# Patient Record
Sex: MALE | Race: OTHER | NOT HISPANIC OR LATINO | URBAN - METROPOLITAN AREA
[De-identification: names, ages, dates, MRNs, and addresses within clinical notes are randomized per-mention and may not be internally consistent; named-entity substitution may affect disease eponyms.]

---

## 2023-08-14 PROBLEM — L30.9 DERMATITIS OF EXTERNAL EAR: Status: ACTIVE | Noted: 2023-08-14

## 2023-08-14 PROBLEM — R00.0 SINUS TACHYCARDIA: Status: ACTIVE | Noted: 2023-08-14

## 2023-08-14 PROBLEM — R31.29 PERSISTENT MICROSCOPIC HEMATURIA: Status: ACTIVE | Noted: 2023-08-14

## 2023-08-14 PROBLEM — M35.3 PMR (POLYMYALGIA RHEUMATICA) (MULTI): Status: ACTIVE | Noted: 2023-08-14

## 2023-08-14 PROBLEM — N52.2 DRUG-INDUCED ERECTILE DYSFUNCTION: Status: ACTIVE | Noted: 2023-08-14

## 2023-08-14 PROBLEM — I10 BENIGN ESSENTIAL HTN: Status: ACTIVE | Noted: 2023-08-14

## 2023-08-14 PROBLEM — L82.1 SEBORRHEIC KERATOSIS: Status: ACTIVE | Noted: 2023-08-14

## 2023-08-14 PROBLEM — M79.10 MYALGIA: Status: ACTIVE | Noted: 2023-08-14

## 2023-08-14 PROBLEM — R31.9 HEMATURIA: Status: ACTIVE | Noted: 2023-08-14

## 2023-08-14 PROBLEM — I10 HYPERTENSION, ACCELERATED: Status: ACTIVE | Noted: 2023-08-14

## 2023-08-14 PROBLEM — E78.2 MIXED HYPERLIPIDEMIA: Status: ACTIVE | Noted: 2023-08-14

## 2023-08-14 PROBLEM — R33.9 URINARY RETENTION: Status: ACTIVE | Noted: 2023-08-14

## 2023-08-14 PROBLEM — M25.50 ARTHRALGIA OF MULTIPLE SITES, BILATERAL: Status: ACTIVE | Noted: 2023-08-14

## 2023-08-14 RX ORDER — LISINOPRIL AND HYDROCHLOROTHIAZIDE 12.5; 2 MG/1; MG/1
1 TABLET ORAL DAILY
COMMUNITY
Start: 2021-05-19 | End: 2023-10-26 | Stop reason: SDUPTHER

## 2023-08-14 RX ORDER — DESONIDE 0.5 MG/G
CREAM TOPICAL
COMMUNITY

## 2023-08-14 RX ORDER — SILDENAFIL 50 MG/1
TABLET, FILM COATED ORAL
COMMUNITY
Start: 2021-11-17 | End: 2024-02-14 | Stop reason: SDUPTHER

## 2023-08-14 RX ORDER — ERGOCALCIFEROL 1.25 1/1
CAPSULE ORAL
COMMUNITY
Start: 2023-02-15

## 2023-08-14 RX ORDER — HYDROCHLOROTHIAZIDE 12.5 MG/1
1 TABLET ORAL DAILY
COMMUNITY
Start: 2022-05-19 | End: 2024-01-30 | Stop reason: SDUPTHER

## 2023-08-14 RX ORDER — KETOCONAZOLE 20 MG/G
CREAM TOPICAL
COMMUNITY

## 2023-08-14 RX ORDER — FLUOCINONIDE TOPICAL SOLUTION USP, 0.05% 0.5 MG/ML
SOLUTION TOPICAL
COMMUNITY

## 2023-08-14 RX ORDER — HYDROCORTISONE 25 MG/G
CREAM TOPICAL
COMMUNITY

## 2023-08-14 RX ORDER — NEBIVOLOL 10 MG/1
1 TABLET ORAL DAILY
COMMUNITY
Start: 2021-06-02 | End: 2024-01-30 | Stop reason: SDUPTHER

## 2023-08-14 RX ORDER — KETOCONAZOLE 20 MG/ML
SHAMPOO, SUSPENSION TOPICAL
COMMUNITY
Start: 2021-02-03

## 2023-08-15 NOTE — PROGRESS NOTES
"Subjective   Patient ID: Regis Mixon is a 63 y.o. male who presents for Hypertension, Hyperlipidemia, and Follow-up. I last saw the patient on 2/15/23.     HPI   Patient has no medical complaints today.     He has a BP log with him.     He notes that he has chronic dryness in bilateral ears. Aquaphor with some relief. He admits to some itching of the ears.     Review of Systems  Except positives as noted in the CC & HPI      Constitutional: Denies fevers, chills, night sweats, fatigue, weight changes, change in appetite    Eyes: Denies blurry vision, double vision    ENT: Denies otalgia, trouble hearing, tinnitus, vertigo, nasal congestion, rhinorrhea, sore throat    Neck: Denies swelling, masses    Cardiovascular: Denies chest pain, palpitations, edema, orthopnea, syncope    Respiratory: Denies dyspnea, cough, wheezing, postural nocturnal dyspnea    Gastrointestinal: Denies abdominal pain, nausea, vomiting, diarrhea, constipation, melena, hematochezia    Genitourinary: Denies dysuria, hematuria, frequency, urgency    Musculoskeletal: Denies back pain, neck pain, arthralgias, myalgias    Integumentary: Denies skin lesions, rashes, masses    Neurological: Denies dizziness, headaches, confusion, limb weakness, paresthesias, syncope, convulsions    Psychiatric: Denies depression, anxiety, homicidal ideations, suicidal ideations, sleep disturbances    Endocrine: Denies polyphagia, polydipsia, polyuria, weakness, hair thinning, heat intolerance, cold intolerance, weight changes    Heme/Lymph: Denies easy bruising, easy bleeding, swollen glands    Objective   /82 (BP Location: Left arm, Patient Position: Sitting)   Pulse 80   Temp 36.6 °C (97.9 °F)   Resp 16   Ht 1.778 m (5' 10\")   Wt 99.2 kg (218 lb 9.6 oz)   SpO2 95%   BMI 31.37 kg/m²     Physical Exam  136/72 on recheck of BP in the right arm. 126/82 on recheck of BP in the left arm.     General Appearance - well-developed, well-nourished, 63 y.o., White " male in no acute distress.     Skin - warm, pink and dry without rash or concerning lesions.     Mental Status - alert and oriented x 3. Normal mood and affect appropriate to mood.     Ears - TMs shiny and move well with insufflation. Ear canals are clear bilaterally. Slight dryness and scaling of the external ear.     Neck - supple without lymphadenopathy. Carotid pulses are normal without bruits. Thyroid is normal in midline without nodules.     Chest - lungs are clear to auscultation without rales, rhonchi or wheezes.     Heart - regular, rate and rhythm without murmurs, rubs or gallops.    Abdomen - soft, obese, protuberant, nontender, nondistended. No masses, hepatomegaly or splenomegaly is noted. No rebound, rigidity or guarding is noted. Bowel sounds are normoactive.    Extremities - no cyanosis, clubbing or edema. Pedal pulses are 2+ normal at the dorsalis pedis and posterior pulses bilaterally.     Neurological - cranial nerves II through XII are grossly intact. Motor strength 5/5 at all fours.     Assessment/Plan   1. Benign essential HTN  Follow Up In [facility] - PCP - Established    CBC and Auto Differential    Comprehensive Metabolic Panel    CANCELED: CBC and Auto Differential    CANCELED: Comprehensive Metabolic Panel    CANCELED: Follow Up In [facility] - PCP - Established      2. Dermatitis of external ear  alclometasone (Aclovate) 0.05 % cream      3. Mixed hyperlipidemia  Follow Up In [facility] - PCP - Established    Lipid Panel    CANCELED: Lipid Panel    CANCELED: Follow Up In [facility] - PCP - Established      4. PMR (polymyalgia rheumatica) (CMS/[facility])  Follow Up In [facility] - PCP - Established    CANCELED: Follow Up In [facility] - PCP - Established      5. Hyperglycemia  Comprehensive Metabolic Panel    Hemoglobin A1C    CANCELED: Comprehensive Metabolic Panel    CANCELED: Hemoglobin A1C      6. Class 1 obesity due to  excess calories without serious comorbidity with body mass index (BMI) of 31.0 to 31.9 in adult  CBC and Auto Differential    CANCELED: CBC and Auto Differential      7. Screening PSA (prostate specific antigen)  Prostate Specific Antigen, Screen    CANCELED: Prostate Specific Antigen, Screen      8. Need for Tdap vaccination  diphth,pertus,acell,,tetanus (BoostRIX) 2.5-8-5 Lf-mcg-Lf/0.5mL injection      Patient to continue current medications (with any exceptions as noted) and diet. Follow-up in 6 month(s) otherwise as needed.      Patient is to complete fasting CBC, CMP, lipid panel, A1c, PSA labs today. Will call patient with results when available.     Patient was started on:   Alclometasone 0.05% Cream, APPLY A THIN FILM TO AFFECT AREA(S)     Rx(s) sent to pharmacy.     Patient declines HIV and Hep C screening.     Tdap injection given in the office today.       Scribe Attestation  By signing my name below, [employer] Scribe   attest that this documentation has been prepared under the direction and in the presence of Terence Nagel MD.

## 2023-08-16 ENCOUNTER — OFFICE VISIT (OUTPATIENT)
Dept: PRIMARY CARE | Facility: CLINIC | Age: 63
End: 2023-08-16
Payer: COMMERCIAL

## 2023-08-16 ENCOUNTER — LAB (OUTPATIENT)
Dept: LAB | Facility: LAB | Age: 63
End: 2023-08-16
Payer: COMMERCIAL

## 2023-08-16 VITALS
TEMPERATURE: 97.9 F | OXYGEN SATURATION: 95 % | BODY MASS INDEX: 31.3 KG/M2 | DIASTOLIC BLOOD PRESSURE: 82 MMHG | HEIGHT: 70 IN | RESPIRATION RATE: 16 BRPM | SYSTOLIC BLOOD PRESSURE: 126 MMHG | HEART RATE: 80 BPM | WEIGHT: 218.6 LBS

## 2023-08-16 DIAGNOSIS — M35.3 PMR (POLYMYALGIA RHEUMATICA) (MULTI): ICD-10-CM

## 2023-08-16 DIAGNOSIS — R73.9 HYPERGLYCEMIA: ICD-10-CM

## 2023-08-16 DIAGNOSIS — I10 BENIGN ESSENTIAL HTN: ICD-10-CM

## 2023-08-16 DIAGNOSIS — E78.2 MIXED HYPERLIPIDEMIA: ICD-10-CM

## 2023-08-16 DIAGNOSIS — Z12.5 SCREENING PSA (PROSTATE SPECIFIC ANTIGEN): ICD-10-CM

## 2023-08-16 DIAGNOSIS — E66.09 CLASS 1 OBESITY DUE TO EXCESS CALORIES WITHOUT SERIOUS COMORBIDITY WITH BODY MASS INDEX (BMI) OF 31.0 TO 31.9 IN ADULT: ICD-10-CM

## 2023-08-16 DIAGNOSIS — I10 BENIGN ESSENTIAL HTN: Primary | ICD-10-CM

## 2023-08-16 DIAGNOSIS — L30.9 DERMATITIS OF EXTERNAL EAR: ICD-10-CM

## 2023-08-16 DIAGNOSIS — Z23 NEED FOR TDAP VACCINATION: ICD-10-CM

## 2023-08-16 PROBLEM — E66.811 CLASS 1 OBESITY DUE TO EXCESS CALORIES WITHOUT SERIOUS COMORBIDITY WITH BODY MASS INDEX (BMI) OF 31.0 TO 31.9 IN ADULT: Status: ACTIVE | Noted: 2023-08-16

## 2023-08-16 LAB
ALANINE AMINOTRANSFERASE (SGPT) (U/L) IN SER/PLAS: 19 U/L (ref 10–52)
ALBUMIN (G/DL) IN SER/PLAS: 4.4 G/DL (ref 3.4–5)
ALKALINE PHOSPHATASE (U/L) IN SER/PLAS: 50 U/L (ref 33–136)
ANION GAP IN SER/PLAS: 11 MMOL/L (ref 10–20)
ASPARTATE AMINOTRANSFERASE (SGOT) (U/L) IN SER/PLAS: 20 U/L (ref 9–39)
BASOPHILS (10*3/UL) IN BLOOD BY AUTOMATED COUNT: 0.03 X10E9/L (ref 0–0.1)
BASOPHILS/100 LEUKOCYTES IN BLOOD BY AUTOMATED COUNT: 0.4 % (ref 0–2)
BILIRUBIN TOTAL (MG/DL) IN SER/PLAS: 0.6 MG/DL (ref 0–1.2)
CALCIUM (MG/DL) IN SER/PLAS: 9.8 MG/DL (ref 8.6–10.3)
CARBON DIOXIDE, TOTAL (MMOL/L) IN SER/PLAS: 30 MMOL/L (ref 21–32)
CHLORIDE (MMOL/L) IN SER/PLAS: 100 MMOL/L (ref 98–107)
CHOLESTEROL (MG/DL) IN SER/PLAS: 208 MG/DL (ref 0–199)
CHOLESTEROL IN HDL (MG/DL) IN SER/PLAS: 38.2 MG/DL
CHOLESTEROL/HDL RATIO: 5.4
CREATININE (MG/DL) IN SER/PLAS: 1.26 MG/DL (ref 0.5–1.3)
EOSINOPHILS (10*3/UL) IN BLOOD BY AUTOMATED COUNT: 0.04 X10E9/L (ref 0–0.7)
EOSINOPHILS/100 LEUKOCYTES IN BLOOD BY AUTOMATED COUNT: 0.5 % (ref 0–6)
ERYTHROCYTE DISTRIBUTION WIDTH (RATIO) BY AUTOMATED COUNT: 12.8 % (ref 11.5–14.5)
ERYTHROCYTE MEAN CORPUSCULAR HEMOGLOBIN CONCENTRATION (G/DL) BY AUTOMATED: 32.5 G/DL (ref 32–36)
ERYTHROCYTE MEAN CORPUSCULAR VOLUME (FL) BY AUTOMATED COUNT: 91 FL (ref 80–100)
ERYTHROCYTES (10*6/UL) IN BLOOD BY AUTOMATED COUNT: 5.13 X10E12/L (ref 4.5–5.9)
ESTIMATED AVERAGE GLUCOSE FOR HBA1C: 114 MG/DL
GFR MALE: 65 ML/MIN/1.73M2
GLUCOSE (MG/DL) IN SER/PLAS: 108 MG/DL (ref 74–99)
HEMATOCRIT (%) IN BLOOD BY AUTOMATED COUNT: 46.5 % (ref 41–52)
HEMOGLOBIN (G/DL) IN BLOOD: 15.1 G/DL (ref 13.5–17.5)
HEMOGLOBIN A1C/HEMOGLOBIN TOTAL IN BLOOD: 5.6 %
IMMATURE GRANULOCYTES/100 LEUKOCYTES IN BLOOD BY AUTOMATED COUNT: 0.3 % (ref 0–0.9)
LDL: 110 MG/DL (ref 0–99)
LEUKOCYTES (10*3/UL) IN BLOOD BY AUTOMATED COUNT: 7.9 X10E9/L (ref 4.4–11.3)
LYMPHOCYTES (10*3/UL) IN BLOOD BY AUTOMATED COUNT: 1.85 X10E9/L (ref 1.2–4.8)
LYMPHOCYTES/100 LEUKOCYTES IN BLOOD BY AUTOMATED COUNT: 23.3 % (ref 13–44)
MONOCYTES (10*3/UL) IN BLOOD BY AUTOMATED COUNT: 0.39 X10E9/L (ref 0.1–1)
MONOCYTES/100 LEUKOCYTES IN BLOOD BY AUTOMATED COUNT: 4.9 % (ref 2–10)
NEUTROPHILS (10*3/UL) IN BLOOD BY AUTOMATED COUNT: 5.61 X10E9/L (ref 1.2–7.7)
NEUTROPHILS/100 LEUKOCYTES IN BLOOD BY AUTOMATED COUNT: 70.6 % (ref 40–80)
NON HDL CHOLESTEROL: 170 MG/DL
PLATELETS (10*3/UL) IN BLOOD AUTOMATED COUNT: 267 X10E9/L (ref 150–450)
POTASSIUM (MMOL/L) IN SER/PLAS: 3.9 MMOL/L (ref 3.5–5.3)
PROSTATE SPECIFIC ANTIGEN,SCREEN: 0.76 NG/ML (ref 0–4)
PROTEIN TOTAL: 7.3 G/DL (ref 6.4–8.2)
SODIUM (MMOL/L) IN SER/PLAS: 137 MMOL/L (ref 136–145)
TRIGLYCERIDE (MG/DL) IN SER/PLAS: 299 MG/DL (ref 0–149)
UREA NITROGEN (MG/DL) IN SER/PLAS: 23 MG/DL (ref 6–23)
VLDL: 60 MG/DL (ref 0–40)

## 2023-08-16 PROCEDURE — 3074F SYST BP LT 130 MM HG: CPT | Performed by: FAMILY MEDICINE

## 2023-08-16 PROCEDURE — 36415 COLL VENOUS BLD VENIPUNCTURE: CPT

## 2023-08-16 PROCEDURE — 83036 HEMOGLOBIN GLYCOSYLATED A1C: CPT

## 2023-08-16 PROCEDURE — 85025 COMPLETE CBC W/AUTO DIFF WBC: CPT

## 2023-08-16 PROCEDURE — 99214 OFFICE O/P EST MOD 30 MIN: CPT | Performed by: FAMILY MEDICINE

## 2023-08-16 PROCEDURE — 3079F DIAST BP 80-89 MM HG: CPT | Performed by: FAMILY MEDICINE

## 2023-08-16 PROCEDURE — 80053 COMPREHEN METABOLIC PANEL: CPT

## 2023-08-16 PROCEDURE — 80061 LIPID PANEL: CPT

## 2023-08-16 PROCEDURE — 3008F BODY MASS INDEX DOCD: CPT | Performed by: FAMILY MEDICINE

## 2023-08-16 PROCEDURE — 84153 ASSAY OF PSA TOTAL: CPT

## 2023-08-16 PROCEDURE — 1036F TOBACCO NON-USER: CPT | Performed by: FAMILY MEDICINE

## 2023-08-16 RX ORDER — ALCLOMETASONE DIPROPIONATE 0.5 MG/G
CREAM TOPICAL 2 TIMES DAILY
Qty: 15 G | Refills: 0 | Status: SHIPPED | OUTPATIENT
Start: 2023-08-16 | End: 2023-08-17 | Stop reason: SDUPTHER

## 2023-08-16 ASSESSMENT — PATIENT HEALTH QUESTIONNAIRE - PHQ9
2. FEELING DOWN, DEPRESSED OR HOPELESS: NOT AT ALL
1. LITTLE INTEREST OR PLEASURE IN DOING THINGS: NOT AT ALL
SUM OF ALL RESPONSES TO PHQ9 QUESTIONS 1 & 2: 0

## 2023-08-16 NOTE — PATIENT INSTRUCTIONS
Patient to continue current medications (with any exceptions as noted) and diet. Follow-up in 6 month(s) otherwise as needed.      Patient is to complete fasting CBC, CMP, lipid panel, A1c, PSA labs today. Will call patient with results when available.     Patient was started on:   Alclometasone 0.05% Cream, APPLY A THIN FILM TO AFFECT AREA(S)     Rx(s) sent to pharmacy.     Patient declines HIV and Hep C screening.     Tdap injection given in the office today.

## 2023-08-17 DIAGNOSIS — L30.9 DERMATITIS OF EXTERNAL EAR: ICD-10-CM

## 2023-08-17 RX ORDER — ALCLOMETASONE DIPROPIONATE 0.5 MG/G
CREAM TOPICAL 2 TIMES DAILY
Qty: 45 G | Refills: 0 | Status: SHIPPED | OUTPATIENT
Start: 2023-08-17 | End: 2024-02-14 | Stop reason: SDUPTHER

## 2023-08-17 NOTE — TELEPHONE ENCOUNTER
Rx refill request     Name: Regis Santana   : 1960  Medication Name: Alclometasone 0.05% topical cream   Pharmacy: [facility]   Date of last appointment: 2023  Date of next appointment: 2024  Best number to reach patient: [phone]    Pharmacy states they have this in a 45gm tube due to backorder if not then is there an alternative?      Please advise  New rx pending

## 2023-08-19 NOTE — RESULT ENCOUNTER NOTE
Please call the patient regarding his abnormal result.  Blood sugar is mildly elevated at 108 but hemoglobin A1c is just within normal range at 5.6.  T.Chol 208, , HDL 38, . Follow low cholesterol, low fat diet and exercise as tolerated.  Might consider cholesterol-lowering medication.  PSA is normal at 0.76.  CBC is normal.

## 2023-08-27 DIAGNOSIS — E78.2 MIXED HYPERLIPIDEMIA: ICD-10-CM

## 2023-08-27 DIAGNOSIS — E78.2 MIXED HYPERLIPIDEMIA: Primary | ICD-10-CM

## 2023-08-27 RX ORDER — PITAVASTATIN CALCIUM 4.18 MG/1
4 TABLET, FILM COATED ORAL DAILY
Qty: 30 TABLET | Refills: 0 | Status: SHIPPED | OUTPATIENT
Start: 2023-08-27 | End: 2023-08-27 | Stop reason: SDUPTHER

## 2023-08-27 RX ORDER — PITAVASTATIN CALCIUM 4.18 MG/1
4 TABLET, FILM COATED ORAL DAILY
Qty: 90 TABLET | Refills: 3 | Status: SHIPPED | OUTPATIENT
Start: 2023-08-27 | End: 2023-09-06 | Stop reason: SDUPTHER

## 2023-08-28 ENCOUNTER — TELEPHONE (OUTPATIENT)
Dept: PRIMARY CARE | Facility: CLINIC | Age: 63
End: 2023-08-28

## 2023-08-28 NOTE — TELEPHONE ENCOUNTER
Patient called stating he talked to dr. Nagel about cholesterol meds based off his labs and he was supposed to send a script to [facility] but they havent received anything yet. Please advise

## 2023-09-01 ENCOUNTER — TELEPHONE (OUTPATIENT)
Dept: PRIMARY CARE | Facility: CLINIC | Age: 63
End: 2023-09-01
Payer: COMMERCIAL

## 2023-09-01 DIAGNOSIS — E78.2 MIXED HYPERLIPIDEMIA: Primary | ICD-10-CM

## 2023-09-01 NOTE — TELEPHONE ENCOUNTER
[facility] called stating the clarification needed is that there is only name brand for this medication.   Please advise  [facility] Clarification line : [phone]  ID : [id number]

## 2023-09-01 NOTE — TELEPHONE ENCOUNTER
Patient called in stating he received an email from [facility] regarding his prescription for pitavastatin. He stated the email is advising him the pharmacy is needing to review this prescription and is needing to speak with Dr. Nagel. Patient stated there was no other information provided in this email and is asking for Dr. Nagel to contact [facility] at [phone]  Please Advise

## 2023-09-05 NOTE — TELEPHONE ENCOUNTER
Ashley from [facility] called in regarding this message. She stated the patient is showing an allergy to rosuvastatin in their system and they are wondering if Dr. Nagel is ok with the patient taking this as it is in the same family of medications  Please Advise

## 2023-09-06 RX ORDER — PITAVASTATIN CALCIUM 4.18 MG/1
4 TABLET, FILM COATED ORAL DAILY
Qty: 14 TABLET | Refills: 0 | Status: SHIPPED | OUTPATIENT
Start: 2023-09-06 | End: 2023-09-07 | Stop reason: SDUPTHER

## 2023-09-06 NOTE — TELEPHONE ENCOUNTER
Pitavastatin was sent to Pharmacy. Should rx be for Pravastatin? He was on Pravastatin 80 mg previously. Please advise.

## 2023-09-07 RX ORDER — PITAVASTATIN CALCIUM 4.18 MG/1
TABLET, FILM COATED ORAL
Qty: 90 TABLET | Refills: 3 | Status: SHIPPED | OUTPATIENT
Start: 2023-09-07 | End: 2023-11-17 | Stop reason: SDUPTHER

## 2023-09-07 NOTE — TELEPHONE ENCOUNTER
Patient called in stating [facility] spoke with [facility] and was advised his prescription for pitavastatin calcium 4 mg was canceled. He is asking for this to be resent  Please Advise

## 2023-10-26 DIAGNOSIS — I10 BENIGN ESSENTIAL HTN: ICD-10-CM

## 2023-10-26 RX ORDER — LISINOPRIL AND HYDROCHLOROTHIAZIDE 12.5; 2 MG/1; MG/1
1 TABLET ORAL DAILY
Qty: 90 TABLET | Refills: 3 | Status: SHIPPED | OUTPATIENT
Start: 2023-10-26 | End: 2023-11-17 | Stop reason: SDUPTHER

## 2023-10-26 NOTE — TELEPHONE ENCOUNTER
Rx Refill Request     Name: Regis LOMBARDI Mixon  :  1960   Medication Name:  Lisinopril/hydrochlorothiazide tabs   [facility]   Date of last appointment:  23   Date of next appointment:  24  Best number to reach patient:  [phone]

## 2023-11-17 DIAGNOSIS — E78.2 MIXED HYPERLIPIDEMIA: ICD-10-CM

## 2023-11-17 DIAGNOSIS — I10 BENIGN ESSENTIAL HTN: Primary | ICD-10-CM

## 2023-11-17 NOTE — TELEPHONE ENCOUNTER
Patient called in stating he has changed pharmacies due to his insurance. He is asking for refills on his lisinopriL-hydrochlorothiazide 20-12.5 mg and pitavastatin calcium 4 mg to [facility] [address]

## 2023-11-20 RX ORDER — PITAVASTATIN CALCIUM 4.18 MG/1
TABLET, FILM COATED ORAL
Qty: 90 TABLET | Refills: 3 | Status: SHIPPED | OUTPATIENT
Start: 2023-11-20

## 2023-11-20 RX ORDER — LISINOPRIL AND HYDROCHLOROTHIAZIDE 12.5; 2 MG/1; MG/1
1 TABLET ORAL DAILY
Qty: 90 TABLET | Refills: 3 | Status: SHIPPED | OUTPATIENT
Start: 2023-11-20

## 2023-12-20 ENCOUNTER — TELEPHONE (OUTPATIENT)
Dept: PRIMARY CARE | Facility: CLINIC | Age: 63
End: 2023-12-20
Payer: COMMERCIAL

## 2023-12-20 NOTE — TELEPHONE ENCOUNTER
Patient called in stating he never received his prescription for pitavastatin calcium 4 mg. He is wondering if this can be resent to [id number] [address]  Please Advise

## 2023-12-20 NOTE — TELEPHONE ENCOUNTER
Patient called back stating he is needing a PA for this through his insurance. He stated he has 5 pills left and would like this started ASAP  Please Advise

## 2023-12-20 NOTE — TELEPHONE ENCOUNTER
Pa has been started. Per cover My meds no PA is needed and they canceled by request. I had pharmacy try to run it and it was still flagging for PA. I did fax an urgent PA request to [facility] since their electronic system was stating no Pa.

## 2023-12-21 NOTE — TELEPHONE ENCOUNTER
Received faxed confirmation from [facility] that a PA is not needed. I have faxed a copy of this to his pharmacy with instructions to fill medication for the patient.

## 2023-12-26 ENCOUNTER — TELEPHONE (OUTPATIENT)
Dept: PRIMARY CARE | Facility: CLINIC | Age: 63
End: 2023-12-26
Payer: COMMERCIAL

## 2023-12-26 NOTE — TELEPHONE ENCOUNTER
Mr. Mixon phoned to inform us the pharmacy is unable to fill his rx for pitavastatin.  After checking the patient's chart,  I phoned the pharmacy to inquire whether the PA is needed or not.  [facility] stated they have sent several request that had been rejected. After verifying the number, it had been determined he/ they were sending the faxes to our office phone [phone]).  I provided him with the correct fax number to our office [phone].  He claims [employer] notified them today that a PA is needed.    Please advise.  Thank you.

## 2023-12-26 NOTE — TELEPHONE ENCOUNTER
I spoke to [facility] on 12/21 and the pharmacist told me him PA request has to go through [facility]. [facility] is telling me no PA needed ( see scan). I faxed a copy of this to [facility] on 12/21/2023.  I will see if I have the information to sent to [facility]. I have yet to receive an [employer] fax from [facility].

## 2023-12-27 NOTE — TELEPHONE ENCOUNTER
I spoke with Jeromy again at [facility]. He again states I need to submit the request to [facility]. He is aware I have done that. He then asked that I try brand name and see if that works as the generic is not available and that is what they have been giving him. I have submitted this for brand Livalo today which it did seem to accept via [employer]. I have marked it urgent.  If this fails pt will have to reach out to his insurance.

## 2024-01-30 DIAGNOSIS — I10 BENIGN ESSENTIAL HTN: ICD-10-CM

## 2024-01-30 RX ORDER — HYDROCHLOROTHIAZIDE 12.5 MG/1
12.5 TABLET ORAL DAILY
Qty: 90 TABLET | Refills: 3 | Status: SHIPPED | OUTPATIENT
Start: 2024-01-30 | End: 2024-02-14 | Stop reason: SDUPTHER

## 2024-01-30 RX ORDER — NEBIVOLOL 10 MG/1
10 TABLET ORAL DAILY
Qty: 90 TABLET | Refills: 3 | Status: SHIPPED | OUTPATIENT
Start: 2024-01-30 | End: 2024-02-14 | Stop reason: SDUPTHER

## 2024-01-30 NOTE — TELEPHONE ENCOUNTER
Rx Refill Request     Name: Regis LOMBARDI Mixon  :  1960   Medication Name:  nebivolol 10mg tabs, and hydrochlorothiazide 12.5mg tabs   [facility]   Date of last appointment:  2023  Date of next appointment:  2024  Best number to reach patient:  [phone]

## 2024-02-14 ENCOUNTER — OFFICE VISIT (OUTPATIENT)
Dept: PRIMARY CARE | Facility: CLINIC | Age: 64
End: 2024-02-14
Payer: COMMERCIAL

## 2024-02-14 VITALS
HEIGHT: 70 IN | DIASTOLIC BLOOD PRESSURE: 82 MMHG | BODY MASS INDEX: 31.12 KG/M2 | OXYGEN SATURATION: 99 % | HEART RATE: 87 BPM | RESPIRATION RATE: 16 BRPM | WEIGHT: 217.4 LBS | TEMPERATURE: 97.6 F | SYSTOLIC BLOOD PRESSURE: 120 MMHG

## 2024-02-14 DIAGNOSIS — I10 BENIGN ESSENTIAL HTN: Primary | ICD-10-CM

## 2024-02-14 DIAGNOSIS — Z12.5 SCREENING PSA (PROSTATE SPECIFIC ANTIGEN): ICD-10-CM

## 2024-02-14 DIAGNOSIS — R73.9 HYPERGLYCEMIA: ICD-10-CM

## 2024-02-14 DIAGNOSIS — E78.2 MIXED HYPERLIPIDEMIA: ICD-10-CM

## 2024-02-14 DIAGNOSIS — L30.9 DERMATITIS OF EXTERNAL EAR: ICD-10-CM

## 2024-02-14 DIAGNOSIS — N52.9 ERECTILE DYSFUNCTION, UNSPECIFIED ERECTILE DYSFUNCTION TYPE: ICD-10-CM

## 2024-02-14 PROBLEM — M35.3 PMR (POLYMYALGIA RHEUMATICA) (MULTI): Status: RESOLVED | Noted: 2023-08-14 | Resolved: 2024-02-14

## 2024-02-14 PROCEDURE — 3079F DIAST BP 80-89 MM HG: CPT | Performed by: FAMILY MEDICINE

## 2024-02-14 PROCEDURE — 99214 OFFICE O/P EST MOD 30 MIN: CPT | Performed by: FAMILY MEDICINE

## 2024-02-14 PROCEDURE — 1036F TOBACCO NON-USER: CPT | Performed by: FAMILY MEDICINE

## 2024-02-14 PROCEDURE — 3074F SYST BP LT 130 MM HG: CPT | Performed by: FAMILY MEDICINE

## 2024-02-14 PROCEDURE — 3008F BODY MASS INDEX DOCD: CPT | Performed by: FAMILY MEDICINE

## 2024-02-14 RX ORDER — HYDROCHLOROTHIAZIDE 12.5 MG/1
12.5 TABLET ORAL DAILY
Qty: 90 TABLET | Refills: 3 | Status: SHIPPED | OUTPATIENT
Start: 2024-02-14

## 2024-02-14 RX ORDER — NEBIVOLOL 10 MG/1
10 TABLET ORAL DAILY
Qty: 90 TABLET | Refills: 3 | Status: SHIPPED | OUTPATIENT
Start: 2024-02-14

## 2024-02-14 RX ORDER — ALCLOMETASONE DIPROPIONATE 0.5 MG/G
CREAM TOPICAL 2 TIMES DAILY
Qty: 45 G | Refills: 0 | Status: SHIPPED | OUTPATIENT
Start: 2024-02-14

## 2024-02-14 RX ORDER — SILDENAFIL 50 MG/1
TABLET, FILM COATED ORAL
Qty: 10 TABLET | Refills: 2 | Status: SHIPPED | OUTPATIENT
Start: 2024-02-14

## 2024-02-14 ASSESSMENT — PATIENT HEALTH QUESTIONNAIRE - PHQ9
SUM OF ALL RESPONSES TO PHQ9 QUESTIONS 1 & 2: 0
1. LITTLE INTEREST OR PLEASURE IN DOING THINGS: NOT AT ALL
2. FEELING DOWN, DEPRESSED OR HOPELESS: NOT AT ALL

## 2024-02-14 ASSESSMENT — ANXIETY QUESTIONNAIRES
6. BECOMING EASILY ANNOYED OR IRRITABLE: NOT AT ALL
4. TROUBLE RELAXING: NOT AT ALL
GAD7 TOTAL SCORE: 0
7. FEELING AFRAID AS IF SOMETHING AWFUL MIGHT HAPPEN: NOT AT ALL
IF YOU CHECKED OFF ANY PROBLEMS ON THIS QUESTIONNAIRE, HOW DIFFICULT HAVE THESE PROBLEMS MADE IT FOR YOU TO DO YOUR WORK, TAKE CARE OF THINGS AT HOME, OR GET ALONG WITH OTHER PEOPLE: NOT DIFFICULT AT ALL
5. BEING SO RESTLESS THAT IT IS HARD TO SIT STILL: NOT AT ALL
2. NOT BEING ABLE TO STOP OR CONTROL WORRYING: NOT AT ALL
1. FEELING NERVOUS, ANXIOUS, OR ON EDGE: NOT AT ALL
3. WORRYING TOO MUCH ABOUT DIFFERENT THINGS: NOT AT ALL

## 2024-02-14 NOTE — PROGRESS NOTES
"Chief Complaint   Patient presents with    Follow-up       HPI: Regis Mixon is a 63 y.o. male presenting for follow-up.  I last saw the patient on 8/16/2023 .    Patient has BP log with him.  His blood pressure readings at home range around 115/70.    Patient contracted COVID 2 weeks ago. Patient is still having a lingering cough and some watery eyes.    Patient has no medical complaints today or refill requests for rooming MA.     ROS    Except positives as noted in the CC & HPI   Constitutional: Denies fevers, chills, night sweats, fatigue, weight changes, change in appetite   Eyes: Denies blurry vision, double vision   ENT: Denies otalgia, trouble hearing, tinnitus, vertigo, nasal congestion, rhinorrhea, sore throat   Neck: Denies swelling, masses   Cardiovascular: Denies chest pain, palpitations, edema, orthopnea, syncope   Respiratory: Denies dyspnea, cough, wheezing, postural nocturnal dyspnea   Gastrointestinal: Denies abdominal pain, nausea, vomiting, diarrhea, constipation, melena, hematochezia   Genitourinary: Denies dysuria, hematuria, frequency, urgency   Musculoskeletal: Denies back pain, neck pain, arthralgias, myalgias   Integumentary: Denies skin lesions, rashes, masses   Neurological: Denies dizziness, headaches, confusion, limb weakness, paresthesias, syncope, convulsions   Psychiatric: Denies depression, anxiety, homicidal ideations, suicidal ideations, sleep disturbances   Endocrine: Denies polyphagia, polydipsia, polyuria, weakness, hair thinning, heat intolerance, cold intolerance, weight changes   Heme/Lymph: Denies easy bruising, easy bleeding, swollen glands     Vitals:    02/14/24 0759   BP: 120/82   Pulse: 87   Resp: 16   Temp: 36.4 °C (97.6 °F)   SpO2: 99%   Weight: 98.6 kg (217 lb 6.4 oz)   Height: 1.778 m (5' 10\")       PHYSICAL EXAM    GENERAL APPEARANCE: well-developed, well-nourished, 63 y.o. male in no acute distress.  SKIN: warm, pink and dry without rash or concerning " lesions.  MENTAL STATUS: alert and oriented × 3. Normal mood and affect appropriate to mood.  NECK: supple without lymphadenopathy. Carotid pulses are normal without bruits. Thyroid - is normal in midline without nodules.  CHEST: lungs are clear to auscultation without rales, rhonchi or wheezes.  HEART: regular, rate and rhythm without murmurs, rubs or gallops.  ABDOMEN: soft, mildly obese, protuberant, nondistended. No masses, hepatomegaly or splenomegaly is noted.  EXTREMITIES: no cyanosis, clubbing or edema. Pedal pulses are 2+ normal at the dorsalis pedis and posterior tibial pulses bilaterally.  NEUROLOGICAL: cranial nerves II through XII are grossly intact. Motor strength 5/5 at all fours.    Below is the patient's most recent value for Albumin, ALT, AST, BUN, Calcium, Chloride, Cholesterol, CO2, Creatinine, GFR, Glucose, HDL, Hematocrit, Hemoglobin, Hemoglobin A1C, LDL, Magnesium, Phosphorus, Platelets, Potassium, PSA, Sodium, Triglycerides, and WBC.   Lab Results   Component Value Date    ALBUMIN 4.4 08/16/2023    ALT 19 08/16/2023    AST 20 08/16/2023    BUN 23 08/16/2023    CALCIUM 9.8 08/16/2023     08/16/2023    CHOL 208 (H) 08/16/2023    CO2 30 08/16/2023    CREATININE 1.26 08/16/2023    HDL 38.2 (A) 08/16/2023    HCT 46.5 08/16/2023    HGB 15.1 08/16/2023    HGBA1C 5.6 08/16/2023     08/16/2023    K 3.9 08/16/2023    PSA 0.80 05/19/2021     08/16/2023    TRIG 299 (H) 08/16/2023    WBC 7.9 08/16/2023         ASSESSMENT:  1. Benign essential HTN  Follow Up In [facility] - PCP - Established    hydroCHLOROthiazide (HYDRODiuril) 12.5 mg tablet    nebivolol (Bystolic) 10 mg tablet    CBC and Auto Differential    Comprehensive Metabolic Panel      2. Mixed hyperlipidemia  Follow Up In [facility] - PCP - Established    Lipid Panel      3. Dermatitis of external ear  alclometasone (Aclovate) 0.05 % cream      4. Erectile dysfunction, unspecified erectile dysfunction type   sildenafil (Viagra) 50 mg tablet      5. Screening PSA (prostate specific antigen)  Prostate Specific Antigen, Screen      6. Hyperglycemia  Hemoglobin A1C          PLAN:  Orders Placed This Encounter   Procedures    CBC and Auto Differential     Standing Status:   Future     Standing Expiration Date:   2/14/2025     Order Specific Question:   Release result to MyChart     Answer:   Immediate    Comprehensive Metabolic Panel     Standing Status:   Future     Standing Expiration Date:   2/14/2025     Order Specific Question:   Release result to MyChart     Answer:   Immediate    Lipid Panel     Standing Status:   Future     Standing Expiration Date:   2/14/2025     Order Specific Question:   Release result to MyChart     Answer:   Immediate    Prostate Specific Antigen, Screen     Standing Status:   Future     Standing Expiration Date:   2/14/2025     Order Specific Question:   Release result to MyChart     Answer:   Immediate    Hemoglobin A1C     Standing Status:   Future     Standing Expiration Date:   2/14/2025     Order Specific Question:   Release result to MyChart     Answer:   Immediate     I have instructed the patient to follow-up with me in 6 months or sooner if needed.  Patient will need fasting labs prior to his appointment to include a CBC, CMP, lipid panel, PSA and hemoglobin A1c.

## 2024-02-19 ENCOUNTER — TELEPHONE (OUTPATIENT)
Dept: PRIMARY CARE | Facility: CLINIC | Age: 64
End: 2024-02-19
Payer: COMMERCIAL

## 2024-02-19 NOTE — TELEPHONE ENCOUNTER
Patient  called with  medication questions a pharmacist told him that he should not be taking or mixing lisinopriL-hydrochlorothiazide 20-12.5 mg tablet  and hydroCHLOROthiazide (HYDRODiuril) 12.5 mg tablet . He would like some clarification. Please advise

## 2024-02-19 NOTE — TELEPHONE ENCOUNTER
Called and spoke with patient regarding his message about the combination of lisinopril 10-hydrochlorothiazide 12.5 mg and hydrochlorothiazide 12.5 mg once daily.  He is perfectly fine taking both medications as it is not uncommon for 25 mg of hydrochlorothiazide to be prescribed.  Patient is doing well on his current medications unfortunately, the pharmacist gave him erroneous information.

## 2024-03-29 DIAGNOSIS — E78.2 MIXED HYPERLIPIDEMIA: Primary | ICD-10-CM

## 2024-03-29 RX ORDER — PRAVASTATIN SODIUM 40 MG/1
40 TABLET ORAL NIGHTLY
Qty: 90 TABLET | Refills: 3 | Status: SHIPPED | OUTPATIENT
Start: 2024-03-29

## 2024-05-21 ENCOUNTER — TELEPHONE (OUTPATIENT)
Dept: PRIMARY CARE | Facility: CLINIC | Age: 64
End: 2024-05-21
Payer: COMMERCIAL

## 2024-07-08 ENCOUNTER — TELEPHONE (OUTPATIENT)
Dept: PRIMARY CARE | Facility: CLINIC | Age: 64
End: 2024-07-08
Payer: COMMERCIAL

## 2024-07-08 NOTE — LETTER
7/16/24    ATTN: SO Submissions    RE: Regis Mixon         1960      To Whom It May Concern:    Regis Mixon, has been under my care for his chronic medical conditions for several years. On January 10, 2022, Regis was diagnosed with polymyalgia likely triggered by Rosuvastatin use. After a change in medication regimen the patient's symptoms were resolved. The patient is currently on no treatment for polymyalgia. It is my medical opinion that his polymyalgia diagnosis is no longer active. I ask that you take my statement into consideration when reviewing the patient's case.      Please feel free to contact my office with any questions or concerns at [phone].    Sincerely,        Terence Nagel MD

## 2024-07-08 NOTE — LETTER
Terence Nagel MD  [address] [address]  [phone]      7/16/24    ATTN: SO Submissions    RE: Regis Mixon         1960      To Whom It May Concern:    Regis Mixon, has been under my care for his chronic medical conditions for several years. On January 10, 2022, Regis was diagnosed with polymyalgia likely triggered by Rosuvastatin use. After a change in medication regimen the patient's symptoms were resolved. The patient is currently on no treatment for polymyalgia. It is my medical opinion that his polymyalgia diagnosis is no longer active. I ask that you take my statement into consideration when reviewing the patient's case.    Please feel free to contact my office with any questions or concerns at [phone].    Sincerely,          Terence Nagel MD

## 2024-07-08 NOTE — TELEPHONE ENCOUNTER
"Regis called in and asked for a call back to either he or his wife. They have questions regarding life insurance requirements. He was unable to provide additional information and just stated that his wife and \"Dr. Nagel's nurse\" would know what is needed. Please advise.   "

## 2024-07-09 NOTE — TELEPHONE ENCOUNTER
Left VM to call the office back during office hours.     Dr. Nagel would like to know what the pts concerns are for his life insurance before speaking with him.

## 2024-07-11 NOTE — TELEPHONE ENCOUNTER
Regis and his wife called back - The insurance is questioning his Myalgia diagnosis. She said the insurance is asking for the following:    Statement confirming diagnosis  Severity of condition  What, if any, causes Myalgia  Treatment plan  Current status    Please advise.

## 2024-07-16 NOTE — TELEPHONE ENCOUNTER
I was able to speak with Lainey and Regis today regarding the letter. The letter was drafted and was given to Dr. Nagel for review and signature. Letter will need to be faxed to [phone].

## 2024-08-14 ENCOUNTER — APPOINTMENT (OUTPATIENT)
Dept: PRIMARY CARE | Facility: CLINIC | Age: 64
End: 2024-08-14
Payer: COMMERCIAL

## 2024-08-14 ENCOUNTER — LAB (OUTPATIENT)
Dept: LAB | Facility: LAB | Age: 64
End: 2024-08-14
Payer: COMMERCIAL

## 2024-08-14 VITALS
SYSTOLIC BLOOD PRESSURE: 120 MMHG | HEART RATE: 76 BPM | WEIGHT: 218 LBS | TEMPERATURE: 97.5 F | HEIGHT: 70 IN | OXYGEN SATURATION: 98 % | RESPIRATION RATE: 16 BRPM | BODY MASS INDEX: 31.21 KG/M2 | DIASTOLIC BLOOD PRESSURE: 90 MMHG

## 2024-08-14 DIAGNOSIS — Z12.5 SCREENING PSA (PROSTATE SPECIFIC ANTIGEN): ICD-10-CM

## 2024-08-14 DIAGNOSIS — L30.9 DERMATITIS OF EXTERNAL EAR: ICD-10-CM

## 2024-08-14 DIAGNOSIS — E78.2 MIXED HYPERLIPIDEMIA: ICD-10-CM

## 2024-08-14 DIAGNOSIS — I10 BENIGN ESSENTIAL HTN: ICD-10-CM

## 2024-08-14 DIAGNOSIS — Z23 NEED FOR TDAP VACCINATION: ICD-10-CM

## 2024-08-14 DIAGNOSIS — E66.09 CLASS 1 OBESITY DUE TO EXCESS CALORIES WITHOUT SERIOUS COMORBIDITY WITH BODY MASS INDEX (BMI) OF 31.0 TO 31.9 IN ADULT: ICD-10-CM

## 2024-08-14 DIAGNOSIS — Z00.00 ROUTINE GENERAL MEDICAL EXAMINATION AT A HEALTH CARE FACILITY: Primary | ICD-10-CM

## 2024-08-14 DIAGNOSIS — R73.9 HYPERGLYCEMIA: ICD-10-CM

## 2024-08-14 LAB
ALBUMIN SERPL BCP-MCNC: 4.4 G/DL (ref 3.4–5)
ALP SERPL-CCNC: 53 U/L (ref 33–136)
ALT SERPL W P-5'-P-CCNC: 19 U/L (ref 10–52)
ANION GAP SERPL CALC-SCNC: 12 MMOL/L (ref 10–20)
APPEARANCE UR: CLEAR
AST SERPL W P-5'-P-CCNC: 18 U/L (ref 9–39)
BASOPHILS # BLD AUTO: 0.03 X10*3/UL (ref 0–0.1)
BASOPHILS NFR BLD AUTO: 0.4 %
BILIRUB SERPL-MCNC: 0.5 MG/DL (ref 0–1.2)
BILIRUB UR STRIP.AUTO-MCNC: NEGATIVE MG/DL
BUN SERPL-MCNC: 23 MG/DL (ref 6–23)
CALCIUM SERPL-MCNC: 9.7 MG/DL (ref 8.6–10.3)
CHLORIDE SERPL-SCNC: 102 MMOL/L (ref 98–107)
CHOLEST SERPL-MCNC: 200 MG/DL (ref 0–199)
CHOLESTEROL/HDL RATIO: 4.6
CO2 SERPL-SCNC: 29 MMOL/L (ref 21–32)
COLOR UR: NORMAL
CREAT SERPL-MCNC: 1.11 MG/DL (ref 0.5–1.3)
EGFRCR SERPLBLD CKD-EPI 2021: 75 ML/MIN/1.73M*2
EOSINOPHIL # BLD AUTO: 0.1 X10*3/UL (ref 0–0.7)
EOSINOPHIL NFR BLD AUTO: 1.3 %
ERYTHROCYTE [DISTWIDTH] IN BLOOD BY AUTOMATED COUNT: 13.2 % (ref 11.5–14.5)
EST. AVERAGE GLUCOSE BLD GHB EST-MCNC: 111 MG/DL
GLUCOSE SERPL-MCNC: 94 MG/DL (ref 74–99)
GLUCOSE UR STRIP.AUTO-MCNC: NORMAL MG/DL
HBA1C MFR BLD: 5.5 %
HCT VFR BLD AUTO: 45.9 % (ref 41–52)
HDLC SERPL-MCNC: 43.3 MG/DL
HGB BLD-MCNC: 15.2 G/DL (ref 13.5–17.5)
IMM GRANULOCYTES # BLD AUTO: 0.03 X10*3/UL (ref 0–0.7)
IMM GRANULOCYTES NFR BLD AUTO: 0.4 % (ref 0–0.9)
KETONES UR STRIP.AUTO-MCNC: NEGATIVE MG/DL
LDLC SERPL CALC-MCNC: 94 MG/DL
LEUKOCYTE ESTERASE UR QL STRIP.AUTO: NEGATIVE
LYMPHOCYTES # BLD AUTO: 1.81 X10*3/UL (ref 1.2–4.8)
LYMPHOCYTES NFR BLD AUTO: 22.7 %
MCH RBC QN AUTO: 29.7 PG (ref 26–34)
MCHC RBC AUTO-ENTMCNC: 33.1 G/DL (ref 32–36)
MCV RBC AUTO: 90 FL (ref 80–100)
MONOCYTES # BLD AUTO: 0.38 X10*3/UL (ref 0.1–1)
MONOCYTES NFR BLD AUTO: 4.8 %
NEUTROPHILS # BLD AUTO: 5.61 X10*3/UL (ref 1.2–7.7)
NEUTROPHILS NFR BLD AUTO: 70.4 %
NITRITE UR QL STRIP.AUTO: NEGATIVE
NON HDL CHOLESTEROL: 157 MG/DL (ref 0–149)
NRBC BLD-RTO: 0 /100 WBCS (ref 0–0)
PH UR STRIP.AUTO: 7 [PH]
PLATELET # BLD AUTO: 247 X10*3/UL (ref 150–450)
POTASSIUM SERPL-SCNC: 4.1 MMOL/L (ref 3.5–5.3)
PROT SERPL-MCNC: 7.4 G/DL (ref 6.4–8.2)
PROT UR STRIP.AUTO-MCNC: NEGATIVE MG/DL
PSA SERPL-MCNC: 0.9 NG/ML
RBC # BLD AUTO: 5.12 X10*6/UL (ref 4.5–5.9)
RBC # UR STRIP.AUTO: NEGATIVE /UL
SODIUM SERPL-SCNC: 139 MMOL/L (ref 136–145)
SP GR UR STRIP.AUTO: 1.02
TRIGL SERPL-MCNC: 314 MG/DL (ref 0–149)
UROBILINOGEN UR STRIP.AUTO-MCNC: NORMAL MG/DL
VLDL: 63 MG/DL (ref 0–40)
WBC # BLD AUTO: 8 X10*3/UL (ref 4.4–11.3)

## 2024-08-14 PROCEDURE — 99396 PREV VISIT EST AGE 40-64: CPT | Performed by: FAMILY MEDICINE

## 2024-08-14 PROCEDURE — 84153 ASSAY OF PSA TOTAL: CPT

## 2024-08-14 PROCEDURE — 90471 IMMUNIZATION ADMIN: CPT | Performed by: FAMILY MEDICINE

## 2024-08-14 PROCEDURE — 80061 LIPID PANEL: CPT

## 2024-08-14 PROCEDURE — 3080F DIAST BP >= 90 MM HG: CPT | Performed by: FAMILY MEDICINE

## 2024-08-14 PROCEDURE — 1036F TOBACCO NON-USER: CPT | Performed by: FAMILY MEDICINE

## 2024-08-14 PROCEDURE — 80053 COMPREHEN METABOLIC PANEL: CPT

## 2024-08-14 PROCEDURE — 83036 HEMOGLOBIN GLYCOSYLATED A1C: CPT

## 2024-08-14 PROCEDURE — 81003 URINALYSIS AUTO W/O SCOPE: CPT

## 2024-08-14 PROCEDURE — 3008F BODY MASS INDEX DOCD: CPT | Performed by: FAMILY MEDICINE

## 2024-08-14 PROCEDURE — 85025 COMPLETE CBC W/AUTO DIFF WBC: CPT

## 2024-08-14 PROCEDURE — 3074F SYST BP LT 130 MM HG: CPT | Performed by: FAMILY MEDICINE

## 2024-08-14 PROCEDURE — 90715 TDAP VACCINE 7 YRS/> IM: CPT | Performed by: FAMILY MEDICINE

## 2024-08-14 ASSESSMENT — PATIENT HEALTH QUESTIONNAIRE - PHQ9
1. LITTLE INTEREST OR PLEASURE IN DOING THINGS: NOT AT ALL
2. FEELING DOWN, DEPRESSED OR HOPELESS: NOT AT ALL
SUM OF ALL RESPONSES TO PHQ9 QUESTIONS 1 AND 2: 0

## 2024-08-14 NOTE — PROGRESS NOTES
"    HPI: Regis Mixon is a 64 y.o. male presenting for follow-up.  I last saw the patient on 2/14/2024      Patient is here for a F/U  Has physical form for insurance needing filled out    Past medical, surgical, and family history reviewed.  Reviewed and documented all medications   Pt eating well, exercising as tolerated and taking medications as directed    Has no medical concerns for rooming MA.     He has been monitoring his BP at home and it has been in an excellent range.      ROS    Except positives as noted in the CC & HPI   Constitutional: Denies fevers, chills, night sweats, fatigue, weight changes, change in appetite   Eyes: Denies blurry vision, double vision   ENT: Denies otalgia, trouble hearing, tinnitus, vertigo, nasal congestion, rhinorrhea, sore throat   Neck: Denies swelling, masses   Cardiovascular: Denies chest pain, palpitations, edema, orthopnea, syncope   Respiratory: Denies dyspnea, cough, wheezing, postural nocturnal dyspnea   Gastrointestinal: Denies abdominal pain, nausea, vomiting, diarrhea, constipation, melena, hematochezia   Genitourinary: Denies dysuria, hematuria, frequency, urgency   Musculoskeletal: Denies back pain, neck pain, arthralgias, myalgias   Integumentary: Denies skin lesions, rashes, masses   Neurological: Denies dizziness, headaches, confusion, limb weakness, paresthesias, syncope, convulsions   Psychiatric: Denies depression, anxiety, homicidal ideations, suicidal ideations, sleep disturbances   Endocrine: Denies polyphagia, polydipsia, polyuria, weakness, hair thinning, heat intolerance, cold intolerance, weight changes   Heme/Lymph: Denies easy bruising, easy bleeding, swollen glands     Vitals:    08/14/24 0855   BP: 120/90   BP Location: Right arm   Pulse: 76   Resp: 16   Temp: 36.4 °C (97.5 °F)   SpO2: 98%   Weight: 98.9 kg (218 lb)   Height: 1.778 m (5' 10\")         PHYSICAL EXAM    116/76 on recheck of BP in the right arm.     GENERAL APPEARANCE: " well-developed, well-nourished, 64 y.o. male in no acute distress.    SKIN: warm, pink and dry without rash or concerning lesions.    MENTAL STATUS: alert and oriented × 3. Normal mood and affect appropriate to mood.    NECK: supple without lymphadenopathy. Carotid pulses are normal without bruits. Thyroid - is normal in midline without nodules.    CHEST: lungs are clear to auscultation without rales, rhonchi or wheezes.    HEART: regular, rate and rhythm without murmurs, rubs or gallops.    ABDOMEN: soft, mildly obese, protuberant, nondistended. No masses, hepatomegaly or splenomegaly is noted.    EXTREMITIES: no cyanosis, clubbing or edema. Pedal pulses are 2+ normal at the dorsalis pedis and posterior tibial pulses bilaterally.    NEUROLOGICAL: cranial nerves II through XII are grossly intact. Motor strength 5/5 at all fours.    Below is the patient's most recent value for Albumin, ALT, AST, BUN, Calcium, Chloride, Cholesterol, CO2, Creatinine, GFR, Glucose, HDL, Hematocrit, Hemoglobin, Hemoglobin A1C, LDL, Magnesium, Phosphorus, Platelets, Potassium, PSA, Sodium, Triglycerides, and WBC.   Lab Results   Component Value Date    ALBUMIN 4.4 08/16/2023    ALT 19 08/16/2023    AST 20 08/16/2023    BUN 23 08/16/2023    CALCIUM 9.8 08/16/2023     08/16/2023    CHOL 208 (H) 08/16/2023    CO2 30 08/16/2023    CREATININE 1.26 08/16/2023    HDL 38.2 (A) 08/16/2023    HCT 46.5 08/16/2023    HGB 15.1 08/16/2023    HGBA1C 5.6 08/16/2023     08/16/2023    K 3.9 08/16/2023    PSA 0.80 05/19/2021     08/16/2023    TRIG 299 (H) 08/16/2023    WBC 7.9 08/16/2023         ASSESSMENT:  1. Routine general medical examination at a health care facility        2. Benign essential HTN  Urinalysis with Reflex Microscopic      3. Mixed hyperlipidemia        4. Dermatitis of external ear        5. Class 1 obesity due to excess calories without serious comorbidity with body mass index (BMI) of 31.0 to 31.9 in adult        6.  Need for Tdap vaccination  Tdap vaccine, age 7 years and older        PLAN:  Orders Placed This Encounter   Procedures    Tdap vaccine, age 7 years and older    Urinalysis with Reflex Microscopic     Standing Status:   Future     Number of Occurrences:   1     Standing Expiration Date:   8/14/2025     Order Specific Question:   Release result to [facility]     Answer:   Immediate [1]     Patient to continue current medications (with any exceptions as noted) and diet. Follow-up in 6 month(s) otherwise as needed.      Will obtain CBC, CMP, Lipid profile, A1c, PSA  today. Will call patient with results when available.     Ordered Urinalysis with Reflex Microscopic. Will call patient with results when available.      Tdap vaccine(s) given in the office today.     Physical exam forms completed for wife's employer.    Patient encouraged to obtain the influenza vaccine in the fall.    Scribe Attestation  By signing my name below, Marlen KAUFMAN Scribe   attest that this documentation has been prepared under the direction and in the presence of Terence Nagel MD.

## 2024-11-21 DIAGNOSIS — I10 BENIGN ESSENTIAL HTN: Primary | ICD-10-CM

## 2024-11-22 DIAGNOSIS — I10 BENIGN ESSENTIAL HTN: ICD-10-CM

## 2024-11-23 RX ORDER — LISINOPRIL AND HYDROCHLOROTHIAZIDE 12.5; 2 MG/1; MG/1
1 TABLET ORAL DAILY
Qty: 90 TABLET | Refills: 3 | Status: SHIPPED | OUTPATIENT
Start: 2024-11-23

## 2024-11-23 RX ORDER — HYDROCHLOROTHIAZIDE 12.5 MG/1
12.5 TABLET ORAL DAILY
Qty: 90 TABLET | Refills: 3 | Status: SHIPPED | OUTPATIENT
Start: 2024-11-23

## 2024-11-25 DIAGNOSIS — E78.2 MIXED HYPERLIPIDEMIA: Primary | ICD-10-CM

## 2024-11-26 RX ORDER — PITAVASTATIN CALCIUM 4.18 MG/1
TABLET, FILM COATED ORAL
Qty: 90 TABLET | Refills: 3 | Status: SHIPPED | OUTPATIENT
Start: 2024-11-26

## 2025-01-02 ENCOUNTER — TELEPHONE (OUTPATIENT)
Dept: PRIMARY CARE | Facility: CLINIC | Age: 65
End: 2025-01-02
Payer: COMMERCIAL

## 2025-01-02 DIAGNOSIS — E78.2 MIXED HYPERLIPIDEMIA: ICD-10-CM

## 2025-01-02 DIAGNOSIS — D72.829 LEUKOCYTOSIS, UNSPECIFIED TYPE: Primary | ICD-10-CM

## 2025-01-02 DIAGNOSIS — I10 BENIGN ESSENTIAL HTN: ICD-10-CM

## 2025-01-02 NOTE — TELEPHONE ENCOUNTER
Patient called in stating he passed out and went to the [place] [facility]. He stated he was diagnosed with dry eye. Patient stated he has not been feeling well and is having a hard time focusing. He is currently scheduled for 2/6 but is needing to be seen sooner. Patient wondering if he can be fit in for a follow up to get an ophthalmology referral or if there is something Dr. Nagel can prescribe to help? He is requesting a call from Dr. Nagel to explain everything  Please Advise

## 2025-01-03 ENCOUNTER — LAB (OUTPATIENT)
Dept: LAB | Facility: LAB | Age: 65
End: 2025-01-03
Payer: COMMERCIAL

## 2025-01-03 DIAGNOSIS — E78.2 MIXED HYPERLIPIDEMIA: ICD-10-CM

## 2025-01-03 DIAGNOSIS — I10 BENIGN ESSENTIAL HTN: ICD-10-CM

## 2025-01-03 LAB
ALBUMIN SERPL BCP-MCNC: 4.8 G/DL (ref 3.4–5)
ALP SERPL-CCNC: 57 U/L (ref 33–136)
ALT SERPL W P-5'-P-CCNC: 21 U/L (ref 10–52)
ANION GAP SERPL CALC-SCNC: 12 MMOL/L (ref 10–20)
AST SERPL W P-5'-P-CCNC: 19 U/L (ref 9–39)
BASOPHILS # BLD AUTO: 0.04 X10*3/UL (ref 0–0.1)
BASOPHILS NFR BLD AUTO: 0.4 %
BILIRUB SERPL-MCNC: 0.6 MG/DL (ref 0–1.2)
BUN SERPL-MCNC: 29 MG/DL (ref 6–23)
CALCIUM SERPL-MCNC: 9.8 MG/DL (ref 8.6–10.3)
CHLORIDE SERPL-SCNC: 99 MMOL/L (ref 98–107)
CO2 SERPL-SCNC: 30 MMOL/L (ref 21–32)
CREAT SERPL-MCNC: 1.26 MG/DL (ref 0.5–1.3)
EGFRCR SERPLBLD CKD-EPI 2021: 64 ML/MIN/1.73M*2
EOSINOPHIL # BLD AUTO: 0.08 X10*3/UL (ref 0–0.7)
EOSINOPHIL NFR BLD AUTO: 0.7 %
ERYTHROCYTE [DISTWIDTH] IN BLOOD BY AUTOMATED COUNT: 13.1 % (ref 11.5–14.5)
GLUCOSE SERPL-MCNC: 109 MG/DL (ref 74–99)
HCT VFR BLD AUTO: 48.6 % (ref 41–52)
HGB BLD-MCNC: 16.3 G/DL (ref 13.5–17.5)
IMM GRANULOCYTES # BLD AUTO: 0.02 X10*3/UL (ref 0–0.7)
IMM GRANULOCYTES NFR BLD AUTO: 0.2 % (ref 0–0.9)
LYMPHOCYTES # BLD AUTO: 2.62 X10*3/UL (ref 1.2–4.8)
LYMPHOCYTES NFR BLD AUTO: 24.4 %
MCH RBC QN AUTO: 30 PG (ref 26–34)
MCHC RBC AUTO-ENTMCNC: 33.5 G/DL (ref 32–36)
MCV RBC AUTO: 89 FL (ref 80–100)
MONOCYTES # BLD AUTO: 0.65 X10*3/UL (ref 0.1–1)
MONOCYTES NFR BLD AUTO: 6.1 %
NEUTROPHILS # BLD AUTO: 7.31 X10*3/UL (ref 1.2–7.7)
NEUTROPHILS NFR BLD AUTO: 68.2 %
NRBC BLD-RTO: 0 /100 WBCS (ref 0–0)
PLATELET # BLD AUTO: 275 X10*3/UL (ref 150–450)
POTASSIUM SERPL-SCNC: 3.7 MMOL/L (ref 3.5–5.3)
PROT SERPL-MCNC: 7.9 G/DL (ref 6.4–8.2)
RBC # BLD AUTO: 5.44 X10*6/UL (ref 4.5–5.9)
SODIUM SERPL-SCNC: 137 MMOL/L (ref 136–145)
WBC # BLD AUTO: 10.7 X10*3/UL (ref 4.4–11.3)

## 2025-01-03 PROCEDURE — 85025 COMPLETE CBC W/AUTO DIFF WBC: CPT

## 2025-01-03 PROCEDURE — 80053 COMPREHEN METABOLIC PANEL: CPT

## 2025-01-03 NOTE — TELEPHONE ENCOUNTER
Patient stated Dr. Nagel spoke to him last night and advised him to come in on the 7th. Advised patient his appointment is on 2/17. Patient stated thank you and disconnected the call

## 2025-01-03 NOTE — TELEPHONE ENCOUNTER
Patient was called to be informed of appointment. VM not set up, will be added to wait list.   Appointment denied at this time as BB can not squeeze in any more appointments as he will be out of office

## 2025-01-04 DIAGNOSIS — R53.81 MALAISE: Primary | ICD-10-CM

## 2025-01-06 ENCOUNTER — TELEPHONE (OUTPATIENT)
Dept: PRIMARY CARE | Facility: CLINIC | Age: 65
End: 2025-01-06
Payer: COMMERCIAL

## 2025-01-07 LAB
NON-UH HIE APPEARANCE, U: CLEAR
NON-UH HIE BACTERIA, U: ABNORMAL
NON-UH HIE BILIRUBIN, U: NEGATIVE
NON-UH HIE BLOOD, U: ABNORMAL
NON-UH HIE COLOR, U: YELLOW
NON-UH HIE GLUCOSE QUAL, U: NEGATIVE
NON-UH HIE KETONES, U: NEGATIVE
NON-UH HIE LEUKOCYTE ESTERASE, U: NEGATIVE
NON-UH HIE MUCOUS, U: ABNORMAL
NON-UH HIE NITRITE, U: NEGATIVE
NON-UH HIE PH, U: 6 (ref 4.5–8)
NON-UH HIE PROTEIN, U: ABNORMAL
NON-UH HIE RBC/HPF, U: 4 #/HPF (ref 0–3)
NON-UH HIE SPECIFIC GRAVITY, U: 1.03 (ref 1–1.03)
NON-UH HIE SQUAMOUS EPITHELIAL CELLS, U: <1 #/HPF
NON-UH HIE U MICRO: ABNORMAL
NON-UH HIE UROBILINOGEN QUAL, U: ABNORMAL
NON-UH HIE WBC/HPF, U: <1 #/HPF (ref 0–5)

## 2025-01-07 NOTE — TELEPHONE ENCOUNTER
Patient states the off balance happens sometimes when standing up to quickly. Patient denies headache, ear pain, sinus issues. Patient to complete xray and urinalysis tomorrow.

## 2025-01-09 ENCOUNTER — APPOINTMENT (OUTPATIENT)
Dept: RADIOLOGY | Facility: HOSPITAL | Age: 65
End: 2025-01-09
Payer: COMMERCIAL

## 2025-01-09 ENCOUNTER — HOSPITAL ENCOUNTER (EMERGENCY)
Facility: HOSPITAL | Age: 65
Discharge: OTHER INSTITUTION | End: 2025-01-10
Attending: EMERGENCY MEDICINE
Payer: COMMERCIAL

## 2025-01-09 DIAGNOSIS — R42 LIGHTHEADED: ICD-10-CM

## 2025-01-09 DIAGNOSIS — G93.89 CEREBELLAR MASS: Primary | ICD-10-CM

## 2025-01-09 LAB
ALBUMIN SERPL BCP-MCNC: 4.8 G/DL (ref 3.4–5)
ALP SERPL-CCNC: 48 U/L (ref 33–136)
ALT SERPL W P-5'-P-CCNC: 27 U/L (ref 10–52)
ANION GAP SERPL CALC-SCNC: 18 MMOL/L (ref 10–20)
APPEARANCE UR: ABNORMAL
AST SERPL W P-5'-P-CCNC: 19 U/L (ref 9–39)
BASOPHILS # BLD AUTO: 0.04 X10*3/UL (ref 0–0.1)
BASOPHILS NFR BLD AUTO: 0.3 %
BILIRUB SERPL-MCNC: 0.5 MG/DL (ref 0–1.2)
BILIRUB UR STRIP.AUTO-MCNC: NEGATIVE MG/DL
BUN SERPL-MCNC: 26 MG/DL (ref 6–23)
CALCIUM SERPL-MCNC: 10 MG/DL (ref 8.6–10.3)
CHLORIDE SERPL-SCNC: 96 MMOL/L (ref 98–107)
CO2 SERPL-SCNC: 22 MMOL/L (ref 21–32)
COLOR UR: YELLOW
CREAT SERPL-MCNC: 1 MG/DL (ref 0.5–1.3)
EGFRCR SERPLBLD CKD-EPI 2021: 85 ML/MIN/1.73M*2
EOSINOPHIL # BLD AUTO: 0.03 X10*3/UL (ref 0–0.7)
EOSINOPHIL NFR BLD AUTO: 0.2 %
ERYTHROCYTE [DISTWIDTH] IN BLOOD BY AUTOMATED COUNT: 12.4 % (ref 11.5–14.5)
GLUCOSE SERPL-MCNC: 101 MG/DL (ref 74–99)
GLUCOSE UR STRIP.AUTO-MCNC: NORMAL MG/DL
HCT VFR BLD AUTO: 51.1 % (ref 41–52)
HGB BLD-MCNC: 17.6 G/DL (ref 13.5–17.5)
HOLD SPECIMEN: NORMAL
HYALINE CASTS #/AREA URNS AUTO: ABNORMAL /LPF
IMM GRANULOCYTES # BLD AUTO: 0.04 X10*3/UL (ref 0–0.7)
IMM GRANULOCYTES NFR BLD AUTO: 0.3 % (ref 0–0.9)
KETONES UR STRIP.AUTO-MCNC: ABNORMAL MG/DL
LEUKOCYTE ESTERASE UR QL STRIP.AUTO: NEGATIVE
LIPASE SERPL-CCNC: 34 U/L (ref 9–82)
LYMPHOCYTES # BLD AUTO: 1.61 X10*3/UL (ref 1.2–4.8)
LYMPHOCYTES NFR BLD AUTO: 12.6 %
MCH RBC QN AUTO: 29.4 PG (ref 26–34)
MCHC RBC AUTO-ENTMCNC: 34.4 G/DL (ref 32–36)
MCV RBC AUTO: 85 FL (ref 80–100)
MONOCYTES # BLD AUTO: 0.56 X10*3/UL (ref 0.1–1)
MONOCYTES NFR BLD AUTO: 4.4 %
MUCOUS THREADS #/AREA URNS AUTO: ABNORMAL /LPF
NEUTROPHILS # BLD AUTO: 10.49 X10*3/UL (ref 1.2–7.7)
NEUTROPHILS NFR BLD AUTO: 82.2 %
NITRITE UR QL STRIP.AUTO: NEGATIVE
NRBC BLD-RTO: 0 /100 WBCS (ref 0–0)
PH UR STRIP.AUTO: 5.5 [PH]
PLATELET # BLD AUTO: 280 X10*3/UL (ref 150–450)
POTASSIUM SERPL-SCNC: 3.4 MMOL/L (ref 3.5–5.3)
PROT SERPL-MCNC: 8.2 G/DL (ref 6.4–8.2)
PROT UR STRIP.AUTO-MCNC: ABNORMAL MG/DL
RBC # BLD AUTO: 5.99 X10*6/UL (ref 4.5–5.9)
RBC # UR STRIP.AUTO: ABNORMAL /UL
RBC #/AREA URNS AUTO: ABNORMAL /HPF
SODIUM SERPL-SCNC: 133 MMOL/L (ref 136–145)
SP GR UR STRIP.AUTO: 1.03
UROBILINOGEN UR STRIP.AUTO-MCNC: ABNORMAL MG/DL
WBC # BLD AUTO: 12.8 X10*3/UL (ref 4.4–11.3)
WBC #/AREA URNS AUTO: ABNORMAL /HPF

## 2025-01-09 PROCEDURE — 99285 EMERGENCY DEPT VISIT HI MDM: CPT | Mod: 25 | Performed by: EMERGENCY MEDICINE

## 2025-01-09 PROCEDURE — 72125 CT NECK SPINE W/O DYE: CPT | Performed by: STUDENT IN AN ORGANIZED HEALTH CARE EDUCATION/TRAINING PROGRAM

## 2025-01-09 PROCEDURE — 36415 COLL VENOUS BLD VENIPUNCTURE: CPT | Performed by: EMERGENCY MEDICINE

## 2025-01-09 PROCEDURE — 81001 URINALYSIS AUTO W/SCOPE: CPT | Performed by: EMERGENCY MEDICINE

## 2025-01-09 PROCEDURE — 72125 CT NECK SPINE W/O DYE: CPT

## 2025-01-09 PROCEDURE — 99285 EMERGENCY DEPT VISIT HI MDM: CPT | Performed by: EMERGENCY MEDICINE

## 2025-01-09 PROCEDURE — 70450 CT HEAD/BRAIN W/O DYE: CPT

## 2025-01-09 PROCEDURE — 70450 CT HEAD/BRAIN W/O DYE: CPT | Performed by: STUDENT IN AN ORGANIZED HEALTH CARE EDUCATION/TRAINING PROGRAM

## 2025-01-09 PROCEDURE — 80053 COMPREHEN METABOLIC PANEL: CPT | Performed by: EMERGENCY MEDICINE

## 2025-01-09 PROCEDURE — 85025 COMPLETE CBC W/AUTO DIFF WBC: CPT | Performed by: EMERGENCY MEDICINE

## 2025-01-09 PROCEDURE — 83690 ASSAY OF LIPASE: CPT | Performed by: EMERGENCY MEDICINE

## 2025-01-09 ASSESSMENT — PAIN SCALES - GENERAL: PAINLEVEL_OUTOF10: 0 - NO PAIN

## 2025-01-09 ASSESSMENT — COLUMBIA-SUICIDE SEVERITY RATING SCALE - C-SSRS
6. HAVE YOU EVER DONE ANYTHING, STARTED TO DO ANYTHING, OR PREPARED TO DO ANYTHING TO END YOUR LIFE?: NO
2. HAVE YOU ACTUALLY HAD ANY THOUGHTS OF KILLING YOURSELF?: NO
1. IN THE PAST MONTH, HAVE YOU WISHED YOU WERE DEAD OR WISHED YOU COULD GO TO SLEEP AND NOT WAKE UP?: NO

## 2025-01-09 ASSESSMENT — PAIN - FUNCTIONAL ASSESSMENT: PAIN_FUNCTIONAL_ASSESSMENT: 0-10

## 2025-01-10 ENCOUNTER — HOSPITAL ENCOUNTER (INPATIENT)
Facility: HOSPITAL | Age: 65
End: 2025-01-10
Attending: EMERGENCY MEDICINE | Admitting: NEUROLOGICAL SURGERY
Payer: COMMERCIAL

## 2025-01-10 ENCOUNTER — APPOINTMENT (OUTPATIENT)
Dept: RADIOLOGY | Facility: HOSPITAL | Age: 65
DRG: 025 | End: 2025-01-10
Payer: COMMERCIAL

## 2025-01-10 ENCOUNTER — CLINICAL SUPPORT (OUTPATIENT)
Dept: EMERGENCY MEDICINE | Facility: HOSPITAL | Age: 65
DRG: 025 | End: 2025-01-10
Payer: COMMERCIAL

## 2025-01-10 VITALS
DIASTOLIC BLOOD PRESSURE: 95 MMHG | TEMPERATURE: 98.2 F | RESPIRATION RATE: 15 BRPM | WEIGHT: 215 LBS | BODY MASS INDEX: 30.78 KG/M2 | OXYGEN SATURATION: 97 % | HEIGHT: 70 IN | HEART RATE: 88 BPM | SYSTOLIC BLOOD PRESSURE: 150 MMHG

## 2025-01-10 DIAGNOSIS — G93.89 CEREBELLAR MASS: Primary | ICD-10-CM

## 2025-01-10 DIAGNOSIS — Z98.890 S/P CRANIOTOMY: ICD-10-CM

## 2025-01-10 DIAGNOSIS — E87.1 HYPONATREMIA: ICD-10-CM

## 2025-01-10 DIAGNOSIS — N28.1 RENAL CYST: ICD-10-CM

## 2025-01-10 DIAGNOSIS — I10 BENIGN ESSENTIAL HTN: ICD-10-CM

## 2025-01-10 DIAGNOSIS — G89.18 ACUTE POST-OPERATIVE PAIN: ICD-10-CM

## 2025-01-10 LAB
ABO GROUP (TYPE) IN BLOOD: NORMAL
ANTIBODY SCREEN: NORMAL
APTT PPP: 43 SECONDS (ref 27–38)
ATRIAL RATE: 102 BPM
HOLD SPECIMEN: NORMAL
INR PPP: 1.3 (ref 0.9–1.1)
P AXIS: 45 DEGREES
P OFFSET: 194 MS
P ONSET: 132 MS
PR INTERVAL: 158 MS
PROTHROMBIN TIME: 14.2 SECONDS (ref 9.8–12.8)
Q ONSET: 211 MS
QRS COUNT: 17 BEATS
QRS DURATION: 96 MS
QT INTERVAL: 346 MS
QTC CALCULATION(BAZETT): 450 MS
QTC FREDERICIA: 412 MS
R AXIS: 74 DEGREES
RH FACTOR (ANTIGEN D): NORMAL
T AXIS: 42 DEGREES
T OFFSET: 384 MS
VENTRICULAR RATE: 102 BPM

## 2025-01-10 PROCEDURE — 70553 MRI BRAIN STEM W/O & W/DYE: CPT

## 2025-01-10 PROCEDURE — 2550000001 HC RX 255 CONTRASTS: Performed by: EMERGENCY MEDICINE

## 2025-01-10 PROCEDURE — 99222 1ST HOSP IP/OBS MODERATE 55: CPT | Performed by: NEUROLOGICAL SURGERY

## 2025-01-10 PROCEDURE — 96374 THER/PROPH/DIAG INJ IV PUSH: CPT

## 2025-01-10 PROCEDURE — 2500000001 HC RX 250 WO HCPCS SELF ADMINISTERED DRUGS (ALT 637 FOR MEDICARE OP)

## 2025-01-10 PROCEDURE — 99285 EMERGENCY DEPT VISIT HI MDM: CPT | Performed by: EMERGENCY MEDICINE

## 2025-01-10 PROCEDURE — 74177 CT ABD & PELVIS W/CONTRAST: CPT | Performed by: RADIOLOGY

## 2025-01-10 PROCEDURE — 71260 CT THORAX DX C+: CPT | Performed by: RADIOLOGY

## 2025-01-10 PROCEDURE — 93005 ELECTROCARDIOGRAM TRACING: CPT

## 2025-01-10 PROCEDURE — 99222 1ST HOSP IP/OBS MODERATE 55: CPT

## 2025-01-10 PROCEDURE — 74177 CT ABD & PELVIS W/CONTRAST: CPT

## 2025-01-10 PROCEDURE — 85610 PROTHROMBIN TIME: CPT

## 2025-01-10 PROCEDURE — 99285 EMERGENCY DEPT VISIT HI MDM: CPT | Mod: 25 | Performed by: EMERGENCY MEDICINE

## 2025-01-10 PROCEDURE — 36415 COLL VENOUS BLD VENIPUNCTURE: CPT

## 2025-01-10 PROCEDURE — 2500000001 HC RX 250 WO HCPCS SELF ADMINISTERED DRUGS (ALT 637 FOR MEDICARE OP): Performed by: PHYSICIAN ASSISTANT

## 2025-01-10 PROCEDURE — 2060000001 HC INTERMEDIATE ICU ROOM DAILY

## 2025-01-10 PROCEDURE — 70553 MRI BRAIN STEM W/O & W/DYE: CPT | Performed by: RADIOLOGY

## 2025-01-10 PROCEDURE — 2500000002 HC RX 250 W HCPCS SELF ADMINISTERED DRUGS (ALT 637 FOR MEDICARE OP, ALT 636 FOR OP/ED): Performed by: PHYSICIAN ASSISTANT

## 2025-01-10 PROCEDURE — A9575 INJ GADOTERATE MEGLUMI 0.1ML: HCPCS | Performed by: EMERGENCY MEDICINE

## 2025-01-10 PROCEDURE — 2500000004 HC RX 250 GENERAL PHARMACY W/ HCPCS (ALT 636 FOR OP/ED)

## 2025-01-10 PROCEDURE — 86901 BLOOD TYPING SEROLOGIC RH(D): CPT

## 2025-01-10 PROCEDURE — 2500000004 HC RX 250 GENERAL PHARMACY W/ HCPCS (ALT 636 FOR OP/ED): Performed by: PHYSICIAN ASSISTANT

## 2025-01-10 RX ORDER — ROFLUMILAST 3 MG/G
1 CREAM TOPICAL DAILY PRN
Status: ON HOLD | COMMUNITY

## 2025-01-10 RX ORDER — ACETAMINOPHEN 160 MG/5ML
650 SOLUTION ORAL EVERY 4 HOURS PRN
Status: ACTIVE | OUTPATIENT
Start: 2025-01-10

## 2025-01-10 RX ORDER — AMOXICILLIN 250 MG
2 CAPSULE ORAL 2 TIMES DAILY
Status: DISPENSED | OUTPATIENT
Start: 2025-01-10

## 2025-01-10 RX ORDER — POLYETHYLENE GLYCOL 3350 17 G/17G
17 POWDER, FOR SOLUTION ORAL DAILY
Status: DISPENSED | OUTPATIENT
Start: 2025-01-10

## 2025-01-10 RX ORDER — MIDAZOLAM HYDROCHLORIDE 1 MG/ML
2 INJECTION INTRAMUSCULAR; INTRAVENOUS AS NEEDED
Status: DISCONTINUED | OUTPATIENT
Start: 2025-01-10 | End: 2025-01-12

## 2025-01-10 RX ORDER — ONDANSETRON HYDROCHLORIDE 2 MG/ML
4 INJECTION, SOLUTION INTRAVENOUS EVERY 8 HOURS PRN
Status: DISPENSED | OUTPATIENT
Start: 2025-01-10

## 2025-01-10 RX ORDER — LISINOPRIL 20 MG/1
20 TABLET ORAL DAILY
Status: DISPENSED | OUTPATIENT
Start: 2025-01-10

## 2025-01-10 RX ORDER — ERGOCALCIFEROL 1.25 MG/1
1250 CAPSULE ORAL WEEKLY
Status: DISPENSED | OUTPATIENT
Start: 2025-01-10

## 2025-01-10 RX ORDER — ONDANSETRON 4 MG/1
4 TABLET, FILM COATED ORAL EVERY 8 HOURS PRN
Status: DISPENSED | OUTPATIENT
Start: 2025-01-10

## 2025-01-10 RX ORDER — ACETAMINOPHEN 325 MG/1
650 TABLET ORAL EVERY 4 HOURS PRN
Status: DISPENSED | OUTPATIENT
Start: 2025-01-10

## 2025-01-10 RX ORDER — HEPARIN SODIUM 5000 [USP'U]/ML
5000 INJECTION, SOLUTION INTRAVENOUS; SUBCUTANEOUS EVERY 8 HOURS
Status: DISPENSED | OUTPATIENT
Start: 2025-01-10

## 2025-01-10 RX ORDER — METOPROLOL SUCCINATE 25 MG/1
25 TABLET, EXTENDED RELEASE ORAL DAILY
Status: DISPENSED | OUTPATIENT
Start: 2025-01-10

## 2025-01-10 RX ORDER — ACETAMINOPHEN 650 MG/1
650 SUPPOSITORY RECTAL EVERY 4 HOURS PRN
Status: ACTIVE | OUTPATIENT
Start: 2025-01-10

## 2025-01-10 RX ORDER — HYDROCHLOROTHIAZIDE 25 MG/1
25 TABLET ORAL DAILY
Status: DISPENSED | OUTPATIENT
Start: 2025-01-10

## 2025-01-10 RX ORDER — POTASSIUM CHLORIDE 20 MEQ/1
40 TABLET, EXTENDED RELEASE ORAL ONCE
Status: COMPLETED | OUTPATIENT
Start: 2025-01-10 | End: 2025-01-10

## 2025-01-10 RX ORDER — MIDAZOLAM HYDROCHLORIDE 5 MG/ML
5 INJECTION, SOLUTION INTRAMUSCULAR; INTRAVENOUS AS NEEDED
Status: DISCONTINUED | OUTPATIENT
Start: 2025-01-10 | End: 2025-01-10

## 2025-01-10 RX ORDER — GADOTERATE MEGLUMINE 376.9 MG/ML
20 INJECTION INTRAVENOUS
Status: COMPLETED | OUTPATIENT
Start: 2025-01-10 | End: 2025-01-10

## 2025-01-10 RX ORDER — PRAVASTATIN SODIUM 40 MG/1
40 TABLET ORAL NIGHTLY
Status: DISPENSED | OUTPATIENT
Start: 2025-01-10

## 2025-01-10 RX ADMIN — ACETAMINOPHEN 650 MG: 325 TABLET ORAL at 16:46

## 2025-01-10 RX ADMIN — HEPARIN SODIUM 5000 UNITS: 5000 INJECTION INTRAVENOUS; SUBCUTANEOUS at 20:00

## 2025-01-10 RX ADMIN — PRAVASTATIN SODIUM 40 MG: 40 TABLET ORAL at 20:00

## 2025-01-10 RX ADMIN — METOPROLOL SUCCINATE 25 MG: 25 TABLET, EXTENDED RELEASE ORAL at 11:08

## 2025-01-10 RX ADMIN — SENNOSIDES AND DOCUSATE SODIUM 2 TABLET: 50; 8.6 TABLET ORAL at 11:10

## 2025-01-10 RX ADMIN — IOHEXOL 90 ML: 350 INJECTION, SOLUTION INTRAVENOUS at 06:10

## 2025-01-10 RX ADMIN — HYDROCHLOROTHIAZIDE 25 MG: 25 TABLET ORAL at 08:50

## 2025-01-10 RX ADMIN — MIDAZOLAM HYDROCHLORIDE 2 MG: 1 INJECTION, SOLUTION INTRAMUSCULAR; INTRAVENOUS at 06:58

## 2025-01-10 RX ADMIN — HEPARIN SODIUM 5000 UNITS: 5000 INJECTION INTRAVENOUS; SUBCUTANEOUS at 11:16

## 2025-01-10 RX ADMIN — ERGOCALCIFEROL 1250 MCG: 1.25 CAPSULE ORAL at 11:08

## 2025-01-10 RX ADMIN — LISINOPRIL 20 MG: 20 TABLET ORAL at 08:50

## 2025-01-10 RX ADMIN — SODIUM CHLORIDE 500 ML: 9 INJECTION, SOLUTION INTRAVENOUS at 14:48

## 2025-01-10 RX ADMIN — GADOTERATE MEGLUMINE 20 ML: 376.9 INJECTION INTRAVENOUS at 07:44

## 2025-01-10 RX ADMIN — POTASSIUM CHLORIDE 40 MEQ: 1500 TABLET, EXTENDED RELEASE ORAL at 14:46

## 2025-01-10 RX ADMIN — ONDANSETRON 4 MG: 2 INJECTION INTRAMUSCULAR; INTRAVENOUS at 16:54

## 2025-01-10 ASSESSMENT — PAIN DESCRIPTION - LOCATION: LOCATION: HEAD

## 2025-01-10 ASSESSMENT — ENCOUNTER SYMPTOMS
NAUSEA: 0
NUMBNESS: 0
DIAPHORESIS: 0
FEVER: 0
CHEST TIGHTNESS: 0
BRUISES/BLEEDS EASILY: 0
CONSTIPATION: 0
CHILLS: 0
COUGH: 0
DIARRHEA: 0
CONFUSION: 0
SHORTNESS OF BREATH: 0
DYSURIA: 0
ABDOMINAL PAIN: 0
MYALGIAS: 0
SLEEP DISTURBANCE: 0
ABDOMINAL DISTENTION: 0
PALPITATIONS: 0
VOMITING: 0
RHINORRHEA: 0
FATIGUE: 0

## 2025-01-10 ASSESSMENT — PAIN - FUNCTIONAL ASSESSMENT
PAIN_FUNCTIONAL_ASSESSMENT: 0-10
PAIN_FUNCTIONAL_ASSESSMENT: 0-10

## 2025-01-10 ASSESSMENT — COGNITIVE AND FUNCTIONAL STATUS - GENERAL
DRESSING REGULAR UPPER BODY CLOTHING: A LITTLE
DAILY ACTIVITIY SCORE: 18
HELP NEEDED FOR BATHING: A LITTLE
HELP NEEDED FOR BATHING: A LITTLE
DRESSING REGULAR UPPER BODY CLOTHING: A LITTLE
TOILETING: A LITTLE
EATING MEALS: A LITTLE
DRESSING REGULAR LOWER BODY CLOTHING: A LITTLE
MOBILITY SCORE: 24
PERSONAL GROOMING: A LITTLE
PERSONAL GROOMING: A LITTLE
DRESSING REGULAR LOWER BODY CLOTHING: A LITTLE
EATING MEALS: A LITTLE
DAILY ACTIVITIY SCORE: 18
MOBILITY SCORE: 24
TOILETING: A LITTLE

## 2025-01-10 ASSESSMENT — LIFESTYLE VARIABLES
TOTAL SCORE: 0
EVER FELT BAD OR GUILTY ABOUT YOUR DRINKING: NO
HAVE YOU EVER FELT YOU SHOULD CUT DOWN ON YOUR DRINKING: NO
EVER HAD A DRINK FIRST THING IN THE MORNING TO STEADY YOUR NERVES TO GET RID OF A HANGOVER: NO
HAVE PEOPLE ANNOYED YOU BY CRITICIZING YOUR DRINKING: NO

## 2025-01-10 ASSESSMENT — PAIN SCALES - GENERAL
PAINLEVEL_OUTOF10: 1
PAINLEVEL_OUTOF10: 0 - NO PAIN

## 2025-01-10 NOTE — ED PROVIDER NOTES
History of Present Illness     History provided by: Patient  Limitations to History: None  External Records Reviewed: Prior ED visit notes    HPI:  Regis Mixon is a 64 y.o. 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  Patient reports no new symptoms since transfer from [facility].  He states that he has been having the symptoms intermittently over the past weeks.  He denies falls other than a fall 2-weeks ago where he did hit his head but did not pass out.  States this was in the context of a painful stimulus after his wife was removing something from his back.  Denies weakness, numbness, tingling, difficulty speaking or changes in vision    Physical Exam   Triage vitals:  T 36.4 °C (97.5 °F)  HR 94  BP (!) 151/104  RR 18  O2 95 % None (Room air)    General: Awake, alert, in no acute distress  Eyes: Gaze conjugate.  No scleral icterus or injection  HENT: Normo-cephalic, atraumatic. No stridor  CV: Regular rate, regular rhythm. Radial pulses 2+ bilaterally  Resp: Breathing non-labored, speaking in full sentences.  Clear to auscultation bilaterally  GI: Soft, non-distended, non-tender. No rebound or guarding.  : No suprapubic tenderness  MSK/Extremities: No gross bony deformities. Moving all extremities  Skin: Warm. Appropriate color  Neuro: Ambulates without difficulty. Awake alert and oriented x 4, pupils equal round reactive to light and accommodation, EOMs are intact, normal facial sensation in all dermatomes, tongue protrusion is midline, symmetric head turn, no pronator drift, 5 out of 5 strength in the bilateral upper extremities at the major muscle groups, 5 out of 5 strength at the major muscle groups of the bilateral lower extremities, sensation intact to light touch over all 4 extremities,  no hyper hyporeflexia, no upgoing toes, no clonus  Psych: Appropriate mood and affect    Medical Decision Making & ED Course   Medical Decision Makin y.o. male who is hypertensive but otherwise hemodynamically stable presenting to the emergency department with the setting of a known intracranial mass.  Neurosurgery was contacted and they requested a life and limb MRI with fingerprinting for surgical planning.  This was ordered.  Preoperative type and screen and coags were also ordered.  Patient will be signed out to the oncoming provider pending results of the MRI and final recommendations from neurosurgery.  Where possible I reordered the patient's home medications.  ----         Social Determinants of Health which Significantly Impact Care: None identified       Chronic conditions affecting the patient's care: See HPI    The patient was discussed with the following consultants/services: Please see ED course for consult transcript        ED Course:  ED Course as of 01/10/25 0501   Fri Fidencio 10, 2025   0456 Neurosurgery recommended specific MRI brain scan, order placed.  They did specifically request life and limb threatening. [WJ]      ED Course User Index  [WJ] Nils Calderon DO         Diagnoses as of 01/10/25 0501   Cerebellar mass     Disposition   Patient was signed out to oncoming provider at 0700 pending completion of their work-up.  Please see the next provider's transition of care note for the remainder of the patient's care.     Procedures   Procedures    Patient was seen and discussed with the attending of record.    Ismael Gupta MD  Emergency Medicine     Ismael Gupta MD  Resident  01/10/25 7342

## 2025-01-10 NOTE — PROGRESS NOTES
Emergency Department Transition of Care Note       Signout   I received Regis Mixon in signout from Dr. Gupta.  Please see the ED Provider Note for all HPI, PE and MDM up to the time of signout at 700.  This is in addition to the primary record.    In brief Regis Mixon is an 64 y.o. male presenting for new left cerebellar mass with no neuro deficits.     At the time of signout we were awaiting:  MRI brain, NSGY recs    ED Course & Medical Decision Making   Medical Decision Making:  Under my care, MRI shows cerebellar lesion, mild ventriculomegaly, possible tonsillar herniation and cervical lesion.  Neurosurgery admitted patient to [facility].    ED Course:  ED Course as of 01/10/25 0906   Fri Fidencio 10, 2025   0456 Neurosurgery recommended specific MRI brain scan, order placed.  They did specifically request life and limb threatening. [WJ]   4660 I ordered a CT chest abdomen and pelvis with IV contrast to evaluate for possible other malignancy [GA]      ED Course User Index  [GA] Ismael Gupta MD  [WJ] Nils Calderon,          Diagnoses as of 01/10/25 0906   Cerebellar mass       Disposition   As a result of their workup, the patient will require admission to the hospital.  The patient was informed of his diagnosis.  The patient was given the opportunity to ask questions and I answered them. The patient agreed to be admitted to the hospital.    Procedures   Procedures    Patient seen and discussed with ED attending physician.    Maame Skaggs MD  Emergency Medicine

## 2025-01-10 NOTE — HOSPITAL COURSE
Regis Mixon is a 64 y.o. male with a past medical history of HTN, HLD, obesity and prior L cerebellar hemangioblastoma s/p resection in 2003 who presented to the [facility] ED 1/9 with dizziness x6 weeks. CTH demonstrated 5.5x5cm L cerebellar lesion. Patient admitted to neurosurgery service for further workup.     1/10 Medicine consulted for preoperative evaluation, RCRI 0.   1/12 MRI CTL spine (did not tolerated contrasted scan) with expansile C3 intramedullary lesion, multiple thoracic/lumbar bony lesions.   1/13 s/p angio 1 muscular branch from L V3 supplying tumor, did not embolize due to tortuosity. CTA H/N with L cerebellar mass with L AICA and PICA feeders. Urology consulted for noted renal cysts on CT CAP --> plan for outpatient follow up. Oncology consulted. Ophthalmology evaluated with normal eye exam, no evidence of optic or retinal hemangioblastomas.   1/14 MRI CS with anesthesia demonstrated intramedullary enhancing mass at C3-4. TTE with EF 55-60%, no PFO, no wall motion abnormalities, normal diastolic filling. Preoperative medicine consulted, RCRI 1, coreg increased to 25mg BID due to poorly controled BP.   1/16 s/p ***     PT/OT evaluated patient and recommended ***     On the day of discharge, the patient was seen and evaluated by the neurosurgery team and deemed suitable for discharge. The patient was given detailed discharge instructions and were scheduled to follow up as an outpatient.

## 2025-01-10 NOTE — PROGRESS NOTES
Pharmacy Medication History Review    Regis Mixon is a 64 y.o. male admitted for Cerebellar mass. Pharmacy reviewed the patient's [id number] medications and allergies for accuracy.    Medications ADDED:  Zoryve 0.3% cream  Medications CHANGED:  None  Medications REMOVED:   Aclovate 0.05% cream  Desonide 0.05% cream  Lidex 0.05% external solution  Hydrocortisone 2.5% cream  Ketoconazole 2% cream  Ketoconazole 2% shampoo  Pravastatin 40 mg (patient reported he is on pitavastatin)  Viagra  Vitamin D2 50,000 unit capsule     The list below reflects the updated PTA list.   Prior to Admission Medications   Prescriptions Last Dose Informant Patient Reported? Taking?   hydroCHLOROthiazide (Microzide) 12.5 mg tablet 1/9/2025 Morning Self No Yes   Sig: TAKE 1 TABLET BY MOUTH ONCE DAILY.   lisinopriL-hydrochlorothiazide 20-12.5 mg tablet 1/9/2025 Morning Self No Yes   Sig: TAKE 1 TABLET BY MOUTH EVERY DAY   nebivolol (Bystolic) 10 mg tablet 1/8/2025 Evening Self No No   Sig: Take 1 tablet (10 mg) by mouth once daily.   pitavastatin calcium (Livalo) 4 mg tablet 1/8/2025 Evening Self No No   Sig: TAKE 1 TABLET DAILY   Patient taking differently: Take 1 tablet by mouth once daily.   roflumilast (Zoryve) 0.3 % cream Past Week Self Yes Yes   Sig: Apply 1 Application topically once daily as needed (apply behind ear and lower back for rash).  No pharmacy dispense history within the last year. Patient endorsed medication dispensed more than a year ago and uses as needed       Facility-Administered Medications: None        The list below reflects the updated allergy list. Please review each documented allergy for additional clarification and justification.  Allergies  Reviewed by Steven Culver, PharmD on 1/10/2025   No Known Allergies         Patient declines M2B at discharge.     Sources:   [facility]  Pharmacy dispense history  Patient interview Good historian  Chart Review  Care Everywhere  8/14/24  primary care office  "visit    Additional Comments:  No recent dispense history per OARRS report      Steven Culver, PharmD  Transitions of Care Pharmacist  01/10/25     Secure Chat preferred   If no response call [phone] or Vocera \"Med Rec\"    "

## 2025-01-10 NOTE — CONSULTS
Inpatient consult to Neurosurgery  Consult performed by: Den Thompson MD  Consult ordered by: Ismael Gupta MD        Reason For Consult  Brain lesion    History Of Present Illness  Regis Mixon is a 64 y.o. male with h/o L cerebellar hemangioblastoma s/p resection (2003), HTN, HLD, obesity, PMR p/w dizziness x6w, CTH 5.5x5 cm L cerebellar lesion      The patient presented with dizziness that had been ongoing for the past several weeks. The patient reported experiencing lightheadedness and dizziness without any associated visual disturbances or weakness. The patient did not report having any headaches until the day of the appointment, when nausea and vomiting occurred at approximately two o'clock after eating blueberries and eggs. This was the first instance of vomiting.     Denies weakness, bowel/bladder incontinence, numbness, tingling, or any other focal neurologic deficits other than dizziness and 1 episode of vomiting.    Imaging is personally reviewed and CTH 5.5x5 cm L cerebellar lesion  Past Medical History  He has a past medical history of Other specified health status, Personal history of other infectious and parasitic diseases, and PMR (polymyalgia rheumatica) (Multi) (08/14/2023).    Surgical History  He has a past surgical history that includes Other surgical history (05/19/2021) and Other surgical history (05/19/2021).     Social History  He reports that he has never smoked. He has never been exposed to tobacco smoke. He has never used smokeless tobacco. He reports that he does not drink alcohol and does not use drugs.    Family History  Family History   Problem Relation Name Age of Onset    Lung cancer Mother      Lung cancer Father          Allergies  Patient has no known allergies.    Review of Systems   Review of systems was reviewed and otherwise negative other than what was listed in the HPI.    Physical Exam   General: No distress, alert, interactive and cooperative.   Resp: breathing  "comfortably  Cardiovascular: Radial pulses +2 and equal. No swelling, varicosities, edema, or tenderness to palpation.   Neuro:     A&Ox3     OU3R, EOMI      Face symmetric, Facial SILT      Tongue midline and symmetric     Shoulder shrugs symmetric     Hearing intact to finger rubs bilaterally        RUE D5 / B5 / T5 / HG 5/ IO 5     LUE D5 / B5 / T5 / HG 5/ IO 5     Negative rebollar     Left dysmetria       RLE HF5 / KE 5/ DF 5/ PF 5     LLE HF5 / KE 5/ DF 5/ PF 5     No clonus       SILT  Psych: appropriate  Skin: no obvious lesions        Last Recorded Vitals  Blood pressure (!) 151/104, pulse 94, temperature 36.4 °C (97.5 °F), temperature source Oral, resp. rate 18, height 1.778 m (5' 10\"), weight 97.5 kg (215 lb), SpO2 95%.    Relevant Results  Results for orders placed or performed during the hospital encounter of 01/09/25 (from the past 24 hours)   CBC with Differential   Result Value Ref Range    WBC 12.8 (H) 4.4 - 11.3 x10*3/uL    nRBC 0.0 0.0 - 0.0 /100 WBCs    RBC 5.99 (H) 4.50 - 5.90 x10*6/uL    Hemoglobin 17.6 (H) 13.5 - 17.5 g/dL    Hematocrit 51.1 41.0 - 52.0 %    MCV 85 80 - 100 fL    MCH 29.4 26.0 - 34.0 pg    MCHC 34.4 32.0 - 36.0 g/dL    RDW 12.4 11.5 - 14.5 %    Platelets 280 150 - 450 x10*3/uL    Neutrophils % 82.2 40.0 - 80.0 %    Immature Granulocytes %, Automated 0.3 0.0 - 0.9 %    Lymphocytes % 12.6 13.0 - 44.0 %    Monocytes % 4.4 2.0 - 10.0 %    Eosinophils % 0.2 0.0 - 6.0 %    Basophils % 0.3 0.0 - 2.0 %    Neutrophils Absolute 10.49 (H) 1.20 - 7.70 x10*3/uL    Immature Granulocytes Absolute, Automated 0.04 0.00 - 0.70 x10*3/uL    Lymphocytes Absolute 1.61 1.20 - 4.80 x10*3/uL    Monocytes Absolute 0.56 0.10 - 1.00 x10*3/uL    Eosinophils Absolute 0.03 0.00 - 0.70 x10*3/uL    Basophils Absolute 0.04 0.00 - 0.10 x10*3/uL   Comprehensive Metabolic Panel   Result Value Ref Range    Glucose 101 (H) 74 - 99 mg/dL    Sodium 133 (L) 136 - 145 mmol/L    Potassium 3.4 (L) 3.5 - 5.3 mmol/L    " Chloride 96 (L) 98 - 107 mmol/L    Bicarbonate 22 21 - 32 mmol/L    Anion Gap 18 10 - 20 mmol/L    Urea Nitrogen 26 (H) 6 - 23 mg/dL    Creatinine 1.00 0.50 - 1.30 mg/dL    eGFR 85 >60 mL/min/1.73m*2    Calcium 10.0 8.6 - 10.3 mg/dL    Albumin 4.8 3.4 - 5.0 g/dL    Alkaline Phosphatase 48 33 - 136 U/L    Total Protein 8.2 6.4 - 8.2 g/dL    AST 19 9 - 39 U/L    Bilirubin, Total 0.5 0.0 - 1.2 mg/dL    ALT 27 10 - 52 U/L   Lipase   Result Value Ref Range    Lipase 34 9 - 82 U/L   PST Top   Result Value Ref Range    Extra Tube Hold for add-ons.    Urinalysis with Reflex Culture and Microscopic   Result Value Ref Range    Color, Urine Yellow Light-Yellow, Yellow, Dark-Yellow    Appearance, Urine Turbid (N) Clear    Specific Gravity, Urine 1.031 1.005 - 1.035    pH, Urine 5.5 5.0, 5.5, 6.0, 6.5, 7.0, 7.5, 8.0    Protein, Urine 20 (TRACE) NEGATIVE, 10 (TRACE), 20 (TRACE) mg/dL    Glucose, Urine Normal Normal mg/dL    Blood, Urine 0.2 (2+) (A) NEGATIVE    Ketones, Urine 80 (3+) (A) NEGATIVE mg/dL    Bilirubin, Urine NEGATIVE NEGATIVE    Urobilinogen, Urine 2 (1+) (A) Normal mg/dL    Nitrite, Urine NEGATIVE NEGATIVE    Leukocyte Esterase, Urine NEGATIVE NEGATIVE   Urinalysis Microscopic   Result Value Ref Range    WBC, Urine 1-5 1-5, NONE /HPF    RBC, Urine 11-20 (A) NONE, 1-2, 3-5 /HPF    Mucus, Urine 4+ Reference range not established. /LPF    Hyaline Casts, Urine OCCASIONAL (A) NONE /LPF          Assessment/Plan     Regis Mixon is a 64 y.o. male with h/o L cerebellar hemangioblastoma s/p resection (2003), HTN, HLD, obesity, PMR p/w dizziness x6w, CTH 5.5x5 cm L cerebellar lesion    Patient is presenting with several weeks of dizziness in the setting of left cerebellar lesion.  Patient has a history of hemangioblastoma that was resected in 2003 at [facility].  In the setting of history of hemangioblastoma, recurrent hemangioblastoma is in differential diagnosis.  However, patient will need MRI to further evaluate the  lesion.    Recs  Please obtain MRI PPF  Please obtain CT chest abdomen pelvis w/wo contrast  to rule out any syndromic lesions given history of hemangiobalstoma  CBC/RFP/coag/T&S/UA/EKG/CXR  Recs pending above imaging      Den Thompson MD

## 2025-01-10 NOTE — ED PROVIDER NOTES
Emergency Department Provider Note        History of Present Illness     History provided by: Patient and Significant Other  Limitations to History: None  External Records Reviewed with Brief Summary: None    HPI:  Regis Mixon is a 64 y.o. male presenting to the [facility] emergency department with a chief complaint of increased dizziness over the past 6 weeks that is not improving after multiple visits to doctors visits.  2 weeks ago the patient experienced a vasovagal event fell backward and hit his head on the granite countertop.  He visited the hospital where he was observed in the ER for 9 hours and discharged before getting a CT scan of the head, he did not want a CT scan 2 weeks ago.  The patient has visited other doctors since then and was diagnosed with vasovagal syncope, dry eye, orthostatic hypotension, and a possible change to his medications.  The patient is not complaining of any pain currently, he did vomit earlier in the evening while sitting down in his chair watching TV.  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  The patient says that the room is spinning from his dizziness he just feels lightheaded for the past 6 weeks.    Physical Exam   Triage vitals:  T 36.8 °C (98.2 °F)  HR 94  BP (!) 147/103  RR 18  O2 98 % None (Room air)    Physical Exam  Vitals and nursing note reviewed.   Constitutional:       General: He is not in acute distress.     Appearance: Normal appearance. He is not ill-appearing, toxic-appearing or diaphoretic.   HENT:      Head: Normocephalic and atraumatic.      Right Ear: External ear normal.      Left Ear: External ear normal.      Nose: Nose normal. No congestion or rhinorrhea.   Eyes:      General: No scleral icterus.        Right eye: No discharge.         Left eye: No discharge.      Extraocular Movements:  Extraocular movements intact.      Conjunctiva/sclera: Conjunctivae normal.      Pupils: Pupils are equal, round, and reactive to light.   Cardiovascular:      Rate and Rhythm: Normal rate and regular rhythm.   Pulmonary:      Effort: Pulmonary effort is normal. No respiratory distress.      Breath sounds: Normal breath sounds.   Chest:      Chest wall: No tenderness.   Abdominal:      General: There is no distension.      Palpations: Abdomen is soft.      Tenderness: There is no abdominal tenderness. There is no right CVA tenderness, left CVA tenderness, guarding or rebound.   Musculoskeletal:         General: No swelling, tenderness, deformity or signs of injury. Normal range of motion.      Cervical back: Normal range of motion and neck supple.      Right lower leg: No edema.      Left lower leg: No edema.   Skin:     General: Skin is warm and dry.      Capillary Refill: Capillary refill takes less than 2 seconds.   Neurological:      General: No focal deficit present.      Mental Status: He is alert and oriented to person, place, and time.      GCS: GCS eye subscore is 4. GCS verbal subscore is 5. GCS motor subscore is 6.      Sensory: Sensation is intact.      Motor: Motor function is intact.      Gait: Gait abnormal.      Comments: Patient was able to accurately describe the day, month, and year when asked what the day, month, and year were.  The patient was able to ambulate with slight unsteadiness to his gait.  The patient is able to explain their symptoms and the events leading to their presentation to the emergency department.   Psychiatric:         Mood and Affect: Mood normal.         Behavior: Behavior normal.          Medical Decision Making & ED Course   Medical Decision Makin y.o. male presenting with dizziness over the past 6 weeks trying to find an answer for why he has this dizziness.    The patient has been told that he has dry eyes, orthostatic hypotension, and needs to adjust his  medications and he comes to us seeking why he is still lightheaded after the 6 weeks.    Basic labs are ordered as well as a urinalysis and lipase and CT head with CT cervical spine.  The patient did not elect to get a CT of the head 2 weeks ago when he hit his head initially on the granite countertop.  The patient does not use any blood thinners.    On my interpretation of labs, results are unimpressive for systemic inflammation or infection, acute anemia or blood loss, JESSA, hepatitis, with some electrolyte abnormalities.     The patient's urinalysis shows some red blood cells with hyaline casts and the patient says this is normal he has had red blood cells in his urine many previous visits to the emergency department and the doctors office.    CT scan does return showing a 5.4 x 5.0 x 2.9 cm mixed cystic and solid mass in the left cerebellum, with local mass effect on the adjacent structures, including effacement of the 4th ventricle and slight prominence of the supratentorial ventricular system.  The patient says that he is had his hemangioblastoma excised from the same area 23 years ago.    The patient remains neurologically intact the entire time while in the emergency department.  The on-call neurosurgeon at [facility] is contacted Dr. Fletcher, and he suggested the patient to be transported to the [facility] for further evaluation by the neurosurgery team there.  Dr. Ly at the [facility] excepted the patient to be admitted for neurosurgery evaluation and the patient will be transported in an ED to ED transport, Dr. Calderon at the [facility] emergency department accepted the patient at the [facility] ED, the patient's wife is going to drive the patient to the [facility] emergency department as soon as she leaves the [facility] emergency department with her /the patient.    Shared decision making for disposition  Patient and/or patient´s representative was counseled regarding labs, imaging, likely diagnosis.  All questions were answered. Recommendation was made for Transfer given the need for further escalation of care to inpatient management. Patient agreed and was transferred in stable condition. Accepting team was notified of any pending labs or imaging to ensure continuity of care.     ----      Differential diagnoses considered include but are not limited to: Hemangioblastoma, intraparenchymal mass of the brain     Social Determinants of Health which Significantly Impact Care: None identified     EKG Independent Interpretation: EKG not obtained    Independent Result Review and Interpretation: Relevant laboratory and radiographic results were reviewed and independently interpreted by myself.  As necessary, they are commented on in the ED Course.    Chronic conditions affecting the patient's care: As documented above in [facility]    The patient was discussed with the following consultants/services:  Dr. Fletcher, the [facility] on-call neurosurgeon, Dr. Ly, the [facility] neurosurgeon accepted the patient for neurosurgery evaluation, Dr. Calderon, the [facility] emergency department physician excepting the patient for ED to ED transfer.    Care Considerations: As documented above in [facility]    ED Course:  ED Course as of 01/10/25 0220   Fri Fidencio 10, 2025   0143 This is a 64 years old male patient presented to the emergency department with a chief complaint of feeling dizzy and lightheaded.  Stated that the symptoms started 3 weeks ago.  Denies any chest pain, shortness of breath, abdominal pain, focal weakness, or numbness.  Stated that he vomited once.  Stated that he had brain tumors in the past.    Review of system: As stated above in the HPI section.    Physical exam revealed a 64 years old male patient with does not appear to be in acute distress  Cardiopulmonary exam with normal S1, S2, no gallop, or murmur, no wheezes, rales, rhonchi or any signs of respiratory distress  Neurologic exam: Alert oriented x 4, NIH stroke scale of 0.   Negative Romberg's test.  Able to stand up and walk steadily.  Skin exam: Warm and dry  Abdominal exam: Abdomen soft, nondistended, nontender, no guarding, rigidity, rebound.    We obtained CT head and cervical spine and was concerning for a mass [ME]   0145 The physician resident spoke to neurosurgery team and please refer to his discussion   [ME]   0146 Neurosurgery team at [facility] recommended transfer by private vehicle. [ME]   0146 Patient is transferred in stable condition. [ME]   0146 Speaking with the neurosurgeon at [facility] Dr. Mayuri Ly MD it was suggested that the patient be transferred emergency department to emergency department.  I then spoke with Dr. Nils Calderon DO at the [facility] emergency department.  The patient is accepted for transfer to the [facility] emergency department, the patient was given the right to travel by private vehicle with his wife driving. [MADDI]      ED Course User Index  [MADDI] Andres Rosas DO  [ME] Paul Waterman DO         Diagnoses as of 01/10/25 0220   Lightheaded   Hemangioblastoma     Disposition   As a result of their workup, the patient will require transfer to another facility.  The patient and/or his guardian/representative is agreeable to transfer at this time.   We will continue to monitor and manage the patient in the Emergency Department until transport for transfer can be arranged.    Procedures   Procedures    Patient seen and discussed with ED attending physician.    Andres Rosas DO  Emergency Medicine     Andres Rosas DO  Resident  01/10/25 0221

## 2025-01-10 NOTE — Clinical Note
Procedure complete. Pt tolerated well with 1mg versed and 50mcg fentanyl IVP, VSS. Access via right groin. Closure via 5 fr mynx. Hemostasis achieved. 2x2 and tegaderm dressing applied. Dressing clean, dry, and intact. No hematoma. Pt to keep right leg straight for 3 hours post deployment time. VSS. Pt transferred to [facility], report given to RN per neuro template.

## 2025-01-10 NOTE — PROGRESS NOTES
Transfer From [facility] ED to [facility] ED for neurosurgery    64-year-old male who had presented to the outside hospital with 6 weeks lightheaded and an unsteady gait.  2 weeks ago, he had a presumed vasovagal syncope where he did strike his head against a countertop.  Due to persistent dizziness, patient was sent to the [facility] ED.  CT head scan today shows a 5.4 x 5.0 x 2.9 mixed cystic and solid mass in the left cerebellum with mass affect.  Patient neuro intact right now.  He is electing to go by private vehicle.  Will presents to [facility] ED for neurosurgery evaluation

## 2025-01-10 NOTE — CONSULTS
Inpatient consult to Medicine  Consult performed by: Parker Asencio MD  Consult ordered by: Ananya Singleton PA-C      Medicine Consult - Pre-Operative Medical Optimization    Currently admitted to neurosurgery for cerebellar mass. Internal Medicine consulted for pre-operative medical optimization.    HPI  Regis Mixon is a 65 yo M with PMH HTN, HLD, obesity, and left cerebellar hemangioblastoma s/p resection in 2003. He has had increasing dizziness for 6 weeks and presented to [place] ED 1/9 after an episode of vomiting and headache but main concern was the dizziness. CT head showed a 5.4 x 5.0 x 2.9 cm left cerebellar mass. He was transferred to [facility] for further neurosurgical evaluation. Patient now admitted to neurosurgery with plans for OR pending imaging.    Of note, patient had syncopal episode 2 weeks ago where he fell backward and hit his head. His wife was trying to pop an abscess on his back and he was bearing down, then had dizziness and sweatiness, no CP or palpitations, and was seen at [facility] ED with reportedly normal EKG and electrolytes. Was deemed likely vasovagal syncope and sent home.    Patient has mild headache currently and dizziness when he tries to walk but not when sitting/lying. He has trouble focusing his vision over the past few weeks. Denies fevers/chill, weakness, runny nose, cough, shortness of breath, chest pain, palpitations, n/v (other than 1 episode yesterday), abdominal pain, diarrhea, constipation, dysuria, difficulty with urination.    PMH  -see HPI    ALL  No Known Allergies     MEDs  Current Outpatient Medications   Medication Instructions    hydroCHLOROthiazide (MICROZIDE) 12.5 mg, oral, Daily    lisinopriL-hydrochlorothiazide 20-12.5 mg tablet 1 tablet, oral, Daily    nebivolol (BYSTOLIC) 10 mg, oral, Daily    pitavastatin calcium (Livalo) 4 mg tablet TAKE 1 TABLET DAILY    roflumilast (Zoryve) 0.3 % cream 1 Application, Daily PRN        PSH  -resection of left cerebellar  hemangioblastoma in     SOC  -tobacco: never smoked  -alcohol: 2 beers/week, previously heavier alcohol use 6 months ago  -substance: none      O  VT  Temperature:  [36.4 °C (97.5 °F)-36.8 °C (98.2 °F)] 36.4 °C (97.5 °F)  Heart Rate:  [] 105  Respirations:  [15-18] 18  BP: (123-151)/() 123/91     PE  General: no acute distress  Cardio: mild tachycardia to low 100s, regular rhythm, no murmurs  Pulm: non-labored, BS symmetric, no crackles/wheezing/stridor  GI: non-distended, appropriately soft, no masses, non-tender  : no CVA tenderness  MSK: no joint swelling  HEENT: grossly normocephalic  Neuro: AAOx4, CN grossly intact, extremities moving symmetrically  Psych: appropriate affect  Volume: mucus membranes dry, no LE pitting edema  Perfusion: no cyanosis, distal extremities warm      A  Hx: HTN and HLD. No history of stroke, CAD, CHF, DM, COPD, RAS    Labs:   -Mild leukocytosis at 12.8. Hemoglobin elevated at 17.6.   -Mild hyponatremia at 133, mild hypokalemia at 3.4, BUN mildly elevated at 26. Cr and LFTs wnl.  -INR mildly elevated at 1.3.    EKG 1/10/25: sinus tach, no Q waves, some nonspecific t-wave changes, QTc 450  TTE 21: LVEF 55-60%, impaired relaxation of left ventricular diastolic filling, normal RV function    Meds:   Home meds:  -lisinopril-hydrochlorothiazide 20-12.5 daily  -hydrochlorothiazide 12.5 daily  -nebivolol 10mg daily  -pitavastatin 4mg daily  -No AC or AP    Current inpatient meds:   -lisinopril 20mg  -metop succinate 25mg   -hydrochlorothiazide 25mg daily  -pravastatin 40mg      Cardiac Risk Assessment:  -emergency procedure: no  -elevated risk procedure: no (intra-thoracic, intra-peritoneal, supra-inguinal vascular)  -active ACS: low concern  -RCRI: 0 pt (3.9% 30-day risk of death, MI, or cardiac arrest)  -DASI: 50.7 pt  -METs: 8.97    STOP-BAN pt (high risk for moderate to severe RAS)      P  Patient mildly tachycardic in low 100s, BP okay. Renal function and LFTs  normal. Exam and labs suggestive of mild dehydration. Can give 500mL fluid bolus and replete K.    Patient on pitavastatin at home. Reported he possibly had myalgias from rosuvastatin a few years ago. Patient has his home meds with him. Can consider having pharmacy verify them and using them instead, or monitor for myalgias if continuing pravastatin that we have on formulary.    EKG with sinus tach, no Q waves, some nonspecific t-wave changes. TTE from 2021 with normal LVEF. Patient has HTN and HLD with no other major medical co-morbidities.   RCRI 0  DASI 50.7  METs 8.97  STOP-BANG 4    Patient okay for surgery without further testing. Continue statin and BB, hold lisinopril and hydrochlorothiazide on day of surgery.     -dispo: pt to remain admitted to neurosurgery  [facility]      Note not final until attested by attending physician - Dr. Oleary.      Parker Asencio MD  PGY-1

## 2025-01-10 NOTE — DISCHARGE INSTRUCTIONS
Please report to the [facility] emergency department as soon as you can.  They should have all of your information when you arrive and tell them who you are and why you are there.

## 2025-01-10 NOTE — H&P
History Of Present Illness  Regis Mixon is a 64 y.o. male with h/o L cerebellar hemangioblastoma s/p resection (2003), HTN, HLD, obesity, PMR p/w dizziness x6w, CTH 5.5x5 cm L cerebellar lesion     The patient presented with dizziness that had been ongoing for the past several weeks. The patient reported experiencing lightheadedness and dizziness without any associated visual disturbances or weakness. The patient did not report having any headaches until the day of the appointment, when nausea and vomiting occurred at approximately two o'clock after eating blueberries and eggs. This was the first instance of vomiting.      Denies weakness, bowel/bladder incontinence, numbness, tingling, or any other focal neurologic deficits other than dizziness and 1 episode of vomiting.     Imaging with CTH 5.5x5 cm L cerebellar lesion     Past Medical History  Past Medical History:   Diagnosis Date    Other specified health status     No pertinent past medical history    Personal history of other infectious and parasitic diseases     History of genital warts    PMR (polymyalgia rheumatica) (Multi) 08/14/2023     Surgical History  Past Surgical History:   Procedure Laterality Date    OTHER SURGICAL HISTORY  05/19/2021    Brain surgery    OTHER SURGICAL HISTORY  05/19/2021    Colonoscopy     Social History  He reports that he has never smoked. He has never been exposed to tobacco smoke. He has never used smokeless tobacco. He reports that he does not drink alcohol and does not use drugs.    Family History  Family History   Problem Relation Name Age of Onset    Lung cancer Mother      Lung cancer Father       Allergies  Patient has no known allergies.    Review of Systems   Constitutional:  Negative for chills, diaphoresis, fatigue and fever.   HENT:  Negative for congestion and rhinorrhea.    Respiratory:  Negative for cough, chest tightness and shortness of breath.    Cardiovascular:  Negative for chest pain, palpitations and  "leg swelling.   Gastrointestinal:  Negative for abdominal distention, abdominal pain, constipation, diarrhea, nausea and vomiting.   Genitourinary:  Negative for dysuria.   Musculoskeletal:  Negative for myalgias.   Skin:  Negative for rash.   Neurological:  Negative for numbness.   Hematological:  Does not bruise/bleed easily.   Psychiatric/Behavioral:  Negative for confusion and sleep disturbance.      Physical Exam  General: No distress, alert, interactive and cooperative.   Resp: breathing comfortably  Cardiovascular: Radial pulses +2 and equal. No swelling, varicosities, edema, or tenderness to palpation.   Neuro:     A&Ox3     OU3R, EOMI      Face symmetric, Facial SILT      Tongue midline and symmetric     Shoulder shrugs symmetric     Hearing intact to finger rubs bilaterally        RUE D5 / B5 / T5 / HG 5/ IO 5     LUE D5 / B5 / T5 / HG 5/ IO 5     Negative rebollar     Left dysmetria        RLE HF5 / KE 5/ DF 5/ PF 5     LLE HF5 / KE 5/ DF 5/ PF 5     No clonus        SILT  Psych: appropriate  Skin: no obvious lesions     Last Recorded Vitals  Blood pressure (!) 123/91, pulse (!) 105, temperature 36.4 °C (97.5 °F), temperature source Oral, resp. rate 18, height 1.778 m (5' 10\"), weight 97.5 kg (215 lb), SpO2 94%.    Assessment/Plan   Assessment & Plan  Cerebellar mass    Regis Mixon is a 64 y.o. male with h/o L cerebellar hemangioblastoma s/p resection (2003), HTN, HLD, obesity, PMR p/w dizziness x6w, CTH 5.5x5 cm L cerebellar lesion     Patient is presenting with several weeks of dizziness in the setting of left cerebellar lesion.  Patient has a history of hemangioblastoma that was resected in 2003 at [facility].  In the setting of history of hemangioblastoma, recurrent hemangioblastoma is in differential diagnosis.  However, patient will need MRI to further evaluate the lesion.     Recs  Admit to [facility] under neurosurgery   OR planning for mass resection   Please obtain MRI PPF  Medicine consult for preoperative " evaluation   CBC/RFP/coag/T&S/UA/EKG/CXR       Ananya Singleton PA-C

## 2025-01-10 NOTE — ED TRIAGE NOTES
MIRACLE BRINK is a 64y old M who presents to [facility] ED from [facility] for NeuroSurgery Consultation.     Background/HPI   64-year-old male who had presented to the outside hospital with 6 weeks lightheaded and an unsteady gait.  2 weeks ago, he had a presumed vasovagal syncope where he did strike his head against a countertop.  Due to persistent dizziness, patient was sent to the ED.  CT head scan today shows a 5.4 x 5.0 x 2.9 mixed cystic and solid mass in the left cerebellum with mass affect.  Patient neuro intact right now. NKDA

## 2025-01-11 PROCEDURE — 2500000001 HC RX 250 WO HCPCS SELF ADMINISTERED DRUGS (ALT 637 FOR MEDICARE OP): Performed by: PHYSICIAN ASSISTANT

## 2025-01-11 PROCEDURE — 2500000004 HC RX 250 GENERAL PHARMACY W/ HCPCS (ALT 636 FOR OP/ED): Performed by: PHYSICIAN ASSISTANT

## 2025-01-11 PROCEDURE — 2060000001 HC INTERMEDIATE ICU ROOM DAILY

## 2025-01-11 RX ORDER — LORAZEPAM 1 MG/1
1 TABLET ORAL ONCE
Status: ACTIVE | OUTPATIENT
Start: 2025-01-11

## 2025-01-11 RX ORDER — ACETAMINOPHEN 500 MG
5 TABLET ORAL NIGHTLY PRN
Status: DISPENSED | OUTPATIENT
Start: 2025-01-11

## 2025-01-11 RX ADMIN — METOPROLOL SUCCINATE 25 MG: 25 TABLET, EXTENDED RELEASE ORAL at 08:16

## 2025-01-11 RX ADMIN — HEPARIN SODIUM 5000 UNITS: 5000 INJECTION INTRAVENOUS; SUBCUTANEOUS at 03:00

## 2025-01-11 RX ADMIN — LISINOPRIL 20 MG: 20 TABLET ORAL at 08:16

## 2025-01-11 RX ADMIN — PRAVASTATIN SODIUM 40 MG: 40 TABLET ORAL at 20:38

## 2025-01-11 RX ADMIN — HYDROCHLOROTHIAZIDE 25 MG: 25 TABLET ORAL at 08:15

## 2025-01-11 RX ADMIN — ONDANSETRON HYDROCHLORIDE 4 MG: 4 TABLET, FILM COATED ORAL at 08:16

## 2025-01-11 RX ADMIN — SENNOSIDES AND DOCUSATE SODIUM 2 TABLET: 50; 8.6 TABLET ORAL at 20:38

## 2025-01-11 RX ADMIN — HEPARIN SODIUM 5000 UNITS: 5000 INJECTION INTRAVENOUS; SUBCUTANEOUS at 18:15

## 2025-01-11 RX ADMIN — HEPARIN SODIUM 5000 UNITS: 5000 INJECTION INTRAVENOUS; SUBCUTANEOUS at 10:06

## 2025-01-11 RX ADMIN — ONDANSETRON HYDROCHLORIDE 4 MG: 4 TABLET, FILM COATED ORAL at 16:20

## 2025-01-11 ASSESSMENT — COGNITIVE AND FUNCTIONAL STATUS - GENERAL
MOBILITY SCORE: 22
CLIMB 3 TO 5 STEPS WITH RAILING: A LITTLE
WALKING IN HOSPITAL ROOM: A LITTLE

## 2025-01-11 ASSESSMENT — PAIN SCALES - GENERAL
PAINLEVEL_OUTOF10: 0 - NO PAIN

## 2025-01-11 ASSESSMENT — PAIN - FUNCTIONAL ASSESSMENT
PAIN_FUNCTIONAL_ASSESSMENT: 0-10

## 2025-01-11 NOTE — CARE PLAN
The patient's goals for the shift include  getting rest and completing MRI.    The clinical goals for the shift include Patient will remain neurologically stable throughout shift.      Problem: Fall/Injury  Goal: Not fall by end of shift  Outcome: Progressing  Goal: Be free from injury by end of the shift  Outcome: Progressing  Goal: Verbalize understanding of personal risk factors for fall in the hospital  Outcome: Progressing  Goal: Verbalize understanding of risk factor reduction measures to prevent injury from fall in the home  Outcome: Progressing  Goal: Use assistive devices by end of the shift  Outcome: Progressing  Goal: Pace activities to prevent fatigue by end of the shift  Outcome: Progressing

## 2025-01-11 NOTE — PROGRESS NOTES
"Regis Mixon is a 64 y.o. male on day 1 of admission presenting with Cerebellar mass.    Subjective   No acute events    Objective     Physical Exam  A&Ox3  Face symmetric  RUE 5/5  LUE 5/5  RLE 5/5  LLE 5/5  Sensation intact to light touch throughout all extremities    Last Recorded Vitals  Blood pressure (!) 127/93, pulse 89, temperature 36 °C (96.8 °F), resp. rate 18, height 1.778 m (5' 10\"), weight 97.5 kg (215 lb), SpO2 95%.  Intake/Output last 3 Shifts:  I/O last 3 completed shifts:  In: 50 (0.5 mL/kg) [P.O.:50]  Out: 50 (0.5 mL/kg) [Emesis/NG output:50]  Weight: 97.5 kg     Relevant Results      Assessment/Plan   Assessment & Plan  Cerebellar mass      Regis Mixon is a 64 y.o. male with h/o L cerebellar of hemangioblastoma s/p resection (2003), HTN, HLD, obesity, PMR p/w dizziness x6w, CTH 5.5x5 cm L cerebellar lesion, MRI 5.3 x 2.6cm  L cerebellar cystic mass, C3-4 dorsal cord lesion, 4th effacement, CT CAP multiple b/l pulmonary nodules, multiple b/l renal cysts        Recs  [facility]  OR Tues 1/14 for L crani for tumor resection  angio Mon 1/13   medicine recs- RCRI 0  SCDs, SQH    Salima Jennings MD      "

## 2025-01-12 ENCOUNTER — APPOINTMENT (OUTPATIENT)
Dept: RADIOLOGY | Facility: HOSPITAL | Age: 65
DRG: 025 | End: 2025-01-12
Payer: COMMERCIAL

## 2025-01-12 VITALS
SYSTOLIC BLOOD PRESSURE: 109 MMHG | WEIGHT: 215 LBS | OXYGEN SATURATION: 95 % | BODY MASS INDEX: 30.78 KG/M2 | DIASTOLIC BLOOD PRESSURE: 81 MMHG | RESPIRATION RATE: 14 BRPM | HEART RATE: 83 BPM | HEIGHT: 70 IN | TEMPERATURE: 97.5 F

## 2025-01-12 LAB
ALBUMIN SERPL BCP-MCNC: 4.3 G/DL (ref 3.4–5)
ANION GAP SERPL CALC-SCNC: 15 MMOL/L (ref 10–20)
APTT PPP: 39 SECONDS (ref 27–38)
BASOPHILS # BLD AUTO: 0.04 X10*3/UL (ref 0–0.1)
BASOPHILS NFR BLD AUTO: 0.4 %
BUN SERPL-MCNC: 34 MG/DL (ref 6–23)
CALCIUM SERPL-MCNC: 10.1 MG/DL (ref 8.6–10.6)
CHLORIDE SERPL-SCNC: 96 MMOL/L (ref 98–107)
CO2 SERPL-SCNC: 24 MMOL/L (ref 21–32)
CREAT SERPL-MCNC: 1.21 MG/DL (ref 0.5–1.3)
EGFRCR SERPLBLD CKD-EPI 2021: 68 ML/MIN/1.73M*2
EOSINOPHIL # BLD AUTO: 0.09 X10*3/UL (ref 0–0.7)
EOSINOPHIL NFR BLD AUTO: 0.8 %
ERYTHROCYTE [DISTWIDTH] IN BLOOD BY AUTOMATED COUNT: 12.4 % (ref 11.5–14.5)
GLUCOSE SERPL-MCNC: 133 MG/DL (ref 74–99)
HCT VFR BLD AUTO: 48.7 % (ref 41–52)
HGB BLD-MCNC: 16.6 G/DL (ref 13.5–17.5)
IMM GRANULOCYTES # BLD AUTO: 0.03 X10*3/UL (ref 0–0.7)
IMM GRANULOCYTES NFR BLD AUTO: 0.3 % (ref 0–0.9)
INR PPP: 1.2 (ref 0.9–1.1)
LYMPHOCYTES # BLD AUTO: 2.29 X10*3/UL (ref 1.2–4.8)
LYMPHOCYTES NFR BLD AUTO: 21 %
MCH RBC QN AUTO: 29.4 PG (ref 26–34)
MCHC RBC AUTO-ENTMCNC: 34.1 G/DL (ref 32–36)
MCV RBC AUTO: 86 FL (ref 80–100)
MONOCYTES # BLD AUTO: 0.63 X10*3/UL (ref 0.1–1)
MONOCYTES NFR BLD AUTO: 5.8 %
NEUTROPHILS # BLD AUTO: 7.8 X10*3/UL (ref 1.2–7.7)
NEUTROPHILS NFR BLD AUTO: 71.7 %
NRBC BLD-RTO: 0 /100 WBCS (ref 0–0)
PHOSPHATE SERPL-MCNC: 4.1 MG/DL (ref 2.5–4.9)
PLATELET # BLD AUTO: 304 X10*3/UL (ref 150–450)
POTASSIUM SERPL-SCNC: 3.3 MMOL/L (ref 3.5–5.3)
PROTHROMBIN TIME: 13.6 SECONDS (ref 9.8–12.8)
RBC # BLD AUTO: 5.64 X10*6/UL (ref 4.5–5.9)
SODIUM SERPL-SCNC: 132 MMOL/L (ref 136–145)
WBC # BLD AUTO: 10.9 X10*3/UL (ref 4.4–11.3)

## 2025-01-12 PROCEDURE — 2500000001 HC RX 250 WO HCPCS SELF ADMINISTERED DRUGS (ALT 637 FOR MEDICARE OP): Performed by: PHYSICIAN ASSISTANT

## 2025-01-12 PROCEDURE — 1100000001 HC PRIVATE ROOM DAILY

## 2025-01-12 PROCEDURE — 2500000001 HC RX 250 WO HCPCS SELF ADMINISTERED DRUGS (ALT 637 FOR MEDICARE OP)

## 2025-01-12 PROCEDURE — 72148 MRI LUMBAR SPINE W/O DYE: CPT | Performed by: RADIOLOGY

## 2025-01-12 PROCEDURE — 72141 MRI NECK SPINE W/O DYE: CPT | Performed by: RADIOLOGY

## 2025-01-12 PROCEDURE — 85610 PROTHROMBIN TIME: CPT | Performed by: STUDENT IN AN ORGANIZED HEALTH CARE EDUCATION/TRAINING PROGRAM

## 2025-01-12 PROCEDURE — 36415 COLL VENOUS BLD VENIPUNCTURE: CPT | Performed by: STUDENT IN AN ORGANIZED HEALTH CARE EDUCATION/TRAINING PROGRAM

## 2025-01-12 PROCEDURE — 2500000004 HC RX 250 GENERAL PHARMACY W/ HCPCS (ALT 636 FOR OP/ED)

## 2025-01-12 PROCEDURE — 80069 RENAL FUNCTION PANEL: CPT | Performed by: STUDENT IN AN ORGANIZED HEALTH CARE EDUCATION/TRAINING PROGRAM

## 2025-01-12 PROCEDURE — 2500000002 HC RX 250 W HCPCS SELF ADMINISTERED DRUGS (ALT 637 FOR MEDICARE OP, ALT 636 FOR OP/ED)

## 2025-01-12 PROCEDURE — 72146 MRI CHEST SPINE W/O DYE: CPT | Mod: 52

## 2025-01-12 PROCEDURE — 72146 MRI CHEST SPINE W/O DYE: CPT | Performed by: RADIOLOGY

## 2025-01-12 PROCEDURE — 72148 MRI LUMBAR SPINE W/O DYE: CPT | Mod: 52

## 2025-01-12 PROCEDURE — 2500000004 HC RX 250 GENERAL PHARMACY W/ HCPCS (ALT 636 FOR OP/ED): Performed by: PHYSICIAN ASSISTANT

## 2025-01-12 PROCEDURE — 2060000001 HC INTERMEDIATE ICU ROOM DAILY

## 2025-01-12 PROCEDURE — 72141 MRI NECK SPINE W/O DYE: CPT

## 2025-01-12 PROCEDURE — 85025 COMPLETE CBC W/AUTO DIFF WBC: CPT | Performed by: STUDENT IN AN ORGANIZED HEALTH CARE EDUCATION/TRAINING PROGRAM

## 2025-01-12 RX ORDER — LORAZEPAM 2 MG/ML
2 INJECTION INTRAMUSCULAR ONCE
Status: COMPLETED | OUTPATIENT
Start: 2025-01-12 | End: 2025-01-12

## 2025-01-12 RX ORDER — POTASSIUM CHLORIDE 20 MEQ/1
20 TABLET, EXTENDED RELEASE ORAL ONCE
Status: COMPLETED | OUTPATIENT
Start: 2025-01-12 | End: 2025-01-12

## 2025-01-12 RX ORDER — LORAZEPAM 2 MG/ML
1 INJECTION INTRAMUSCULAR ONCE
Status: COMPLETED | OUTPATIENT
Start: 2025-01-12 | End: 2025-01-12

## 2025-01-12 RX ADMIN — LISINOPRIL 20 MG: 20 TABLET ORAL at 08:43

## 2025-01-12 RX ADMIN — LORAZEPAM 2 MG: 2 INJECTION INTRAMUSCULAR; INTRAVENOUS at 01:08

## 2025-01-12 RX ADMIN — METOPROLOL SUCCINATE 25 MG: 25 TABLET, EXTENDED RELEASE ORAL at 08:43

## 2025-01-12 RX ADMIN — Medication 5 MG: at 00:38

## 2025-01-12 RX ADMIN — HEPARIN SODIUM 5000 UNITS: 5000 INJECTION INTRAVENOUS; SUBCUTANEOUS at 10:59

## 2025-01-12 RX ADMIN — ONDANSETRON HYDROCHLORIDE 4 MG: 4 TABLET, FILM COATED ORAL at 00:38

## 2025-01-12 RX ADMIN — HEPARIN SODIUM 5000 UNITS: 5000 INJECTION INTRAVENOUS; SUBCUTANEOUS at 18:18

## 2025-01-12 RX ADMIN — PRAVASTATIN SODIUM 40 MG: 40 TABLET ORAL at 20:05

## 2025-01-12 RX ADMIN — SENNOSIDES AND DOCUSATE SODIUM 2 TABLET: 50; 8.6 TABLET ORAL at 08:43

## 2025-01-12 RX ADMIN — POTASSIUM CHLORIDE 20 MEQ: 1500 TABLET, EXTENDED RELEASE ORAL at 20:51

## 2025-01-12 RX ADMIN — LORAZEPAM 1 MG: 2 INJECTION INTRAMUSCULAR; INTRAVENOUS at 02:15

## 2025-01-12 RX ADMIN — HYDROCHLOROTHIAZIDE 25 MG: 25 TABLET ORAL at 08:43

## 2025-01-12 ASSESSMENT — COGNITIVE AND FUNCTIONAL STATUS - GENERAL
CLIMB 3 TO 5 STEPS WITH RAILING: A LITTLE
MOBILITY SCORE: 22
WALKING IN HOSPITAL ROOM: A LITTLE
DAILY ACTIVITIY SCORE: 24

## 2025-01-12 ASSESSMENT — PAIN - FUNCTIONAL ASSESSMENT
PAIN_FUNCTIONAL_ASSESSMENT: 0-10
PAIN_FUNCTIONAL_ASSESSMENT: 0-10

## 2025-01-12 ASSESSMENT — PAIN SCALES - GENERAL
PAINLEVEL_OUTOF10: 0 - NO PAIN
PAINLEVEL_OUTOF10: 0 - NO PAIN

## 2025-01-12 NOTE — PROGRESS NOTES
"Regis Mixon is a 64 y.o. male on day 2 of admission presenting with Cerebellar mass.    Subjective   NAEON       Objective     Physical Exam  A&Ox3  Face symmetric  RUE 5/5  LUE 5/5  RLE 5/5  LLE 5/5  Sensation intact to light touch throughout all extremities  Left dysmetria    Last Recorded Vitals  Blood pressure 100/70, pulse 81, temperature 35.9 °C (96.6 °F), temperature source Temporal, resp. rate 13, height 1.778 m (5' 10\"), weight 97.5 kg (215 lb), SpO2 94%.  Intake/Output last 3 Shifts:  I/O last 3 completed shifts:  In: 170 (1.7 mL/kg) [P.O.:170]  Out: 50 (0.5 mL/kg) [Emesis/NG output:50]  Weight: 97.5 kg     Relevant Results                              Assessment/Plan   Assessment & Plan  Cerebellar mass    Regis Mixon is a 64 y.o. male with h/o L cerebellar of hemangioblastoma s/p resection (2003), HTN, HLD, obesity, PMR p/w dizziness x6w, CTH 5.5x5 cm L cerebellar lesion, MRI 5.3 x 2.6cm  L cerebellar cystic mass, C3-4 dorsal cord lesion, 4th effacement, CT CAP multiple b/l pulmonary nodules, multiple b/l renal cysts        Recs  [facility]  OR [place] 1/14 for L crani for tumor resection  angio Mon 1/13   medicine recs- RCRI 0  SCDs, SQH  PTOT           Giselle Benavides MD      "

## 2025-01-12 NOTE — CARE PLAN
The patient's goals for the shift include increase appetite     The clinical goals for the shift include Pt's neuro exam will remain stable overnight    Over the shift, the patient did not make progress toward the following goals. Barriers to progression include decrease in appetite. Recommendations to address these barriers include encourage po intake.

## 2025-01-12 NOTE — SIGNIFICANT EVENT
Rapid Response Nurse Note: RADAR alert: 6    Pager time: [phone]  Arrival time: 426  Event end time: 428  Location:  4029  [] Triage by phone or secure messaging    Rapid response initiated by:  [] Rapid response RN [] Family [] Nursing Supervisor [] Physician   [x] RADAR auto page [] Sepsis auto-page [] RN [] RT   [] NP/PA [] Other:     Primary reason for call:   [] BAT [] New CPAP/BiPAP [] Bleeding [] Change in mental status   [] Chest pain [] Code blue [] FiO2 >/= 50% [] HR </= 40 bpm   [] HR >/= 130 bpm [] Hyperglycemia [] Hypoglycemia [x] RADAR    [] RR </= 8 bpm [] RR >/= 30 bpm [] SBP </= 90 mmHg [] SpO2 < 90%   [] Seizure [] Sepsis [] Shortness of breath  [] Staff concern: see comments     Initial VS and/or RADAR VS: T 35.9 °C; HR 90; RR 14; /79; SPO2 93% on supplemental oxygen.    Interventions:  [x] None [] ABG/VBG [] Assist w/ICU transfer [] BAT paged    [] Bag mask [] Blood [] Cardioversion [] Code Blue   [] Code blue for intubation [] Code status changed [] Chest x-ray [] EKG   [] IV fluid/bolus [] KUB x-ray [] Labs/cultures [] Medication   [] Nebulizer treatment [] NIPPV (CPAP/BiPAP) [] Oxygen [] Oral airway   [] Peripheral IV [] Palliative care consult [] CT/MRI [] Sepsis protocol    [] Suctioned [] Other:     Outcome:  [] Coded and  [] Code blue for intubation [] Coded and transferred to ICU []  on division   [x] Remained on division (no change) [] Remained on division + additional monitoring [] Remained in ED [] Transferred to ED   [] Transferred to ICU [] Transferred to inpatient status [] Transferred for interventions (procedure) [] Transferred to ICU stepdown    [] Transferred to surgery [] Transferred to telemetry [] Sepsis protocol [] STEMI protocol   [] Stroke protocol [x] Bedside nurse instructed to page rapid response for any concerns or acute change in condition/VS     Additional Comments: Reviewed above RADAR VS with bedside RN.  VS within patient's current trends.   SPO2 verified: 96%.  Staff to page rapid response for any concerns or acute change in condition/VS.

## 2025-01-12 NOTE — SIGNIFICANT EVENT
Department of Pediatrics  General Pediatrics  Attending History and Physical        CHIEF COMPLAINT:    Chief Complaint   Patient presents with    Abdominal Pain        Reason for Admission:  pyelonephritis    History Obtained From:  patient, mother, chart    HISTORY OF PRESENT ILLNESS:              The patient is a 14 y.o. female with significant past medical history of two prior UTI's who presented with back pain and fever. She was initially taken to [facility] ED on 1/30 and was diagnosed with a UTI and started on Keflex. Her pain worsened and fevers became higher (initially low grade 100-101, then became 102-103) so she returned to [facility] where she a CT scan showed R sided pyelonephritis. She was admitted to the hospital and given two doses of ceftriaxone before being transitioned to oral bactrim and discharged. At home she continued to have back pain, particularly with urinating, and continued fevers. She also reports juni nausea, runny nose, and a mild cough. Of note, siblings have influenza. Given persistence/worsening of her symptoms, she was brought to [facility]. ER course: She was afebrile with normal vitals, but given history and physical exam findings of back pain, labs were performed and included a UA showing large leuk esterase, a CT scan was performed which confirmed R sided pyelonephritis. CBC and electrolytes were unremarkable. CXR was clear. Flu was negative. She was given 1 g of Rocephin and admitted for further management. Review of Systems:  CONSTITUTIONAL:  General malaize  EYES:  negative  HEENT:  Mild congestion/rhinorrhea  RESPIRATORY:  Mild cough  CARDIOVASCULAR:  negative  GASTROINTESTINAL:  negative  GENITOURINARY:  see HPI  INTEGUMENT/BREAST:  negative  HEMATOLOGIC/LYMPHATIC:  negative  ALLERGIC/IMMUNOLOGIC:  negative  ENDOCRINE:  negative  MUSCULOSKELETAL:  negative  NEUROLOGICAL:  negative  BEHAVIOR/PSYCH:  negative      Past Medical History:    History reviewed.  No pertinent Rapid Response Nurse Note: QUINTEN score of 6    Pager time:   Arrival time:   Event end time:   Location: LT 4029  [x] Triage by secure messaging    Rapid response initiated by:  [] Rapid response RN [] Family [] Nursing Supervisor [] Physician   [x] RADAR auto page [] Sepsis auto-page [] RN [] RT   [] NP/PA [] Other:     Primary reason for call:   [] BAT [] New CPAP/BiPAP [] Bleeding [] Change in mental status   [] Chest pain [] Code blue [] FiO2 >/= 50% [] HR </= 40 bpm   [] HR >/= 130 bpm [] Hyperglycemia [] Hypoglycemia [x] RADAR    [] RR </= 8 bpm [] RR >/= 30 bpm [] SBP </= 90 mmHg [] SpO2 < 90%   [] Seizure [] Sepsis [] Shortness of breath  [] Staff concern: see comments     Interventions:  [x] None [] ABG/VBG [] Assist w/ICU transfer [] BAT paged    [] Bag mask [] Blood [] Cardioversion [] Code Blue   [] Code blue for intubation [] Code status changed [] Chest x-ray [] EKG   [] IV fluid/bolus [] KUB x-ray [] Labs/cultures [] Medication   [] Nebulizer treatment [] NIPPV (CPAP/BiPAP) [] Oxygen [] Oral airway   [] Peripheral IV [] Palliative care consult [] CT/MRI [] Sepsis protocol    [] Suctioned [] Other:     Outcome:  [] Coded and  [] Code blue for intubation [] Coded and transferred to ICU []  on division   [x] Remained on division (no change) [] Remained on division + additional monitoring [] Remained in ED [] Transferred to ED   [] Transferred to ICU [] Transferred to inpatient status [] Transferred for interventions (procedure) [] Transferred to ICU stepdown    [] Transferred to surgery [] Transferred to telemetry [] Sepsis protocol [] STEMI protocol   [] Stroke protocol       Additional Comments:      Notified nurse of QUINTEN andre received: 35.2 95 10 106/74 96.  RR now 13.     No concerns per nurse at this time.  Encouraged to call a rapid response as needed if patient status changes.     past medical history. Past Surgical History:    History reviewed. No pertinent surgical history. Medications Prior to Admission:   Medications Prior to Admission: sulfamethoxazole-trimethoprim (BACTRIM DS;SEPTRA DS) 800-160 MG per tablet, Take 1 tablet by mouth 2 times daily  ibuprofen (ADVIL;MOTRIN) 200 MG tablet, Take 400 mg by mouth every 6 hours as needed for Pain    Allergies:  Codeine    Diet:  general    Family History:       Problem Relation Age of Onset    Diabetes Mother     Diabetes Maternal Grandmother     Diabetes Maternal Grandfather        Social History:   Current Caregiver is mom. Development: Age appropriate. Physical Exam:    Vitals:    Temp: 100.3 °F (37.9 °C) I Temp  Av.3 °F (37.4 °C)  Min: 97.8 °F (36.6 °C)  Max: 100.3 °F (37.9 °C) I Heart Rate: 90 I Pulse  Av.3  Min: 50  Max: 91 I BP: 804/01 I Systolic (54VZZ), YBW:026 , Min:92 , ORF:518   ; Diastolic (59LRM), WYQ:47, Min:52, Max:69   I Resp: 16 I Resp  Av.4  Min: 15  Max: 22 I SpO2: 100 % I SpO2  Av %  Min: 98 %  Max: 100 % I   I Height: 5' 1\" (154.9 cm) I   I No head circumference on file for this encounter. I      29 %ile (Z= -0.54) based on CDC (Girls, 2-20 Years) weight-for-age data using vitals from 2020.  11 %ile (Z= -1.21) based on CDC (Girls, 2-20 Years) Stature-for-age data based on Stature recorded on 2020. No head circumference on file for this encounter. 53 %ile (Z= 0.08) based on CDC (Girls, 2-20 Years) BMI-for-age based on BMI available as of 2020.     GENERAL:  alert, active and cooperative  HEENT:  sclera clear, extra ocular muscles intact, mucus membranes moist, neck supple and mild congestion noted  RESPIRATORY:  no increased work of breathing, breath sounds clear to auscultation bilaterally, no crackles or wheezing and good air exchange  CARDIOVASCULAR:  regular rate and rhythm, normal S1, S2, no murmur noted and capillary Refill less than 2 seconds  ABDOMEN:  soft, non-distended, non-tender, no rebound tenderness or guarding, no masses palpated and no hepatosplenomegaly  MUSCULOSKELETAL:  moving all extremities well and symmetrically and costovertebral angle tenderness on right  NEUROLOGIC:  normal tone and strength and sensation intact  SKIN:  no rashes    DATA:  Admission on 02/04/2020   Component Date Value Ref Range Status    WBC 02/04/2020 8.1  4.8 - 10.8 thou/mm3 Final    RBC 02/04/2020 4.41  4.20 - 5.40 mill/mm3 Final    Hemoglobin 02/04/2020 12.8  12.0 - 16.0 gm/dl Final    Hematocrit 02/04/2020 39.2  37.0 - 47.0 % Final    MCV 02/04/2020 88.9  81.0 - 99.0 fL Final    MCH 02/04/2020 29.0  26.0 - 33.0 pg Final    MCHC 02/04/2020 32.7  32.2 - 35.5 gm/dl Final    RDW-CV 02/04/2020 12.4  11.5 - 14.5 % Final    RDW-SD 02/04/2020 40.3  35.0 - 45.0 fL Final    Platelets 14/10/3232 375  130 - 400 thou/mm3 Final    MPV 02/04/2020 9.2* 9.4 - 12.4 fL Final    Seg Neutrophils 02/04/2020 77.5  % Final    Lymphocytes 02/04/2020 11.8  % Final    Monocytes 02/04/2020 8.0  % Final    Eosinophils 02/04/2020 1.9  % Final    Basophils 02/04/2020 0.6  % Final    Immature Granulocytes 02/04/2020 0.2  % Final    Segs Absolute 02/04/2020 6.3  1.8 - 7.7 thou/mm3 Final    Lymphocytes Absolute 02/04/2020 1.0  1.0 - 4.8 thou/mm3 Final    Monocytes Absolute 02/04/2020 0.6  0.4 - 1.3 thou/mm3 Final    Eosinophils Absolute 02/04/2020 0.2  0.0 - 0.4 thou/mm3 Final    Basophils Absolute 02/04/2020 0.0  0.0 - 0.1 thou/mm3 Final    Immature Grans (Abs) 02/04/2020 0.02  0.00 - 0.07 thou/mm3 Final    nRBC 02/04/2020 0  /100 wbc Final    Performed at [facility], [address]    Magnesium 02/04/2020 2.2  1.6 - 2.4 mg/dL Final    Performed at [facility], [address]    Preg, Serum 02/04/2020 NEGATIVE  NEGATIVE Final    Performed at [facility], [address]    Glucose 02/04/2020 117* 70 - 108 test should be     considered presumptive positive until/unless confirmed     by another method. (Additional request)  Quantitative values from a reference laboratory are     available upon additional request.  These results are for medical use only.   Performed at [facility], [address]      Flu A Antigen 02/04/2020 NEGATIVE  NEGATIVE Final    Flu B Antigen 02/04/2020 NEGATIVE  NEGATIVE Final    Performed at [facility], [address]    Glucose, Ur 02/04/2020 NEGATIVE  NEGATIVE mg/dl Final    Bilirubin Urine 02/04/2020 NEGATIVE  NEGATIVE Final    Ketones, Urine 02/04/2020 NEGATIVE  NEGATIVE Final    Specific Gravity, Urine 02/04/2020 1.006  1.002 - 1.03 Final    Blood, Urine 02/04/2020 NEGATIVE  NEGATIVE Final    pH, UA 02/04/2020 6.0  5.0 - 9.0 Final    Protein, UA 02/04/2020 NEGATIVE  NEGATIVE Final    Urobilinogen, Urine 02/04/2020 0.2  0.0 - 1.0 eu/dl Final    Nitrite, Urine 02/04/2020 NEGATIVE  NEGATIVE Final    Leukocyte Esterase, Urine 02/04/2020 LARGE* NEGATIVE Final    Color, UA 02/04/2020 YELLOW  STRAW-YELL Final    Character, Urine 02/04/2020 CLEAR  CLEAR-SL C Final    RBC, UA 02/04/2020 0-2  0-2/hpf /hpf Final    WBC, UA 02/04/2020 25-50  0-4/hpf /hpf Final    Epi Cells 02/04/2020 3-5  3-5/hpf /hpf Final    Bacteria, UA 02/04/2020 FEW  FEW/NONE S /hpf Final    Casts UA 02/04/2020 NONE SEEN  NONE SEEN /lpf Final    Crystals 02/04/2020 NONE SEEN  NONE SEEN Final    Renal Epithelial, Urine 02/04/2020 NONE SEEN  NONE SEEN Final    Yeast, UA 02/04/2020 NONE SEEN  NONE SEEN Final    CASTS 2 02/04/2020 NONE SEEN  NONE SEEN /lpf Final    MISCELLANEOUS 2 02/04/2020 NONE SEEN   Final    Performed at [facility], [address]    Urine Culture Reflex 02/04/2020 No growth-preliminary    Preliminary    Anion Gap 02/04/2020 15.0  8.0 - 16.0 meq/L Final    Comment: ANION GAP = Sodium -(Chloride + CO2)  Performed at [address] [place] [address], [address]      Osmolality Calc 02/04/2020 272.4* 275.0 - 300 mOsmol/kg Final    Performed at [address], [address]       I personally reviewed the patient's CXR, which shows no focal findings. Assessment and Plan:    Patient's primary care physician is REYNA Deleon - CNP     Active Problems:    Acute pyelonephritis  - Give another 1 g of Rocephin this morning for total first dose of 2 g (more appropriate dose based on weight), continue 2 g Rocephin thereafter   - Given readmission, would not discharge until afebrile >24 h and clinically improving.  Due to rising resistance to Bactrim, would prefer a 3rd gen cephalosporin when time to transition to PO antibiotics  - Continue IVF, can stop when taking excellent PO      Adria Toure MD, PhD  02/05/20   2:49 PM

## 2025-01-12 NOTE — CARE PLAN
The patient's goals for the shift include      The clinical goals for the shift include Pt's neuro exam will remain stable overnight    Problem: Fall/Injury  Goal: Not fall by end of shift  Outcome: Progressing     Problem: Fall/Injury  Goal: Be free from injury by end of the shift  Outcome: Progressing     Problem: Fall/Injury  Goal: Verbalize understanding of personal risk factors for fall in the hospital  Outcome: Progressing     Problem: Pain - Adult  Goal: Verbalizes/displays adequate comfort level or baseline comfort level  Outcome: Progressing     Problem: Safety - Adult  Goal: Free from fall injury  Outcome: Progressing     Problem: Discharge Planning  Goal: Discharge to home or other facility with appropriate resources  Outcome: Progressing     Problem: Chronic Conditions and Co-morbidities  Goal: Patient's chronic conditions and co-morbidity symptoms are monitored and maintained or improved  Outcome: Progressing

## 2025-01-13 ENCOUNTER — ANESTHESIA (OUTPATIENT)
Dept: RADIOLOGY | Facility: HOSPITAL | Age: 65
DRG: 025 | End: 2025-01-13
Payer: COMMERCIAL

## 2025-01-13 ENCOUNTER — ANESTHESIA EVENT (OUTPATIENT)
Dept: RADIOLOGY | Facility: HOSPITAL | Age: 65
DRG: 025 | End: 2025-01-13
Payer: COMMERCIAL

## 2025-01-13 ENCOUNTER — APPOINTMENT (OUTPATIENT)
Dept: RADIOLOGY | Facility: HOSPITAL | Age: 65
DRG: 025 | End: 2025-01-13
Payer: COMMERCIAL

## 2025-01-13 ENCOUNTER — ANESTHESIA EVENT (OUTPATIENT)
Dept: OPERATING ROOM | Facility: HOSPITAL | Age: 65
End: 2025-01-13
Payer: COMMERCIAL

## 2025-01-13 DIAGNOSIS — D43.1: Primary | ICD-10-CM

## 2025-01-13 LAB
ABO GROUP (TYPE) IN BLOOD: NORMAL
ALBUMIN SERPL BCP-MCNC: 4.3 G/DL (ref 3.4–5)
ANION GAP SERPL CALC-SCNC: 15 MMOL/L (ref 10–20)
ANTIBODY SCREEN: NORMAL
BUN SERPL-MCNC: 24 MG/DL (ref 6–23)
CALCIUM SERPL-MCNC: 9.9 MG/DL (ref 8.6–10.6)
CHLORIDE SERPL-SCNC: 95 MMOL/L (ref 98–107)
CO2 SERPL-SCNC: 27 MMOL/L (ref 21–32)
CREAT SERPL-MCNC: 1.16 MG/DL (ref 0.5–1.3)
EGFRCR SERPLBLD CKD-EPI 2021: 71 ML/MIN/1.73M*2
ERYTHROCYTE [DISTWIDTH] IN BLOOD BY AUTOMATED COUNT: 12.5 % (ref 11.5–14.5)
GLUCOSE SERPL-MCNC: 93 MG/DL (ref 74–99)
HCT VFR BLD AUTO: 48.3 % (ref 41–52)
HGB BLD-MCNC: 16.7 G/DL (ref 13.5–17.5)
MCH RBC QN AUTO: 29.6 PG (ref 26–34)
MCHC RBC AUTO-ENTMCNC: 34.6 G/DL (ref 32–36)
MCV RBC AUTO: 86 FL (ref 80–100)
NRBC BLD-RTO: 0 /100 WBCS (ref 0–0)
PHOSPHATE SERPL-MCNC: 3.3 MG/DL (ref 2.5–4.9)
PLATELET # BLD AUTO: 283 X10*3/UL (ref 150–450)
POTASSIUM SERPL-SCNC: 3.3 MMOL/L (ref 3.5–5.3)
RBC # BLD AUTO: 5.64 X10*6/UL (ref 4.5–5.9)
RH FACTOR (ANTIGEN D): NORMAL
SODIUM SERPL-SCNC: 134 MMOL/L (ref 136–145)
WBC # BLD AUTO: 10 X10*3/UL (ref 4.4–11.3)

## 2025-01-13 PROCEDURE — 7100000009 HC PHASE TWO TIME - INITIAL BASE CHARGE: Performed by: NEUROLOGICAL SURGERY

## 2025-01-13 PROCEDURE — 2500000001 HC RX 250 WO HCPCS SELF ADMINISTERED DRUGS (ALT 637 FOR MEDICARE OP): Performed by: PHYSICIAN ASSISTANT

## 2025-01-13 PROCEDURE — 99152 MOD SED SAME PHYS/QHP 5/>YRS: CPT | Performed by: NEUROLOGICAL SURGERY

## 2025-01-13 PROCEDURE — 36226 PLACE CATH VERTEBRAL ART: CPT | Performed by: NEUROLOGICAL SURGERY

## 2025-01-13 PROCEDURE — 99223 1ST HOSP IP/OBS HIGH 75: CPT | Performed by: PSYCHIATRY & NEUROLOGY

## 2025-01-13 PROCEDURE — 86901 BLOOD TYPING SEROLOGIC RH(D): CPT | Performed by: PHYSICIAN ASSISTANT

## 2025-01-13 PROCEDURE — 2550000001 HC RX 255 CONTRASTS: Performed by: NEUROLOGICAL SURGERY

## 2025-01-13 PROCEDURE — 2780000003 HC OR 278 NO HCPCS: Performed by: NEUROLOGICAL SURGERY

## 2025-01-13 PROCEDURE — 86923 COMPATIBILITY TEST ELECTRIC: CPT

## 2025-01-13 PROCEDURE — 70498 CT ANGIOGRAPHY NECK: CPT

## 2025-01-13 PROCEDURE — 70498 CT ANGIOGRAPHY NECK: CPT | Performed by: RADIOLOGY

## 2025-01-13 PROCEDURE — 36415 COLL VENOUS BLD VENIPUNCTURE: CPT | Performed by: PHYSICIAN ASSISTANT

## 2025-01-13 PROCEDURE — 2500000004 HC RX 250 GENERAL PHARMACY W/ HCPCS (ALT 636 FOR OP/ED): Performed by: PHYSICIAN ASSISTANT

## 2025-01-13 PROCEDURE — 99153 MOD SED SAME PHYS/QHP EA: CPT | Performed by: NEUROLOGICAL SURGERY

## 2025-01-13 PROCEDURE — 85027 COMPLETE CBC AUTOMATED: CPT | Performed by: PHYSICIAN ASSISTANT

## 2025-01-13 PROCEDURE — 80069 RENAL FUNCTION PANEL: CPT | Performed by: PHYSICIAN ASSISTANT

## 2025-01-13 PROCEDURE — 2500000004 HC RX 250 GENERAL PHARMACY W/ HCPCS (ALT 636 FOR OP/ED): Performed by: NEUROLOGICAL SURGERY

## 2025-01-13 PROCEDURE — 7100000010 HC PHASE TWO TIME - EACH INCREMENTAL 1 MINUTE: Performed by: NEUROLOGICAL SURGERY

## 2025-01-13 PROCEDURE — 2060000001 HC INTERMEDIATE ICU ROOM DAILY

## 2025-01-13 PROCEDURE — 2720000007 HC OR 272 NO HCPCS: Performed by: NEUROLOGICAL SURGERY

## 2025-01-13 PROCEDURE — 2500000004 HC RX 250 GENERAL PHARMACY W/ HCPCS (ALT 636 FOR OP/ED)

## 2025-01-13 PROCEDURE — C1769 GUIDE WIRE: HCPCS | Performed by: NEUROLOGICAL SURGERY

## 2025-01-13 PROCEDURE — C1760 CLOSURE DEV, VASC: HCPCS | Performed by: NEUROLOGICAL SURGERY

## 2025-01-13 PROCEDURE — 2500000001 HC RX 250 WO HCPCS SELF ADMINISTERED DRUGS (ALT 637 FOR MEDICARE OP): Performed by: STUDENT IN AN ORGANIZED HEALTH CARE EDUCATION/TRAINING PROGRAM

## 2025-01-13 PROCEDURE — 70496 CT ANGIOGRAPHY HEAD: CPT | Performed by: RADIOLOGY

## 2025-01-13 PROCEDURE — 2500000004 HC RX 250 GENERAL PHARMACY W/ HCPCS (ALT 636 FOR OP/ED): Performed by: STUDENT IN AN ORGANIZED HEALTH CARE EDUCATION/TRAINING PROGRAM

## 2025-01-13 PROCEDURE — 75710 ARTERY X-RAYS ARM/LEG: CPT | Mod: TC,RT | Performed by: NEUROLOGICAL SURGERY

## 2025-01-13 RX ORDER — POTASSIUM CHLORIDE 1.5 G/1.58G
20 POWDER, FOR SOLUTION ORAL ONCE
Status: COMPLETED | OUTPATIENT
Start: 2025-01-13 | End: 2025-01-13

## 2025-01-13 RX ORDER — MIDAZOLAM HYDROCHLORIDE 1 MG/ML
INJECTION INTRAMUSCULAR; INTRAVENOUS
Status: COMPLETED | OUTPATIENT
Start: 2025-01-13 | End: 2025-01-13

## 2025-01-13 RX ORDER — FENTANYL CITRATE 50 UG/ML
INJECTION, SOLUTION INTRAMUSCULAR; INTRAVENOUS
Status: COMPLETED | OUTPATIENT
Start: 2025-01-13 | End: 2025-01-13

## 2025-01-13 RX ORDER — SODIUM CHLORIDE 9 MG/ML
75 INJECTION, SOLUTION INTRAVENOUS CONTINUOUS
Status: DISCONTINUED | OUTPATIENT
Start: 2025-01-14 | End: 2025-01-13

## 2025-01-13 RX ORDER — POTASSIUM CHLORIDE 14.9 MG/ML
20 INJECTION INTRAVENOUS
Status: DISCONTINUED | OUTPATIENT
Start: 2025-01-13 | End: 2025-01-14

## 2025-01-13 RX ORDER — SODIUM CHLORIDE 9 MG/ML
75 INJECTION, SOLUTION INTRAVENOUS CONTINUOUS
Status: DISCONTINUED | OUTPATIENT
Start: 2025-01-13 | End: 2025-01-15

## 2025-01-13 RX ADMIN — LISINOPRIL 20 MG: 20 TABLET ORAL at 08:01

## 2025-01-13 RX ADMIN — HYDROCHLOROTHIAZIDE 25 MG: 25 TABLET ORAL at 08:01

## 2025-01-13 RX ADMIN — SODIUM CHLORIDE 1000 ML: 9 INJECTION, SOLUTION INTRAVENOUS at 23:54

## 2025-01-13 RX ADMIN — SODIUM CHLORIDE 75 ML/HR: 9 INJECTION, SOLUTION INTRAVENOUS at 14:15

## 2025-01-13 RX ADMIN — ONDANSETRON 4 MG: 2 INJECTION INTRAMUSCULAR; INTRAVENOUS at 08:06

## 2025-01-13 RX ADMIN — POTASSIUM CHLORIDE 20 MEQ: 1.5 POWDER, FOR SOLUTION ORAL at 23:52

## 2025-01-13 RX ADMIN — MIDAZOLAM HYDROCHLORIDE 1 MG: 1 INJECTION, SOLUTION INTRAMUSCULAR; INTRAVENOUS at 10:08

## 2025-01-13 RX ADMIN — IOHEXOL 80 ML: 350 INJECTION, SOLUTION INTRAVENOUS at 17:57

## 2025-01-13 RX ADMIN — HEPARIN SODIUM 5000 UNITS: 5000 INJECTION INTRAVENOUS; SUBCUTANEOUS at 17:15

## 2025-01-13 RX ADMIN — IOHEXOL 100 ML: 350 INJECTION, SOLUTION INTRAVENOUS at 10:54

## 2025-01-13 RX ADMIN — PRAVASTATIN SODIUM 40 MG: 40 TABLET ORAL at 22:23

## 2025-01-13 RX ADMIN — ONDANSETRON HYDROCHLORIDE 4 MG: 4 TABLET, FILM COATED ORAL at 16:42

## 2025-01-13 RX ADMIN — FENTANYL CITRATE 50 MCG: 50 INJECTION, SOLUTION INTRAMUSCULAR; INTRAVENOUS at 10:08

## 2025-01-13 RX ADMIN — HEPARIN SODIUM 5000 UNITS: 5000 INJECTION INTRAVENOUS; SUBCUTANEOUS at 02:00

## 2025-01-13 RX ADMIN — POTASSIUM CHLORIDE 20 MEQ: 14.9 INJECTION, SOLUTION INTRAVENOUS at 22:24

## 2025-01-13 RX ADMIN — METOPROLOL SUCCINATE 25 MG: 25 TABLET, EXTENDED RELEASE ORAL at 08:01

## 2025-01-13 ASSESSMENT — PAIN SCALES - GENERAL

## 2025-01-13 ASSESSMENT — PAIN - FUNCTIONAL ASSESSMENT

## 2025-01-13 ASSESSMENT — ACTIVITIES OF DAILY LIVING (ADL): LACK_OF_TRANSPORTATION: NO

## 2025-01-13 NOTE — PRE-PROCEDURE NOTE
Pre-Procedure H&P     Provider Assessment:  Diagnosis/Reason for Procedure: Hemangioblastoma   Procedure: Diagnostic Cerebral Angiogram  Medications Reviewed:   yes   Prophylatic Antibiotics Needed:   no    Neuro status: A&Ox3, moving all extremities full strength   Mouth Opening OK: yes   Neck Flexibility OK: yes   Sedation Plan: moderate sedation   COVID-19 Risk Consent:  Surgeon has reviewed key risks related to the risk of domenico COVID-19 and if they contract COVID-19 what the risks are.

## 2025-01-13 NOTE — CONSULTS
History Of Present Illness  Regis Mixon is a 64-year-old male with a history of left cerebellar hemangioblastoma status post-resection in 2003, presenting with progressive dizziness over the past six weeks. The dizziness is described as lightheadedness without associated visual disturbances, weakness, numbness, or focal neurological deficits. On the day of presentation, the patient experienced his first episode of nausea and vomiting after eating, with no recurrent episodes. He denies headache, bowel or bladder incontinence, or any other systemic symptoms.    Recent imaging studies revealed a 5.3 x 2.6 cm mixed cystic and hypervascular solid mass in the left cerebellar hemisphere, associated with effacement of the 4th ventricle, mild ventriculomegaly, and tonsillar herniation (1.1 cm below the foramen magnum). Additional findings include a suspected C3-4 dorsal cord lesion and multiple renal cysts.    Plan discussed with the patient and his wife.      Past Medical History  He has a past medical history of Other specified health status, Personal history of other infectious and parasitic diseases, and PMR (polymyalgia rheumatica) (Multi) (08/14/2023).    Surgical History  He has a past surgical history that includes Other surgical history (05/19/2021) and Other surgical history (05/19/2021).     Social History  He reports that he has never smoked. He has never been exposed to tobacco smoke. He has never used smokeless tobacco. He reports that he does not drink alcohol and does not use drugs.    Family History  Family History   Problem Relation Name Age of Onset    Lung cancer Mother      Lung cancer Father          Allergies  Patient has no known allergies.    Review of Systems  As above     Physical Exam  General: No distress, alert, interactive and cooperative.   Resp: breathing comfortably  Cardiovascular: Radial pulses +2 and equal. No swelling, varicosities, edema, or tenderness to palpation.   Neuro:A&Ox3    "  Last Recorded Vitals  Blood pressure 124/88, pulse 85, temperature 36.9 °C (98.4 °F), resp. rate 16, height 1.778 m (5' 10\"), weight 97.5 kg (215 lb), SpO2 97%.    Relevant Results  Results for orders placed or performed during the hospital encounter of 01/10/25 (from the past 24 hours)   Coagulation Screen   Result Value Ref Range    Protime 13.6 (H) 9.8 - 12.8 seconds    INR 1.2 (H) 0.9 - 1.1    aPTT 39 (H) 27 - 38 seconds   Renal function panel   Result Value Ref Range    Glucose 133 (H) 74 - 99 mg/dL    Sodium 132 (L) 136 - 145 mmol/L    Potassium 3.3 (L) 3.5 - 5.3 mmol/L    Chloride 96 (L) 98 - 107 mmol/L    Bicarbonate 24 21 - 32 mmol/L    Anion Gap 15 10 - 20 mmol/L    Urea Nitrogen 34 (H) 6 - 23 mg/dL    Creatinine 1.21 0.50 - 1.30 mg/dL    eGFR 68 >60 mL/min/1.73m*2    Calcium 10.1 8.6 - 10.6 mg/dL    Phosphorus 4.1 2.5 - 4.9 mg/dL    Albumin 4.3 3.4 - 5.0 g/dL   CBC and Auto Differential   Result Value Ref Range    WBC 10.9 4.4 - 11.3 x10*3/uL    nRBC 0.0 0.0 - 0.0 /100 WBCs    RBC 5.64 4.50 - 5.90 x10*6/uL    Hemoglobin 16.6 13.5 - 17.5 g/dL    Hematocrit 48.7 41.0 - 52.0 %    MCV 86 80 - 100 fL    MCH 29.4 26.0 - 34.0 pg    MCHC 34.1 32.0 - 36.0 g/dL    RDW 12.4 11.5 - 14.5 %    Platelets 304 150 - 450 x10*3/uL    Neutrophils % 71.7 40.0 - 80.0 %    Immature Granulocytes %, Automated 0.3 0.0 - 0.9 %    Lymphocytes % 21.0 13.0 - 44.0 %    Monocytes % 5.8 2.0 - 10.0 %    Eosinophils % 0.8 0.0 - 6.0 %    Basophils % 0.4 0.0 - 2.0 %    Neutrophils Absolute 7.80 (H) 1.20 - 7.70 x10*3/uL    Immature Granulocytes Absolute, Automated 0.03 0.00 - 0.70 x10*3/uL    Lymphocytes Absolute 2.29 1.20 - 4.80 x10*3/uL    Monocytes Absolute 0.63 0.10 - 1.00 x10*3/uL    Eosinophils Absolute 0.09 0.00 - 0.70 x10*3/uL    Basophils Absolute 0.04 0.00 - 0.10 x10*3/uL      Scheduled medications  ergocalciferol, 1,250 mcg, oral, Weekly  heparin (porcine), 5,000 Units, subcutaneous, q8h  hydroCHLOROthiazide, 25 mg, oral, " Daily  lisinopril, 20 mg, oral, Daily  LORazepam, 1 mg, oral, Once  metoprolol succinate XL, 25 mg, oral, Daily  polyethylene glycol, 17 g, oral, Daily  pravastatin, 40 mg, oral, Nightly  sennosides-docusate sodium, 2 tablet, oral, BID      Continuous medications  sodium chloride 0.9%, 75 mL/hr      PRN medications  PRN medications: acetaminophen **OR** acetaminophen **OR** acetaminophen, melatonin, ondansetron **OR** ondansetron       Oncology History    No history exists.         Assessment/Plan     A 64-year-old male with a history of left cerebellar hemangioblastoma status post-resection (2003), presenting with six weeks of progressive dizziness. Imaging identified a 5.3 x 2.6 cm mixed cystic and hypervascular solid mass in the left cerebellar hemisphere with associated mass effect, effacement of the 4th ventricle, and mild ventriculomegaly. Additional findings include a suspected C3-4 dorsal cord lesion, nonspecific intraosseous hemangiomas in the thoracic and lumbar vertebrae, and multiple renal cysts.    Staging and Workup Timeline  2003: Left cerebellar hemangioblastoma resected.  01/09/2025: CT head identified a 5.5 x 5 cm left cerebellar lesion.  01/10/2025: MRI brain with and without contrast:  Mixed cystic and hypervascular solid mass (5.3 x 2.6 cm) in the left cerebellar hemisphere.  Mass effect with effacement of the 4th ventricle and mild ventriculomegaly.  Inferior cerebellar tonsillar herniation (1.1 cm below the foramen magnum).  Tortuous vascular supply from the left vertebral artery.  01/12/2025: MRI spine (non-contrast, limited study):  Expansile T2/STIR hyperintense lesion at C3-4.  No definitive osseous metastatic disease; atypical hemangioma in T11.  01/13/2025: Diagnostic cerebral angiogram confirmed tumor vascularity from the left vertebral artery muscular branch.    Assessment and plan:    # Cerebellar Mass  -History of left cerebellar hemangioblastoma (2003, resected) presenting with a  recurrent left cerebellar mass (5.3 x 2.6 cm) on imaging, likely representing recurrent hemangioblastoma versus other differentials, causing mass effect and C3-4 dorsal cord lesion. Planned for surgical resection     Recommendations:     Diagnosis and Staging:  -Confirm pathology with planned surgical resection (left craniotomy for tumor resection, scheduled 01/14/2025).  -Post-resection pathology to determine recurrence or a secondary tumor.  Workup:  -MRI cervical spine with contrast for further evaluation of the C3-4 lesion.  -Consider genetic evaluation if von Hippel-Lindau (VHL) disease suspected due to hemangioblastoma recurrence.  -Repeat contrast-enhanced brain MRI 6 weeks postoperatively for surveillance.  Surgical and Perioperative Management:  -Proceed with left craniotomy for tumor resection on 01/14/2025.  -Dexamethasone for cerebral edema and mass effect (current dose: 4 mg Q6H).  -Neurological monitoring and PT/OT postoperatively.    Thank you for this consult. Patient seen and discussed with attending physician, Dr. Barboza who agrees with the above.      Trina Cohn  Hematology-Oncology Fellow, PGY4  Hematology Consult Pager: [phone]  Oncology Consult Pager: [phone]

## 2025-01-13 NOTE — PROGRESS NOTES
"Regis Mixon is a 64 y.o. male on day 3 of admission presenting with Cerebellar mass.    Subjective   No acute events overnight, ready for angio       Objective     Physical Exam  A&Ox3  Face symmetric  RUE 5/5  LUE 5/5  RLE 5/5  LLE 5/5  Sensation intact to light touch throughout all extremities  Left dysmetria    Last Recorded Vitals  Blood pressure 117/84, pulse 75, temperature 37.2 °C (99 °F), temperature source Temporal, resp. rate 16, height 1.778 m (5' 10\"), weight 97.5 kg (215 lb), SpO2 95%.  Intake/Output last 3 Shifts:  I/O last 3 completed shifts:  In: 720 (7.4 mL/kg) [P.O.:720]  Out: - (0 mL/kg)   Weight: 97.5 kg       Assessment/Plan   Assessment & Plan  Cerebellar mass    Regis Mixon is a 64 y.o. male with h/o L cerebellar of hemangioblastoma s/p resection (2003), HTN, HLD, obesity, PMR p/w dizziness x6w, CTH 5.5x5 cm L cerebellar lesion, MRI 5.3 x 2.6cm  L cerebellar cystic mass, C3-4 dorsal cord lesion, 4th effacement, CT CAP multiple b/l pulmonary nodules, multiple b/l renal cysts, 1/12 MRI CTL spine w/o did not tolerate XIAO, expansile C3 intramedullary lesion, multiple thoracic/lumbar vertebral body lesions        Recs  MILAGRO-poss downgrade to floor  OR Tues 1/14 for L crani for tumor resection  angio today Mon 1/13   medicine recs- RCRI 0  SCDs, SQH  PTOT           Francesco Franco MD      "

## 2025-01-13 NOTE — PROCEDURES
Pre-Procedure Verification and Time Out:  Procedure Location: procedure area  HUDDLE - Pre-procedure Verification:  completed  TIME OUT - Final Verification:  completed immediately prior to procedure start  DEBRIEF: completed    Complications:  None; patient tolerated the procedure well.     Disposition: rPCU  Condition: stable  Specimens Collected: No specimens collected    General Information:   Anesthesia/ sedation: Non-Anesthesia  Indication(s)/Pre - Procedure Diagnoses: Cerebral Hemangioblastoma   Post-Procedure Diagnosis: Cerebral Hemangioblastoma  Procedure Name: Diagnostic Cerebral Angiogram  Procedure performed by: Vini Patricio   Assistant(s): Bk Alexandra   Estimated Blood Loss (mL): 10  Specimen: no  Informed Consent: consent obtained and in chart    Access: 6 Fr Sheath in R CFA  Closure: Mynx  Vessels Injected: L VA, R CFA   Moderate Sedation Time: 30 min   Findings: cerebellar hemangioblastoma supplied to Left vertebral artery muscular branch, Full report to follow in PACS dictation

## 2025-01-13 NOTE — PROGRESS NOTES
Transitional Care Coordination Progress Note:  Plan per Medical/Surgical team: Presented with cerebellar mass. Angio today, to OR Tuesday for L crani tumor resection.   Discharge Disposition: Pending PT/OT rec's  Potential Barriers: none  ADOD: 1/16  This nurse attempted to meet pt to discuss discharge planning needs, pt off of unit for angiogram. Will reattempt.     Becky Murillo, RN, BSN  Transitional Care Coordinator  Office: [phone]  Secure chat via Haiku

## 2025-01-13 NOTE — PROGRESS NOTES
01/13/25 1552   Discharge Planning   Living Arrangements Spouse/significant other   Support Systems Spouse/significant other   Assistance Needed None   Type of Residence Private residence   Who is requesting discharge planning? Provider   Home or Post Acute Services None   Expected Discharge Disposition Home   Does the patient need discharge transport arranged? No   RoundTrip coordination needed? No   Financial Resource Strain   How hard is it for you to pay for the very basics like food, housing, medical care, and heating? Not hard   Housing Stability   In the last 12 months, was there a time when you were not able to pay the mortgage or rent on time? N   At any time in the past 12 months, were you homeless or living in a shelter (including now)? N   Transportation Needs   In the past 12 months, has lack of transportation kept you from medical appointments or from getting medications? no   In the past 12 months, has lack of transportation kept you from meetings, work, or from getting things needed for daily living? No     Met with pt, and his family and introduced myself as Care Coordinator with Care Transitions Team for Discharge Planning. Pt stated he  feels safe at home. Pt works part time at [employer], drives himself to work and  to [facility].  Pt's address, phone number and contact information was verified. Pt denies any social or financial concerns at this time.  Discharge disposition: home  Home Healthcare: none  DME: none  PCP: Terence Nagel

## 2025-01-13 NOTE — SIGNIFICANT EVENT
Urology Plan of care note:    Per chart review, Mr. Mixon is 64M with a history of L cerebellar mass admitted for dizziness and altered mental status and is scheduled for a L craniotomy for tumor resection on 1/14. Urology team called to review a CT abd/pel obtained on 1/10 and found to have multiple bilateral simple renal cysts with a 2.5 x 2.2 cm Bosniak 2F cyst of the left kidney. No evidence of hydronephrosis. Cr 1.21 and voiding spontaneously.    Recommendations:  - No acute surgical intervention is indicated this admission  - Outpatient urology follow-up (ordered) to obtain repeat renal imaging in 6 months to re-assess Bosniak 2F cyst  - Please re-engage urology if needed    David Stark MD

## 2025-01-14 ENCOUNTER — APPOINTMENT (OUTPATIENT)
Dept: RADIOLOGY | Facility: HOSPITAL | Age: 65
DRG: 025 | End: 2025-01-14
Payer: COMMERCIAL

## 2025-01-14 ENCOUNTER — ANESTHESIA (OUTPATIENT)
Dept: OPERATING ROOM | Facility: HOSPITAL | Age: 65
End: 2025-01-14
Payer: COMMERCIAL

## 2025-01-14 ENCOUNTER — APPOINTMENT (OUTPATIENT)
Dept: CARDIOLOGY | Facility: HOSPITAL | Age: 65
DRG: 025 | End: 2025-01-14
Payer: COMMERCIAL

## 2025-01-14 LAB
AORTIC VALVE MEAN GRADIENT: 3 MMHG
AORTIC VALVE PEAK VELOCITY: 1.25 M/S
AV PEAK GRADIENT: 6 MMHG
AVA (PEAK VEL): 2.96 CM2
AVA (VTI): 3.22 CM2
EJECTION FRACTION APICAL 4 CHAMBER: 55.4
EJECTION FRACTION: 58 %
LEFT ATRIUM VOLUME AREA LENGTH INDEX BSA: 29.1 ML/M2
LEFT VENTRICLE INTERNAL DIMENSION DIASTOLE: 4.7 CM (ref 3.5–6)
LEFT VENTRICULAR OUTFLOW TRACT DIAMETER: 2.1 CM
MITRAL VALVE E/A RATIO: 0.64
RIGHT VENTRICLE FREE WALL PEAK S': 17 CM/S
TRICUSPID ANNULAR PLANE SYSTOLIC EXCURSION: 2 CM

## 2025-01-14 PROCEDURE — 2060000001 HC INTERMEDIATE ICU ROOM DAILY

## 2025-01-14 PROCEDURE — 93306 TTE W/DOPPLER COMPLETE: CPT | Performed by: INTERNAL MEDICINE

## 2025-01-14 PROCEDURE — 2500000001 HC RX 250 WO HCPCS SELF ADMINISTERED DRUGS (ALT 637 FOR MEDICARE OP): Performed by: PHYSICIAN ASSISTANT

## 2025-01-14 PROCEDURE — 2550000001 HC RX 255 CONTRASTS: Performed by: NEUROLOGICAL SURGERY

## 2025-01-14 PROCEDURE — C8929 TTE W OR WO FOL WCON,DOPPLER: HCPCS

## 2025-01-14 PROCEDURE — 72156 MRI NECK SPINE W/O & W/DYE: CPT

## 2025-01-14 PROCEDURE — 2500000001 HC RX 250 WO HCPCS SELF ADMINISTERED DRUGS (ALT 637 FOR MEDICARE OP): Performed by: STUDENT IN AN ORGANIZED HEALTH CARE EDUCATION/TRAINING PROGRAM

## 2025-01-14 PROCEDURE — A9575 INJ GADOTERATE MEGLUMI 0.1ML: HCPCS | Performed by: NEUROLOGICAL SURGERY

## 2025-01-14 PROCEDURE — 99223 1ST HOSP IP/OBS HIGH 75: CPT

## 2025-01-14 PROCEDURE — 72156 MRI NECK SPINE W/O & W/DYE: CPT | Performed by: RADIOLOGY

## 2025-01-14 PROCEDURE — 2500000004 HC RX 250 GENERAL PHARMACY W/ HCPCS (ALT 636 FOR OP/ED)

## 2025-01-14 PROCEDURE — 2500000004 HC RX 250 GENERAL PHARMACY W/ HCPCS (ALT 636 FOR OP/ED): Performed by: PHYSICIAN ASSISTANT

## 2025-01-14 RX ORDER — LORAZEPAM 1 MG/1
1 TABLET ORAL ONCE
Status: COMPLETED | OUTPATIENT
Start: 2025-01-14 | End: 2025-01-14

## 2025-01-14 RX ORDER — CARVEDILOL 12.5 MG/1
12.5 TABLET ORAL 2 TIMES DAILY
Status: DISCONTINUED | OUTPATIENT
Start: 2025-01-14 | End: 2025-01-15

## 2025-01-14 RX ORDER — GADOTERATE MEGLUMINE 376.9 MG/ML
20 INJECTION INTRAVENOUS
Status: COMPLETED | OUTPATIENT
Start: 2025-01-14 | End: 2025-01-14

## 2025-01-14 RX ADMIN — ACETAMINOPHEN 650 MG: 325 TABLET ORAL at 06:16

## 2025-01-14 RX ADMIN — GADOTERATE MEGLUMINE 19 ML: 376.9 INJECTION INTRAVENOUS at 13:44

## 2025-01-14 RX ADMIN — HEPARIN SODIUM 5000 UNITS: 5000 INJECTION INTRAVENOUS; SUBCUTANEOUS at 18:55

## 2025-01-14 RX ADMIN — SENNOSIDES AND DOCUSATE SODIUM 2 TABLET: 50; 8.6 TABLET ORAL at 08:47

## 2025-01-14 RX ADMIN — HYDROCHLOROTHIAZIDE 25 MG: 25 TABLET ORAL at 08:49

## 2025-01-14 RX ADMIN — HEPARIN SODIUM 5000 UNITS: 5000 INJECTION INTRAVENOUS; SUBCUTANEOUS at 10:36

## 2025-01-14 RX ADMIN — CARVEDILOL 12.5 MG: 12.5 TABLET, FILM COATED ORAL at 20:30

## 2025-01-14 RX ADMIN — SENNOSIDES AND DOCUSATE SODIUM 2 TABLET: 50; 8.6 TABLET ORAL at 20:30

## 2025-01-14 RX ADMIN — PRAVASTATIN SODIUM 40 MG: 40 TABLET ORAL at 20:30

## 2025-01-14 RX ADMIN — PERFLUTREN 2 ML OF DILUTION: 6.52 INJECTION, SUSPENSION INTRAVENOUS at 15:20

## 2025-01-14 RX ADMIN — METOPROLOL SUCCINATE 25 MG: 25 TABLET, EXTENDED RELEASE ORAL at 08:47

## 2025-01-14 RX ADMIN — LORAZEPAM 1 MG: 1 TABLET ORAL at 12:39

## 2025-01-14 RX ADMIN — LISINOPRIL 20 MG: 20 TABLET ORAL at 08:47

## 2025-01-14 RX ADMIN — HEPARIN SODIUM 5000 UNITS: 5000 INJECTION INTRAVENOUS; SUBCUTANEOUS at 02:26

## 2025-01-14 ASSESSMENT — COGNITIVE AND FUNCTIONAL STATUS - GENERAL
MOBILITY SCORE: 22
WALKING IN HOSPITAL ROOM: A LITTLE
CLIMB 3 TO 5 STEPS WITH RAILING: A LITTLE
DAILY ACTIVITIY SCORE: 24

## 2025-01-14 ASSESSMENT — PAIN SCALES - GENERAL
PAINLEVEL_OUTOF10: 0 - NO PAIN
PAINLEVEL_OUTOF10: 0 - NO PAIN

## 2025-01-14 NOTE — PROGRESS NOTES
"Regis Mixon is a 64 y.o. male on day 4 of admission presenting with Cerebellar mass.    Subjective   No acute events overnight       Objective     Physical Exam  A&Ox3  Face symmetric  RUE 5/5  LUE 5/5  RLE 5/5  LLE 5/5  Sensation intact to light touch throughout all extremities  Groin site soft  Left dysmetria    Last Recorded Vitals  Blood pressure (!) 139/100, pulse 78, temperature 36.9 °C (98.4 °F), resp. rate 14, height 1.778 m (5' 10\"), weight 97.5 kg (215 lb), SpO2 95%.  Intake/Output last 3 Shifts:  I/O last 3 completed shifts:  In: 1620 (16.6 mL/kg) [I.V.:600 (6.2 mL/kg); IV Piggyback:1020]  Out: - (0 mL/kg)   Weight: 97.5 kg       Assessment/Plan   Assessment & Plan  Cerebellar mass    Regis Mixon is a 64 y.o. male with h/o L cerebellar of hemangioblastoma s/p resection (2003), HTN, HLD, obesity, PMR p/w dizziness x6w, CTH 5.5x5 cm L cerebellar lesion, MRI 5.3 x 2.6cm  L cerebellar cystic mass, C3-4 dorsal cord lesion, 4th effacement, CT CAP multiple b/l pulmonary nodules, multiple b/l renal cysts, 1/12 MRI CTL spine w/o did not tolerate XIAO, expansile C3 intramedullary lesion, multiple thoracic/lumbar vertebral body lesions, 1/13 angio 1 muscular branch from L V3 supplying tumor, did not embolize d/t tortuosity, CTA H/N L cerebellar mass w/ L AICA and PICA feeders        Recs  MILAGRO  MRI CS  TTE  Onc recs  [facility] consult  medicine recs- RCRI 0  SCDs, SQH  PTOT           Francesco Franco MD      "

## 2025-01-14 NOTE — CARE PLAN
The clinical goals for the shift include patient will maintain neuro exam through the shift    Patients neuro exam unchanged through the night.  Has denied pain.  Awaiting further testing today.      Problem: Fall/Injury  Goal: Be free from injury by end of the shift  Outcome: Progressing

## 2025-01-14 NOTE — CONSULTS
Reason For Consult  OR clearance     History Of Present Illness  Regis Mixon is a 64 y.o. male with h/o L cerebellar hemangioblastoma s/p resection (2003) w/ IVC filter also placed at that time, HTN, HLD, obesity. He has had increasing dizziness for 6 weeks and presented to [facility] ED 1/9 after an episode of vomiting and headache but main concern was the dizziness. CT head showed a 5.4 x 5.0 x 2.9 cm left cerebellar mass. He was transferred to [facility] for further neurosurgical evaluation. Further imaging studies including angio and MRI brain, C/T/L spine c/w L cerebellar hemangioblastoma w/ associated with effacement of the 4th ventricle, mild ventriculomegaly, and tonsillar herniation (1.1 cm below the foramen magnum). Additional findings include a suspected C3-4 dorsal cord lesion and multiple renal and pulmonary cysts.     Today, patient endorsing worsening lightheadedness visual disturbances over the last 6 weeks, however have significantly started to worsen within the last 3 weeks. Denies similar symptoms between prior hemangioblastoma resection and present.  Overall he has difficulty describing sensation he has been feeling, but states symptoms are similar to what he experienced in 2003 with an initial diagnosis of cerebellar hemangioblastoma.  Associated with mild headaches around forehead and back of head. He denies presyncopal feelings however had a vasovagal episode around 3 weeks ago.  Episode provoked by bearing down when wife was popping abscess on his back.  He was worked up at outside hospital with a normal EKG and BMP.  His symptoms worsen when going from seated/laying to standing.  Describes visual disturbances as having trouble focusing but denies tunnel vision or double vision.  Denies recent falls or trouble with gait.  Prior to hospitalization had no trouble with completing ADLs or working.  Works as a  at car [employer].  Reports that no one in his family has had similar  issues.    Denies history of MI or heart failure.  No recent chest pain, palpitations, or shortness of breath.  No decreasing activity level.  Was diagnosed with hypertension 3 years ago. Had an echo done at that time which was largely within normal limits - some degree of RV and RA dilation.  BP throughout admission has been elevated with diastolics into the 110s.  However patient takes blood pressure at home and reports it is usually within a normal range.  He contributes his hypertension to stress.     Past Medical History  He has a past medical history of Other specified health status, Personal history of other infectious and parasitic diseases, and PMR (polymyalgia rheumatica) (Multi) (08/14/2023).    Surgical History  -resection of left cerebellar hemangioblastoma in 2003      Social History  -tobacco: never smoked  -alcohol: 2 beers/week  -substance: none    Family History  Family History   Problem Relation Name Age of Onset    Lung cancer Mother      Lung cancer Father       Medications Prior to Admission   Medication Sig Dispense Refill Last Dose/Taking    hydroCHLOROthiazide (Microzide) 12.5 mg tablet TAKE 1 TABLET BY MOUTH ONCE DAILY. 90 tablet 3 1/9/2025 Morning    lisinopriL-hydrochlorothiazide 20-12.5 mg tablet TAKE 1 TABLET BY MOUTH EVERY DAY 90 tablet 3 1/9/2025 Morning    roflumilast (Zoryve) 0.3 % cream Apply 1 Application topically once daily as needed (apply behind ear and lower back for rash).   Past Week    nebivolol (Bystolic) 10 mg tablet Take 1 tablet (10 mg) by mouth once daily. 90 tablet 3 1/8/2025 Evening    pitavastatin calcium (Livalo) 4 mg tablet TAKE 1 TABLET DAILY (Patient taking differently: Take 1 tablet by mouth once daily.) 90 tablet 3 1/8/2025 Evening       Scheduled medications  ergocalciferol, 1,250 mcg, oral, Weekly  heparin (porcine), 5,000 Units, subcutaneous, q8h  hydroCHLOROthiazide, 25 mg, oral, Daily  lisinopril, 20 mg, oral, Daily  LORazepam, 1 mg, oral,  "Once  metoprolol succinate XL, 25 mg, oral, Daily  perflutren lipid microspheres, 0.5-10 mL of dilution, intravenous, Once in imaging  perflutren protein A microsphere, 0.5 mL, intravenous, Once in imaging  polyethylene glycol, 17 g, oral, Daily  pravastatin, 40 mg, oral, Nightly  sennosides-docusate sodium, 2 tablet, oral, BID  sulfur hexafluoride microsphr, 2 mL, intravenous, Once in imaging      Continuous medications  sodium chloride 0.9%, 75 mL/hr, Last Rate: 75 mL/hr (01/13/25 1415)      PRN medications  PRN medications: acetaminophen **OR** acetaminophen **OR** acetaminophen, melatonin, ondansetron **OR** ondansetron    Allergies  Patient has no known allergies.    Review of Systems  13 point ROS negative except as mentioned in HPI     Physical Exam  General: Awake, alert, no acute distress  Neuro: A&Ox4. No focal deficits. Pupils equal and round. Ssensation to light touch intact and equal in UE.   HENT     Head: atraumatic, normocephalic. Mucus membranes moist.     Eyes: Pupils equal, round, reactive. EOM intact.    Neck: full ROM  CV: RRR, no murmurs, rubs, gallops.   Resp: No increased work of breathing. CTAB  GI: abd soft, non-distended, non-tender to palpation. Bowel sounds present   Ext: WWP. No edema in LE BL. 5/5 strength in UE BL.        Last Recorded Vitals  Blood pressure (!) 139/100, pulse 79, temperature 36.9 °C (98.4 °F), resp. rate 21, height 1.778 m (5' 10\"), weight 97.5 kg (215 lb), SpO2 95%.    Relevant Results    Results for orders placed or performed during the hospital encounter of 01/10/25 (from the past 24 hours)   CBC   Result Value Ref Range    WBC 10.0 4.4 - 11.3 x10*3/uL    nRBC 0.0 0.0 - 0.0 /100 WBCs    RBC 5.64 4.50 - 5.90 x10*6/uL    Hemoglobin 16.7 13.5 - 17.5 g/dL    Hematocrit 48.3 41.0 - 52.0 %    MCV 86 80 - 100 fL    MCH 29.6 26.0 - 34.0 pg    MCHC 34.6 32.0 - 36.0 g/dL    RDW 12.5 11.5 - 14.5 %    Platelets 283 150 - 450 x10*3/uL   Renal Function Panel   Result Value Ref " Range    Glucose 93 74 - 99 mg/dL    Sodium 134 (L) 136 - 145 mmol/L    Potassium 3.3 (L) 3.5 - 5.3 mmol/L    Chloride 95 (L) 98 - 107 mmol/L    Bicarbonate 27 21 - 32 mmol/L    Anion Gap 15 10 - 20 mmol/L    Urea Nitrogen 24 (H) 6 - 23 mg/dL    Creatinine 1.16 0.50 - 1.30 mg/dL    eGFR 71 >60 mL/min/1.73m*2    Calcium 9.9 8.6 - 10.6 mg/dL    Phosphorus 3.3 2.5 - 4.9 mg/dL    Albumin 4.3 3.4 - 5.0 g/dL   Type and screen   Result Value Ref Range    ABO TYPE A     Rh TYPE POS     ANTIBODY SCREEN NEG       Cardiac   Encounter Date: 01/10/25   ECG 12 Lead   Result Value    Ventricular Rate 102    Atrial Rate 102    IL Interval 158    QRS Duration 96    QT Interval 346    QTC Calculation(Bazett) 450    P Axis 45    R Axis 74    T Axis 42    QRS Count 17    Q Onset 211    P Onset 132    P Offset 194    T Offset 384    QTC Fredericia 412    Narrative    Sinus tachycardia  ST & T wave abnormality, consider anterior ischemia  Abnormal ECG  No previous ECGs available  See ED provider note for full interpretation and clinical correlation  Confirmed by Raisa Mccoy ([id number]) on 1/10/2025 3:38:03 PM     TRANSTHORACIC ECHOCARDIOGRAM REPORT 6/22/2021  - ordered for essential HTN  Left Ventricle: The left ventricular systolic function is normal, with an estimated ejection fraction of 55-60%. There are no regional wall motion abnormalities. The left ventricular cavity size is normal. Spectral Doppler shows an impaired relaxation pattern of left ventricular diastolic filling.   CONCLUSIONS:  1. The left ventricular systolic function is normal with a 55-60% estimated ejection fraction.  2. Spectral Doppler shows an impaired relaxation pattern of left ventricular diastolic filling.  3. Mitral stenosis is not seen.  4. Aortic valve stenosis is not present.  5. The main pulmonary artery is normal in size, and position, with normal bifurcation into the left and right pulmonary arteries.    Imaging   Chest PA and lateral  1/7/2025 2:38  PM CST   Findings:   There is mild linear atelectasis versus fibrosis involving the lingula. Otherwise, there is no definite acute infiltrate by plain film. The costophrenic angles are not blunted. There is no pneumothorax. The heart size and mediastinal width are within gross limits of normal.  No prior chest radiograph is available for comparison.     CT HEAD WO IV CONTRAST; CT CERVICAL SPINE WO IV CONTRAST;  1/9/2025  11:15 pm  IMPRESSION:  CT HEAD:  1. Imaging findings concerning for a 5.4 x 5.0 x 2.9 cm mixed cystic  and solid mass in the left cerebellum, with local mass effect on the  adjacent structures, including effacement of the 4th ventricle and  slight prominence of the supratentorial ventricular system. MRI of  the brain with and without contrast and neurosurgical consultation  are recommended.  2. No evidence of hemorrhage or CT apparent transcortical infarct.  3. Patchy attenuation changes in the periventricular and subcortical  white matter of bilateral cerebral hemispheres are nonspecific, but  favored to represent sequela of microvascular disease.      CT C-SPINE:  1. No evidence of acute osseous abnormality in the cervical spine.  2. Spondylotic changes of the cervical spine as detailed above.    CT CAP W IV CONTRAST; 1/10/2025 6:19 am   INDICATION:   Signs/Symptoms: New intracranial mass, eval for malignancy   IMPRESSION:  1. Indeterminate multiple bilateral pulmonary nodules measuring up to  0.7 cm in the right lower lobe. Consider follow up non contrast chest  CT at 3-6 months, then consider CT chest at 18-24 months (Mat Nguyenhobenjamin et al., Guidelines for management of incidental pulmonary  nodules detected on CT images: From the Fleischner Society 2017,  Radiology. 2017 Jul;284 (1):228-243.)  2. Multiple bilateral simple renal cysts with a Bosniak 2F cyst of  the left kidney as described above.  3. Additional chronic findings as described above.    MR BRAIN W AND WO IV CONTRAST  1/10/2025  Mixed cystic and hypervascular solid mass in the left cerebellar  hemisphere with the cystic component measuring up to 5.3 cm and solid  component measuring 2.6 cm. Differential includes hemangioblastoma,  pilocytic astrocytoma, or ganglia glioma.    Questionable additional lesion within the dorsal aspect of the cord  at C3-4 only included in the field of view on single  image.  Inferior cerebellar tonsillar herniation as well as effacement of the  superior cerebellar, prepontine, and quadrigeminal cisterns.    MR CERVICAL SPINE WO IV CONTRAST; MR THORACIC SPINE WO IV CONTRAST;  MR LUMBAR SPINE WO IV CONTRAST;  1/12/2025 2:44 am  2. Expansile T2/stir hyperintense signal within the cervical spinal  cord. Underlying lesion not excluded. Evaluation with  contrast-enhanced MRI is recommended.  3. No evidence of osseous metastatic disease within the limitations  of exam. Findings suggestive of atypical hemangioma within T11  vertebral body.  4. Mild multilevel degenerative changes of the spine most pronounced  at C5-6 and L4-5.     CT ANGIO HEAD AND NECK W AND WO IV CONTRAST; 1/13/2025 5:56 pm   IMPRESSION:  1. Left cerebellar mass, better characterized on MRI of the brain  from 01/10/2025, with suspected vascular supply from the left  anterior inferior cerebellar and left posteroinferior cerebellar  arteries as detailed above. There is no evidence of new mass effect  with overall similar degree of effacement of the 4th ventricle and  mild supratentorial ventriculomegaly.  2. No evidence of significant stenosis or large branch vessel cutoffs  of the intracranial vessels.  3. No evidence of significant stenosis of the cervical vessels.  4. Heterogeneous nodular focus of increased density in the left half  of the spinal canal at the C3-4 level measures 6.3 x 6.3 mm greatest  axial dimensions and 7.7 mm cc. This may represent another  hemangioblastoma.        Assessment/Plan   A 64-year-old male with a  history of HTN, HLD, left cerebellar hemangioblastoma status post-resection (2003), presenting with six weeks of progressive dizziness. W/u  identified a 5.3 x 2.6 cm hemangioblastoma in left cerebellar hemisphere with associated mass effect, effacement of the 4th ventricle, mild ventriculomegaly and tonsillar herniation. Additional findings include a suspected C3-4 hemangioblastoma and, nonspecific intraosseous hemangiomas in the thoracic and lumbar vertebrae, and multiple renal cysts. 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     EKG acquired this admission s/f mild sinus tachycardia with T wave inversions in V1-V4, and poor R wave progression from V1 to V6. No prior EKGs to compare to, but low suspicion for ischemia given normal LVEF on echo. Pt hypertensive throughout admission w/ DBPs into the 110s. Maintained on nebivolol, lisinopril, and hydrochlorothiazide at home. Here was started on metoprolol 25mg. Transitioned to Carvedilol 12.5mg BID yesterday. Today, will increase Carvedilol to 25mg BID. Likely that some degree of increased intracranial pressure is contributing to hypertension.    This patient has cardiovascular risk factors of hypertension and hyperlipidemia. No signs or symptoms c/f active ACS, decompensated heart failure, malignant arrhythmias. Patient has no risk modifiers. Patient is at elevated but acceptable clinical risk with RCRI 1 given high risk surgery equating to 6.0% of 30-day risk of major cardiac events. Patient has acceptable baseline functional capacity with DASI 50.7/METS 8.97. Given these findings, no significant further cardiovascular testing needs to be ordered at this time.     Patient seen and discussed with Dr. Gil.     Kelly Dukes MD  Anesthesiology, PGY-2

## 2025-01-14 NOTE — CONSULTS
History of Present Illness:  This is a 64 year old male with h/o L cerebellar of hemangioblastoma s/p resection (2003) and past ocular history of RONNIE OU who is presenting with dizziness. Since admission he has been found to have a 5.5x5 cm L cerebellar lesion on CTH, MRI with 5.3 x 2.6cm  L cerebellar cystic mass, C3-4 dorsal cord lesion, 4th effacement, CT CAP multiple b/l pulmonary nodules, multiple b/l renal cysts, 1/12 MRI CTL spine  demonstrating expansile C3 intramedullary lesion, & multiple thoracic/lumbar vertebral body lesions. Ophthalmology has been consulted to rule out retinal hemangioblastoma with concerns for Von Hippel Lindau syndrome.    Of note, patient has had no prior eye surgeries but has had some recent eye exams. Besides, dry eyes, these exams were unremarkable per patient. He states he had a dilated fundus examination three days ago and it was normal per his provider.    ROS: Patient denies eye pain, redness, discharge, decreased vision, double vision, blind spots, flashes, or new floaters. All other systems have been reviewed and are negative.    PMHx: please refer to admission HPI  Medications: please refer to medication reconciliation  Allergies: please refer to patient allergy list  Past Ocular History: as per above HPI  Family History: reviewed and noncontributory to chief ophthalmic complaint  Orientation: Alert and oriented x3, appropriate mood and behavior    Examination:     Base Eye Exam       Visual Acuity (Snellen - Linear)         Right Left    Near sc 20/20-1 20/20-2              Tonometry (Tonopen, 8:12 PM)         Right Left    Pressure 13 13              Pupils         Dark Light Shape React APD    Right 4 2 Round Brisk None    Left 4 2 Round Brisk None              Visual Fields         Left Right     Full Full              Extraocular Movement         Right Left     Full Full              Dilation       Both eyes: 1% Mydriacyl & 2.5% Glenn  @ 8:12 PM                  Slit Lamp  and Fundus Exam       External Exam         Right Left    External Normal Normal              Slit Lamp Exam         Right Left    Lids/Lashes Normal Normal    Conjunctiva/Sclera White and quiet White and quiet    Cornea Clear Clear    Anterior Chamber Deep and quiet Deep and quiet    Iris Round and reactive Small nevus inferior    Lens 1+ Nuclear sclerosis, Trace Cortical cataract 1+ Nuclear sclerosis, Trace Cortical cataract    Anterior Vitreous Normal Normal              Fundus Exam         Right Left    Disc Pink and sharp Pink and sharp    C/D Ratio 0.5 0.5    Macula Normal Normal    Vessels Normal Normal    Periphery Normal Normal                  Assessment and Plan:  #Normal Eye Exam  - No evidence of retinal hemangioblastomas or optic nerve hemangioblastomas on exam  - No disc edema on today's exam, remainder of exam also unremarkable  - Please note that the absence of these findings does not necessarily rule out VHL as these findings may present later in life and may be variably expressed in different individuals  - If warranted, further workup should be performed to evaluate for this condition such as genetic testing  - Rest of workup per primary team  - Patient to follow up as needed with eye care provider of choice    Viri Soliz MD    Ophthalmology Adult Pager - [phone]  Ophthalmology Pediatrics Pager - [phone]    For adult follow-up appointments, call: [phone]  For pediatric follow-up appointments, call: [phone]    NOTE: This note is not finalized until attending reviews and signs.

## 2025-01-15 ENCOUNTER — TUMOR BOARD CONFERENCE (OUTPATIENT)
Dept: HEMATOLOGY/ONCOLOGY | Facility: HOSPITAL | Age: 65
End: 2025-01-15
Payer: COMMERCIAL

## 2025-01-15 LAB
ABO GROUP (TYPE) IN BLOOD: NORMAL
ALBUMIN SERPL BCP-MCNC: 4.2 G/DL (ref 3.4–5)
ANION GAP SERPL CALC-SCNC: 16 MMOL/L (ref 10–20)
ANTIBODY SCREEN: NORMAL
APTT PPP: 39 SECONDS (ref 27–38)
BUN SERPL-MCNC: 17 MG/DL (ref 6–23)
CALCIUM SERPL-MCNC: 9.8 MG/DL (ref 8.6–10.6)
CHLORIDE SERPL-SCNC: 97 MMOL/L (ref 98–107)
CO2 SERPL-SCNC: 27 MMOL/L (ref 21–32)
CREAT SERPL-MCNC: 1 MG/DL (ref 0.5–1.3)
EGFRCR SERPLBLD CKD-EPI 2021: 85 ML/MIN/1.73M*2
ERYTHROCYTE [DISTWIDTH] IN BLOOD BY AUTOMATED COUNT: 12.3 % (ref 11.5–14.5)
GLUCOSE SERPL-MCNC: 91 MG/DL (ref 74–99)
HCT VFR BLD AUTO: 45.3 % (ref 41–52)
HGB BLD-MCNC: 15.5 G/DL (ref 13.5–17.5)
INR PPP: 1.3 (ref 0.9–1.1)
MAGNESIUM SERPL-MCNC: 1.98 MG/DL (ref 1.6–2.4)
MCH RBC QN AUTO: 29.1 PG (ref 26–34)
MCHC RBC AUTO-ENTMCNC: 34.2 G/DL (ref 32–36)
MCV RBC AUTO: 85 FL (ref 80–100)
NRBC BLD-RTO: 0 /100 WBCS (ref 0–0)
PHOSPHATE SERPL-MCNC: 3.2 MG/DL (ref 2.5–4.9)
PLATELET # BLD AUTO: 246 X10*3/UL (ref 150–450)
POTASSIUM SERPL-SCNC: 3.7 MMOL/L (ref 3.5–5.3)
PROTHROMBIN TIME: 14.1 SECONDS (ref 9.8–12.8)
RBC # BLD AUTO: 5.32 X10*6/UL (ref 4.5–5.9)
RH FACTOR (ANTIGEN D): NORMAL
SODIUM SERPL-SCNC: 136 MMOL/L (ref 136–145)
WBC # BLD AUTO: 8.2 X10*3/UL (ref 4.4–11.3)

## 2025-01-15 PROCEDURE — 2500000001 HC RX 250 WO HCPCS SELF ADMINISTERED DRUGS (ALT 637 FOR MEDICARE OP): Performed by: STUDENT IN AN ORGANIZED HEALTH CARE EDUCATION/TRAINING PROGRAM

## 2025-01-15 PROCEDURE — 2500000001 HC RX 250 WO HCPCS SELF ADMINISTERED DRUGS (ALT 637 FOR MEDICARE OP): Performed by: PHYSICIAN ASSISTANT

## 2025-01-15 PROCEDURE — 85027 COMPLETE CBC AUTOMATED: CPT | Performed by: NURSE PRACTITIONER

## 2025-01-15 PROCEDURE — 83835 ASSAY OF METANEPHRINES: CPT | Performed by: ANESTHESIOLOGY

## 2025-01-15 PROCEDURE — 36415 COLL VENOUS BLD VENIPUNCTURE: CPT

## 2025-01-15 PROCEDURE — 2500000001 HC RX 250 WO HCPCS SELF ADMINISTERED DRUGS (ALT 637 FOR MEDICARE OP)

## 2025-01-15 PROCEDURE — 2500000004 HC RX 250 GENERAL PHARMACY W/ HCPCS (ALT 636 FOR OP/ED)

## 2025-01-15 PROCEDURE — 83735 ASSAY OF MAGNESIUM: CPT | Performed by: NURSE PRACTITIONER

## 2025-01-15 PROCEDURE — 2500000004 HC RX 250 GENERAL PHARMACY W/ HCPCS (ALT 636 FOR OP/ED): Performed by: PHYSICIAN ASSISTANT

## 2025-01-15 PROCEDURE — 85610 PROTHROMBIN TIME: CPT | Performed by: STUDENT IN AN ORGANIZED HEALTH CARE EDUCATION/TRAINING PROGRAM

## 2025-01-15 PROCEDURE — 2060000001 HC INTERMEDIATE ICU ROOM DAILY

## 2025-01-15 PROCEDURE — 36415 COLL VENOUS BLD VENIPUNCTURE: CPT | Performed by: PHYSICIAN ASSISTANT

## 2025-01-15 PROCEDURE — 99232 SBSQ HOSP IP/OBS MODERATE 35: CPT | Performed by: NEUROLOGICAL SURGERY

## 2025-01-15 PROCEDURE — 82565 ASSAY OF CREATININE: CPT | Performed by: NURSE PRACTITIONER

## 2025-01-15 PROCEDURE — 2500000001 HC RX 250 WO HCPCS SELF ADMINISTERED DRUGS (ALT 637 FOR MEDICARE OP): Performed by: ANESTHESIOLOGY

## 2025-01-15 PROCEDURE — 86901 BLOOD TYPING SEROLOGIC RH(D): CPT

## 2025-01-15 RX ORDER — CARVEDILOL 25 MG/1
25 TABLET ORAL 2 TIMES DAILY
Status: DISCONTINUED | OUTPATIENT
Start: 2025-01-15 | End: 2025-01-22

## 2025-01-15 RX ORDER — CARVEDILOL 12.5 MG/1
12.5 TABLET ORAL ONCE
Status: COMPLETED | OUTPATIENT
Start: 2025-01-15 | End: 2025-01-15

## 2025-01-15 RX ORDER — PANTOPRAZOLE SODIUM 40 MG/10ML
40 INJECTION, POWDER, LYOPHILIZED, FOR SOLUTION INTRAVENOUS
Status: DISCONTINUED | OUTPATIENT
Start: 2025-01-16 | End: 2025-01-23

## 2025-01-15 RX ORDER — SODIUM CHLORIDE 9 MG/ML
75 INJECTION, SOLUTION INTRAVENOUS CONTINUOUS
Status: DISCONTINUED | OUTPATIENT
Start: 2025-01-16 | End: 2025-01-16

## 2025-01-15 RX ORDER — ESOMEPRAZOLE MAGNESIUM 40 MG/1
40 GRANULE, DELAYED RELEASE ORAL
Status: DISCONTINUED | OUTPATIENT
Start: 2025-01-16 | End: 2025-01-23

## 2025-01-15 RX ORDER — PANTOPRAZOLE SODIUM 40 MG/1
40 TABLET, DELAYED RELEASE ORAL
Status: DISCONTINUED | OUTPATIENT
Start: 2025-01-16 | End: 2025-01-27 | Stop reason: HOSPADM

## 2025-01-15 RX ADMIN — DEXAMETHASONE SODIUM PHOSPHATE 10 MG: 4 INJECTION, SOLUTION INTRA-ARTICULAR; INTRALESIONAL; INTRAMUSCULAR; INTRAVENOUS; SOFT TISSUE at 21:29

## 2025-01-15 RX ADMIN — SODIUM CHLORIDE 75 ML/HR: 9 INJECTION, SOLUTION INTRAVENOUS at 23:58

## 2025-01-15 RX ADMIN — POLYETHYLENE GLYCOL 3350 17 G: 17 POWDER, FOR SOLUTION ORAL at 09:37

## 2025-01-15 RX ADMIN — CARVEDILOL 25 MG: 25 TABLET, FILM COATED ORAL at 21:26

## 2025-01-15 RX ADMIN — LISINOPRIL 20 MG: 20 TABLET ORAL at 09:37

## 2025-01-15 RX ADMIN — PRAVASTATIN SODIUM 40 MG: 40 TABLET ORAL at 21:26

## 2025-01-15 RX ADMIN — CARVEDILOL 12.5 MG: 12.5 TABLET, FILM COATED ORAL at 15:51

## 2025-01-15 RX ADMIN — HEPARIN SODIUM 5000 UNITS: 5000 INJECTION INTRAVENOUS; SUBCUTANEOUS at 02:30

## 2025-01-15 RX ADMIN — SENNOSIDES AND DOCUSATE SODIUM 2 TABLET: 50; 8.6 TABLET ORAL at 09:37

## 2025-01-15 RX ADMIN — ONDANSETRON 4 MG: 2 INJECTION INTRAMUSCULAR; INTRAVENOUS at 10:25

## 2025-01-15 RX ADMIN — ACETAMINOPHEN 650 MG: 325 TABLET ORAL at 09:51

## 2025-01-15 RX ADMIN — HYDROCHLOROTHIAZIDE 25 MG: 25 TABLET ORAL at 09:37

## 2025-01-15 RX ADMIN — HEPARIN SODIUM 5000 UNITS: 5000 INJECTION INTRAVENOUS; SUBCUTANEOUS at 09:37

## 2025-01-15 RX ADMIN — CARVEDILOL 12.5 MG: 12.5 TABLET, FILM COATED ORAL at 09:37

## 2025-01-15 RX ADMIN — ONDANSETRON 4 MG: 2 INJECTION INTRAMUSCULAR; INTRAVENOUS at 20:16

## 2025-01-15 RX ADMIN — HEPARIN SODIUM 5000 UNITS: 5000 INJECTION INTRAVENOUS; SUBCUTANEOUS at 19:07

## 2025-01-15 ASSESSMENT — PAIN SCALES - GENERAL
PAINLEVEL_OUTOF10: 0 - NO PAIN

## 2025-01-15 ASSESSMENT — PAIN - FUNCTIONAL ASSESSMENT
PAIN_FUNCTIONAL_ASSESSMENT: 0-10
PAIN_FUNCTIONAL_ASSESSMENT: 0-10

## 2025-01-15 NOTE — PROGRESS NOTES
Transitional Care Coordination Progress Note:  Plan per Medical/Surgical team: Pt to OR Thursday 1/16 for redo L retrosig for tumor resection.   Discharge Disposition: From home with wife, dispo pending PT/OT assessment following surgery.   Potential Barriers: none  ADOD: 1/19    Becky Murillo RN, BSN  Transitional Care Coordinator  Office: [phone]  Secure chat via Haiku

## 2025-01-15 NOTE — CARE PLAN
The patient's goals for the shift include      The clinical goals for the shift include Pt. will remain safe during shift.    Over the shift, the patient met these goals.

## 2025-01-15 NOTE — TUMOR BOARD NOTE
CNS Tumor Board Recommendations       Patient was presented by Dr. Geetha Ghosh at our CNS Tumor Board on 01/15/2025 which included representatives from Radiation oncology, Surgical oncology, Neuro-oncology, Pathology, Radiology, Research, Neurosurgery, Social Work (Neurosurgery).     Current patient presents with history of the following treatment history: PMH L cerebellar of hemangioblastoma s/p resection (2003), HTN, HLD, obesity, p/w dizziness, headaches, gait instability x 6 weeks. CTH with 5.5 x 5cm L cerebellar lesion. MRI with L cerebellar cystic mass. C3-4 dorsal cord lesion with 4th effacement consistent for hemangioma, multiple small foci that are concerning for neoplasm. CT C/A/P with multiple bilateral pulmonary nodules and bilateral renal cyst. DSA 01/13/24 with muscular branch from L V3 supplying tumor; embolization not recommended.  CTA H/N with L cerebellar mass with L AICA and PICA feeders. No previous VHL workup. No ocular lesions on exam.     The CNS Tumor Board tumor board considered available treatment options and made the following recommendations: VHL workup through neuro-oncology. MR spinal imaging with contrast. Surgical resection of L cerebellar lesion.     Clinical Trial Status: N/A      National site-specific guidelines were discussed with respect to the case.

## 2025-01-15 NOTE — PROGRESS NOTES
"Regis Mixon is a 64 y.o. male on day 5 of admission presenting with Cerebellar mass.    Subjective   NAEON       Objective     Physical Exam  A&Ox3  Face symmetric  RUE 5/5  LUE 5/5  RLE 5/5  LLE 5/5  Sensation intact to light touch throughout all extremities  Left dysmetria    Last Recorded Vitals  Blood pressure (!) 144/98, pulse 78, temperature 36.6 °C (97.9 °F), temperature source Temporal, resp. rate 12, height 1.778 m (5' 10\"), weight 97.5 kg (215 lb), SpO2 99%.  Intake/Output last 3 Shifts:  I/O last 3 completed shifts:  In: 1620 (16.6 mL/kg) [I.V.:600 (6.2 mL/kg); IV Piggyback:1020]  Out: - (0 mL/kg)   Weight: 97.5 kg     Relevant Results                              Assessment/Plan   Assessment & Plan  Cerebellar mass    Regis Mixon is a 64 y.o. male with h/o L cerebellar of hemangioblastoma s/p resection (2003), HTN, HLD, obesity, PMR p/w dizziness x6w, CTH 5.5x5 cm L cerebellar lesion, MRI 5.3 x 2.6cm  L cerebellar cystic mass, C3-4 dorsal cord lesion, 4th effacement, CT CAP multiple b/l pulmonary nodules, multiple b/l renal cysts, 1/12 MRI CTL spine w/o did not tolerate XIAO, expansile C3 intramedullary lesion, multiple thoracic/lumbar vertebral body lesions, 1/13 angio 1 muscular branch from L V3 supplying tumor, did not embolize d/t tortuosity, CTA H/N L cerebellar mass w/ L AICA and PICA feeders     1/14 MRI C-spine w/ intramedullary enhancing mass at C3-C4, TTE EF 55-60% without wall motion abnormalities=, no PFO, normal diastolic filling     Recs  MILAGRO  OR likely Thursday 1/16 for redo L retrosig for tumor resection  Onc recs  Appreciate Dr. Gil recs  SCDs, [facility]  PTOT           Giselle Benavides MD      "

## 2025-01-16 ENCOUNTER — ANESTHESIA EVENT (OUTPATIENT)
Dept: OPERATING ROOM | Facility: HOSPITAL | Age: 65
End: 2025-01-16
Payer: COMMERCIAL

## 2025-01-16 ENCOUNTER — APPOINTMENT (OUTPATIENT)
Dept: RADIOLOGY | Facility: HOSPITAL | Age: 65
DRG: 025 | End: 2025-01-16
Payer: COMMERCIAL

## 2025-01-16 ENCOUNTER — ANESTHESIA (OUTPATIENT)
Dept: OPERATING ROOM | Facility: HOSPITAL | Age: 65
End: 2025-01-16
Payer: COMMERCIAL

## 2025-01-16 ENCOUNTER — HOSPITAL ENCOUNTER (INPATIENT)
Dept: NEUROLOGY | Facility: HOSPITAL | Age: 65
Discharge: HOME | DRG: 025 | End: 2025-01-16
Payer: COMMERCIAL

## 2025-01-16 PROBLEM — M19.90 OSTEOARTHRITIS: Status: ACTIVE | Noted: 2025-01-16

## 2025-01-16 PROBLEM — G89.29 CHRONIC NECK PAIN: Status: ACTIVE | Noted: 2025-01-16

## 2025-01-16 PROBLEM — Z85.89 HISTORY OF MUSCULOSKELETAL CANCER: Status: ACTIVE | Noted: 2025-01-16

## 2025-01-16 PROBLEM — M54.50 CHRONIC LOW BACK PAIN: Status: ACTIVE | Noted: 2025-01-16

## 2025-01-16 PROBLEM — M54.2 CHRONIC NECK PAIN: Status: ACTIVE | Noted: 2025-01-16

## 2025-01-16 PROBLEM — G89.29 CHRONIC LOW BACK PAIN: Status: ACTIVE | Noted: 2025-01-16

## 2025-01-16 LAB
ANION GAP BLDA CALCULATED.4IONS-SCNC: 12 MMO/L (ref 10–25)
ANION GAP BLDA CALCULATED.4IONS-SCNC: 14 MMO/L (ref 10–25)
ANION GAP BLDA CALCULATED.4IONS-SCNC: 15 MMO/L (ref 10–25)
ANION GAP BLDA CALCULATED.4IONS-SCNC: 9 MMO/L (ref 10–25)
BASE EXCESS BLDA CALC-SCNC: -1.7 MMOL/L (ref -2–3)
BASE EXCESS BLDA CALC-SCNC: -2.1 MMOL/L (ref -2–3)
BASE EXCESS BLDA CALC-SCNC: -2.4 MMOL/L (ref -2–3)
BASE EXCESS BLDA CALC-SCNC: -2.7 MMOL/L (ref -2–3)
BODY TEMPERATURE: 37 DEGREES CELSIUS
CA-I BLDA-SCNC: 1.12 MMOL/L (ref 1.1–1.33)
CA-I BLDA-SCNC: 1.16 MMOL/L (ref 1.1–1.33)
CA-I BLDA-SCNC: 1.17 MMOL/L (ref 1.1–1.33)
CA-I BLDA-SCNC: 1.17 MMOL/L (ref 1.1–1.33)
CHLORIDE BLDA-SCNC: 96 MMOL/L (ref 98–107)
CHLORIDE BLDA-SCNC: 96 MMOL/L (ref 98–107)
CHLORIDE BLDA-SCNC: 97 MMOL/L (ref 98–107)
CHLORIDE BLDA-SCNC: 98 MMOL/L (ref 98–107)
GLUCOSE BLDA-MCNC: 149 MG/DL (ref 74–99)
GLUCOSE BLDA-MCNC: 157 MG/DL (ref 74–99)
GLUCOSE BLDA-MCNC: 158 MG/DL (ref 74–99)
GLUCOSE BLDA-MCNC: 174 MG/DL (ref 74–99)
HCO3 BLDA-SCNC: 21.2 MMOL/L (ref 22–26)
HCO3 BLDA-SCNC: 21.9 MMOL/L (ref 22–26)
HCO3 BLDA-SCNC: 23 MMOL/L (ref 22–26)
HCO3 BLDA-SCNC: 23.2 MMOL/L (ref 22–26)
HCT VFR BLD EST: 44 % (ref 41–52)
HCT VFR BLD EST: 45 % (ref 41–52)
HCT VFR BLD EST: 45 % (ref 41–52)
HCT VFR BLD EST: 46 % (ref 41–52)
HGB BLDA-MCNC: 14.8 G/DL (ref 13.5–17.5)
HGB BLDA-MCNC: 15.1 G/DL (ref 13.5–17.5)
HGB BLDA-MCNC: 15.1 G/DL (ref 13.5–17.5)
HGB BLDA-MCNC: 15.4 G/DL (ref 13.5–17.5)
INHALED O2 CONCENTRATION: 50 %
INHALED O2 CONCENTRATION: 80 %
LACTATE BLDA-SCNC: 1.7 MMOL/L (ref 0.4–2)
LACTATE BLDA-SCNC: 1.8 MMOL/L (ref 0.4–2)
LACTATE BLDA-SCNC: 1.8 MMOL/L (ref 0.4–2)
LACTATE BLDA-SCNC: 2.1 MMOL/L (ref 0.4–2)
OXYHGB MFR BLDA: 97.2 % (ref 94–98)
OXYHGB MFR BLDA: 97.3 % (ref 94–98)
OXYHGB MFR BLDA: 97.3 % (ref 94–98)
OXYHGB MFR BLDA: 97.6 % (ref 94–98)
PCO2 BLDA: 32 MM HG (ref 38–42)
PCO2 BLDA: 37 MM HG (ref 38–42)
PCO2 BLDA: 38 MM HG (ref 38–42)
PCO2 BLDA: 42 MM HG (ref 38–42)
PH BLDA: 7.35 PH (ref 7.38–7.42)
PH BLDA: 7.38 PH (ref 7.38–7.42)
PH BLDA: 7.39 PH (ref 7.38–7.42)
PH BLDA: 7.43 PH (ref 7.38–7.42)
PO2 BLDA: 127 MM HG (ref 85–95)
PO2 BLDA: 135 MM HG (ref 85–95)
PO2 BLDA: 157 MM HG (ref 85–95)
PO2 BLDA: 170 MM HG (ref 85–95)
POTASSIUM BLDA-SCNC: 3.8 MMOL/L (ref 3.5–5.3)
POTASSIUM BLDA-SCNC: 3.9 MMOL/L (ref 3.5–5.3)
POTASSIUM BLDA-SCNC: 4 MMOL/L (ref 3.5–5.3)
POTASSIUM BLDA-SCNC: 4.4 MMOL/L (ref 3.5–5.3)
SAO2 % BLDA: 100 % (ref 94–100)
SODIUM BLDA-SCNC: 126 MMOL/L (ref 136–145)
SODIUM BLDA-SCNC: 127 MMOL/L (ref 136–145)
SODIUM BLDA-SCNC: 128 MMOL/L (ref 136–145)
SODIUM BLDA-SCNC: 129 MMOL/L (ref 136–145)

## 2025-01-16 PROCEDURE — 2500000004 HC RX 250 GENERAL PHARMACY W/ HCPCS (ALT 636 FOR OP/ED): Performed by: STUDENT IN AN ORGANIZED HEALTH CARE EDUCATION/TRAINING PROGRAM

## 2025-01-16 PROCEDURE — 61107 TDH PNXR IMPLT VENTR CATH: CPT | Performed by: NEUROLOGICAL SURGERY

## 2025-01-16 PROCEDURE — 70450 CT HEAD/BRAIN W/O DYE: CPT

## 2025-01-16 PROCEDURE — 2500000004 HC RX 250 GENERAL PHARMACY W/ HCPCS (ALT 636 FOR OP/ED): Performed by: ANESTHESIOLOGY

## 2025-01-16 PROCEDURE — 95938 SOMATOSENSORY TESTING: CPT

## 2025-01-16 PROCEDURE — 88307 TISSUE EXAM BY PATHOLOGIST: CPT | Mod: TC,SUR | Performed by: NEUROLOGICAL SURGERY

## 2025-01-16 PROCEDURE — 84132 ASSAY OF SERUM POTASSIUM: CPT

## 2025-01-16 PROCEDURE — 2500000004 HC RX 250 GENERAL PHARMACY W/ HCPCS (ALT 636 FOR OP/ED)

## 2025-01-16 PROCEDURE — 99291 CRITICAL CARE FIRST HOUR: CPT | Performed by: REGISTERED NURSE

## 2025-01-16 PROCEDURE — 88307 TISSUE EXAM BY PATHOLOGIST: CPT | Performed by: PATHOLOGY

## 2025-01-16 PROCEDURE — 3600000005 HC OR TIME - INITIAL BASE CHARGE - PROCEDURE LEVEL FIVE: Performed by: NEUROLOGICAL SURGERY

## 2025-01-16 PROCEDURE — 69990 MICROSURGERY ADD-ON: CPT | Performed by: NEUROLOGICAL SURGERY

## 2025-01-16 PROCEDURE — C9248 INJ, CLEVIDIPINE BUTYRATE: HCPCS

## 2025-01-16 PROCEDURE — 00BC0ZZ EXCISION OF CEREBELLUM, OPEN APPROACH: ICD-10-PCS | Performed by: NEUROLOGICAL SURGERY

## 2025-01-16 PROCEDURE — 2500000005 HC RX 250 GENERAL PHARMACY W/O HCPCS: Performed by: NEUROLOGICAL SURGERY

## 2025-01-16 PROCEDURE — C1713 ANCHOR/SCREW BN/BN,TIS/BN: HCPCS | Performed by: NEUROLOGICAL SURGERY

## 2025-01-16 PROCEDURE — 2500000004 HC RX 250 GENERAL PHARMACY W/ HCPCS (ALT 636 FOR OP/ED): Performed by: PHYSICIAN ASSISTANT

## 2025-01-16 PROCEDURE — 88341 IMHCHEM/IMCYTCHM EA ADD ANTB: CPT | Performed by: PATHOLOGY

## 2025-01-16 PROCEDURE — 95955 EEG DURING SURGERY: CPT | Performed by: STUDENT IN AN ORGANIZED HEALTH CARE EDUCATION/TRAINING PROGRAM

## 2025-01-16 PROCEDURE — 3600000018 HC OR TIME - INITIAL BASE CHARGE - PROCEDURE LEVEL SIX: Performed by: NEUROLOGICAL SURGERY

## 2025-01-16 PROCEDURE — 3700000001 HC GENERAL ANESTHESIA TIME - INITIAL BASE CHARGE: Performed by: NEUROLOGICAL SURGERY

## 2025-01-16 PROCEDURE — 2780000003 HC OR 278 NO HCPCS: Performed by: NEUROLOGICAL SURGERY

## 2025-01-16 PROCEDURE — 2500000004 HC RX 250 GENERAL PHARMACY W/ HCPCS (ALT 636 FOR OP/ED): Performed by: NEUROLOGICAL SURGERY

## 2025-01-16 PROCEDURE — 3700000002 HC GENERAL ANESTHESIA TIME - EACH INCREMENTAL 1 MINUTE: Performed by: NEUROLOGICAL SURGERY

## 2025-01-16 PROCEDURE — 2020000001 HC ICU ROOM DAILY

## 2025-01-16 PROCEDURE — 84132 ASSAY OF SERUM POTASSIUM: CPT | Performed by: NURSE ANESTHETIST, CERTIFIED REGISTERED

## 2025-01-16 PROCEDURE — C1889 IMPLANT/INSERT DEVICE, NOC: HCPCS | Performed by: NEUROLOGICAL SURGERY

## 2025-01-16 PROCEDURE — 95941 IONM REMOTE/>1 PT OR PER HR: CPT | Performed by: STUDENT IN AN ORGANIZED HEALTH CARE EDUCATION/TRAINING PROGRAM

## 2025-01-16 PROCEDURE — 009630Z DRAINAGE OF CEREBRAL VENTRICLE WITH DRAINAGE DEVICE, PERCUTANEOUS APPROACH: ICD-10-PCS | Performed by: NEUROLOGICAL SURGERY

## 2025-01-16 PROCEDURE — 2500000001 HC RX 250 WO HCPCS SELF ADMINISTERED DRUGS (ALT 637 FOR MEDICARE OP): Performed by: STUDENT IN AN ORGANIZED HEALTH CARE EDUCATION/TRAINING PROGRAM

## 2025-01-16 PROCEDURE — 8E09XBZ COMPUTER ASSISTED PROCEDURE OF HEAD AND NECK REGION: ICD-10-PCS | Performed by: NEUROLOGICAL SURGERY

## 2025-01-16 PROCEDURE — 2720000007 HC OR 272 NO HCPCS: Performed by: NEUROLOGICAL SURGERY

## 2025-01-16 PROCEDURE — 61781 SCAN PROC CRANIAL INTRA: CPT | Performed by: NEUROLOGICAL SURGERY

## 2025-01-16 PROCEDURE — C1729 CATH, DRAINAGE: HCPCS | Performed by: NEUROLOGICAL SURGERY

## 2025-01-16 PROCEDURE — 3600000010 HC OR TIME - EACH INCREMENTAL 1 MINUTE - PROCEDURE LEVEL FIVE: Performed by: NEUROLOGICAL SURGERY

## 2025-01-16 PROCEDURE — 71045 X-RAY EXAM CHEST 1 VIEW: CPT

## 2025-01-16 PROCEDURE — 95938 SOMATOSENSORY TESTING: CPT | Performed by: STUDENT IN AN ORGANIZED HEALTH CARE EDUCATION/TRAINING PROGRAM

## 2025-01-16 PROCEDURE — 88342 IMHCHEM/IMCYTCHM 1ST ANTB: CPT | Performed by: PATHOLOGY

## 2025-01-16 PROCEDURE — 70450 CT HEAD/BRAIN W/O DYE: CPT | Performed by: RADIOLOGY

## 2025-01-16 PROCEDURE — 61520 REMOVAL OF BRAIN LESION: CPT | Performed by: NEUROLOGICAL SURGERY

## 2025-01-16 DEVICE — DURA-GUARD DURAL REPAIR PATCH IS PREPARED FROM BOVINE PERICARDIUM WHICH IS CROSS-LINKED WITH GLUTARALDEHYDE. THE PERICARDIUM IS PROCURED FROM CATTLE ORIGINATING IN THE UNITED STATES. DURA-GUARD DURAL REPAIR PATCH IS CHEMICALLY STERILIZED USING ETHANOL AND PROPYLENE OXIDE. DURA-GUARD DURAL REPAIR PATCH HAS BEEN TREATED WITH 1 MOLAR SODIUM HYDROXIDE FOR A MINIMUM OF 60 MINUTES AT 20 - 25 C.  DURA-GUARD DURAL REPAIR PATCH IS PACKAGED IN A CONTAINER FILLED WITH STERILE, NON-PYROGENIC WATER CONTAINING PROPYLENE OXIDE. THE CONTENTS OF THE UNOPENED, UNDAMAGED CONTAINER ARE STERILE.
Type: IMPLANTABLE DEVICE | Site: SCALP | Status: FUNCTIONAL
Brand: DURA-GUARD DURAL REPAIR PATCH

## 2025-01-16 DEVICE — MEDPOR TITAN BARRIER (MTB)
Type: IMPLANTABLE DEVICE | Site: SCALP | Status: FUNCTIONAL
Brand: MEDPOR TITAN

## 2025-01-16 DEVICE — IMPLANTABLE DEVICE: Type: IMPLANTABLE DEVICE | Site: CRANIAL | Status: FUNCTIONAL

## 2025-01-16 DEVICE — VENTRICLEAR II VENTRICULAR DRAINAGE CATHETER
Type: IMPLANTABLE DEVICE | Site: CRANIAL | Status: FUNCTIONAL
Brand: VENTRICLEAR

## 2025-01-16 DEVICE — IMPLANTABLE DEVICE: Type: IMPLANTABLE DEVICE | Site: BRAIN | Status: FUNCTIONAL

## 2025-01-16 RX ORDER — SODIUM CHLORIDE, SODIUM LACTATE, POTASSIUM CHLORIDE, CALCIUM CHLORIDE 600; 310; 30; 20 MG/100ML; MG/100ML; MG/100ML; MG/100ML
INJECTION, SOLUTION INTRAVENOUS CONTINUOUS PRN
Status: DISCONTINUED | OUTPATIENT
Start: 2025-01-16 | End: 2025-01-16

## 2025-01-16 RX ORDER — FENTANYL CITRATE 50 UG/ML
INJECTION, SOLUTION INTRAMUSCULAR; INTRAVENOUS AS NEEDED
Status: DISCONTINUED | OUTPATIENT
Start: 2025-01-16 | End: 2025-01-16

## 2025-01-16 RX ORDER — BACITRACIN 500 [USP'U]/G
OINTMENT TOPICAL AS NEEDED
Status: DISCONTINUED | OUTPATIENT
Start: 2025-01-16 | End: 2025-01-16 | Stop reason: HOSPADM

## 2025-01-16 RX ORDER — ETOMIDATE 2 MG/ML
INJECTION INTRAVENOUS AS NEEDED
Status: DISCONTINUED | OUTPATIENT
Start: 2025-01-16 | End: 2025-01-16

## 2025-01-16 RX ORDER — PROPOFOL 10 MG/ML
INJECTION, EMULSION INTRAVENOUS AS NEEDED
Status: DISCONTINUED | OUTPATIENT
Start: 2025-01-16 | End: 2025-01-16

## 2025-01-16 RX ORDER — PHENYLEPHRINE HCL IN 0.9% NACL 0.4MG/10ML
SYRINGE (ML) INTRAVENOUS AS NEEDED
Status: DISCONTINUED | OUTPATIENT
Start: 2025-01-16 | End: 2025-01-16

## 2025-01-16 RX ORDER — CEFTRIAXONE 2 G/1
INJECTION, POWDER, FOR SOLUTION INTRAMUSCULAR; INTRAVENOUS AS NEEDED
Status: DISCONTINUED | OUTPATIENT
Start: 2025-01-16 | End: 2025-01-16

## 2025-01-16 RX ORDER — LISINOPRIL 20 MG/1
20 TABLET ORAL DAILY
Status: DISCONTINUED | OUTPATIENT
Start: 2025-01-17 | End: 2025-01-27 | Stop reason: HOSPADM

## 2025-01-16 RX ORDER — HYDROCHLOROTHIAZIDE 25 MG/1
25 TABLET ORAL DAILY
Status: DISCONTINUED | OUTPATIENT
Start: 2025-01-17 | End: 2025-01-27 | Stop reason: HOSPADM

## 2025-01-16 RX ORDER — VANCOMYCIN HYDROCHLORIDE 1 G/20ML
INJECTION, POWDER, LYOPHILIZED, FOR SOLUTION INTRAVENOUS AS NEEDED
Status: DISCONTINUED | OUTPATIENT
Start: 2025-01-16 | End: 2025-01-16

## 2025-01-16 RX ORDER — MIDAZOLAM HYDROCHLORIDE 1 MG/ML
INJECTION INTRAMUSCULAR; INTRAVENOUS AS NEEDED
Status: DISCONTINUED | OUTPATIENT
Start: 2025-01-16 | End: 2025-01-16

## 2025-01-16 RX ORDER — FUROSEMIDE 10 MG/ML
INJECTION INTRAMUSCULAR; INTRAVENOUS AS NEEDED
Status: DISCONTINUED | OUTPATIENT
Start: 2025-01-16 | End: 2025-01-16

## 2025-01-16 RX ORDER — ROCURONIUM BROMIDE 10 MG/ML
INJECTION, SOLUTION INTRAVENOUS AS NEEDED
Status: DISCONTINUED | OUTPATIENT
Start: 2025-01-16 | End: 2025-01-16

## 2025-01-16 RX ORDER — LIDOCAINE HYDROCHLORIDE AND EPINEPHRINE 5; 5 MG/ML; UG/ML
INJECTION, SOLUTION INFILTRATION; PERINEURAL AS NEEDED
Status: DISCONTINUED | OUTPATIENT
Start: 2025-01-16 | End: 2025-01-16 | Stop reason: HOSPADM

## 2025-01-16 RX ORDER — MANNITOL 20 G/100ML
INJECTION, SOLUTION INTRAVENOUS AS NEEDED
Status: DISCONTINUED | OUTPATIENT
Start: 2025-01-16 | End: 2025-01-16

## 2025-01-16 RX ORDER — NICARDIPINE HYDROCHLORIDE 0.2 MG/ML
2.5-15 INJECTION INTRAVENOUS CONTINUOUS
Status: DISCONTINUED | OUTPATIENT
Start: 2025-01-16 | End: 2025-01-17

## 2025-01-16 RX ORDER — ESMOLOL HYDROCHLORIDE 10 MG/ML
INJECTION INTRAVENOUS AS NEEDED
Status: DISCONTINUED | OUTPATIENT
Start: 2025-01-16 | End: 2025-01-16

## 2025-01-16 RX ORDER — LABETALOL HYDROCHLORIDE 5 MG/ML
INJECTION, SOLUTION INTRAVENOUS AS NEEDED
Status: DISCONTINUED | OUTPATIENT
Start: 2025-01-16 | End: 2025-01-16

## 2025-01-16 RX ORDER — POLYMYXIN B 500000 [USP'U]/1
INJECTION, POWDER, LYOPHILIZED, FOR SOLUTION INTRAMUSCULAR; INTRATHECAL; INTRAVENOUS; OPHTHALMIC AS NEEDED
Status: DISCONTINUED | OUTPATIENT
Start: 2025-01-16 | End: 2025-01-16 | Stop reason: HOSPADM

## 2025-01-16 RX ORDER — LIDOCAINE HYDROCHLORIDE 20 MG/ML
INJECTION, SOLUTION INFILTRATION; PERINEURAL AS NEEDED
Status: DISCONTINUED | OUTPATIENT
Start: 2025-01-16 | End: 2025-01-16

## 2025-01-16 RX ORDER — SUCCINYLCHOLINE CHLORIDE 20 MG/ML
INJECTION INTRAMUSCULAR; INTRAVENOUS AS NEEDED
Status: DISCONTINUED | OUTPATIENT
Start: 2025-01-16 | End: 2025-01-16

## 2025-01-16 RX ORDER — DEXMEDETOMIDINE HYDROCHLORIDE 4 UG/ML
INJECTION, SOLUTION INTRAVENOUS CONTINUOUS PRN
Status: DISCONTINUED | OUTPATIENT
Start: 2025-01-16 | End: 2025-01-16

## 2025-01-16 RX ORDER — REMIFENTANIL HYDROCHLORIDE 1 MG/ML
INJECTION, POWDER, LYOPHILIZED, FOR SOLUTION INTRAVENOUS CONTINUOUS PRN
Status: DISCONTINUED | OUTPATIENT
Start: 2025-01-16 | End: 2025-01-16

## 2025-01-16 RX ORDER — ONDANSETRON HYDROCHLORIDE 2 MG/ML
INJECTION, SOLUTION INTRAVENOUS AS NEEDED
Status: DISCONTINUED | OUTPATIENT
Start: 2025-01-16 | End: 2025-01-16

## 2025-01-16 RX ORDER — PHENYLEPHRINE 10 MG/250 ML(40 MCG/ML)IN 0.9 % SOD.CHLORIDE INTRAVENOUS
CONTINUOUS PRN
Status: DISCONTINUED | OUTPATIENT
Start: 2025-01-16 | End: 2025-01-16

## 2025-01-16 RX ORDER — SODIUM CHLORIDE 0.9 G/100ML
INJECTION, SOLUTION IRRIGATION AS NEEDED
Status: DISCONTINUED | OUTPATIENT
Start: 2025-01-16 | End: 2025-01-16 | Stop reason: HOSPADM

## 2025-01-16 RX ORDER — GLYCOPYRROLATE 0.2 MG/ML
INJECTION INTRAMUSCULAR; INTRAVENOUS AS NEEDED
Status: DISCONTINUED | OUTPATIENT
Start: 2025-01-16 | End: 2025-01-16

## 2025-01-16 RX ADMIN — NICARDIPINE HYDROCHLORIDE 2.5 MG/HR: 0.2 INJECTION, SOLUTION INTRAVENOUS at 20:30

## 2025-01-16 RX ADMIN — ESMOLOL HYDROCHLORIDE 50 MG: 100 INJECTION, SOLUTION INTRAVENOUS at 10:36

## 2025-01-16 RX ADMIN — SODIUM CHLORIDE, POTASSIUM CHLORIDE, SODIUM LACTATE AND CALCIUM CHLORIDE: 600; 310; 30; 20 INJECTION, SOLUTION INTRAVENOUS at 07:31

## 2025-01-16 RX ADMIN — DEXAMETHASONE SODIUM PHOSPHATE 10 MG: 4 INJECTION INTRA-ARTICULAR; INTRALESIONAL; INTRAMUSCULAR; INTRAVENOUS; SOFT TISSUE at 09:00

## 2025-01-16 RX ADMIN — Medication 40 MCG: at 18:17

## 2025-01-16 RX ADMIN — PROPOFOL 75 MCG/KG/MIN: 10 INJECTION, EMULSION INTRAVENOUS at 09:19

## 2025-01-16 RX ADMIN — PHENYLEPHRINE-NACL IV SOLUTION 10 MG/250ML-0.9% 0.4 MCG/KG/MIN: 10-0.9/25 SOLUTION at 09:56

## 2025-01-16 RX ADMIN — PROPOFOL 30 MG: 10 INJECTION, EMULSION INTRAVENOUS at 19:45

## 2025-01-16 RX ADMIN — LABETALOL HYDROCHLORIDE 20 MG: 5 INJECTION INTRAVENOUS at 19:51

## 2025-01-16 RX ADMIN — ONDANSETRON 4 MG: 2 INJECTION INTRAMUSCULAR; INTRAVENOUS at 18:53

## 2025-01-16 RX ADMIN — PROPOFOL 50 MCG/KG/MIN: 10 INJECTION, EMULSION INTRAVENOUS at 09:38

## 2025-01-16 RX ADMIN — ROCURONIUM BROMIDE 50 MG: 10 INJECTION INTRAVENOUS at 10:25

## 2025-01-16 RX ADMIN — GLYCOPYRROLATE 0.2 MG: 0.2 INJECTION, SOLUTION INTRAMUSCULAR; INTRAVENOUS at 10:00

## 2025-01-16 RX ADMIN — REMIFENTANIL HYDROCHLORIDE 10 MCG: 1 INJECTION, POWDER, LYOPHILIZED, FOR SOLUTION INTRAVENOUS at 19:49

## 2025-01-16 RX ADMIN — FENTANYL CITRATE 50 MCG: 50 INJECTION, SOLUTION INTRAMUSCULAR; INTRAVENOUS at 08:06

## 2025-01-16 RX ADMIN — NICARDIPINE HYDROCHLORIDE 10 MG/HR: 0.2 INJECTION, SOLUTION INTRAVENOUS at 23:34

## 2025-01-16 RX ADMIN — FENTANYL CITRATE 50 MCG: 50 INJECTION, SOLUTION INTRAMUSCULAR; INTRAVENOUS at 19:47

## 2025-01-16 RX ADMIN — Medication 120 MCG: at 11:34

## 2025-01-16 RX ADMIN — SUCCINYLCHOLINE CHLORIDE 120 MG: 20 INJECTION, SOLUTION INTRAMUSCULAR; INTRAVENOUS at 08:06

## 2025-01-16 RX ADMIN — PROPOFOL 50 MG: 10 INJECTION, EMULSION INTRAVENOUS at 08:07

## 2025-01-16 RX ADMIN — PROPOFOL 20 MG: 10 INJECTION, EMULSION INTRAVENOUS at 16:22

## 2025-01-16 RX ADMIN — Medication 40 MCG: at 15:57

## 2025-01-16 RX ADMIN — SODIUM CHLORIDE, SODIUM LACTATE, POTASSIUM CHLORIDE, CALCIUM CHLORIDE: 600; 310; 30; 20 INJECTION, SOLUTION INTRAVENOUS at 09:00

## 2025-01-16 RX ADMIN — ESMOLOL HYDROCHLORIDE 50 MG: 100 INJECTION, SOLUTION INTRAVENOUS at 10:37

## 2025-01-16 RX ADMIN — Medication 40 MCG: at 17:21

## 2025-01-16 RX ADMIN — ROCURONIUM BROMIDE 20 MG: 10 INJECTION INTRAVENOUS at 14:05

## 2025-01-16 RX ADMIN — Medication 5 MG: at 23:51

## 2025-01-16 RX ADMIN — SUGAMMADEX 200 MG: 100 INJECTION, SOLUTION INTRAVENOUS at 19:55

## 2025-01-16 RX ADMIN — DEXAMETHASONE SODIUM PHOSPHATE 4 MG: 4 INJECTION, SOLUTION INTRA-ARTICULAR; INTRALESIONAL; INTRAMUSCULAR; INTRAVENOUS; SOFT TISSUE at 02:26

## 2025-01-16 RX ADMIN — LABETALOL HYDROCHLORIDE 15 MG: 5 INJECTION INTRAVENOUS at 19:41

## 2025-01-16 RX ADMIN — MIDAZOLAM HYDROCHLORIDE 1 MG: 1 INJECTION, SOLUTION INTRAMUSCULAR; INTRAVENOUS at 07:40

## 2025-01-16 RX ADMIN — CLEVIPIDINE 1 MG/HR: 0.5 EMULSION INTRAVENOUS at 20:10

## 2025-01-16 RX ADMIN — REMIFENTANIL HYDROCHLORIDE 10 MCG: 1 INJECTION, POWDER, LYOPHILIZED, FOR SOLUTION INTRAVENOUS at 16:25

## 2025-01-16 RX ADMIN — VANCOMYCIN HYDROCHLORIDE 1.5 G: 1025 INJECTION, POWDER, FOR SOLUTION INTRAVENOUS; ORAL at 08:00

## 2025-01-16 RX ADMIN — LIDOCAINE HYDROCHLORIDE 100 MG: 20 INJECTION, SOLUTION INFILTRATION; PERINEURAL at 08:06

## 2025-01-16 RX ADMIN — REMIFENTANIL HYDROCHLORIDE 0.06 MCG/KG/MIN: 1 INJECTION, POWDER, LYOPHILIZED, FOR SOLUTION INTRAVENOUS at 09:28

## 2025-01-16 RX ADMIN — ONDANSETRON 4 MG: 2 INJECTION INTRAMUSCULAR; INTRAVENOUS at 07:30

## 2025-01-16 RX ADMIN — ROCURONIUM BROMIDE 20 MG: 10 INJECTION INTRAVENOUS at 16:29

## 2025-01-16 RX ADMIN — SENNOSIDES AND DOCUSATE SODIUM 2 TABLET: 50; 8.6 TABLET ORAL at 23:55

## 2025-01-16 RX ADMIN — ROCURONIUM BROMIDE 10 MG: 10 INJECTION INTRAVENOUS at 15:52

## 2025-01-16 RX ADMIN — Medication 120 MCG: at 12:26

## 2025-01-16 RX ADMIN — CARVEDILOL 25 MG: 25 TABLET, FILM COATED ORAL at 23:55

## 2025-01-16 RX ADMIN — LABETALOL HYDROCHLORIDE 5 MG: 5 INJECTION INTRAVENOUS at 19:37

## 2025-01-16 RX ADMIN — ACETAMINOPHEN 650 MG: 325 TABLET ORAL at 23:52

## 2025-01-16 RX ADMIN — PROPOFOL 30 MG: 10 INJECTION, EMULSION INTRAVENOUS at 19:52

## 2025-01-16 RX ADMIN — DEXAMETHASONE SODIUM PHOSPHATE 4 MG: 4 INJECTION INTRA-ARTICULAR; INTRALESIONAL; INTRAMUSCULAR; INTRAVENOUS; SOFT TISSUE at 14:54

## 2025-01-16 RX ADMIN — Medication 40 MCG: at 17:14

## 2025-01-16 RX ADMIN — LABETALOL HYDROCHLORIDE 5 MG: 5 INJECTION INTRAVENOUS at 19:35

## 2025-01-16 RX ADMIN — Medication 120 MCG: at 11:45

## 2025-01-16 RX ADMIN — FENTANYL CITRATE 50 MCG: 50 INJECTION, SOLUTION INTRAMUSCULAR; INTRAVENOUS at 11:31

## 2025-01-16 RX ADMIN — CEFTRIAXONE SODIUM 2 G: 2 INJECTION, POWDER, FOR SOLUTION INTRAMUSCULAR; INTRAVENOUS at 11:15

## 2025-01-16 RX ADMIN — REMIFENTANIL HYDROCHLORIDE 0.08 MCG/KG/MIN: 1 INJECTION, POWDER, LYOPHILIZED, FOR SOLUTION INTRAVENOUS at 09:24

## 2025-01-16 RX ADMIN — PROPOFOL 150 MG: 10 INJECTION, EMULSION INTRAVENOUS at 08:06

## 2025-01-16 RX ADMIN — Medication 40 MCG: at 17:25

## 2025-01-16 RX ADMIN — MANNITOL 25 G: 20 INJECTION, SOLUTION INTRAVENOUS at 11:20

## 2025-01-16 RX ADMIN — ESMOLOL HYDROCHLORIDE 30 MG: 100 INJECTION, SOLUTION INTRAVENOUS at 19:40

## 2025-01-16 RX ADMIN — Medication 40 MCG: at 16:42

## 2025-01-16 RX ADMIN — Medication 80 MCG: at 09:49

## 2025-01-16 RX ADMIN — ESMOLOL HYDROCHLORIDE 40 MG: 100 INJECTION, SOLUTION INTRAVENOUS at 19:51

## 2025-01-16 RX ADMIN — Medication 40 MCG: at 16:35

## 2025-01-16 RX ADMIN — PROPOFOL 20 MG: 10 INJECTION, EMULSION INTRAVENOUS at 10:35

## 2025-01-16 RX ADMIN — ESMOLOL HYDROCHLORIDE 30 MG: 100 INJECTION, SOLUTION INTRAVENOUS at 19:45

## 2025-01-16 RX ADMIN — Medication 120 MCG: at 11:56

## 2025-01-16 RX ADMIN — DEXMEDETOMIDINE HYDROCHLORIDE 0.2 MCG/KG/HR: 4 INJECTION, SOLUTION INTRAVENOUS at 09:19

## 2025-01-16 RX ADMIN — FUROSEMIDE 10 MG: 10 INJECTION, SOLUTION INTRAVENOUS at 11:15

## 2025-01-16 RX ADMIN — Medication 40 MCG: at 09:50

## 2025-01-16 RX ADMIN — SODIUM CHLORIDE, POTASSIUM CHLORIDE, SODIUM LACTATE AND CALCIUM CHLORIDE: 600; 310; 30; 20 INJECTION, SOLUTION INTRAVENOUS at 16:00

## 2025-01-16 RX ADMIN — ROCURONIUM BROMIDE 30 MG: 10 INJECTION INTRAVENOUS at 14:54

## 2025-01-16 RX ADMIN — FENTANYL CITRATE 50 MCG: 50 INJECTION, SOLUTION INTRAMUSCULAR; INTRAVENOUS at 07:47

## 2025-01-16 RX ADMIN — Medication 120 MCG: at 11:59

## 2025-01-16 RX ADMIN — Medication 120 MCG: at 09:56

## 2025-01-16 RX ADMIN — GLYCOPYRROLATE 0.2 MG: 0.2 INJECTION, SOLUTION INTRAMUSCULAR; INTRAVENOUS at 09:49

## 2025-01-16 RX ADMIN — PROPOFOL 20 MG: 10 INJECTION, EMULSION INTRAVENOUS at 15:30

## 2025-01-16 RX ADMIN — LABETALOL HYDROCHLORIDE 5 MG: 5 INJECTION INTRAVENOUS at 19:47

## 2025-01-16 RX ADMIN — MIDAZOLAM HYDROCHLORIDE 2 MG: 1 INJECTION, SOLUTION INTRAMUSCULAR; INTRAVENOUS at 09:45

## 2025-01-16 RX ADMIN — ROCURONIUM BROMIDE 20 MG: 10 INJECTION INTRAVENOUS at 17:29

## 2025-01-16 RX ADMIN — MIDAZOLAM HYDROCHLORIDE 1 MG: 1 INJECTION, SOLUTION INTRAMUSCULAR; INTRAVENOUS at 07:31

## 2025-01-16 SDOH — HEALTH STABILITY: MENTAL HEALTH: CURRENT SMOKER: 0

## 2025-01-16 ASSESSMENT — PAIN SCALES - GENERAL
PAINLEVEL_OUTOF10: 0 - NO PAIN
PAIN_LEVEL: 0
PAINLEVEL_OUTOF10: 0 - NO PAIN

## 2025-01-16 ASSESSMENT — COLUMBIA-SUICIDE SEVERITY RATING SCALE - C-SSRS
6. HAVE YOU EVER DONE ANYTHING, STARTED TO DO ANYTHING, OR PREPARED TO DO ANYTHING TO END YOUR LIFE?: NO
1. IN THE PAST MONTH, HAVE YOU WISHED YOU WERE DEAD OR WISHED YOU COULD GO TO SLEEP AND NOT WAKE UP?: NO
2. HAVE YOU ACTUALLY HAD ANY THOUGHTS OF KILLING YOURSELF?: NO

## 2025-01-16 ASSESSMENT — PAIN - FUNCTIONAL ASSESSMENT
PAIN_FUNCTIONAL_ASSESSMENT: 0-10

## 2025-01-16 NOTE — SIGNIFICANT EVENT
Rapid Response Nurse Note: RADAR alert: 6    Pager time: 235  Arrival time: 240  Event end time: 245  Location:  402  [] Triage by phone or secure messaging    Rapid response initiated by:  [] Rapid response RN [] Family [] Nursing Supervisor [] Physician   [x] RADAR auto page [] Sepsis auto-page [] RN [] RT   [] NP/PA [] Other:     Primary reason for call:   [] BAT [] New CPAP/BiPAP [] Bleeding [] Change in mental status   [] Chest pain [] Code blue [] FiO2 >/= 50% [] HR </= 40 bpm   [] HR >/= 130 bpm [] Hyperglycemia [] Hypoglycemia [x] RADAR    [] RR </= 8 bpm [] RR >/= 30 bpm [] SBP </= 90 mmHg [] SpO2 < 90%   [] Seizure [] Sepsis [] Shortness of breath  [] Staff concern: see comments     Initial VS and/or RADAR VS: T n/a °C; HR 77; RR 15; /71; SPO2 90% on room air.    Interventions:  [x] None [] ABG/VBG [] Assist w/ICU transfer [] BAT paged    [] Bag mask [] Blood [] Cardioversion [] Code Blue   [] Code blue for intubation [] Code status changed [] Chest x-ray [] EKG   [] IV fluid/bolus [] KUB x-ray [] Labs/cultures [] Medication   [] Nebulizer treatment [] NIPPV (CPAP/BiPAP) [] Oxygen [] Oral airway   [] Peripheral IV [] Palliative care consult [] CT/MRI [] Sepsis protocol    [] Suctioned [] Other:     Outcome:  [] Coded and  [] Code blue for intubation [] Coded and transferred to ICU []  on division   [x] Remained on division (no change) [] Remained on division + additional monitoring [] Remained in ED [] Transferred to ED   [] Transferred to ICU [] Transferred to inpatient status [] Transferred for interventions (procedure) [] Transferred to ICU stepdown    [] Transferred to surgery [] Transferred to telemetry [] Sepsis protocol [] STEMI protocol   [] Stroke protocol [x] Bedside nurse instructed to page rapid response for any concerns or acute change in condition/VS     Additional Comments: Reviewed above RADAR VS with bedside RN.  Oxygen placed at 2 L/m NC per RN.  SPO2 95%.   Patient without complaints.  Staff to page rapid response for any concerns or acute change in condition/VS.

## 2025-01-16 NOTE — ANESTHESIA PREPROCEDURE EVALUATION
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      Musculoskeletal   (+) Chronic low back pain   (+) Chronic neck pain   (+) Osteoarthritis       Clinical information reviewed:   Tobacco  Allergies  Meds   Med Hx  Surg Hx   Fam Hx  Soc Hx        NPO Detail:  NPO/Void Status  Date of Last Liquid: 01/15/25  Time of Last Liquid: 1600  Date of Last Solid: 01/12/25         Physical Exam    Airway  TM distance: >3 FB  Neck ROM: limited     Cardiovascular   Rhythm: regular  Rate: normal     Dental - normal exam     Pulmonary   Breath sounds clear to auscultation     Abdominal   Abdomen: soft             Anesthesia Plan    History of general anesthesia?: yes  History of complications of general anesthesia?: no    ASA 3     general     The patient is not a current smoker.  Patient did not smoke on day of procedure.    intravenous induction   Postoperative administration of opioids is intended.  Trial extubation is planned.  Anesthetic plan and risks discussed with patient.  Use of blood products discussed with patient who consented to blood products.    Plan discussed with attending.

## 2025-01-16 NOTE — ANESTHESIA PROCEDURE NOTES
Arterial Line:    Date/Time: 1/16/2025 8:35 AM    Staffing  Performed: resident   Authorized by: Imtiaz Boss MD    Performed by: Foster Vincent MD    An arterial line was placed. Procedure performed using surface landmarks.in the OR for the following indication(s): continuous blood pressure monitoring and blood sampling needed.    A 20 gauge (size), 1 and 3/4 inch (length), Angiocath (type) catheter was placed into the Left radial artery, secured by Tegaderm,   Seldinger technique used.  Events:  patient tolerated procedure well with no complications.

## 2025-01-16 NOTE — ANESTHESIA PROCEDURE NOTES
Central Venous Line:    Date/Time: 1/16/2025 8:45 AM    A central venous line was placed in the OR for the following indication(s): central venous access.  Staffing  Performed: resident   Authorized by: Imtiaz Boss MD    Performed by: Foster Vincent MD    Sterility preparation included the following: provider hand hygiene performed prior to central venous catheter insertion, all 5 sterile barriers used (gloves, gown, cap, mask, large sterile drape) during central venous catheter insertion, antiseptic used during central venous catheter insertion and skin prep agent completely dried prior to procedure.  The patient was placed in Trendelenburg position.    Right subclavian vein was prepped.    The site was prepped with Chlorhexidine.  Catheter size: 8 Fr.   Catheter length (cm): 16 cm.  Number of Lumens: double lumen      During the procedure, the following specific steps were taken: target vein identified, needle advanced into vein and blood aspirated and guidewire advanced into vein.  Seldinger technique used.  Procedure performed using ultrasound guidance and surface landmarks.  Sterile gel and probe cover used in ultrasound-guided central venous catheter insertion.    Intravenous verification was obtained by ultrasound, venous blood return, x-ray and manometry.      Post insertion care included: all ports aspirated, all ports flushed easily, guidewire removed intact, Biopatch applied, line sutured in place and dressing applied.    During the procedure the patient experienced: patient tolerated procedure well with no complications.

## 2025-01-16 NOTE — PROGRESS NOTES
"Regis Mixon is a 64 y.o. male on day 6 of admission presenting with Cerebellar mass.    Subjective   NAEON       Objective     Physical Exam  A&Ox3  Face symmetric  RUE 5/5  LUE 5/5  RLE 5/5  LLE 5/5  Sensation intact to light touch throughout all extremities  Left dysmetria    Last Recorded Vitals  Blood pressure 101/74, pulse 78, temperature 36.3 °C (97.3 °F), temperature source Temporal, resp. rate 14, height 1.778 m (5' 10\"), weight 97.5 kg (215 lb), SpO2 95%.  Intake/Output last 3 Shifts:  I/O last 3 completed shifts:  In: - (0 mL/kg)   Out: 400 (4.1 mL/kg) [Urine:400 (0.1 mL/kg/hr)]  Weight: 97.5 kg         Assessment/Plan   Assessment & Plan  Cerebellar mass    Regis Mixon is a 64 y.o. male with h/o L cerebellar of hemangioblastoma s/p resection (2003), HTN, HLD, obesity, PMR p/w dizziness x6w, CTH 5.5x5 cm L cerebellar lesion, MRI 5.3 x 2.6cm  L cerebellar cystic mass, C3-4 dorsal cord lesion, 4th effacement, CT CAP multiple b/l pulmonary nodules, multiple b/l renal cysts, 1/12 MRI CTL spine w/o did not tolerate XIAO, expansile C3 intramedullary lesion, multiple thoracic/lumbar vertebral body lesions, 1/13 angio 1 muscular branch from L V3 supplying tumor, did not embolize d/t tortuosity, CTA H/N L cerebellar mass w/ L AICA and PICA feeders     1/14 MRI C-spine w/ intramedullary enhancing mass at C3-C4, TTE EF 55-60% without wall motion abnormalities=, no PFO, normal diastolic filling     Recs  MILAGRO  OR today for redo L retrosig for tumor resection  Onc recs  Appreciate Dr. Gil recs  SCDs, [facility] held  PTOT           Francesco Franco MD      "

## 2025-01-16 NOTE — ANESTHESIA PROCEDURE NOTES
Airway  Date/Time: 1/16/2025 8:07 AM  Urgency: elective    Airway not difficult    Staffing  Performed: resident   Authorized by: Imtiaz Boss MD    Performed by: Foster Vincent MD  Patient location during procedure: OR    Indications and Patient Condition  Indications for airway management: anesthesia  Spontaneous Ventilation: absent  Sedation level: deep  Preoxygenated: yes  Patient position: MILS by NSGY fellow.  MILS maintained throughout  Mask difficulty assessment: 0 - not attempted  Planned trial extubation    Final Airway Details  Final airway type: endotracheal airway      Successful airway: ETT and reinforced tube  Cuffed: yes   Successful intubation technique: video laryngoscopy  Facilitating devices/methods: intubating stylet  Endotracheal tube insertion site: oral  Blade: [place]  Blade size: #4  ETT size (mm): 7.0  Cormack-Lehane Classification: grade I - full view of glottis  Placement verified by: chest auscultation and capnometry   Cuff volume (mL): 5  Measured from: lips  ETT to lips (cm): 22  Number of attempts at approach: 1    Additional Comments  ETT cuff inflated and deflated upon repositioning from supine to sloppy lateral position.

## 2025-01-16 NOTE — CARE PLAN
The patient's goals for the shift include      The clinical goals for the shift include Pt. will remain safe throughout shift.    Over the shift, the patient met these goals.

## 2025-01-16 NOTE — ANESTHESIA PROCEDURE NOTES
Central Venous Line:    Date/Time: 1/16/2025 12:24 PM    A central venous line was placed in the OR for the following indication(s): central venous access and CVP monitoring.  Staffing  Performed: resident   Authorized by: Imtiaz Boss MD    Performed by: Foster Vincent MD    Sterility preparation included the following: provider hand hygiene performed prior to central venous catheter insertion, all 5 sterile barriers used (gloves, gown, cap, mask, large sterile drape) during central venous catheter insertion, antiseptic used during central venous catheter insertion and skin prep agent completely dried prior to procedure.  The patient was placed in Trendelenburg position.       The site was prepped with Chlorhexidine.  Catheter size: 8 Fr.   Catheter length (cm): 16 cm.  Number of Lumens: double lumen      During the procedure, the following specific steps were taken: target vein identified, needle advanced into vein and blood aspirated and guidewire advanced into vein.  Seldinger technique used.  Procedure performed using ultrasound guidance.  Sterile gel and probe cover used in ultrasound-guided central venous catheter insertion.    Intravenous verification was obtained by ultrasound, venous blood return and manometry.      Post insertion care included: all ports aspirated, all ports flushed easily, guidewire removed intact, Biopatch applied, line sutured in place and dressing applied.    During the procedure the patient experienced: patient tolerated procedure well with no complications.

## 2025-01-17 ENCOUNTER — APPOINTMENT (OUTPATIENT)
Dept: RADIOLOGY | Facility: HOSPITAL | Age: 65
DRG: 025 | End: 2025-01-17
Payer: COMMERCIAL

## 2025-01-17 LAB
ALBUMIN SERPL BCP-MCNC: 3.5 G/DL (ref 3.4–5)
ALBUMIN SERPL BCP-MCNC: 3.8 G/DL (ref 3.4–5)
ANION GAP SERPL CALC-SCNC: 12 MMOL/L (ref 10–20)
ANION GAP SERPL CALC-SCNC: 15 MMOL/L (ref 10–20)
APTT PPP: 30 SECONDS (ref 27–38)
BLOOD EXPIRATION DATE: NORMAL
BUN SERPL-MCNC: 24 MG/DL (ref 6–23)
BUN SERPL-MCNC: 26 MG/DL (ref 6–23)
CA-I BLD-SCNC: 1.1 MMOL/L (ref 1.1–1.33)
CALCIUM SERPL-MCNC: 9 MG/DL (ref 8.6–10.6)
CALCIUM SERPL-MCNC: 9.4 MG/DL (ref 8.6–10.6)
CFT FORM KAOLIN IND BLD RES TEG: 1.2 MIN (ref 0.8–2.1)
CHLORIDE SERPL-SCNC: 96 MMOL/L (ref 98–107)
CHLORIDE SERPL-SCNC: 97 MMOL/L (ref 98–107)
CLOT ANGLE.KAOLIN INDUCED BLD RES TEG: 74 DEG (ref 63–78)
CLOT INIT KAO IND P HEP NEUT BLD RES TEG: 5.2 MIN (ref 4.6–9.1)
CLOT INIT KAO IND P HEP NEUT BLD RES TEG: 5.5 MIN (ref 4.3–8.3)
CO2 SERPL-SCNC: 25 MMOL/L (ref 21–32)
CO2 SERPL-SCNC: 27 MMOL/L (ref 21–32)
CREAT SERPL-MCNC: 0.86 MG/DL (ref 0.5–1.3)
CREAT SERPL-MCNC: 0.98 MG/DL (ref 0.5–1.3)
DISPENSE STATUS: NORMAL
EGFRCR SERPLBLD CKD-EPI 2021: 87 ML/MIN/1.73M*2
EGFRCR SERPLBLD CKD-EPI 2021: >90 ML/MIN/1.73M*2
ERYTHROCYTE [DISTWIDTH] IN BLOOD BY AUTOMATED COUNT: 12 % (ref 11.5–14.5)
ERYTHROCYTE [DISTWIDTH] IN BLOOD BY AUTOMATED COUNT: 12.1 % (ref 11.5–14.5)
FIBRINOGEN BLD CALC-MCNC: 442 MG/DL (ref 278–581)
GLUCOSE SERPL-MCNC: 144 MG/DL (ref 74–99)
GLUCOSE SERPL-MCNC: 159 MG/DL (ref 74–99)
HCT VFR BLD AUTO: 43.5 % (ref 41–52)
HCT VFR BLD AUTO: 44.5 % (ref 41–52)
HGB BLD-MCNC: 15.1 G/DL (ref 13.5–17.5)
HGB BLD-MCNC: 16.1 G/DL (ref 13.5–17.5)
INR PPP: 1.2 (ref 0.9–1.1)
MA KAOLIN BLD RES TEG: 64 MM (ref 52–69)
MA KAOLIN+TF BLD RES TEG: 67 MM (ref 52–70)
MA TF IND+IIB-IIIA INH BLD RES TEG: 24 MM (ref 15–32)
MAGNESIUM SERPL-MCNC: 1.83 MG/DL (ref 1.6–2.4)
MAGNESIUM SERPL-MCNC: 2.57 MG/DL (ref 1.6–2.4)
MCH RBC QN AUTO: 29 PG (ref 26–34)
MCH RBC QN AUTO: 29 PG (ref 26–34)
MCHC RBC AUTO-ENTMCNC: 34.7 G/DL (ref 32–36)
MCHC RBC AUTO-ENTMCNC: 36.2 G/DL (ref 32–36)
MCV RBC AUTO: 80 FL (ref 80–100)
MCV RBC AUTO: 84 FL (ref 80–100)
NRBC BLD-RTO: 0 /100 WBCS (ref 0–0)
NRBC BLD-RTO: 0 /100 WBCS (ref 0–0)
PHOSPHATE SERPL-MCNC: 2.3 MG/DL (ref 2.5–4.9)
PHOSPHATE SERPL-MCNC: 3 MG/DL (ref 2.5–4.9)
PLATELET # BLD AUTO: 274 X10*3/UL (ref 150–450)
PLATELET # BLD AUTO: 278 X10*3/UL (ref 150–450)
POTASSIUM SERPL-SCNC: 3.6 MMOL/L (ref 3.5–5.3)
POTASSIUM SERPL-SCNC: 4 MMOL/L (ref 3.5–5.3)
PRODUCT BLOOD TYPE: 6200
PRODUCT CODE: NORMAL
PROTHROMBIN TIME: 13.2 SECONDS (ref 9.8–12.8)
RBC # BLD AUTO: 5.2 X10*6/UL (ref 4.5–5.9)
RBC # BLD AUTO: 5.55 X10*6/UL (ref 4.5–5.9)
SODIUM SERPL-SCNC: 131 MMOL/L (ref 136–145)
SODIUM SERPL-SCNC: 132 MMOL/L (ref 136–145)
SODIUM SERPL-SCNC: 132 MMOL/L (ref 136–145)
SODIUM SERPL-SCNC: 133 MMOL/L (ref 136–145)
UNIT ABO: NORMAL
UNIT NUMBER: NORMAL
UNIT RH: NORMAL
UNIT VOLUME: 350
WBC # BLD AUTO: 19.3 X10*3/UL (ref 4.4–11.3)
WBC # BLD AUTO: 22.1 X10*3/UL (ref 4.4–11.3)
XM INTEP: NORMAL

## 2025-01-17 PROCEDURE — 2500000001 HC RX 250 WO HCPCS SELF ADMINISTERED DRUGS (ALT 637 FOR MEDICARE OP): Performed by: STUDENT IN AN ORGANIZED HEALTH CARE EDUCATION/TRAINING PROGRAM

## 2025-01-17 PROCEDURE — 70450 CT HEAD/BRAIN W/O DYE: CPT | Performed by: RADIOLOGY

## 2025-01-17 PROCEDURE — 97530 THERAPEUTIC ACTIVITIES: CPT | Mod: GP

## 2025-01-17 PROCEDURE — 85027 COMPLETE CBC AUTOMATED: CPT

## 2025-01-17 PROCEDURE — 2500000004 HC RX 250 GENERAL PHARMACY W/ HCPCS (ALT 636 FOR OP/ED): Performed by: STUDENT IN AN ORGANIZED HEALTH CARE EDUCATION/TRAINING PROGRAM

## 2025-01-17 PROCEDURE — 97530 THERAPEUTIC ACTIVITIES: CPT | Mod: GO

## 2025-01-17 PROCEDURE — 2500000001 HC RX 250 WO HCPCS SELF ADMINISTERED DRUGS (ALT 637 FOR MEDICARE OP): Performed by: NEUROLOGICAL SURGERY

## 2025-01-17 PROCEDURE — 85610 PROTHROMBIN TIME: CPT | Performed by: STUDENT IN AN ORGANIZED HEALTH CARE EDUCATION/TRAINING PROGRAM

## 2025-01-17 PROCEDURE — 2500000004 HC RX 250 GENERAL PHARMACY W/ HCPCS (ALT 636 FOR OP/ED)

## 2025-01-17 PROCEDURE — 37799 UNLISTED PX VASCULAR SURGERY: CPT | Performed by: REGISTERED NURSE

## 2025-01-17 PROCEDURE — 97162 PT EVAL MOD COMPLEX 30 MIN: CPT | Mod: GP

## 2025-01-17 PROCEDURE — 82330 ASSAY OF CALCIUM: CPT | Performed by: REGISTERED NURSE

## 2025-01-17 PROCEDURE — 85027 COMPLETE CBC AUTOMATED: CPT | Performed by: NURSE PRACTITIONER

## 2025-01-17 PROCEDURE — 80069 RENAL FUNCTION PANEL: CPT | Performed by: STUDENT IN AN ORGANIZED HEALTH CARE EDUCATION/TRAINING PROGRAM

## 2025-01-17 PROCEDURE — 83735 ASSAY OF MAGNESIUM: CPT

## 2025-01-17 PROCEDURE — 84520 ASSAY OF UREA NITROGEN: CPT

## 2025-01-17 PROCEDURE — 97166 OT EVAL MOD COMPLEX 45 MIN: CPT | Mod: GO

## 2025-01-17 PROCEDURE — 2580000001 HC RX 258 IV SOLUTIONS

## 2025-01-17 PROCEDURE — 37799 UNLISTED PX VASCULAR SURGERY: CPT

## 2025-01-17 PROCEDURE — 2500000004 HC RX 250 GENERAL PHARMACY W/ HCPCS (ALT 636 FOR OP/ED): Performed by: REGISTERED NURSE

## 2025-01-17 PROCEDURE — 70450 CT HEAD/BRAIN W/O DYE: CPT

## 2025-01-17 PROCEDURE — 2500000002 HC RX 250 W HCPCS SELF ADMINISTERED DRUGS (ALT 637 FOR MEDICARE OP, ALT 636 FOR OP/ED): Performed by: REGISTERED NURSE

## 2025-01-17 PROCEDURE — 2020000001 HC ICU ROOM DAILY

## 2025-01-17 PROCEDURE — 83735 ASSAY OF MAGNESIUM: CPT | Performed by: STUDENT IN AN ORGANIZED HEALTH CARE EDUCATION/TRAINING PROGRAM

## 2025-01-17 PROCEDURE — 84295 ASSAY OF SERUM SODIUM: CPT

## 2025-01-17 PROCEDURE — 85384 FIBRINOGEN ACTIVITY: CPT

## 2025-01-17 RX ORDER — POTASSIUM CHLORIDE 20 MEQ/1
20 TABLET, EXTENDED RELEASE ORAL EVERY 6 HOURS PRN
Status: DISCONTINUED | OUTPATIENT
Start: 2025-01-17 | End: 2025-01-22

## 2025-01-17 RX ORDER — 3% SODIUM CHLORIDE 3 G/100ML
25 INJECTION, SOLUTION INTRAVENOUS CONTINUOUS
Status: DISCONTINUED | OUTPATIENT
Start: 2025-01-17 | End: 2025-01-19

## 2025-01-17 RX ORDER — NICARDIPINE HYDROCHLORIDE 0.2 MG/ML
2.5-15 INJECTION INTRAVENOUS CONTINUOUS
Status: DISCONTINUED | OUTPATIENT
Start: 2025-01-17 | End: 2025-01-20

## 2025-01-17 RX ORDER — LABETALOL HYDROCHLORIDE 5 MG/ML
10 INJECTION, SOLUTION INTRAVENOUS EVERY 10 MIN PRN
Status: DISCONTINUED | OUTPATIENT
Start: 2025-01-17 | End: 2025-01-21

## 2025-01-17 RX ORDER — CALCIUM GLUCONATE 20 MG/ML
1 INJECTION, SOLUTION INTRAVENOUS EVERY 6 HOURS PRN
Status: DISCONTINUED | OUTPATIENT
Start: 2025-01-17 | End: 2025-01-21

## 2025-01-17 RX ORDER — LABETALOL HYDROCHLORIDE 5 MG/ML
10 INJECTION, SOLUTION INTRAVENOUS EVERY 10 MIN PRN
Status: DISCONTINUED | OUTPATIENT
Start: 2025-01-17 | End: 2025-01-17

## 2025-01-17 RX ORDER — HYDRALAZINE HYDROCHLORIDE 20 MG/ML
20 INJECTION INTRAMUSCULAR; INTRAVENOUS EVERY 30 MIN PRN
Status: DISCONTINUED | OUTPATIENT
Start: 2025-01-17 | End: 2025-01-21

## 2025-01-17 RX ORDER — POTASSIUM CHLORIDE 1.5 G/1.58G
40 POWDER, FOR SOLUTION ORAL EVERY 6 HOURS PRN
Status: DISCONTINUED | OUTPATIENT
Start: 2025-01-17 | End: 2025-01-22

## 2025-01-17 RX ORDER — LEVETIRACETAM 500 MG/1
500 TABLET ORAL 2 TIMES DAILY
Status: DISCONTINUED | OUTPATIENT
Start: 2025-01-17 | End: 2025-01-27 | Stop reason: HOSPADM

## 2025-01-17 RX ORDER — POTASSIUM CHLORIDE 20 MEQ/1
40 TABLET, EXTENDED RELEASE ORAL EVERY 6 HOURS PRN
Status: DISCONTINUED | OUTPATIENT
Start: 2025-01-17 | End: 2025-01-22

## 2025-01-17 RX ORDER — HYDRALAZINE HYDROCHLORIDE 20 MG/ML
20 INJECTION INTRAMUSCULAR; INTRAVENOUS EVERY 30 MIN PRN
Status: DISCONTINUED | OUTPATIENT
Start: 2025-01-17 | End: 2025-01-17

## 2025-01-17 RX ORDER — POTASSIUM CHLORIDE 1.5 G/1.58G
20 POWDER, FOR SOLUTION ORAL EVERY 6 HOURS PRN
Status: DISCONTINUED | OUTPATIENT
Start: 2025-01-17 | End: 2025-01-22

## 2025-01-17 RX ORDER — HEPARIN SODIUM 5000 [USP'U]/ML
5000 INJECTION, SOLUTION INTRAVENOUS; SUBCUTANEOUS EVERY 12 HOURS
Status: DISCONTINUED | OUTPATIENT
Start: 2025-01-17 | End: 2025-01-18

## 2025-01-17 RX ORDER — CALCIUM GLUCONATE 20 MG/ML
2 INJECTION, SOLUTION INTRAVENOUS EVERY 6 HOURS PRN
Status: DISCONTINUED | OUTPATIENT
Start: 2025-01-17 | End: 2025-01-21

## 2025-01-17 RX ORDER — MAGNESIUM SULFATE HEPTAHYDRATE 40 MG/ML
2 INJECTION, SOLUTION INTRAVENOUS EVERY 6 HOURS PRN
Status: DISCONTINUED | OUTPATIENT
Start: 2025-01-17 | End: 2025-01-21

## 2025-01-17 RX ORDER — MAGNESIUM SULFATE HEPTAHYDRATE 40 MG/ML
4 INJECTION, SOLUTION INTRAVENOUS EVERY 6 HOURS PRN
Status: DISCONTINUED | OUTPATIENT
Start: 2025-01-17 | End: 2025-01-21

## 2025-01-17 RX ADMIN — CARVEDILOL 25 MG: 25 TABLET, FILM COATED ORAL at 08:55

## 2025-01-17 RX ADMIN — DEXAMETHASONE SODIUM PHOSPHATE 4 MG: 4 INJECTION, SOLUTION INTRA-ARTICULAR; INTRALESIONAL; INTRAMUSCULAR; INTRAVENOUS; SOFT TISSUE at 05:28

## 2025-01-17 RX ADMIN — DEXAMETHASONE SODIUM PHOSPHATE 4 MG: 4 INJECTION, SOLUTION INTRA-ARTICULAR; INTRALESIONAL; INTRAMUSCULAR; INTRAVENOUS; SOFT TISSUE at 00:45

## 2025-01-17 RX ADMIN — HEPARIN SODIUM 5000 UNITS: 5000 INJECTION, SOLUTION INTRAVENOUS; SUBCUTANEOUS at 12:56

## 2025-01-17 RX ADMIN — SODIUM CHLORIDE 50 ML/HR: 3 INJECTION, SOLUTION INTRAVENOUS at 17:38

## 2025-01-17 RX ADMIN — HEPARIN SODIUM 5000 UNITS: 5000 INJECTION, SOLUTION INTRAVENOUS; SUBCUTANEOUS at 21:19

## 2025-01-17 RX ADMIN — POLYETHYLENE GLYCOL 3350 17 G: 17 POWDER, FOR SOLUTION ORAL at 08:55

## 2025-01-17 RX ADMIN — HYDROCHLOROTHIAZIDE 25 MG: 25 TABLET ORAL at 08:55

## 2025-01-17 RX ADMIN — LEVETIRACETAM 500 MG: 500 TABLET, FILM COATED ORAL at 21:19

## 2025-01-17 RX ADMIN — SENNOSIDES AND DOCUSATE SODIUM 2 TABLET: 50; 8.6 TABLET ORAL at 21:19

## 2025-01-17 RX ADMIN — CARVEDILOL 25 MG: 25 TABLET, FILM COATED ORAL at 21:19

## 2025-01-17 RX ADMIN — LISINOPRIL 20 MG: 20 TABLET ORAL at 08:55

## 2025-01-17 RX ADMIN — CALCIUM GLUCONATE 1 G: 20 INJECTION, SOLUTION INTRAVENOUS at 05:28

## 2025-01-17 RX ADMIN — LABETALOL HYDROCHLORIDE 10 MG: 5 INJECTION, SOLUTION INTRAVENOUS at 06:17

## 2025-01-17 RX ADMIN — DEXAMETHASONE SODIUM PHOSPHATE 4 MG: 4 INJECTION, SOLUTION INTRA-ARTICULAR; INTRALESIONAL; INTRAMUSCULAR; INTRAVENOUS; SOFT TISSUE at 12:56

## 2025-01-17 RX ADMIN — ERGOCALCIFEROL 1250 MCG: 1.25 CAPSULE ORAL at 08:55

## 2025-01-17 RX ADMIN — Medication 5 MG: at 21:19

## 2025-01-17 RX ADMIN — NICARDIPINE HYDROCHLORIDE 12.5 MG/HR: 0.2 INJECTION, SOLUTION INTRAVENOUS at 03:17

## 2025-01-17 RX ADMIN — LABETALOL HYDROCHLORIDE 10 MG: 5 INJECTION, SOLUTION INTRAVENOUS at 08:12

## 2025-01-17 RX ADMIN — MAGNESIUM SULFATE IN WATER 2 G: 40 INJECTION, SOLUTION INTRAVENOUS at 05:28

## 2025-01-17 RX ADMIN — POTASSIUM CHLORIDE 40 MEQ: 1500 TABLET, EXTENDED RELEASE ORAL at 05:27

## 2025-01-17 RX ADMIN — SENNOSIDES AND DOCUSATE SODIUM 2 TABLET: 50; 8.6 TABLET ORAL at 08:55

## 2025-01-17 RX ADMIN — PRAVASTATIN SODIUM 40 MG: 40 TABLET ORAL at 21:19

## 2025-01-17 RX ADMIN — DEXAMETHASONE SODIUM PHOSPHATE 4 MG: 4 INJECTION, SOLUTION INTRA-ARTICULAR; INTRALESIONAL; INTRAMUSCULAR; INTRAVENOUS; SOFT TISSUE at 23:51

## 2025-01-17 RX ADMIN — PANTOPRAZOLE SODIUM 40 MG: 40 TABLET, DELAYED RELEASE ORAL at 06:17

## 2025-01-17 RX ADMIN — DEXAMETHASONE SODIUM PHOSPHATE 4 MG: 4 INJECTION, SOLUTION INTRA-ARTICULAR; INTRALESIONAL; INTRAMUSCULAR; INTRAVENOUS; SOFT TISSUE at 17:38

## 2025-01-17 ASSESSMENT — PAIN SCALES - GENERAL
PAINLEVEL_OUTOF10: 0 - NO PAIN

## 2025-01-17 ASSESSMENT — COGNITIVE AND FUNCTIONAL STATUS - GENERAL
STANDING UP FROM CHAIR USING ARMS: A LOT
PERSONAL GROOMING: A LITTLE
DRESSING REGULAR UPPER BODY CLOTHING: A LITTLE
TURNING FROM BACK TO SIDE WHILE IN FLAT BAD: A LOT
DAILY ACTIVITIY SCORE: 19
TOILETING: A LITTLE
MOBILITY SCORE: 11
MOVING FROM LYING ON BACK TO SITTING ON SIDE OF FLAT BED WITH BEDRAILS: A LOT
WALKING IN HOSPITAL ROOM: A LOT
CLIMB 3 TO 5 STEPS WITH RAILING: TOTAL
MOVING TO AND FROM BED TO CHAIR: A LOT
HELP NEEDED FOR BATHING: A LITTLE
DRESSING REGULAR LOWER BODY CLOTHING: A LITTLE

## 2025-01-17 ASSESSMENT — PAIN - FUNCTIONAL ASSESSMENT
PAIN_FUNCTIONAL_ASSESSMENT: 0-10

## 2025-01-17 ASSESSMENT — ACTIVITIES OF DAILY LIVING (ADL)
BATHING_ASSISTANCE: MINIMAL
ADLS_ADDRESSED: NO
ADL_ASSISTANCE: INDEPENDENT
ADL_ASSISTANCE: INDEPENDENT

## 2025-01-17 NOTE — CARE PLAN
Problem: Fall/Injury  Goal: Not fall by end of shift  Outcome: Progressing  Goal: Be free from injury by end of the shift  Outcome: Progressing  Goal: Verbalize understanding of personal risk factors for fall in the hospital  Outcome: Progressing  Goal: Verbalize understanding of risk factor reduction measures to prevent injury from fall in the home  Outcome: Progressing  Goal: Use assistive devices by end of the shift  Outcome: Progressing  Goal: Pace activities to prevent fatigue by end of the shift  Outcome: Progressing     Problem: Discharge Planning  Goal: Discharge to home or other facility with appropriate resources  Outcome: Progressing     Problem: Chronic Conditions and Co-morbidities  Goal: Patient's chronic conditions and co-morbidity symptoms are monitored and maintained or improved  Outcome: Progressing     Problem: Skin  Goal: Decreased wound size/increased tissue granulation at next dressing change  Outcome: Progressing  Goal: Participates in plan/prevention/treatment measures  1/17/2025 1219 by Yandy Santana RN  Outcome: Progressing  1/17/2025 1218 by Yandy Santana RN  Flowsheets (Taken 1/17/2025 1218)  Participates in plan/prevention/treatment measures: Elevate heels  Goal: Prevent/manage excess moisture  Outcome: Progressing  Goal: Prevent/minimize sheer/friction injuries  1/17/2025 1219 by Yandy Santana RN  Outcome: Progressing  1/17/2025 1219 by Yandy Santaan RN  Flowsheets (Taken 1/17/2025 1219)  Prevent/minimize sheer/friction injuries:   Use pull sheet   HOB 30 degrees or less  Goal: Promote/optimize nutrition  Outcome: Progressing  Goal: Promote skin healing  Outcome: Progressing   The patient's goals for the shift include      The clinical goals for the shift include pt to remain neurologically stable throughout shift.

## 2025-01-17 NOTE — PROGRESS NOTES
Physical Therapy    Physical Therapy Evaluation & Treatment    Patient Name: Regis Mixon  MRN: [id number]  Today's Date: 1/17/2025   Room: 19/19  Time Calculation  Start Time: 1157  Stop Time: 1245  Time Calculation (min): 48 min    Assessment/Plan   PT Assessment  PT Assessment Results: Decreased endurance, Impaired balance, Decreased mobility, Decreased coordination, Pain, Impaired vision  Rehab Prognosis: Excellent  Barriers to Discharge Home: Physical needs  Physical Needs: High falls risk due to function or environment  Evaluation/Treatment Tolerance: Patient tolerated treatment well  Strengths: Attitude of self, Support and attitude of living partners  End of Session Communication: Bedside nurse  Assessment Comment: Pt to benefit from ongoing PT services to address the above limitations and to prepare pt for timely return to prior level of function.  End of Session Patient Position: Up in chair, Alarm on  IP OR SWING BED PT PLAN  Inpatient or Swing Bed: Inpatient  PT Plan  Treatment/Interventions: Bed mobility, Transfer training, Gait training, Stair training, Balance training, Neuromuscular re-education, Strengthening, Endurance training, Therapeutic exercise, Therapeutic activity, Home exercise program, Postural re-education  PT Plan: Ongoing PT  PT Frequency: 6 times per week  PT Discharge Recommendations: High intensity level of continued care  Equipment Recommended upon Discharge: Wheeled walker  PT Recommended Transfer Status: Assist x2  PT - OK to Discharge: Yes (When medically ready)      Subjective   General Visit Information:  Reason for Referral: Pt p/w dizziness. Found to have L cerebellar cystic mass, C3-4 dorsal cord lesion, 4th effacement. 1/12 MRI CTL spine w/o did not tolerate XIAO, expansile C3 intramedullary lesion, multiple thoracic/lumbar vertebral body lesions. 1/13 angio 1 muscular branch from L V3 supplying tumor, did not embolize d/t tortuosity, CTA H/N L cerebellar mass w/ L AICA and  PICA feeders. 1/14 MRI C-spine w/ intramedullary enhancing mass at C3-C4. 1/16 s/p L redo retrosig crani for tumor resection, with intra-op EVD, CTH POC with EVD tract hemorrhage/IVH.  Past Medical History Relevant to Rehab: L cerebellar of hemangioblastoma s/p resection (2003), HTN, HLD, obesity, PMR  Co-Treatment:  (Rehab aide assisted with session)  Prior to Session Communication: Bedside nurse  Patient Position Received: Bed, 3 rail up, Alarm off, not on at start of session  Family/Caregiver Present: Yes  Caregiver Feedback: Pt's spouse present and supportive  General Comment: Pt reports experiencing dizziness with standing/position changes prior to surgery. Mild dizziness with sup to sit, but not with sit to stand. Extra time taken between position changes due to this.     Home Living:  Home Living  Type of Home: House  Lives With: Spouse (Spouse works, but reports family supervision will be available 24/7.)  Home Adaptive Equipment: None  Home Layout: One level  Home Access: Stairs to enter without rails  Entrance Stairs-Number of Steps: 1  Bathroom Shower/Tub: Walk-in shower, Tub/shower unit  Bathroom Equipment: Built-in shower seat    Prior Level of Function:  Prior Function Per Pt/Caregiver Report  Level of [place]: Independent with ADLs and functional transfers, Independent with homemaking with ambulation  ADL Assistance: Independent  Homemaking Assistance: Independent  Ambulatory Assistance: Independent  Vocational: Part time employment ()  Leisure: Golf, gambling, bowling  Hand Dominance: Left  Prior Function Comments: Drives, community ambulator    Precautions:  Precautions  Hearing/Visual Limitations: Hypermetric saccades, smooth pursuit with reduced saccadic gain in horizontal plane.  Medical Precautions: Fall precautions, External Ventricular Device (EVD)  Precautions Comment: SBP , EVD clamped for mobility.    Vital Signs:  Vital Signs (Past 2hrs)        Date/Time Vitals  Session Patient Position Pulse Resp SpO2 BP MAP (mmHg)    01/17/25 1157 Pre PT  Lying  101  14  96 %  120/84  96     01/17/25 1200 --  --  102  16  99 %  123/92  --     01/17/25 1220 During PT  Sitting  112  17  97 %  128/72  88     01/17/25 1245 Post PT  Sitting  101  17  97 %  104/83  91                     Objective   Lines/Tubes/Drains:  CVC 01/16/25 Double lumen Right Subclavian (Active)   Number of days: 1       Arterial Line 01/16/25 Left Radial (Active)   Number of days: 1       Urethral Catheter Double-lumen 16 Fr. (Active)   Number of days: 1       Intracranial Pressure/Ventriculostomy Right Temporal region (Active)   Number of days: 1       Oxygen: 3 L/min via NC     Pain:  Pain Assessment  Pain Assessment:  (Pt reported unrated pain in head throughout session.)    Cognition:  Cognition  Overall Cognitive Status: Within Functional Limits  Orientation Level: Oriented X4  Insight: Within function limits  Impulsive: Within functional limits  Processing Speed: Within funtional limits      Extremity/Trunk Assessments:  Strength:       RUE   RUE : Within Functional Limits    LUE   LUE: Within Functional Limits    RLE   RLE : Exceptions to WFL  Strength RLE  R Hip Flexion: 5/5  R Knee Flexion: 5/5  R Knee Extension: 5/5  R Ankle Dorsiflexion: 5/5  R Ankle Plantar Flexion: 5/5    LLE   LLE : Exceptions to WFL  Strength LLE  L Hip Flexion: 5/5  L Knee Flexion: 5/5  L Knee Extension: 5/5  L Ankle Dorsiflexion: 5/5  L Ankle Plantar Flexion: 5/5    General Assessments:         Activity Tolerance  Endurance: Endurance does not limit participation in activity  Early Mobility/Exercise Safety Screen: Proceed with mobilization - No exclusion criteria met  Sensation  Light Touch: No apparent deficits  Coordination  Movements are Fluid and Coordinated: No  Lower Body Coordination: BLE ataxia with stepping at bedside  Trunk Coordination: Trunk ataxia appreciated in standing/sitting dynamic activitess.  Finger to Nose:  Impaired (BUE L > R hypometria, bradykinesia)  Heel to [place]: Impaired (Bradykinesia BLE, mild dysmetria LLE)  Static Sitting Balance  Static Sitting-Balance Support: Bilateral upper extremity supported, Feet supported  Static Sitting-Level of Assistance: Moderate assistance (x1; Progressing to MIN A x1 with cues and assist.)  Static Sitting-Comment/Number of Minutes: 15 min  Dynamic Sitting Balance  Dynamic Sitting-Balance Support: Bilateral upper extremity supported, Feet supported  Dynamic Sitting-Level of Assistance: Moderate assistance (x1)  Dynamic Sitting-Balance: Forward lean  Static Standing Balance  Static Standing-Balance Support: Bilateral upper extremity supported  Static Standing-Level of Assistance: Moderate assistance (x1)  Dynamic Standing Balance  Dynamic Standing-Balance Support: Bilateral upper extremity supported  Dynamic Standing-Level of Assistance: Moderate assistance (x1)  Dynamic Standing-Balance: Turning    Functional Assessments:  Bed Mobility  Bed Mobility: Yes  Bed Mobility 1  Bed Mobility 1: Supine to sitting  Level of Assistance 1: Moderate assistance (x1)  Bed Mobility Comments 1: HOB elevated, cues for proper hand placement and controlling speed.    Transfers  Transfer: Yes  Transfer 1  Technique 1: Sit to stand, Stand to sit  Transfer Device 1: Walker, Gait belt  Transfer Level of Assistance 1: Moderate assistance (x1)  Trials/Comments 1: Retropulsive on standing. Cues for glute activation/trunk muscle activation to shift weight anteriorly.    Ambulation/Gait Training  Ambulation/Gait Training Performed: Yes  Ambulation/Gait Training 1  Surface 1: Level tile  Device 1: Rolling walker  Gait Support Devices: Gait belt  Assistance 1: Moderate assistance (x1)  Quality of Gait 1: Wide base of support, Inconsistent stride length, Ataxic (Retropulsive. Cues for decreased B step length and walker mgmt. Tactile cues to promote fwd shifting of center of mass to prevent posterior loss of  balance.)  Comments/Distance (ft) 1: 3 steps laterally at bedside    Stairs  Stairs: No         Treatment:  Skilled PT treatment was provided beyond the scope of evaluation, as follows:    Therapeutic Activity  Therapeutic Activity Performed: Yes  Therapeutic Activity 1: Progressive chair position in bed with skilled vitals monitoring to promote increased tolerance of upright position and to decrease risk of sudden onset dizziness with transition to sitting.      Outcome Measures:  [facility] Basic Mobility  Turning from your back to your side while in a flat bed without using bedrails: A lot  Moving from lying on your back to sitting on the side of a flat bed without using bedrails: A lot  Moving to and from bed to chair (including a wheelchair): A lot  Standing up from a chair using your arms (e.g. wheelchair or bedside chair): A lot  To walk in hospital room: A lot  Climbing 3-5 steps with railing: Total  Basic Mobility - Total Score: 11                   FSS-ICU  Ambulation: Walks <50 feet with any assistance x1 or walks any distance with assistance x2 people  Rolling: Moderate assistance (performs 50 - 74% of task)  Sitting: Moderate assistance (performs 50 - 74% of task)  Transfer Sit-to-Stand: Moderate assistance (performs 50 - 74% of task)  Transfer Supine-to-Sit: Moderate assistance (performs 50 - 74% of task)  Total Score: 13    ICU Mobility Screen  Early Mobility/Exercise Safety Screen: Proceed with mobilization - No exclusion criteria met  ICU Mobility Scale: Transferring bed to chair                        Encounter Problems       Encounter Problems (Active)       PT Problem       Patient will perform bed mobility with </= close sup to reduce risk of developing decubitus ulcers.        Start:  01/17/25    Expected End:  01/31/25            Patient will perform sit to stand and stand to sit transfers with </= close sup and LRD to increase functional strength.        Start:  01/17/25    Expected End:  01/31/25             Patient will ambulate at least 150  ft. with </= close sup and LRD to improve tolerance of community distances.          Start:  01/17/25    Expected End:  01/31/25            Patient will perform the 5-Time Sit to Stand test in <12 sec to indicate decreased falls risk. (Cut-Off Scores: Balance/Vestibular: >14.2 sec if over 60 y.o. Chronic Stroke: >12 sec. MCID: >2.3 sec balance/vestibular.)        Start:  01/17/25    Expected End:  01/31/25            Patient will ascend and descend 1 platform step with LRD and </= close sup to facilitate safe navigation of stairs in the home.          Start:  01/17/25    Expected End:  01/31/25               Pain - Adult              Education Documentation  Precautions, taught by Kelly Tubbs PT at 1/17/2025  1:25 PM.  Learner: Significant Other, Patient  Readiness: Acceptance  Method: Explanation  Response: Verbalizes Understanding  Comment: PT expectations, mobility prognosis, tracking progress to appreciate gains while in acute care/rehabilitation, importance of upright position and out of bed mobility.    Body Mechanics, taught by Kelly Tubbs PT at 1/17/2025  1:25 PM.  Learner: Significant Other, Patient  Readiness: Acceptance  Method: Explanation  Response: Verbalizes Understanding  Comment: PT expectations, mobility prognosis, tracking progress to appreciate gains while in acute care/rehabilitation, importance of upright position and out of bed mobility.    Mobility Training, taught by Kelly Tubbs PT at 1/17/2025  1:25 PM.  Learner: Significant Other, Patient  Readiness: Acceptance  Method: Explanation  Response: Verbalizes Understanding  Comment: PT expectations, mobility prognosis, tracking progress to appreciate gains while in acute care/rehabilitation, importance of upright position and out of bed mobility.    Education Comments  No comments found.            01/17/25 at 1:27 PM   Kelly Tubbs PT   Rehab Office: [phone]

## 2025-01-17 NOTE — ANESTHESIA POSTPROCEDURE EVALUATION
Patient: Regis Mixon    Procedure Summary       Date: 01/16/25 Room / Location: [facility] OR 26 / Virtual [facility] [place] OR    Anesthesia Start: 0733 Anesthesia Stop: 2036    Procedure: CRANIOTOMY, RETROSIGMOID APPROACH, WITH TISSUE EXCISION (Left: Head) Diagnosis:       Cerebellar mass      (Cerebellar mass [G93.89])    Surgeons: Geetha Ghosh MD Responsible Provider: Henrik Santacruz MD    Anesthesia Type: general ASA Status: 3            Anesthesia Type: general    Vitals Value Taken Time   /65 01/16/25 2036   Temp 36 01/16/25 2036   Pulse 86 01/16/25 2035   Resp 15 01/16/25 2035   SpO2 96 % 01/16/25 2035   Vitals shown include unfiled device data.    Anesthesia Post Evaluation    Patient location during evaluation: ICU  Patient participation: complete - patient participated  Level of consciousness: awake  Pain score: 0  Pain management: adequate  Multimodal analgesia pain management approach  Airway patency: patent  Two or more strategies used to mitigate risk of obstructive sleep apnea  Cardiovascular status: acceptable, hemodynamically stable and blood pressure returned to baseline  Respiratory status: acceptable, airway suctioned and face mask  Hydration status: acceptable  Postoperative Nausea and Vomiting: none        No notable events documented.

## 2025-01-17 NOTE — PROGRESS NOTES
[facility]  NEUROSCIENCE INTENSIVE CARE UNIT  DAILY PROGRESS NOTE       Patient Name: Regis Mixon   MRN: [id number]     Admit Date: 1/10/2025     : 1960 AGE: 64 y.o. GENDER: male        Subjective    Regis Mixon is a 63yo M with PMH L cerebellar hemangioblastoma s/p resection (), HTN, HLD, obesity, PMR. 1/10 p/w dizziness x6 weeks. CTH 5/5x5cm L cerebellar lesion, MRI 5.3 x2.6cm L cerebellar cystic mass, C3-4 dosal cord lesion, 4th ventricular effacement. CT CAP multiple b/l pulmonary nodules, multiple b/l renal cysts.  MRI CTL spine w/o did not tolerate XIAO, expansile C3 intramedullary lesion, multiple thoracic/lumbar vertebral body lesions,  angio 1 muscular branch from L V3 supplying tumor, did not embolize d/t tortuosity, CTA H/N L cerebellar mass w/ L AICA and PICA feeders.     Interval Events:    MRI C-spine w/ intramedullary enhancing mass at C3-C4, TTE EF 55-60% without wall motion abnormalities=, no PFO, normal diastolic filling    s/p redo left retrosigmoid craniotomy for tumor resection, admitted to [facility] post-op for close blood pressure management.        Objective   VITALS (24H):  Temp:  [36.2 °C (97.2 °F)-37.2 °C (99 °F)] 36.2 °C (97.2 °F)  Heart Rate:  [77-89] 89  Resp:  [14-18] 16  BP: (101-158)/() 158/98  INTAKE/OUTPUT:  Intake/Output Summary (Last 24 hours) at 20259  Last data filed at 2025  Gross per 24 hour   Intake 3315.75 ml   Output 2635 ml   Net 680.75 ml     VENT SETTINGS:        PHYSICAL EXAM:  NEURO:  - Awake, AOx4, follows commands  - EOS, PERRL 3mm-->2mm bilaterally, EOMI, VFF  - R side +FC, 5/5   - LUE +FC, 4+/5  - LLE +FC, 5/5   - SILT   - Crani site c/d/I   - EVD clamped   CV:  - RRR on telemetry, NSR  RESP:  - No signs of resp distress  - On RA  :  - Rodriguez draining clear yellow urine to gravity   GI:  - Abdomen NT/ND, soft  SKIN:  - Intact    MEDICATIONS:  Scheduled: PRN: Continuous:   carvedilol, 25 mg, oral,  BID  dexAMETHasone, 4 mg, intravenous, q6h  ergocalciferol, 1,250 mcg, oral, Weekly  pantoprazole, 40 mg, oral, Daily before breakfast   Or  esomeprazole, 40 mg, nasoduodenal tube, Daily before breakfast   Or  pantoprazole, 40 mg, intravenous, Daily before breakfast  [Held by provider] heparin (porcine), 5,000 Units, subcutaneous, q8h  [START ON 1/17/2025] hydroCHLOROthiazide, 25 mg, oral, Daily  [START ON 1/17/2025] lisinopril, 20 mg, oral, Daily  polyethylene glycol, 17 g, oral, Daily  pravastatin, 40 mg, oral, Nightly  sennosides-docusate sodium, 2 tablet, oral, BID     PRN medications: acetaminophen **OR** acetaminophen **OR** acetaminophen, melatonin, ondansetron **OR** ondansetron niCARdipine, 2.5-15 mg/hr         LAB RESULTS:  Results from last 72 hours   Lab Units 01/15/25  0848   GLUCOSE mg/dL 91   SODIUM mmol/L 136   POTASSIUM mmol/L 3.7   CHLORIDE mmol/L 97*   CO2 mmol/L 27   ANION GAP mmol/L 16   BUN mg/dL 17   CREATININE mg/dL 1.00   EGFR mL/min/1.73m*2 85   CALCIUM mg/dL 9.8   PHOSPHORUS mg/dL 3.2   ALBUMIN g/dL 4.2   MAGNESIUM mg/dL 1.98      Results from last 72 hours   Lab Units 01/15/25  0848   WBC AUTO x10*3/uL 8.2   NRBC AUTO /100 WBCs 0.0   RBC AUTO x10*6/uL 5.32   HEMOGLOBIN g/dL 15.5   HEMATOCRIT % 45.3   MCV fL 85   MCH pg 29.1   MCHC g/dL 34.2   RDW % 12.3   PLATELETS AUTO x10*3/uL 246      Results from last 72 hours   Lab Units 01/15/25  0848   WBC AUTO x10*3/uL 8.2   NRBC AUTO /100 WBCs 0.0   RBC AUTO x10*6/uL 5.32   HEMOGLOBIN g/dL 15.5   HEMATOCRIT % 45.3   MCV fL 85   MCH pg 29.1   MCHC g/dL 34.2   RDW % 12.3   PLATELETS AUTO x10*3/uL 246      Results from last 72 hours   Lab Units 01/15/25  0848   PROTIME seconds 14.1*   INR  1.3*   APTT seconds 39*        IMAGING RESULTS:  XR chest 1 view    Result Date: 1/16/2025  These images are not reportable by radiology and will not be interpreted by  Radiologists.    Transthoracic Echo (TTE) Complete    Result Date: 1/14/2025   Alexx  [facility], [place], [address] [place] [address]                Tel [phone] and Fax [phone] TRANSTHORACIC ECHOCARDIOGRAM REPORT  Patient Name:       MIRACLE BRINK       Reading Physician:    [id number] Piter Patino MD Study Date:         1/14/2025           Ordering Provider:    [id number] NISSA DEE MRN/PID:            [id number]            Fellow:               [id number] Tc Chow MD Accession#:         [id number]        Nurse: Date of Birth/Age:  1960 / 64      Sonographer:          Mei lacy                                     PAUL Gender assigned at                     Additional Staff: Birth: Height:             177.80 cm           Admit Date:           1/10/2025 Weight:             97.52 kg            Admission Status:     Inpatient -                                                               Routine BSA / BMI:          2.15 m2 / 30.85     Encounter#:           [id number]                     kg/m2 Blood Pressure:     158/104 mmHg        Department Location:  [facility]                                                               Non Invasive Study Type:    TRANSTHORACIC ECHO (TTE) COMPLETE Diagnosis/ICD: Essential (primary) hypertension-I10 Indication:    HTN, impared diastolic relaxation CPT Code:      Echo Complete w Full Doppler-02921 Patient History: Pertinent History: Cerebellar mass, hx Cerebellar hemangioblastoma s/p resection                    (2003), HTN, HLD, Obesity, PMR p/w dizziness x6wks. Study Detail: The following Echo studies were performed: 2D, M-Mode, Doppler and               color flow. Definity used as a contrast agent for endocardial               border definition and agitated saline used as a contrast agent for               intraseptal flow  evaluation.  PHYSICIAN INTERPRETATION: Left Ventricle: Left ventricular ejection fraction is normal, by visual estimate at 55-60%. There are no regional left ventricular wall motion abnormalities. The left ventricular cavity size is normal. There is normal septal and normal posterior left ventricular wall thickness. Spectral Doppler shows a normal pattern of left ventricular diastolic filling. Left Atrium: The left atrium is normal in size. There is no evidence of a patent foramen ovale. A bubble study using agitated saline was performed. Bubble study is negative. Right Ventricle: The right ventricle is normal in size. There is normal right ventricular global systolic function. Right Atrium: The right atrium is normal in size. Aortic Valve: The aortic valve is trileaflet. There is minimal aortic valve cusp calcification. The aortic valve dimensionless index is 0.93. There is trace aortic valve regurgitation. The peak instantaneous gradient of the aortic valve is 6 mmHg. The mean gradient of the aortic valve is 3 mmHg. Mitral Valve: The mitral valve is normal in structure. There is no evidence of mitral valve regurgitation. Tricuspid Valve: The tricuspid valve is structurally normal. There is trace tricuspid regurgitation. The right ventricular systolic pressure is unable to be estimated. Pulmonic Valve: The pulmonic valve is not well visualized. The pulmonic valve regurgitation was not well visualized. Pericardium: Trivial pericardial effusion. Aorta: The aortic root is normal. There is no dilatation of the ascending aorta. The aortic root is at the upper limits of normal size. In comparison to the previous echocardiogram(s): There are no prior studies on this patient for comparison purposes.  CONCLUSIONS:  1. Poorly visualized anatomical structures due to suboptimal image quality.  2. Left ventricular ejection fraction is normal, by visual estimate at 55-60%.  3. There is normal right ventricular global systolic  function.  4. There is no evidence of a patent foramen ovale. QUANTITATIVE DATA SUMMARY:  2D MEASUREMENTS:          Normal Ranges: Ao Root d:       3.50 cm  (2.0-3.7cm) LAs:             2.90 cm  (2.7-4.0cm) IVSd:            0.80 cm  (0.6-1.1cm) LVPWd:           0.80 cm  (0.6-1.1cm) LVIDd:           4.70 cm  (3.9-5.9cm) LVIDs:           3.70 cm LV Mass Index:   57 g/m2 LVEDV Index:     43 ml/m2 LV % FS          21.3 %  LA VOLUME:                    Normal Ranges: LA Vol A4C:        68.7 ml    (22+/-6mL/m2) LA Vol A2C:        55.7 ml LA Vol BP:         62.6 ml LA Vol Index A4C:  31.9ml/m2 LA Vol Index A2C:  25.9 ml/m2 LA Vol Index BP:   29.1 ml/m2 LA Area A4C:       21.4 cm2 LA Area A2C:       19.5 cm2 LA Major Axis A4C: 5.7 cm LA Major Axis A2C: 5.8 cm LA Volume Index:   29.1 ml/m2  AORTA MEASUREMENTS:         Normal Ranges: Asc Ao, d:          3.30 cm (2.1-3.4cm)  LV SYSTOLIC FUNCTION BY 2D PLANIMETRY (MOD):                      Normal Ranges: EF-A4C View:    55 % (>=55%) EF-A2C View:    55 % EF-Biplane:     53 % EF-Visual:      58 % LV EF Reported: 58 %  LV DIASTOLIC FUNCTION:             Normal Ranges: MV Peak E:             0.48 m/s    (0.7-1.2 m/s) MV Peak A:             0.74 m/s    (0.42-0.7 m/s) E/A Ratio:             0.64        (1.0-2.2) MV e'                  0.095 m/s   (>8.0) MV lateral e'          0.10 m/s MV medial e'           0.09 m/s MV A Dur:              85.00 msec E/e' Ratio:            5.00        (<8.0) a'                     0.12 m/s MV DT:                 235 msec    (150-240 msec) PulmV Sys Hitesh:         55.40 cm/s PulmV Hudson Hitesh:        34.10 cm/s PulmV S/D Hitesh:         1.60 PulmV A Revs Hitesh:      39.00 cm/s PulmV A Revs Dur:      121.00 msec  MITRAL VALVE:          Normal Ranges: MV DT:        235 msec (150-240msec)  AORTIC VALVE:                     Normal Ranges: AoV Vmax:                1.25 m/s (<=1.7m/s) AoV Peak P.2 mmHg (<20mmHg) AoV Mean PG:             3.0 mmHg  (1.7-11.5mmHg) LVOT Max Hitesh:            1.07 m/s (<=1.1m/s) AoV VTI:                 19.70 cm (18-25cm) LVOT VTI:                18.30 cm LVOT Diameter:           2.10 cm  (1.8-2.4cm) AoV Area, VTI:           3.22 cm2 (2.5-5.5cm2) AoV Area,Vmax:           2.96 cm2 (2.5-4.5cm2) AoV Dimensionless Index: 0.93  RIGHT VENTRICLE: TAPSE: 20.3 mm RV s'  0.17 m/s  PULMONIC VALVE:          Normal Ranges: PV Accel Time:  98 msec  (>120ms) PV Max Hitesh:     1.3 m/s  (0.6-0.9m/s) PV Max P.0 mmHg  Pulmonary Veins: PulmV A Revs Dur: 121.00 msec PulmV A Revs Hitesh: 39.00 cm/s PulmV Hudson Hitesh:   34.10 cm/s PulmV S/D Hitesh:    1.60 PulmV Sys Hitesh:    55.40 cm/s  [id number] Piter Patino MD Electronically signed on 2025 at 6:18:03 PM  ** Final **     MR cervical spine w and wo IV contrast    Result Date: 2025  Interpreted By:  Av Thomson, STUDY: MR CERVICAL SPINE W AND WO IV CONTRAST;  2025 2:04 pm   INDICATION: Signs/Symptoms:assess cervical lesion for OR planning.   COMPARISON: MRI cervical spine 2025.   ACCESSION NUMBER(S): [id number]   ORDERING CLINICIAN: NISSA DEE   TECHNIQUE: Sagittal T1, T2, STIR, axial T1 and axial T2 weighted images were acquired through the cervical spine.  19 cc Dotarem administered intravenously. Subsequently, sagittal and axial postcontrast T1 weighted fat saturated imaging was performed.   FINDINGS: Mild retrolisthesis C5 on C6. Moderate loss of disc height C5-6 with mild Modic type 1 endplate degenerative changes at that level. No compression deformity or destructive osseous process.   Again demonstrated is slightly ill-defined spinal cord T2 signal increase extending from the lower C2 level to the C6 superior endplate level. This is mildly expansile with isocenter at approximately the C4 level. A T2 signal abnormalities predominantly centrally within the cord. Additionally, there is an enhancing nodule along the left lateral spinal cord centered at the upper C4 level. This  measures 6.6 x 6.2 mm greatest axial dimensions, 9.8 mm cc. There is multifocal nodular and curvilinear discontinuous enhancement along the margin of the spinal cord, predominantly posterior cord, that extends along essentially the entirety of the cervical spine into the visualized upper thoracic spine.   C1-C2: There is cerebellar tonsillar herniation from mass effect of the posterior cranial fossa that causes moderate central canal stenosis and crowding of the spinal cord anteriorly at this level.   C2-C3: Mild disc uncinate and facet degenerative changes. There is no significant central canal or neural foraminal stenosis.   C3-C4: Shallow dorsal central disc protrusion. Mild uncinate spurring. Mild facet arthrosis. Mild central canal stenosis. Mild bilateral foraminal stenosis.   C4-C5: Shallow broad-based dorsal disc protrusion. Minimal uncinate spurring. Mild-to-moderate right and mild left facet arthrosis. Mild central canal stenosis. Mild bilateral foraminal stenosis.   C5-C6: Broad-based dorsal disc protrusion eccentric to the right with right-greater-than-left uncinate spurring. Mild-to-moderate facet arthrosis. Mild central canal stenosis. Moderately severe right lateral recess stenosis and right foraminal stenosis. Mild-to-moderate left foraminal stenosis.   C6-C7: Shallow broad-based dorsal disc protrusion. Mild left-greater-than-right uncinate spurring. Mild facet arthrosis. No significant central canal stenosis. Mild-to-moderate bilateral foraminal stenosis.   C7-T1: Mild disc uncinate degenerative changes. Mild-to-moderate facet arthrosis. No significant central canal stenosis. Mild bilateral foraminal stenosis.       1. Enhancing nodule left side of the spinal cord of the upper C4 level with associated vaguely defined T2 cord signal extending from the lower C2 level to the C6 superior endplate level which is mildly expansile. Suspect this represents another hemangioblastoma. 2. Multifocal nodular and  curvilinear discontinuous enhancement along the margin of the spinal cord, predominantly posterior cord, that extends along essentially the entirety of the cervical spine into the visualized upper thoracic spine. Suspect these represent additional tiny hemangioblastomas. 3. Correlate for history of von Hippel-Lindau syndrome. 4. Multilevel degenerative cervical spondylosis as described above.   MACRO: None   Signed by: Av Thomson 1/14/2025 3:20 PM Dictation workstation:   [id number]    CT angio head and neck w and wo IV contrast    Result Date: 1/13/2025  Interpreted By:  Av Thomson,  and Fredrick Meng STUDY: CT ANGIO HEAD AND NECK W AND WO IV CONTRAST;  1/13/2025 5:56 pm   INDICATION: Signs/Symptoms:hemangioblastoma, pre-op planning.   COMPARISON: CT head 01/09/2025 MRI of the brain 01/10/2025.   ACCESSION NUMBER(S): [id number]   ORDERING CLINICIAN: NISSA DEE   TECHNIQUE: Unenhanced CT images of the head were obtained. Subsequently, 80 ML of Omnipaque 350 was administered intravenously and axial images of the head and neck were acquired.  Coronal, sagittal, and 3-D reconstructions were provided for review.   FINDINGS: CT BRAIN:   Parenchyma: Redemonstration of area of geographically decreased attenuation of the left cerebellar hemisphere, with mixed densities compatible with cystic and hypervascular solid components better characterized on MR of the brain from 01/10/2025, measuring similar in size and 6.0 x 4.6 cm (series 204, image 69) compatible with suspected hemangioblastoma (series 204, image 68). On arterial phase imaging,, there are multiple vascular collateral vessels supplying the mass from the intracranial portion of the left anterior inferior and posteroinferior cerebellar arteries. There is again similar degree of mild mass effect on the adjacent 4th ventricle/effacement. There is overall similar mild degree of prominent supratentorial ventriculomegaly.   Otherwise, the grey-white  differentiation is intact without transcortical infarct. No evidence of new extra-axial fluid collection. Few scattered areas of patchy periventricular white matter hyperintensities favored to represent sequela of chronic small vessel ischemic disease.   Paranasal sinuses and mastoid air cells are clear.     CTA HEAD FINDINGS:   Anterior circulation: The bilateral intracranial internal carotid arteries, bilateral carotid terminals, bilateral proximal anterior and middle cerebral arteries are normal.   Posterior circulation: Bilateral intracranial vertebral arteries, vertebrobasilar junction, basilar artery and proximal posterior cerebral arteries are normal. Collateral vasculature as detailed above supplying the left cerebellar mass, with collaterals along the expected course of the posteroinferior and left anterior inferior cerebellar arteries.   CTA NECK FINDINGS:   Conventional three-vessel aortic arch anatomy is noted.   Right carotid vessels: The common carotid artery is normal. The carotid bifurcation is normal. The internal carotid artery in the neck is normal. There is no evidence of significant flow-limiting stenosis or dissection.   Left carotid vessels: The common carotid artery is normal. The carotid bifurcation is normal. The internal carotid artery in the neck is normal. There is no evidence of significant flow-limiting stenosis or dissection.   Vertebral vessels:  The visualized segments of the cervical vertebral arteries are normal in caliber.   Spine: Heterogeneous nodular focus of increased density in the left half of the spinal canal at the C3-4 level measures 6.3 x 6.3 mm greatest axial dimensions and 7.7 mm cc.       1. Left cerebellar mass, better characterized on MRI of the brain from 01/10/2025, with suspected vascular supply from the left anterior inferior cerebellar and left posteroinferior cerebellar arteries as detailed above. There is no evidence of new mass effect with overall similar  degree of effacement of the 4th ventricle and mild supratentorial ventriculomegaly. 2. No evidence of significant stenosis or large branch vessel cutoffs of the intracranial vessels. 3. No evidence of significant stenosis of the cervical vessels. 4. Heterogeneous nodular focus of increased density in the left half of the spinal canal at the C3-4 level measures 6.3 x 6.3 mm greatest axial dimensions and 7.7 mm cc. This may represent another hemangioblastoma.   I personally reviewed the images/study and I agree with the findings as stated by resident Taqueria Alcala. This study was interpreted at [place], [place].   MACRO: None   Signed by: Av Thomson 1/13/2025 7:45 PM Dictation workstation:   [id number]    IR intervention NEURO embolization    Result Date: 1/13/2025  Interpreted By:  George Alexandra, STUDY: IR INTERVENTION NEURO EMBOLIZATION;  1/13/2025 10:50 am   INDICATION: Signs/Symptoms:brain tumor for embolization.   COMPARISON: None.   ACCESSION NUMBER(S): [id number]   ORDERING CLINICIAN: NATALI THOMAS   TECHNIQUE: Risks including groin site injury, groin hematoma, pseudoaneurysm, retroperitoneal hematoma, vessel dissection, stroke, paralysis, contrast induced nephropathy, and death were explained to the patient. After discussion of risks, benefits, and alternatives, the patient agreed to proceed with cerebral angiogram for possible tumor embolization and informed consent was obtained.   The patient was monitored throughout for EKG, blood pressure, and pulse oximetry.   Moderate sedation services (supervision of administration, induction, and maintenance) were provided by the physician performing the procedure with intravenous fentanyl and versed for 30 minutes. The physician was assisted by an independent trained observer in the continuous monitoring of patient level of consciousness and physiologic status. There were no apparent sedation complications.    Total fluoroscopy time was 5.8 minutes. Approximately 50 ML Optiray 320 contrast was employed.   Using Seldinger technique and a right common femoral approach a 6 [place] sheath was placed. Next a MCS catheter was used for selective injections of the following arteries: Left vertebral artery, right common femoral artery   No safe target for embolization was identified, therefore embolization was not attempted.   The catheter and then the sheath were removed. Hemostasis was obtained with hand compression following placement of Mynx device. The patient was taken to a hospital bed for routine post procedure observation and care. There were no apparent complications.   FINDINGS: LEFT VERTEBRAL ARTERY INJECTION: DSA runs of the head were obtained in PA, lateral, and magnified oblique views. The distal left vertebral artery is within normal limits. Tumor blush is seen being fed from a muscular branch originating from the distal cervical segment of the left vertebral artery. Opacification of the left posterior inferior cerebellar artery and the vertebrobasilar junction is seen without evidence of aneurysm, vascular malformation, or stenosis. The basilar artery is widely patent and unremarkable. Bilateral anterior inferior cerebellar, superior cerebellar, and posterior cerebral arteries fill within normal limits. No aneurysm, early draining vein, or stenosis is seen.   RIGHT COMMON FEMORAL ARTERY INJECTION: DSA run over the right hip was obtained in UMANZOR view. The right common femoral artery, femoral bifurcation, and distal vasculature opacify well appear unremarkable. There is no evidence of pseudoaneurysm, stenosis, dissection, or other type of vascular injury.       1. Tumor blush is seen and is fed by a muscular branch originating from the distal cervical segment of the left vertebral artery. No tumor arterial feeders for the tumor is seen from the left posteroinferior cerebellar artery.   I was present for and/or  performed the critical portions of the procedure and immediately available throughout the entire procedure. I personally reviewed the image(s)/study and interpretation. I agree with the findings as stated. Performed and dictated at [facility].   MACRO: None     Dictation workstation:   [id number]    MR cervical spine wo IV contrast    Result Date: 1/12/2025  Interpreted By:  Lilliam Maynard and Stephens Katherine STUDY: MR CERVICAL SPINE WO IV CONTRAST; MR THORACIC SPINE WO IV CONTRAST; MR LUMBAR SPINE WO IV CONTRAST;  1/12/2025 2:44 am   INDICATION: Signs/Symptoms:metastatic work up, known cerebellar mass, L dysmetria.     COMPARISON: CT cervical spine 01/09/2025 CT chest abdomen pelvis 01/10/2025   ACCESSION NUMBER(S): [id number]; [id number]; [id number]   ORDERING CLINICIAN: NISSA DEE   TECHNIQUE: Sagittal T1, T2, STIR, axial T1 and axial T2 weighted images were acquired through the cervical spine. Sagittal T1, T2, and STIR weighted images were acquired through the thoracic and lumbar spine. Exam terminated at patient's request.   FINDINGS: Images are degraded by motion artifact.   CERVICAL SPINE:   Alignment: Straightening of the expected cervical lordosis likely secondary to patient positioning or muscle spasm.   Vertebrae/Intervertebral Discs: The vertebral bodies demonstrate expected height. The marrow signal is within normal limits. Mild disc desiccation and loss of intervertebral disc height most pronounced at C5-6.   Cord: There is expansile T2 hyperintense signal spanning C2 through C6 no discrete underlying mass identified in the limits of the noncontrast enhanced exam.   C1-C2: The cervicomedullary junction appears unremarkable.   C2-C3: No significant spinal canal or neural foraminal stenosis.   C3-C4: Posterior disc osteophyte complex indenting the ventral thecal sac without significant spinal canal or neural foraminal stenosis.   C4-C5: Posterior  disc osteophyte complex indenting the ventral thecal sac without significant spinal canal or neural foraminal stenosis.   C5-C6: Asymmetric right posterior disc osteophyte complex indenting the ventral thecal sac and causing moderate right neural foraminal stenosis. No significant spinal canal stenosis.   C6-C7: Posterior disc osteophyte complex without significant spinal canal or neural foraminal stenosis.   C7-T1: No significant spinal canal or neural foraminal stenosis.   The prevertebral and posterior paraspinous soft tissues are within normal limits. Cystic lesion noted in the left posterior fossa better evaluated on prior MRI brain. There is herniation of the left cerebellar tonsil     THORACIC SPINE: Only sagittal imaging performed.   Alignment: Within normal limits.   Vertebrae/Intervertebral Discs: The vertebral body heights are intact.  Diffuse increased T2/stir and patchy decreased T1 signal within the T11 vertebral body likely reflects an atypical hemangioma. Additional T1/T2 hyperintense lesions in the T6 vertebral body measuring 1.6 x 1.7 cm and T12 vertebral body measuring 2.0 x 1.7 cm. Another small T1 and T2 hyperintense lesion within T6 vertebral body. These likely reflect nonspecific intraosseous hemangiomas. The disc spaces are preserved.   Cord: Normal in signal.   Degenerative Change: Disc bulge at T7-8 indenting the ventral thecal sac without significant spinal canal or neural foraminal stenosis.   Paraspinous Soft Tissues: Within normal limits.     LUMBAR SPINE: Only sagittal images were acquired. Motion limited exam.   There are 5 lumbar type vertebral bodies. The last rib-bearing vertebra is labeled T12.   Alignment: The vertebral alignment is maintained.   Vertebrae/Intervertebral Discs: The vertebral bodies demonstrate expected height. T1/T2 hyperintense lesions in the L2 vertebral body measuring 1.7 x 1.6 cm on the left and 1.6 x 1.5 cm on the right. Lesion in the L3 vertebral body  measuring 1.6 x 1.4 cm. These are suggestive of nonspecific intraosseous hemangiomas. There is desiccation and mild degeneration of several intervertebral discs most significantly at L4-L5.   Conus: The conus terminates at L1.   T12-L1: No significant spinal canal or neural foraminal stenosis.   L1-2: No significant spinal canal or neural foraminal stenosis.   L2-3: No significant spinal canal or neural foraminal stenosis.   L3-4: Right facet arthrosis without significant spinal canal or neural foraminal stenosis.   L4-5: Disc bulge with superimposed left paracentral disc protrusion narrowing the left subarticular recess and causing mild-to-moderate left neural foraminal stenosis. No significant spinal canal stenosis.   L5-S1: No significant spinal canal or neural foraminal stenosis.   The prevertebral and posterior paraspinous soft tissues are unremarkable.       1. Limited, incomplete exam. 2. Expansile T2/stir hyperintense signal within the cervical spinal cord. Underlying lesion not excluded. Evaluation with contrast-enhanced MRI is recommended. 3. No evidence of osseous metastatic disease within the limitations of exam. Findings suggestive of atypical hemangioma within T11 vertebral body. 4. Mild multilevel degenerative changes of the spine most pronounced at C5-6 and L4-5. 5. Partially visualized cystic posterior fossa lesion better evaluated on MRI brain dated 01/10/2025.   I personally reviewed the images/study and I agree with Ivory Moon DO's (radiology resident) findings as stated. This study was interpreted at [place], [place].   MACRO: None   Signed by: Lilliam Maynard 1/12/2025 8:12 AM Dictation workstation:   [id number]    MR lumbar spine wo IV contrast    Result Date: 1/12/2025  Interpreted By:  Lilliam Maynard and Stephens Katherine STUDY: MR CERVICAL SPINE WO IV CONTRAST; MR THORACIC SPINE WO IV CONTRAST; MR LUMBAR SPINE WO IV CONTRAST;   1/12/2025 2:44 am   INDICATION: Signs/Symptoms:metastatic work up, known cerebellar mass, L dysmetria.     COMPARISON: CT cervical spine 01/09/2025 CT chest abdomen pelvis 01/10/2025   ACCESSION NUMBER(S): [id number]; [id number]; [id number]   ORDERING CLINICIAN: NISSA DEE   TECHNIQUE: Sagittal T1, T2, STIR, axial T1 and axial T2 weighted images were acquired through the cervical spine. Sagittal T1, T2, and STIR weighted images were acquired through the thoracic and lumbar spine. Exam terminated at patient's request.   FINDINGS: Images are degraded by motion artifact.   CERVICAL SPINE:   Alignment: Straightening of the expected cervical lordosis likely secondary to patient positioning or muscle spasm.   Vertebrae/Intervertebral Discs: The vertebral bodies demonstrate expected height. The marrow signal is within normal limits. Mild disc desiccation and loss of intervertebral disc height most pronounced at C5-6.   Cord: There is expansile T2 hyperintense signal spanning C2 through C6 no discrete underlying mass identified in the limits of the noncontrast enhanced exam.   C1-C2: The cervicomedullary junction appears unremarkable.   C2-C3: No significant spinal canal or neural foraminal stenosis.   C3-C4: Posterior disc osteophyte complex indenting the ventral thecal sac without significant spinal canal or neural foraminal stenosis.   C4-C5: Posterior disc osteophyte complex indenting the ventral thecal sac without significant spinal canal or neural foraminal stenosis.   C5-C6: Asymmetric right posterior disc osteophyte complex indenting the ventral thecal sac and causing moderate right neural foraminal stenosis. No significant spinal canal stenosis.   C6-C7: Posterior disc osteophyte complex without significant spinal canal or neural foraminal stenosis.   C7-T1: No significant spinal canal or neural foraminal stenosis.   The prevertebral and posterior paraspinous soft tissues are within normal limits. Cystic  lesion noted in the left posterior fossa better evaluated on prior MRI brain. There is herniation of the left cerebellar tonsil     THORACIC SPINE: Only sagittal imaging performed.   Alignment: Within normal limits.   Vertebrae/Intervertebral Discs: The vertebral body heights are intact.  Diffuse increased T2/stir and patchy decreased T1 signal within the T11 vertebral body likely reflects an atypical hemangioma. Additional T1/T2 hyperintense lesions in the T6 vertebral body measuring 1.6 x 1.7 cm and T12 vertebral body measuring 2.0 x 1.7 cm. Another small T1 and T2 hyperintense lesion within T6 vertebral body. These likely reflect nonspecific intraosseous hemangiomas. The disc spaces are preserved.   Cord: Normal in signal.   Degenerative Change: Disc bulge at T7-8 indenting the ventral thecal sac without significant spinal canal or neural foraminal stenosis.   Paraspinous Soft Tissues: Within normal limits.     LUMBAR SPINE: Only sagittal images were acquired. Motion limited exam.   There are 5 lumbar type vertebral bodies. The last rib-bearing vertebra is labeled T12.   Alignment: The vertebral alignment is maintained.   Vertebrae/Intervertebral Discs: The vertebral bodies demonstrate expected height. T1/T2 hyperintense lesions in the L2 vertebral body measuring 1.7 x 1.6 cm on the left and 1.6 x 1.5 cm on the right. Lesion in the L3 vertebral body measuring 1.6 x 1.4 cm. These are suggestive of nonspecific intraosseous hemangiomas. There is desiccation and mild degeneration of several intervertebral discs most significantly at L4-L5.   Conus: The conus terminates at L1.   T12-L1: No significant spinal canal or neural foraminal stenosis.   L1-2: No significant spinal canal or neural foraminal stenosis.   L2-3: No significant spinal canal or neural foraminal stenosis.   L3-4: Right facet arthrosis without significant spinal canal or neural foraminal stenosis.   L4-5: Disc bulge with superimposed left paracentral  disc protrusion narrowing the left subarticular recess and causing mild-to-moderate left neural foraminal stenosis. No significant spinal canal stenosis.   L5-S1: No significant spinal canal or neural foraminal stenosis.   The prevertebral and posterior paraspinous soft tissues are unremarkable.       1. Limited, incomplete exam. 2. Expansile T2/stir hyperintense signal within the cervical spinal cord. Underlying lesion not excluded. Evaluation with contrast-enhanced MRI is recommended. 3. No evidence of osseous metastatic disease within the limitations of exam. Findings suggestive of atypical hemangioma within T11 vertebral body. 4. Mild multilevel degenerative changes of the spine most pronounced at C5-6 and L4-5. 5. Partially visualized cystic posterior fossa lesion better evaluated on MRI brain dated 01/10/2025.   I personally reviewed the images/study and I agree with Ivory Moon DO's (radiology resident) findings as stated. This study was interpreted at [place], [place].   MACRO: None   Signed by: Lilliam Maynard 1/12/2025 8:12 AM Dictation workstation:   [id number]    MR thoracic spine wo IV contrast    Result Date: 1/12/2025  Interpreted By:  Lilliam Maynard and Stephens Katherine STUDY: MR CERVICAL SPINE WO IV CONTRAST; MR THORACIC SPINE WO IV CONTRAST; MR LUMBAR SPINE WO IV CONTRAST;  1/12/2025 2:44 am   INDICATION: Signs/Symptoms:metastatic work up, known cerebellar mass, L dysmetria.     COMPARISON: CT cervical spine 01/09/2025 CT chest abdomen pelvis 01/10/2025   ACCESSION NUMBER(S): [id number]; [id number]; [id number]   ORDERING CLINICIAN: NISSA DEE   TECHNIQUE: Sagittal T1, T2, STIR, axial T1 and axial T2 weighted images were acquired through the cervical spine. Sagittal T1, T2, and STIR weighted images were acquired through the thoracic and lumbar spine. Exam terminated at patient's request.   FINDINGS: Images are degraded by motion  artifact.   CERVICAL SPINE:   Alignment: Straightening of the expected cervical lordosis likely secondary to patient positioning or muscle spasm.   Vertebrae/Intervertebral Discs: The vertebral bodies demonstrate expected height. The marrow signal is within normal limits. Mild disc desiccation and loss of intervertebral disc height most pronounced at C5-6.   Cord: There is expansile T2 hyperintense signal spanning C2 through C6 no discrete underlying mass identified in the limits of the noncontrast enhanced exam.   C1-C2: The cervicomedullary junction appears unremarkable.   C2-C3: No significant spinal canal or neural foraminal stenosis.   C3-C4: Posterior disc osteophyte complex indenting the ventral thecal sac without significant spinal canal or neural foraminal stenosis.   C4-C5: Posterior disc osteophyte complex indenting the ventral thecal sac without significant spinal canal or neural foraminal stenosis.   C5-C6: Asymmetric right posterior disc osteophyte complex indenting the ventral thecal sac and causing moderate right neural foraminal stenosis. No significant spinal canal stenosis.   C6-C7: Posterior disc osteophyte complex without significant spinal canal or neural foraminal stenosis.   C7-T1: No significant spinal canal or neural foraminal stenosis.   The prevertebral and posterior paraspinous soft tissues are within normal limits. Cystic lesion noted in the left posterior fossa better evaluated on prior MRI brain. There is herniation of the left cerebellar tonsil     THORACIC SPINE: Only sagittal imaging performed.   Alignment: Within normal limits.   Vertebrae/Intervertebral Discs: The vertebral body heights are intact.  Diffuse increased T2/stir and patchy decreased T1 signal within the T11 vertebral body likely reflects an atypical hemangioma. Additional T1/T2 hyperintense lesions in the T6 vertebral body measuring 1.6 x 1.7 cm and T12 vertebral body measuring 2.0 x 1.7 cm. Another small T1 and T2  hyperintense lesion within T6 vertebral body. These likely reflect nonspecific intraosseous hemangiomas. The disc spaces are preserved.   Cord: Normal in signal.   Degenerative Change: Disc bulge at T7-8 indenting the ventral thecal sac without significant spinal canal or neural foraminal stenosis.   Paraspinous Soft Tissues: Within normal limits.     LUMBAR SPINE: Only sagittal images were acquired. Motion limited exam.   There are 5 lumbar type vertebral bodies. The last rib-bearing vertebra is labeled T12.   Alignment: The vertebral alignment is maintained.   Vertebrae/Intervertebral Discs: The vertebral bodies demonstrate expected height. T1/T2 hyperintense lesions in the L2 vertebral body measuring 1.7 x 1.6 cm on the left and 1.6 x 1.5 cm on the right. Lesion in the L3 vertebral body measuring 1.6 x 1.4 cm. These are suggestive of nonspecific intraosseous hemangiomas. There is desiccation and mild degeneration of several intervertebral discs most significantly at L4-L5.   Conus: The conus terminates at L1.   T12-L1: No significant spinal canal or neural foraminal stenosis.   L1-2: No significant spinal canal or neural foraminal stenosis.   L2-3: No significant spinal canal or neural foraminal stenosis.   L3-4: Right facet arthrosis without significant spinal canal or neural foraminal stenosis.   L4-5: Disc bulge with superimposed left paracentral disc protrusion narrowing the left subarticular recess and causing mild-to-moderate left neural foraminal stenosis. No significant spinal canal stenosis.   L5-S1: No significant spinal canal or neural foraminal stenosis.   The prevertebral and posterior paraspinous soft tissues are unremarkable.       1. Limited, incomplete exam. 2. Expansile T2/stir hyperintense signal within the cervical spinal cord. Underlying lesion not excluded. Evaluation with contrast-enhanced MRI is recommended. 3. No evidence of osseous metastatic disease within the limitations of exam.  Findings suggestive of atypical hemangioma within T11 vertebral body. 4. Mild multilevel degenerative changes of the spine most pronounced at C5-6 and L4-5. 5. Partially visualized cystic posterior fossa lesion better evaluated on MRI brain dated 01/10/2025.   I personally reviewed the images/study and I agree with Ivory Moon DO's (radiology resident) findings as stated. This study was interpreted at [place], [place].   MACRO: None   Signed by: Lilliam Maynard 1/12/2025 8:12 AM Dictation workstation:   [id number]    ECG 12 Lead    Result Date: 1/10/2025  Sinus tachycardia ST & T wave abnormality, consider anterior ischemia Abnormal ECG No previous ECGs available See ED provider note for full interpretation and clinical correlation Confirmed by Raisa Mccoy ([id number]) on 1/10/2025 3:38:03 PM    MR brain w and wo IV contrast    Result Date: 1/10/2025  Interpreted By:  Sean Rivas, STUDY: MR BRAIN W AND WO IV CONTRAST   INDICATION: Signs/Symptoms:Dizziness, outside CT showed a cystic mass in left cerebellum measuring 5.4 x 5.0 x 2.9 mass   COMPARISON: Head CT 01/09/2025.   ACCESSION NUMBER(S): [id number]   ORDERING CLINICIAN: RAYMUNDO TEMPLE   TECHNIQUE: Multi-planar multi-sequential MR imaging of the brain was performed before and after the intravenous administration of contrast. 20 ml of Dotarem was administered (the balance of single use vial(s) has/have been discarded).   FINDINGS: Cystic lesion within the anterior left cerebellar hemisphere measuring 2.9 x 5.3 x 2.7 cm (series 9, image 168; series 1040, image 38). Along the inferolateral aspect of the lesion, there is a heterogeneously enhancing solid component measuring approximately 2.6 x 1.3 x 2.0 cm (series 9, image 179; series 1040, image 33). Along these posterosuperior aspect of the lesion, additional rind of solid enhancement measuring 0.9 x 3.1 x 0.7 cm (series 9, image 167; series 1040, image 32).  There is tortuous vascularity supplying the solid component of the lesion seen within the posterior left cerebellar hemisphere (series 9, image 177). There also enhancing septation through the cystic cavity. Mild degree of hemosiderin along the posterior aspect of the cystic component (series 4, image 30).   The tumor abuts the left sigmoid sinus, which remains patent. Edema throughout the left cerebellar hemisphere and cerebellar vermis resulting in near-complete effacement of the 4th ventricle, anterior displacement of the chucky with partial effacement of the prepontine cistern, as well as left-greater-than-right cerebellar tonsil herniation measuring up to 1.1 cm below level of foramen magnum (series 1046, image 45). There is effacement of the superior cerebellar cistern and quadrigeminal plate cistern. Mass effect on the left inferior, middle, and superior cerebellar peduncles.   No acute infarct or intracranial hemorrhage.   Mild ventriculomegaly. Confluent FLAIR hyperintense signal within the periventricular white matter noted. Additional deep subcortical white matter FLAIR hyperintense lesions noted.   No extra-axial fluid collections. The skull base flow voids are present.   The visualized intraorbital contents are normal. The imaged portions of the paranasal sinuses are clear. Small left mastoid effusion. The visualized osseous structures, soft tissues and partially visualized parotid glands appear normal.   Questionable lesion versus artifact only seen on  images at the dorsal aspect of the cord at C3-4 (series 6, image 1 of 1).       Mixed cystic and hypervascular solid mass in the left cerebellar hemisphere with the cystic component measuring up to 5.3 cm and solid component measuring 2.6 cm. Differential includes hemangioblastoma, pilocytic astrocytoma, or ganglia glioma.   Questionable additional lesion within the dorsal aspect of the cord at C3-4 only included in the field of view on single   image. Consider cervical spine MRI with and without contrast for further evaluation.   Left cerebellar lesion results in effacement of the 4th ventricle with mild ventriculomegaly. Confluent periventricular FLAIR hyperintensity may represent a combination of transependymal edema and microangiopathic change.   Inferior cerebellar tonsillar herniation as well as effacement of the superior cerebellar, prepontine, and quadrigeminal cisterns.   No acute infarct. Moderate burden deep and subcortical white matter microangiopathic disease also noted.   Signed by: Sean Rivas 1/10/2025 8:50 AM Dictation workstation:   [id number]    CT chest abdomen pelvis w IV contrast    Result Date: 1/10/2025  Interpreted By:  Vargas Farley and Ohs Zachary STUDY: CT CHEST ABDOMEN PELVIS W IV CONTRAST;  1/10/2025 6:19 am   INDICATION: Signs/Symptoms:New intracranial mass, eval for malignancy.     COMPARISON: None.   ACCESSION NUMBER(S): [id number]   ORDERING CLINICIAN: RAYMUNDO TEMPLE   TECHNIQUE: CT of the chest, abdomen, and pelvis was performed.  Contiguous axial images were obtained at 3 mm slice thickness through the chest, abdomen and pelvis. Coronal and sagittal reconstructions at 3 mm slice thickness were performed. 90 ML of Omnipaque 350 was administered intravenously without immediate complication.   FINDINGS: CHEST:   LUNG/PLEURA/LARGE AIRWAYS: Minimal nonobstructive mucus in the upper trachea. Otherwise the trachea and central bronchi are patent without endobronchial lesions. Round noncalcified pulmonary nodule measures up to 0.5 cm in the left lower lobe (series 204, image 181). There is an additional 0.4 cm pulmonary nodule of the right upper lobe (series 204, image 131) and a 0.7 cm pulmonary nodule of the right lower lobe (image 204). Scattered calcified granulomas. No lung masses or consolidations are present. There is no pleural effusion or pneumothorax. Bibasilar atelectasis is noted.   VESSELS: Aorta and pulmonary  arteries are normal caliber.  Mild atherosclerotic changes are noted of the aorta and branching vessels. Minimal coronary artery calcifications are present.   HEART: The heart is normal in size.  There is no pericardial effusion.   MEDIASTINUM AND PEDRITO: No mediastinal, hilar or axillary lymphadenopathy is present.  The esophagus is normal.   CHEST WALL AND LOWER NECK: The soft tissues of the chest wall demonstrate no gross abnormality. The visualized thyroid gland appears within normal limits.   ABDOMEN:   LIVER: The liver is normal in size and attenuation. There are several hypoattenuating lesions measuring up to 1.7 cm in the left hepatic lobe (series 201, image 76) likely representing hepatic cysts/hemangiomas.   BILE DUCTS: The intrahepatic and extrahepatic ducts are not dilated.   GALLBLADDER: The gallbladder is nondistended and without evidence of radiopaque stones.   PANCREAS: The pancreas appears unremarkable without evidence of ductal dilatation or masses.   SPLEEN: The spleen is normal in size without focal lesions.   ADRENAL GLANDS: Bilateral adrenal glands appear normal.   KIDNEYS AND URETERS: Multiple bilateral presumed simple renal cysts. Exophytic left midpole lesion measures 2.5 x 2.2 cm and demonstrates intermediate density with layering calcific focus measuring up to 0.6 cm (series 201, image 112). Otherwise, no hydroureteronephrosis or nephroureterolithiasis.   PELVIS:   BLADDER: The urinary bladder appears normal without abnormal wall thickening.   REPRODUCTIVE ORGANS: The prostate is not enlarged.   BOWEL: The stomach is unremarkable.  The small and large bowel are normal in caliber and demonstrate no wall thickening. Diverticulosis without evidence of diverticulitis. The appendix appears normal.     VESSELS: The aorta and IVC appear normal. IVC filter at the L2-3 level.   PERITONEUM/RETROPERITONEUM/LYMPH NODES: No ascites or free air, no fluid collection.  No abdominopelvic lymphadenopathy is  present.   BONE AND SOFT TISSUE: No suspicious osseous lesions are identified. Degenerative discogenic disease is noted in the lower thoracic and lumbar spine. There are multiple heterogeneous lesions involving the T12, L2, and L3 vertebral bodies as best seen on series 206, image 71, image 70, and image 69 which likely represent hemangiomas. The abdominal wall soft tissues appear normal.       1. Indeterminate multiple bilateral pulmonary nodules measuring up to 0.7 cm in the right lower lobe. Consider follow up non contrast chest CT at 3-6 months, then consider CT chest at 18-24 months (Mat Marie et al., Guidelines for management of incidental pulmonary nodules detected on CT images: From the Fleischner Society 2017, Radiology. 2017 Jul;284 (1):228-243.) 2. Multiple bilateral simple renal cysts with a Bosniak 2F cyst of the left kidney as described above. 3. Additional chronic findings as described above.   I personally reviewed the images/study and I agree with the findings as stated by Dr. Phu Ugalde. This study was interpreted at [place], [place].   MACRO: None   Signed by: Vargas Farley 1/10/2025 8:03 AM Dictation workstation:   [id number]    CT head wo IV contrast    Result Date: 1/9/2025  Interpreted By:  Malena Stover, STUDY: CT HEAD WO IV CONTRAST; CT CERVICAL SPINE WO IV CONTRAST;  1/9/2025 11:15 pm   INDICATION: Signs/Symptoms:Dizziness; Signs/Symptoms:Head injury.     COMPARISON: None.   ACCESSION NUMBER(S): [id number]; [id number]   ORDERING CLINICIAN: DILLON SOLANO   TECHNIQUE: Noncontrast axial CT scan of head was performed, with coronal and sagittal reformats provided. The images were reviewed in bone, brain, blood and soft tissue windows.   Axial CT images of the cervical spine are obtained. Axial, coronal and sagittal reconstructions are provided for review.   FINDINGS: CT HEAD:   No hyperdense intracranial hemorrhage is present.  There is no mass effect or midline shift identified.   A geographic area of decreased attenuation measuring 5.4 x 5.0 x 2.9 cm in size (series 205, image 10) is present in the left cerebellum, highly suspicious for a mixed cystic and solid mass. There is mass effect on the adjacent structures, including effacement of the 4th ventricle.   There is mild prominence of the supratentorial ventricular system, possibly representing early hydrocephalus. No extra-axial fluid collections are identified. There is effacement of the basal cisterns in the posterior cranial fossa.   Gray-white differentiation is intact, without evidence of CT apparent transcortical infarct. Patchy attenuation changes are present in the periventricular and subcortical white matter of bilateral cerebral hemispheres, likely representing sequela of microvascular disease.   Scalp soft tissues do not demonstrate any acute abnormality. No acute calvarial abnormality is present.   Mastoid air cells and middle ear cavities are clear. Visualized paranasal sinuses are unremarkable in appearance.   CT C-SPINE:   There is straightening of normal lordotic curvature of the cervical spine, without significant spondylolisthesis.   Cervical vertebral body heights are preserved without compression fractures. Posterior elements of the cervical spine do not demonstrate any evidence of acute trauma.   Craniocervical junction is intact. Facet joints are preserved without evidence of traumatic subluxation or perching.   Moderate intervertebral disc height loss is present at C5-C6 and C6-C7.   No high-grade spinal canal stenosis is identified, although mild spinal canal narrowing is suspected at C5-C6 and C6-C7 due to disc osteophyte complexes with some encroachment of the ventral cervical spinal cord.   Moderate neural foraminal stenosis is present at C5-C6 and C6-C7 the right with mild stenosis present in the left due to hypertrophic facet changes.   Prevertebral and  paraspinal soft tissues do not demonstrate any acute abnormality.       CT HEAD: 1. Imaging findings concerning for a 5.4 x 5.0 x 2.9 cm mixed cystic and solid mass in the left cerebellum, with local mass effect on the adjacent structures, including effacement of the 4th ventricle and slight prominence of the supratentorial ventricular system. MRI of the brain with and without contrast and neurosurgical consultation are recommended. 2. No evidence of hemorrhage or CT apparent transcortical infarct. 3. Patchy attenuation changes in the periventricular and subcortical white matter of bilateral cerebral hemispheres are nonspecific, but favored to represent sequela of microvascular disease.   CT C-SPINE: 1. No evidence of acute osseous abnormality in the cervical spine. 2. Spondylotic changes of the cervical spine as detailed above.   MACRO: None   Signed by: Malena Stover 1/9/2025 11:27 PM Dictation workstation:   [id number]    CT cervical spine wo IV contrast    Result Date: 1/9/2025  Interpreted By:  Malena Stover, STUDY: CT HEAD WO IV CONTRAST; CT CERVICAL SPINE WO IV CONTRAST;  1/9/2025 11:15 pm   INDICATION: Signs/Symptoms:Dizziness; Signs/Symptoms:Head injury.     COMPARISON: None.   ACCESSION NUMBER(S): [id number]; [id number]   ORDERING CLINICIAN: DILLON SOLANO   TECHNIQUE: Noncontrast axial CT scan of head was performed, with coronal and sagittal reformats provided. The images were reviewed in bone, brain, blood and soft tissue windows.   Axial CT images of the cervical spine are obtained. Axial, coronal and sagittal reconstructions are provided for review.   FINDINGS: CT HEAD:   No hyperdense intracranial hemorrhage is present. There is no mass effect or midline shift identified.   A geographic area of decreased attenuation measuring 5.4 x 5.0 x 2.9 cm in size (series 205, image 10) is present in the left cerebellum, highly suspicious for a mixed cystic and solid mass. There is mass  effect on the adjacent structures, including effacement of the 4th ventricle.   There is mild prominence of the supratentorial ventricular system, possibly representing early hydrocephalus. No extra-axial fluid collections are identified. There is effacement of the basal cisterns in the posterior cranial fossa.   Gray-white differentiation is intact, without evidence of CT apparent transcortical infarct. Patchy attenuation changes are present in the periventricular and subcortical white matter of bilateral cerebral hemispheres, likely representing sequela of microvascular disease.   Scalp soft tissues do not demonstrate any acute abnormality. No acute calvarial abnormality is present.   Mastoid air cells and middle ear cavities are clear. Visualized paranasal sinuses are unremarkable in appearance.   CT C-SPINE:   There is straightening of normal lordotic curvature of the cervical spine, without significant spondylolisthesis.   Cervical vertebral body heights are preserved without compression fractures. Posterior elements of the cervical spine do not demonstrate any evidence of acute trauma.   Craniocervical junction is intact. Facet joints are preserved without evidence of traumatic subluxation or perching.   Moderate intervertebral disc height loss is present at C5-C6 and C6-C7.   No high-grade spinal canal stenosis is identified, although mild spinal canal narrowing is suspected at C5-C6 and C6-C7 due to disc osteophyte complexes with some encroachment of the ventral cervical spinal cord.   Moderate neural foraminal stenosis is present at C5-C6 and C6-C7 the right with mild stenosis present in the left due to hypertrophic facet changes.   Prevertebral and paraspinal soft tissues do not demonstrate any acute abnormality.       CT HEAD: 1. Imaging findings concerning for a 5.4 x 5.0 x 2.9 cm mixed cystic and solid mass in the left cerebellum, with local mass effect on the adjacent structures, including effacement  of the 4th ventricle and slight prominence of the supratentorial ventricular system. MRI of the brain with and without contrast and neurosurgical consultation are recommended. 2. No evidence of hemorrhage or CT apparent transcortical infarct. 3. Patchy attenuation changes in the periventricular and subcortical white matter of bilateral cerebral hemispheres are nonspecific, but favored to represent sequela of microvascular disease.   CT C-SPINE: 1. No evidence of acute osseous abnormality in the cervical spine. 2. Spondylotic changes of the cervical spine as detailed above.   MACRO: None   Signed by: Malena Stover 1/9/2025 11:27 PM Dictation workstation:   [id number]    XR chest 2 views    Result Date: 1/7/2025  Chest PA and lateral  1/7/2025 2:38 PM CST Prior Study: None History:  OTHER REASON Tech notes: WHAT SYMPTOMS ARE YOU EXPERIENCING?; DIZZINESS/MALAISE Findings: There is mild linear atelectasis versus fibrosis involving the lingula. Otherwise, there is no definite acute infiltrate by plain film. The costophrenic angles are not blunted. There is no pneumothorax. The heart size and mediastinal width are within gross limits of normal.  No prior chest radiograph is available for comparison. Impression: Mild linear atelectasis versus fibrosis involving the lingula. Otherwise, no definite acute pulmonary findings by plain film. Electronically signed by:  Pelon Roach MD  01/07/2025 03:57 PM EST  Workstation: [id number] Technologist:  RONEL Dictated By:   PELON ROACH MD Signed By:     PELON ROACH MD Signed Out:    01/07/25 15:57:59            Assessment/Plan    64 y.o. male with PMH L cerebellar hemangioblastoma s/p resection (2023), HTN, HLD, obesity, PMR. 1/10 p/w dizziness x6 weeks. CTH 5/5x5cm L cerebellar lesion, MRI 5.3 x2.6cm L cerebellar cystic mass, C3-4 dosal cord lesion, 4th ventricular effacement. CT CAP multiple b/l pulmonary nodules, multiple b/l renal cysts. 1/12 MRI CTL spine w/o did not  tolerate XIAO, expansile C3 intramedullary lesion, multiple thoracic/lumbar vertebral body lesions, 1/13 angio 1 muscular branch from L V3 supplying tumor, did not embolize d/t tortuosity, CTA H/N L cerebellar mass w/ L AICA and PICA feeders.     Interval Events:   1/14 MRI C-spine w/ intramedullary enhancing mass at C3-C4, TTE EF 55-60% without wall motion abnormalities=, no PFO, normal diastolic filling   1/16 s/p redo left retrosigmoid craniotomy for tumor resection, admitted to [facility] post-op for close blood pressure management.      NEURO:  #L cerebellar mass s/p redo L retrosigmoid craniotomy for tumor resection   #L cerebellar hemangioblastoma s/p resection (2023)  Assessment:  - Neurologically: see above   Plan:  - NSU  - NSGY primary   - Neuro Checks: Q1H  - HOB>30 degrees   - SBP    - Dex 4q6  - STAT CTH   - Pain: acetaminophen PRN, oxycodone PRN, and hydromorphone PRN  - Nausea: ondansetron  - PT/OT/SLP    CARDIOVASCULAR:  #HTN, HLD  Assessment:  - SR on tele  - ECHO 1/14/25: EF 55-60%, no evidence of PFO    Plan:  - Continue to monitor on telemetry  - SBP Goal:     --> PRN: Labetalol, Hydralazine, and Nicardipine   - c/w home lisinopril/hydrochlorothiazide 20mg-25mg qd  - c/w coreg 25mg BID  - pravastatin 40mg qHS    RESPIRATORY:  Assessment:  - On RA  Plan:  - Continuous pulse oximetry   - O2 PRN to maintain SpO2 > 94%, wean as tolerated  - Incentive spirometry while awake    RENAL/:  Assessment:  Results from last 72 hours   Lab Units 01/15/25  0848   BUN mg/dL 17   CREATININE mg/dL 1.00   Plan:  - Monitor with daily RFP  - Remove catheter by POD#1    FEN/GI:  Assessment:  - Last BM Date: 01/12/25  Plan:  - Monitor and replace electrolytes per protocol  - Diet: NPO until passes bedside swallow  - Bowel Regimen: Docusate-Senna BID and Miralax QD    ENDOCRINE:  Assessment:  Results from last 7 days   Lab Units 01/15/25  0848 01/13/25 1927 01/12/25  1704   GLUCOSE mg/dL 91 93 133*   SODIUM  mmol/L 136 134* 132*   Plan:  - Accuchecks & ISS Q4H   - Hypoglycemia protocol     HEMATOLOGY:  Assessment:  Results from last 7 days   Lab Units 01/15/25  0848 25  1704   HEMOGLOBIN g/dL 15.5 16.7 16.6   HEMATOCRIT % 45.3 48.3 48.7   PLATELETS AUTO x10*3/uL 246 283 304   Plan:  - Continue to monitor with daily CBC and Coag panel    INFECTIOUS DISEASE:  Assessment:  Results from last 7 days   Lab Units 01/15/25  0848 25  1704   WBC AUTO x10*3/uL 8.2 10.0 10.9    - Temp (24hrs), Av.6 °C (97.8 °F), Min:36.2 °C (97.2 °F), Max:37.2 °C (99 °F)  Plan:  - Continue to monitor for s/sx of infection  - Pan culture for temperature > 38.4 C    MUSCULOSKELETAL:  - No acute issues    SKIN:  - No acute issues  - Turns and skin care per NSU protocol    ACCESS:  - PIVs   - R subclavian CVC line (--)  - L radial arterial line (--)    PROPHYLAXIS:  - DVT Ppx: SCDs and hold chem ppx pending [facility]  - GI Ppx: Pantoprazole    RESTRAINTS:  Not indicated/Patient does not meet criteria for restraints    SARAH Roth-CNP  Neuroscience Intensive Care       Total critical care time of 60 minutes, with > 50% of time spent in direct contact with patient/family for education, counseling and coordination of care.

## 2025-01-17 NOTE — PROGRESS NOTES
Occupational Therapy    Evaluation and Treatment    Patient Name: Regis Mixon  MRN: [id number]  Today's Date: 1/17/2025  Room: 19/19  Time Calculation  Start Time: 1301  Stop Time: 1330  Time Calculation (min): 29 min    Assessment  IP OT Assessment  Prognosis: Good  Barriers to Discharge Home: Physical needs  Physical Needs: Ambulating household distances limited by function/safety, Intermittent mobility assistance needed, Intermittent ADL assistance needed, High falls risk due to function or environment  Evaluation/Treatment Tolerance: Patient tolerated treatment well  Medical Staff Made Aware: Yes  End of Session Communication: Bedside nurse  End of Session Patient Position: Bed, 3 rail up, Alarm off, not on at start of session  Plan:  Inpatient Plan  Treatment Interventions: ADL retraining, Functional transfer training, Cognitive reorientation, Patient/family training, Equipment evaluation/education, Neuromuscular reeducation, Fine motor coordination activities, Compensatory technique education  OT Frequency: 4 times per week  OT Discharge Recommendations: High intensity level of continued care  Equipment Recommended upon Discharge: Wheeled walker  OT Recommended Transfer Status: Minimal assist, Assist of 1  OT - OK to Discharge: Yes  OT Assessment  OT Assessment Results: Decreased ADL status, Decreased safe judgment during ADL, Decreased cognition, Decreased fine motor control, Decreased functional mobility, Decreased gross motor control, Decreased IADLs  Prognosis: Good  Barriers to Discharge: None  Evaluation/Treatment Tolerance: Patient tolerated treatment well  Medical Staff Made Aware: Yes  Strengths: Attitude of self, Coping skills, Support and attitude of living partners    Subjective   Current Problem:  1. Cerebellar mass  Case Request Operating Room: CRANIOTOMY, RETROSIGMOID APPROACH, WITH TISSUE EXCISION    Case Request Operating Room: CRANIOTOMY, RETROSIGMOID APPROACH, WITH TISSUE EXCISION     Surgical Pathology Exam    Surgical Pathology Exam    CANCELED: Case Request Operating Room: Left craniotomy for tumor resection with neuromonitoring    CANCELED: Case Request Operating Room: Left craniotomy for tumor resection with neuromonitoring      2. Renal cyst  Referral to Urology      3. Benign essential HTN  Transthoracic Echo (TTE) Complete    Transthoracic Echo (TTE) Complete        General:  Reason for Referral: Pt p/w dizziness. Found to have L cerebellar cystic mass, C3-4 dorsal cord lesion, 4th effacement. 1/12 MRI CTL spine w/o did not tolerate XIAO, expansile C3 intramedullary lesion, multiple thoracic/lumbar vertebral body lesions. 1/13 angio 1 muscular branch from L V3 supplying tumor, did not embolize d/t tortuosity, CTA H/N L cerebellar mass w/ L AICA and PICA feeders. 1/14 MRI C-spine w/ intramedullary enhancing mass at C3-C4. 1/16 s/p L redo retrosig crani for tumor resection, with intra-op EVD, CTH POC with EVD tract hemorrhage/IVH.  Past Medical History Relevant to Rehab: L cerebellar of hemangioblastoma s/p resection (2003), HTN, HLD, obesity, PMR  Prior to Session Communication: Bedside nurse  Patient Position Received: Up in chair, Alarm on  Family/Caregiver Present: Yes  Caregiver Feedback: Pt's wife present and supportive during therapy session  General Comment: Pt pleasant and agreeable to therapy. Completed transfer and side steps with Min A at FWW.   Precautions:  Hearing/Visual Limitations: able to track across all planes, vision WFL and hearing WFL.  Medical Precautions: Fall precautions, External Ventricular Device (EVD)  Precautions Comment: SBP , EVD clamped for mobility, SpO2 > 94%  Vital Signs:    Vital Signs     Vitals Session Pre OT During OT Post OT   Heart Rate 99 90s 95   Resp  16  16   SpO2 97 >97 98   /83 114/91 121/92   MAP 91 100 99   ICP            Pain:  Pain Assessment  Pain Assessment: 0-10  0-10 (Numeric) Pain Score: 0 - No  pain  Lines/Tubes/Drains:  A-line, EVD, tele 3L O2 via NC, double lumen.      Objective   Cognition:  Overall Cognitive Status: Within Functional Limits  Orientation Level: Oriented X4  Attention: Within Functional Limits  Problem Solving: Exceptions to WFL  Complex Functional Tasks: Impaired  Simple Functional Tasks: Impaired  Safety/Judgement: Exceptions to WFL  Unable to Self-Monitor and Self-Correct Consistently: Minimal  Insight: Within function limits  Impulsive: Within functional limits  Planning: Reduced planning skills  Processing Speed: Within funtional limits  Cognition Test Scores  Cognition Tests: Cognition Test Performed  SBT Test Score: Administered SBT this date with pt scoring 7/28, demonstrating questionable cognition (Normal =0-4). Pt made one errot with timeof day, stating months in reverse order, and delayed recall.        Home Living:  Type of Home: House  Lives With: Spouse (Spouse works, but reports family supervision will be available 24/7.)  Home Adaptive Equipment: None  Home Layout: One level  Home Access: Stairs to enter without rails  Entrance Stairs-Number of Steps: 1  Bathroom Shower/Tub: Walk-in shower, Tub/shower unit  Bathroom Toilet: Standard  Bathroom Equipment: Built-in shower seat   Prior Function:  Level of [place]: Independent with ADLs and functional transfers, Independent with homemaking with ambulation  ADL Assistance: Independent  Homemaking Assistance: Independent  Ambulatory Assistance: Independent  Vocational: Part time employment ()  Leisure: Golf, gambling, bowling  Hand Dominance: Left  Prior Function Comments: Drives, community ambulator  IADL History:     ADL:  Eating Assistance: Independent  Grooming Assistance: Stand by  Bathing Assistance: Minimal  UE Dressing Assistance:  (CGA)  LE Dressing Assistance: Minimal  Toileting Assistance with Device: Minimal  Activity Tolerance:  Endurance: Endurance does not limit participation in  activity  Early Mobility/Exercise Safety Screen: Proceed with mobilization - No exclusion criteria met  Balance:  Dynamic Sitting Balance  Dynamic Sitting-Level of Assistance: Contact guard  Dynamic Standing Balance  Dynamic Standing-Level of Assistance: Minimum assistance  Static Sitting Balance  Static Sitting-Level of Assistance: Contact guard, Close supervision  Static Standing Balance  Static Standing-Level of Assistance: Minimum assistance  Bed Mobility/Transfers: Bed Mobility/Transfers: Bed Mobility  Bed Mobility: Yes  Bed Mobility 1  Bed Mobility 1: Sitting to supine  Level of Assistance 1: Moderate assistance (x 1 assist)  Bed Mobility Comments 1: HOB flat, cues for sequencing steps for transitional technique and UE/LE placement   and Transfers  Transfer: Yes  Transfer 1  Technique 1: Sit to stand, Stand to sit  Transfer Device 1: Walker, Gait belt  Transfer Level of Assistance 1: Minimum assistance (x 1 assist)  Trials/Comments 1: Cues for proper hand placement and sequencing  Transfers 2  Transfer From 2: Chair with arms to  Transfer to 2: Bed  Technique 2:  (side steps)  Transfer Device 2: Walker, Gait belt  Transfer Level of Assistance 2: Minimum assistance, Moderate assistance  Trials/Comments 2: cues for walker management and sequencing  IADL's:      Vision: Vision - Basic Assessment  Current Vision: Wears glasses all the time   and Vision - Complex Assessment  Tracking: [facility]  Sensation:  Light Touch: No apparent deficits  Strength:     Perception:  Inattention/Neglect: Appears intact  Coordination:  Movements are Fluid and Coordinated: No  Upper Body Coordination: B UE ataxia and dysmetria L> R  Lower Body Coordination: B LE ataxia with stepping  Finger to Nose: Impaired, Dysmetria (L > R)  Rapid Alternating Movements: Dysdiadokinesia  Alternating Toe Taps: Bradykinesia  Heel to Shin: Impaired (Bradykinesia B LE, L LE > impairment than R LE)   Hand Function:  Hand Function  Gross Grasp:  Functional  Coordination: Functional  Extremities:   RUE   RUE : Exceptions to WFL  RUE AROM (degrees)  RUE AROM Comment: WFL  RUE Strength  R Shoulder Flexion: 4/5  R Shoulder Extension: 5/5  R Elbow Flexion: 5/5  R Elbow Extension: 5/5  R Forearm Pronation: 5/5  R Forearm Supination: 5/5  R Wrist Flexion: 5/5  R Wrist Extension: 5/5, LUE   LUE: Exceptions to WFL  LUE AROM (degrees)  LUE AROM Comment: WFL  LUE Strength  L Shoulder Flexion: 4/5  L Shoulder Extension: 5/5  L Elbow Flexion: 5/5  L Elbow Extension: 5/5  L Forearm Pronation: 5/5  L Forearm Supination: 5/5  L Wrist Flexion: 5/5  L Wrist Extension: 5/5, RLE   RLE : Within Functional Limits (strength 5/5), and LLE   LLE : Within Functional Limits (strength 5/5)      Outcome Measures: [facility] Daily Activity  Putting on and taking off regular lower body clothing: A little  Bathing (including washing, rinsing, drying): A little  Putting on and taking off regular upper body clothing: A little  Toileting, which includes using toilet, bedpan or urinal: A little  Taking care of personal grooming such as brushing teeth: A little  Eating Meals: None  Daily Activity - Total Score: 19        ICU Mobility Screen  Early Mobility/Exercise Safety Screen: Proceed with mobilization - No exclusion criteria met  ICU Mobility Scale: Marching on spot (at bedside),          Education Documentation  Body Mechanics, taught by Suma Sanchez OT at 1/17/2025  5:04 PM.  Learner: Patient  Readiness: Acceptance  Method: Explanation  Response: Verbalizes Understanding  Comment: OT POC    Precautions, taught by Suma Sanchez OT at 1/17/2025  5:04 PM.  Learner: Patient  Readiness: Acceptance  Method: Explanation  Response: Verbalizes Understanding  Comment: OT POC    ADL Training, taught by Suma Sanchez OT at 1/17/2025  5:04 PM.  Learner: Patient  Readiness: Acceptance  Method: Explanation  Response: Verbalizes Understanding  Comment: OT POC    Education Comments  No  comments found.        Goals:   Encounter Problems       Encounter Problems (Active)       ADLs       Patient will perform UB and LB bathing with Mod I using AE and adaptive technique as needed.       Start:  01/17/25    Expected End:  01/31/25            Patient with complete upper body dressing with IND.       Start:  01/17/25    Expected End:  01/31/25            Patient with complete lower body dressing with Mod I using AE and adaptive tech as needed.       Start:  01/17/25    Expected End:  01/31/25            Patient will complete daily grooming tasks in standing with Mod I using LRAD.       Start:  01/17/25    Expected End:  01/31/25            Patient will complete toileting including hygiene clothing management/hygiene with Mod I.       Start:  01/17/25    Expected End:  01/31/25               BALANCE       Patient will tolerate static/dynamic standing tasks during functional tasks > 10 min with Mod I using LRAD without LOB.        Start:  01/17/25    Expected End:  01/31/25               COGNITION/SAFETY       Patient will score WFL on standardized cognitive assessment within reasonable time frame       Start:  01/17/25    Expected End:  01/31/25               COORDINATION       Pt will improve FMC/GMC to WNL to complete ADLs independently using bilateral integration without increase in time.         Start:  01/17/25    Expected End:  01/31/25               MOBILITY       Pt will perform functional mobility household distances with Mod I using LRAD without LOB and demonstrating good safety awareness.        Start:  01/17/25    Expected End:  01/31/25               TRANSFERS       Pt will perform bed mobility in simulated home environment with Mod I.         Start:  01/17/25    Expected End:  01/31/25            Pt will perform functional transfers on various surfaces with Mod I using LRAD with good safety awareness.        Start:  01/17/25    Expected End:  01/31/25                   Treatment Completed on  Evaluation    Activities of Daily Living:                LE Dressing  LE Dressing: Yes  Sock Level of Assistance: Contact guard, Minimal verbal cues  LE Dressing Where Assessed: Chair  LE Dressing Comments: pt required cues for doffing/donning socks this date but demonstrated decreased probloem solving, trying to don new socks over old socks. Required prompting and cueing to correct and able to correct error with cues appropriately. Provided cues for figure four position for LB dressing.         Therapy/Activity:     Therapeutic Activity  Therapeutic Activity Performed: Yes  Therapeutic Activity 1: Pt completed side steps and forward/backward steps with Min-Mod A x 1 at FWW with cuesw needed for walker management, sequencing,and safety.  Therapeutic Activity 2: Pt sat EOB with CGA-SBA with cues for safety and sequencing.  Pt required close monitoring of vital signs throughout session for hemodynamic stability.       01/17/25 at 5:05 PM   Suma Sanchez OT   Rehab Office: [phone]

## 2025-01-17 NOTE — PROGRESS NOTES
[facility]  NEUROSCIENCE INTENSIVE CARE UNIT  DAILY PROGRESS NOTE       Patient Name: Regis Mixon   MRN: [id number]     Admit Date: 1/10/2025     : 1960 AGE: 64 y.o. GENDER: male        Subjective    Regis Mixon is a 65yo M with PMH L cerebellar hemangioblastoma s/p resection (), HTN, HLD, obesity, PMR. 1/10 p/w dizziness x6 weeks. CTH 5/5x5cm L cerebellar lesion, MRI 5.3 x2.6cm L cerebellar cystic mass, C3-4 dosal cord lesion, 4th ventricular effacement. CT CAP multiple b/l pulmonary nodules, multiple b/l renal cysts.  MRI CTL spine w/o did not tolerate XIAO, expansile C3 intramedullary lesion, multiple thoracic/lumbar vertebral body lesions,  angio 1 muscular branch from L V3 supplying tumor, did not embolize d/t tortuosity, CTA H/N L cerebellar mass w/ L AICA and PICA feeders.  MRI C-spine w/ intramedullary enhancing mass at C3-C4, TTE EF 55-60% without wall motion abnormalities=, no PFO, normal diastolic filling,  s/p L redo retrosig crani for tumor resection, with intra-op EVD, CTH POC with EVD tract hemorrhage/IVH    Significant Events:  - Post-op CTH with track hemorrhage/IVH       Objective   VITALS (24H):  Temp:  [36 °C (96.8 °F)-36.6 °C (97.9 °F)] 36.6 °C (97.9 °F)  Heart Rate:  [] 89  Resp:  [11-20] 16  BP: (104-135)/(68-89) 135/68  Arterial Line BP 1: (110-164)/(51-93) 116/56  INTAKE/OUTPUT:  Intake/Output Summary (Last 24 hours) at 2025 0815  Last data filed at 2025 0700  Gross per 24 hour   Intake 3572.84 ml   Output 3255 ml   Net 317.84 ml     VENT SETTINGS:        PHYSICAL EXAM:  NEURO:  - Awake, AOx4, follows commands  - EOS, PERRL 3mm-->2mm bilaterally, EOMI, VFF  - R side +FC, 5/5   - LUE +FC, 4+/5  - LLE +FC, 5/5   - Crani site slight drainage   - EVD clamped   CV:  - RRR on telemetry, NSR  RESP:  - No signs of resp distress  - On RA  -Clear to auscultation b/l  :  - Rodriguez draining clear yellow urine to gravity   GI:  - Abdomen NT,  slight distension, soft  SKIN:  - Intact    MEDICATIONS:  Scheduled: PRN: Continuous:   carvedilol, 25 mg, oral, BID  dexAMETHasone, 4 mg, intravenous, q6h  ergocalciferol, 1,250 mcg, oral, Weekly  pantoprazole, 40 mg, oral, Daily before breakfast   Or  esomeprazole, 40 mg, nasoduodenal tube, Daily before breakfast   Or  pantoprazole, 40 mg, intravenous, Daily before breakfast  [Held by provider] heparin (porcine), 5,000 Units, subcutaneous, q8h  hydroCHLOROthiazide, 25 mg, oral, Daily  lisinopril, 20 mg, oral, Daily  polyethylene glycol, 17 g, oral, Daily  pravastatin, 40 mg, oral, Nightly  sennosides-docusate sodium, 2 tablet, oral, BID     PRN medications: acetaminophen **OR** acetaminophen **OR** acetaminophen, calcium gluconate, calcium gluconate, hydrALAZINE, labetaloL, magnesium sulfate, magnesium sulfate, melatonin, ondansetron **OR** ondansetron, potassium chloride CR **OR** potassium chloride, potassium chloride CR **OR** potassium chloride niCARdipine, 2.5-15 mg/hr, Last Rate: Stopped (01/17/25 0617)         LAB RESULTS:  Results from last 72 hours   Lab Units 01/17/25 0018 01/15/25  0848   GLUCOSE mg/dL 159* 91   SODIUM mmol/L 132* 136   POTASSIUM mmol/L 3.6 3.7   CHLORIDE mmol/L 96* 97*   CO2 mmol/L 25 27   ANION GAP mmol/L 15 16   BUN mg/dL 26* 17   CREATININE mg/dL 0.86 1.00   EGFR mL/min/1.73m*2 >90 85   CALCIUM mg/dL 9.4 9.8   PHOSPHORUS mg/dL 2.3* 3.2   ALBUMIN g/dL 3.8 4.2   MAGNESIUM mg/dL 1.83 1.98   POCT CALCIUM IONIZED (MMOL/L) IN BLOOD mmol/L 1.10  --       Results from last 72 hours   Lab Units 01/17/25  0018 01/15/25  0848   WBC AUTO x10*3/uL 22.1* 8.2   NRBC AUTO /100 WBCs 0.0 0.0   RBC AUTO x10*6/uL 5.55 5.32   HEMOGLOBIN g/dL 16.1 15.5   HEMATOCRIT % 44.5 45.3   MCV fL 80 85   MCH pg 29.0 29.1   MCHC g/dL 36.2* 34.2   RDW % 12.0 12.3   PLATELETS AUTO x10*3/uL 274 246         Results from last 72 hours   Lab Units 01/17/25  0018 01/15/25  0848   PROTIME seconds 13.2* 14.1*   INR  1.2*  1.3*   APTT seconds 30 39*        IMAGING RESULTS:  MR brain w and wo IV contrast   Final Result   Mixed cystic and hypervascular solid mass in the left cerebellar   hemisphere with the cystic component measuring up to 5.3 cm and solid   component measuring 2.6 cm. Differential includes hemangioblastoma,   pilocytic astrocytoma, or ganglia glioma.        Questionable additional lesion within the dorsal aspect of the cord   at C3-4 only included in the field of view on single  image.   Consider cervical spine MRI with and without contrast for further   evaluation.        Left cerebellar lesion results in effacement of the 4th ventricle   with mild ventriculomegaly. Confluent periventricular FLAIR   hyperintensity may represent a combination of transependymal edema   and microangiopathic change.        Inferior cerebellar tonsillar herniation as well as effacement of the   superior cerebellar, prepontine, and quadrigeminal cisterns.        No acute infarct. Moderate burden deep and subcortical white matter   microangiopathic disease also noted.        Signed by: Sean Rivas 1/10/2025 8:50 AM   Dictation workstation:   [id number]      CT chest abdomen pelvis w IV contrast   Final Result   1. Indeterminate multiple bilateral pulmonary nodules measuring up to   0.7 cm in the right lower lobe. Consider follow up non contrast chest   CT at 3-6 months, then consider CT chest at 18-24 months ([place]   Mac[place]on et al., Guidelines for management of incidental pulmonary   nodules detected on CT images: From the Fleischner Society 2017,   Radiology. 2017 Jul;284 (1):228-243.)   2. Multiple bilateral simple renal cysts with a Bosniak 2F cyst of   the left kidney as described above.   3. Additional chronic findings as described above.        I personally reviewed the images/study and I agree with the findings   as stated by Dr. Phu Ugalde. This study was interpreted at   [facility],  [place], [place].        MACRO:   None        Signed by: Vargas Farley 1/10/2025 8:03 AM   Dictation workstation:   [id number]             Assessment/Plan    64 y.o. male with PMH L cerebellar hemangioblastoma s/p resection (2023), HTN, HLD, obesity, PMR. 1/10 p/w dizziness x6 weeks. CTH 5/5x5cm L cerebellar lesion, MRI 5.3 x2.6cm L cerebellar cystic mass, C3-4 dosal cord lesion, 4th ventricular effacement. CT CAP multiple b/l pulmonary nodules, multiple b/l renal cysts. 1/12 MRI CTL spine w/o did not tolerate XIAO, expansile C3 intramedullary lesion, multiple thoracic/lumbar vertebral body lesions, 1/13 angio 1 muscular branch from L V3 supplying tumor, did not embolize d/t tortuosity, CTA H/N L cerebellar mass w/ L AICA and PICA feeders.     Interval Events:   1/14 MRI C-spine w/ intramedullary enhancing mass at C3-C4, TTE EF 55-60% without wall motion abnormalities=, no PFO, normal diastolic filling   1/16 s/p redo left retrosigmoid craniotomy for tumor resection, admitted to NSU post-op for close blood pressure management.    NEURO:  #L cerebellar mass s/p redo L retrosigmoid craniotomy for tumor resection with intra-op EVD  #L cerebellar hemangioblastoma s/p resection (2023)  #Concern for VHL  Assessment:  - Neurologically: see above   Plan:  - NSU  - NSGY primary   - Neuro Checks: Q1H  - HOB>30 degrees   - SBP    - Dex 4q6  - RF EVD clamped transduce ICP   - Repeat CTH 1/18  - Pain: acetaminophen PRN  - Nausea: ondansetron  - PT/OT  - Genetics eval for VHL as OP     CARDIOVASCULAR:  #HTN, HLD  Assessment:  - SR on tele  - ECHO 1/14/25: EF 55-60%, no evidence of PFO    Plan:  - Continue to monitor on telemetry  - SBP Goal:     --> PRN: Labetalol, Hydralazine, and Nicardipine   - c/w home lisinopril/hydrochlorothiazide 20mg-25mg qd  - c/w coreg 25mg BID  - pravastatin 40mg qHS     RESPIRATORY:  Assessment:  - On RA  Plan:  - Continuous pulse oximetry   - O2 PRN to maintain SpO2 > 94%, wean as  tolerated  - Incentive spirometry while awake     RENAL/:  Assessment:  Results from last 72 hours   Lab Units 25  0018 01/15/25  0848   BUN mg/dL 26* 17   CREATININE mg/dL 0.86 1.00     Plan:  - Monitor with daily RFP  - electrolyte replacement PRN      FEN/GI:  #renal cyst  Assessment:  - Last BM Date: 25, passing flatus  Plan:  - Monitor and replace electrolytes per protocol  - Diet: Adult diet  - Bowel Regimen: Docusate-Senna BID and Miralax every day  - Urology recs- OP follow up for renal cyst      ENDOCRINE:  Assessment:  Results from last 7 days   Lab Units 25  0018 01/15/25  0848 25  1927 25  1704   GLUCOSE mg/dL 159* 91 93 133*   SODIUM mmol/L 132* 136 134* 132*      Plan:  - Accuchecks & ISS Q4H   - Hypoglycemia protocol      HEMATOLOGY:  Assessment:  Results from last 7 days   Lab Units 258 01/15/25  0848 25  1927   HEMOGLOBIN g/dL 16.1 15.5 16.7   HEMATOCRIT % 44.5 45.3 48.3   PLATELETS AUTO x10*3/uL 274 246 283   Plan:  - Continue to monitor with daily CBC and Coag panel     INFECTIOUS DISEASE:  #Leukocytosis likely reactive  Assessment:  Results from last 7 days   Lab Units 25  0018 01/15/25  0848 25  1927   WBC AUTO x10*3/uL 22.1* 8.2 10.0    - Temp (24hrs), Av.3 °C (97.3 °F), Min:36 °C (96.8 °F), Max:36.6 °C (97.9 °F)     Plan:  - Continue to monitor for s/sx of infection  - Pan culture for temperature > 38.4 C     MUSCULOSKELETAL:  - No acute issues     SKIN:  - No acute issues  - Turns and skin care per NSU protocol     ACCESS:  - PIVs   - R subclavian CVC line (--)  - L radial arterial line (--)     PROPHYLAXIS:  - DVT Ppx: SCDs, SQH 5q12   - GI Ppx: Pantoprazole     RESTRAINTS:  Not indicated/Patient does not meet criteria for restraints    Claudia Ambriz MD  Neuroscience Intensive Care       Plan of care to be discussed with neurocritical care attending    The patient is critically ill with brain tumor with brain  compression  Neurologically unchanged  EVD management per neurosurgery: Drain clamped  Cont steroids per neurosurgery    I have seen and examined the patient.  I have reviewed the patient's laboratory, radiographic, and clinical data.  I have spent 30 minutes providing critical care for the patient.      BRINA Ace M.D.

## 2025-01-17 NOTE — PROGRESS NOTES
"Regis Mixon is a 64 y.o. male on day 7 of admission presenting with Cerebellar mass.    Subjective   NAEON. Tolerated the procedure well. Denies any new symptoms.       Objective     Physical Exam  A&Ox3  Face symmetric  RUE 5/5  LUE 5/5  RLE 5/5  LLE 5/5  Sensation intact to light touch throughout all extremities  Incision c/d/I    Last Recorded Vitals  Blood pressure 115/83, pulse 98, temperature 36.2 °C (97.2 °F), temperature source Temporal, resp. rate 19, height 1.778 m (5' 10\"), weight 106 kg (233 lb 11 oz), SpO2 96%.  Intake/Output last 3 Shifts:  I/O last 3 completed shifts:  In: 2528.8 (25.9 mL/kg) [P.O.:120; I.V.:2408.8 (24.7 mL/kg)]  Out: 2635 (27 mL/kg) [Urine:2585 (0.7 mL/kg/hr); Blood:50]  Weight: 97.5 kg     Relevant Results               This patient has a central line   Reason for the central line remaining today? Parenteral medication    This patient has a urinary catheter   Reason for the urinary catheter remaining today? Urine catheter unnecessary, will be removed today               Assessment/Plan   Assessment & Plan  Cerebellar mass    Osteoarthritis    Chronic low back pain    Chronic neck pain    Regis Mixon is a 64 y.o. male with h/o L cerebellar of hemangioblastoma s/p resection (2003), HTN, HLD, obesity, PMR p/w dizziness x6w, CTH 5.5x5 cm L cerebellar lesion, MRI 5.3 x 2.6cm  L cerebellar cystic mass, C3-4 dorsal cord lesion, 4th effacement, CT CAP multiple b/l pulmonary nodules, multiple b/l renal cysts, 1/12 MRI CTL spine w/o did not tolerate XIAO, expansile C3 intramedullary lesion, multiple thoracic/lumbar vertebral body lesions, 1/13 angio 1 muscular branch from L V3 supplying tumor, did not embolize d/t tortuosity, CTA H/N L cerebellar mass w/ L AICA and PICA feeders     1/14 MRI C-spine w/ intramedullary enhancing mass at C3-C4, TTE EF 55-60% without wall motion abnormalities=, no PFO, normal diastolic filling  1/16 s/p L redo retrosig crani for tumor resection, with intra-op " EVD, CTH POC with EVD tract hemorrhage/IVH    Recs  NSU  RF EVD clamped transduce ICP  Repeat CTH   SBP ; will further relax depending on   Dr. Gil recs  Neuro onc recs  Genetics eval for VHL as OP  Urology recs- OP follow up for renal cyst  PTOT  SCDs, SQH 5q12           Giselle Benavides MD

## 2025-01-18 ENCOUNTER — APPOINTMENT (OUTPATIENT)
Dept: RADIOLOGY | Facility: HOSPITAL | Age: 65
DRG: 025 | End: 2025-01-18
Payer: COMMERCIAL

## 2025-01-18 LAB
ALBUMIN SERPL BCP-MCNC: 3.5 G/DL (ref 3.4–5)
ANION GAP SERPL CALC-SCNC: 11 MMOL/L (ref 10–20)
BUN SERPL-MCNC: 25 MG/DL (ref 6–23)
CALCIUM SERPL-MCNC: 8.4 MG/DL (ref 8.6–10.6)
CHLORIDE SERPL-SCNC: 103 MMOL/L (ref 98–107)
CO2 SERPL-SCNC: 25 MMOL/L (ref 21–32)
CREAT SERPL-MCNC: 0.8 MG/DL (ref 0.5–1.3)
EGFRCR SERPLBLD CKD-EPI 2021: >90 ML/MIN/1.73M*2
ERYTHROCYTE [DISTWIDTH] IN BLOOD BY AUTOMATED COUNT: 12.6 % (ref 11.5–14.5)
GLUCOSE SERPL-MCNC: 137 MG/DL (ref 74–99)
HCT VFR BLD AUTO: 42 % (ref 41–52)
HGB BLD-MCNC: 14.3 G/DL (ref 13.5–17.5)
MAGNESIUM SERPL-MCNC: 2.27 MG/DL (ref 1.6–2.4)
MCH RBC QN AUTO: 29.3 PG (ref 26–34)
MCHC RBC AUTO-ENTMCNC: 34 G/DL (ref 32–36)
MCV RBC AUTO: 86 FL (ref 80–100)
NRBC BLD-RTO: 0 /100 WBCS (ref 0–0)
PHOSPHATE SERPL-MCNC: 2.5 MG/DL (ref 2.5–4.9)
PLATELET # BLD AUTO: 253 X10*3/UL (ref 150–450)
POTASSIUM SERPL-SCNC: 3.9 MMOL/L (ref 3.5–5.3)
RBC # BLD AUTO: 4.88 X10*6/UL (ref 4.5–5.9)
SODIUM SERPL-SCNC: 135 MMOL/L (ref 136–145)
SODIUM SERPL-SCNC: 136 MMOL/L (ref 136–145)
SODIUM SERPL-SCNC: 137 MMOL/L (ref 136–145)
SODIUM SERPL-SCNC: 139 MMOL/L (ref 136–145)
SODIUM SERPL-SCNC: 140 MMOL/L (ref 136–145)
WBC # BLD AUTO: 16.4 X10*3/UL (ref 4.4–11.3)

## 2025-01-18 PROCEDURE — 2500000001 HC RX 250 WO HCPCS SELF ADMINISTERED DRUGS (ALT 637 FOR MEDICARE OP): Performed by: STUDENT IN AN ORGANIZED HEALTH CARE EDUCATION/TRAINING PROGRAM

## 2025-01-18 PROCEDURE — 80069 RENAL FUNCTION PANEL: CPT

## 2025-01-18 PROCEDURE — 84295 ASSAY OF SERUM SODIUM: CPT

## 2025-01-18 PROCEDURE — 2500000001 HC RX 250 WO HCPCS SELF ADMINISTERED DRUGS (ALT 637 FOR MEDICARE OP)

## 2025-01-18 PROCEDURE — 2500000004 HC RX 250 GENERAL PHARMACY W/ HCPCS (ALT 636 FOR OP/ED): Performed by: REGISTERED NURSE

## 2025-01-18 PROCEDURE — 74018 RADEX ABDOMEN 1 VIEW: CPT

## 2025-01-18 PROCEDURE — 2500000004 HC RX 250 GENERAL PHARMACY W/ HCPCS (ALT 636 FOR OP/ED)

## 2025-01-18 PROCEDURE — 37799 UNLISTED PX VASCULAR SURGERY: CPT

## 2025-01-18 PROCEDURE — 83735 ASSAY OF MAGNESIUM: CPT

## 2025-01-18 PROCEDURE — 70450 CT HEAD/BRAIN W/O DYE: CPT | Performed by: RADIOLOGY

## 2025-01-18 PROCEDURE — 2500000004 HC RX 250 GENERAL PHARMACY W/ HCPCS (ALT 636 FOR OP/ED): Performed by: STUDENT IN AN ORGANIZED HEALTH CARE EDUCATION/TRAINING PROGRAM

## 2025-01-18 PROCEDURE — 2500000001 HC RX 250 WO HCPCS SELF ADMINISTERED DRUGS (ALT 637 FOR MEDICARE OP): Performed by: NEUROLOGICAL SURGERY

## 2025-01-18 PROCEDURE — 74018 RADEX ABDOMEN 1 VIEW: CPT | Performed by: RADIOLOGY

## 2025-01-18 PROCEDURE — 2020000001 HC ICU ROOM DAILY

## 2025-01-18 PROCEDURE — 70450 CT HEAD/BRAIN W/O DYE: CPT

## 2025-01-18 PROCEDURE — 99291 CRITICAL CARE FIRST HOUR: CPT | Performed by: NEUROLOGICAL SURGERY

## 2025-01-18 PROCEDURE — 2580000001 HC RX 258 IV SOLUTIONS

## 2025-01-18 PROCEDURE — 85027 COMPLETE CBC AUTOMATED: CPT

## 2025-01-18 PROCEDURE — 2500000002 HC RX 250 W HCPCS SELF ADMINISTERED DRUGS (ALT 637 FOR MEDICARE OP, ALT 636 FOR OP/ED): Performed by: REGISTERED NURSE

## 2025-01-18 RX ORDER — POLYETHYLENE GLYCOL 3350 17 G/17G
17 POWDER, FOR SOLUTION ORAL 2 TIMES DAILY
Status: DISCONTINUED | OUTPATIENT
Start: 2025-01-18 | End: 2025-01-27 | Stop reason: HOSPADM

## 2025-01-18 RX ORDER — CYCLOBENZAPRINE HCL 10 MG
5 TABLET ORAL 3 TIMES DAILY PRN
Status: DISCONTINUED | OUTPATIENT
Start: 2025-01-18 | End: 2025-01-27 | Stop reason: HOSPADM

## 2025-01-18 RX ORDER — BISACODYL 10 MG/1
10 SUPPOSITORY RECTAL DAILY
Status: DISCONTINUED | OUTPATIENT
Start: 2025-01-18 | End: 2025-01-19

## 2025-01-18 RX ORDER — HEPARIN SODIUM 5000 [USP'U]/ML
5000 INJECTION, SOLUTION INTRAVENOUS; SUBCUTANEOUS EVERY 8 HOURS
Status: DISCONTINUED | OUTPATIENT
Start: 2025-01-18 | End: 2025-01-27 | Stop reason: HOSPADM

## 2025-01-18 RX ADMIN — HYDROCHLOROTHIAZIDE 25 MG: 25 TABLET ORAL at 08:03

## 2025-01-18 RX ADMIN — BISACODYL 10 MG: 10 SUPPOSITORY RECTAL at 12:33

## 2025-01-18 RX ADMIN — CARVEDILOL 25 MG: 25 TABLET, FILM COATED ORAL at 21:36

## 2025-01-18 RX ADMIN — LEVETIRACETAM 500 MG: 500 TABLET, FILM COATED ORAL at 08:03

## 2025-01-18 RX ADMIN — POLYETHYLENE GLYCOL 3350 17 G: 17 POWDER, FOR SOLUTION ORAL at 08:03

## 2025-01-18 RX ADMIN — SODIUM CHLORIDE 50 ML/HR: 3 INJECTION, SOLUTION INTRAVENOUS at 03:07

## 2025-01-18 RX ADMIN — ACETAMINOPHEN 650 MG: 325 TABLET ORAL at 18:25

## 2025-01-18 RX ADMIN — SENNOSIDES AND DOCUSATE SODIUM 2 TABLET: 50; 8.6 TABLET ORAL at 21:36

## 2025-01-18 RX ADMIN — SENNOSIDES AND DOCUSATE SODIUM 2 TABLET: 50; 8.6 TABLET ORAL at 08:03

## 2025-01-18 RX ADMIN — POTASSIUM CHLORIDE 20 MEQ: 1500 TABLET, EXTENDED RELEASE ORAL at 03:07

## 2025-01-18 RX ADMIN — LABETALOL HYDROCHLORIDE 10 MG: 5 INJECTION, SOLUTION INTRAVENOUS at 10:12

## 2025-01-18 RX ADMIN — HEPARIN SODIUM 5000 UNITS: 5000 INJECTION INTRAVENOUS; SUBCUTANEOUS at 21:36

## 2025-01-18 RX ADMIN — PRAVASTATIN SODIUM 40 MG: 40 TABLET ORAL at 21:36

## 2025-01-18 RX ADMIN — CYCLOBENZAPRINE 5 MG: 10 TABLET, FILM COATED ORAL at 22:16

## 2025-01-18 RX ADMIN — LABETALOL HYDROCHLORIDE 10 MG: 5 INJECTION, SOLUTION INTRAVENOUS at 17:20

## 2025-01-18 RX ADMIN — CARVEDILOL 25 MG: 25 TABLET, FILM COATED ORAL at 08:03

## 2025-01-18 RX ADMIN — DEXAMETHASONE SODIUM PHOSPHATE 4 MG: 4 INJECTION, SOLUTION INTRA-ARTICULAR; INTRALESIONAL; INTRAMUSCULAR; INTRAVENOUS; SOFT TISSUE at 12:24

## 2025-01-18 RX ADMIN — HYDRALAZINE HYDROCHLORIDE 20 MG: 20 INJECTION INTRAMUSCULAR; INTRAVENOUS at 18:25

## 2025-01-18 RX ADMIN — SODIUM CHLORIDE 50 ML/HR: 3 INJECTION, SOLUTION INTRAVENOUS at 13:47

## 2025-01-18 RX ADMIN — LEVETIRACETAM 500 MG: 500 TABLET, FILM COATED ORAL at 21:36

## 2025-01-18 RX ADMIN — PANTOPRAZOLE SODIUM 40 MG: 40 TABLET, DELAYED RELEASE ORAL at 06:07

## 2025-01-18 RX ADMIN — HEPARIN SODIUM 5000 UNITS: 5000 INJECTION INTRAVENOUS; SUBCUTANEOUS at 15:14

## 2025-01-18 RX ADMIN — LISINOPRIL 20 MG: 20 TABLET ORAL at 08:03

## 2025-01-18 RX ADMIN — LABETALOL HYDROCHLORIDE 10 MG: 5 INJECTION, SOLUTION INTRAVENOUS at 04:58

## 2025-01-18 RX ADMIN — DEXAMETHASONE SODIUM PHOSPHATE 4 MG: 4 INJECTION, SOLUTION INTRA-ARTICULAR; INTRALESIONAL; INTRAMUSCULAR; INTRAVENOUS; SOFT TISSUE at 05:00

## 2025-01-18 RX ADMIN — DEXAMETHASONE SODIUM PHOSPHATE 4 MG: 4 INJECTION, SOLUTION INTRA-ARTICULAR; INTRALESIONAL; INTRAMUSCULAR; INTRAVENOUS; SOFT TISSUE at 17:27

## 2025-01-18 RX ADMIN — HEPARIN SODIUM 5000 UNITS: 5000 INJECTION INTRAVENOUS; SUBCUTANEOUS at 06:07

## 2025-01-18 ASSESSMENT — PAIN SCALES - GENERAL
PAINLEVEL_OUTOF10: 0 - NO PAIN
PAINLEVEL_OUTOF10: 5 - MODERATE PAIN
PAINLEVEL_OUTOF10: 4
PAINLEVEL_OUTOF10: 0 - NO PAIN
PAINLEVEL_OUTOF10: 3
PAINLEVEL_OUTOF10: 0 - NO PAIN

## 2025-01-18 ASSESSMENT — PAIN - FUNCTIONAL ASSESSMENT
PAIN_FUNCTIONAL_ASSESSMENT: 0-10

## 2025-01-18 ASSESSMENT — PAIN DESCRIPTION - ORIENTATION: ORIENTATION: RIGHT

## 2025-01-18 ASSESSMENT — PAIN DESCRIPTION - LOCATION: LOCATION: HIP

## 2025-01-18 NOTE — PROGRESS NOTES
[facility]  NEUROSCIENCE INTENSIVE CARE UNIT  DAILY PROGRESS NOTE       Patient Name: Regis Mixon   MRN: [id number]     Admit Date: 1/10/2025     : 1960 AGE: 64 y.o. GENDER: male        Subjective    Regis Mixon is a 63yo M with PMH L cerebellar hemangioblastoma s/p resection (), HTN, HLD, obesity, PMR. 1/10 p/w dizziness x6 weeks. CTH 5/5x5cm L cerebellar lesion, MRI 5.3 x2.6cm L cerebellar cystic mass, C3-4 dosal cord lesion, 4th ventricular effacement. CT CAP multiple b/l pulmonary nodules, multiple b/l renal cysts.  MRI CTL spine w/o did not tolerate XIAO, expansile C3 intramedullary lesion, multiple thoracic/lumbar vertebral body lesions,  angio 1 muscular branch from L V3 supplying tumor, did not embolize d/t tortuosity, CTA H/N L cerebellar mass w/ L AICA and PICA feeders.  MRI C-spine w/ intramedullary enhancing mass at C3-C4, TTE EF 55-60% without wall motion abnormalities=, no PFO, normal diastolic filling,  s/p L redo retrosig crani for tumor resection, with intra-op EVD, CTH POC with EVD tract hemorrhage/IVH.    Overnight:  NAEON. Received hydralazine 1x.       Objective   VITALS (24H):  Temp:  [36.6 °C (97.9 °F)-36.9 °C (98.4 °F)] 36.8 °C (98.2 °F)  Heart Rate:  [] 83  Resp:  [14-19] 15  BP: (102-136)/(68-95) 125/86  Arterial Line BP 1: (103-163)/(56-87) 147/68  INTAKE/OUTPUT:  Intake/Output Summary (Last 24 hours) at 2025 0634  Last data filed at 2025 0600  Gross per 24 hour   Intake 636.04 ml   Output 1040 ml   Net -403.96 ml     VENT SETTINGS:        VENTRICULOSTOMY:  EVD @ 0 cm, Clamped  - ICP Mean (mmHg)  Min: 0 mmHg  Max: 9 mmHg    Output by Drain (mL) 25 0700 - 25 1859 25 1900 - 25 0659 25 07 - 25 1859 25 190 - 25 0644   Intracranial Pressure/Ventriculostomy Right Temporal region    0        PHYSICAL EXAM:  NEURO:  - Awake, AOx4, follows commands  - EOS, PERRL 3mm-->2mm bilaterally,  EOMI, VFF  - R side +FC, 5/5   - LUE +FC, 4+/5  - LLE +FC, 4+/5   - Crani site c/d/i  - EVD clamped   CV:  - RRR on telemetry, NSR  RESP:  - No signs of resp distress  - On RA  - Clear to auscultation b/l  :  - Rodriguez draining clear yellow urine to gravity   GI:  - Abdomen NT/ND, soft  - Normoactive bowel sounds  SKIN:  - Intact    MEDICATIONS:  Scheduled: PRN: Continuous:   carvedilol, 25 mg, oral, BID  dexAMETHasone, 4 mg, intravenous, q6h  ergocalciferol, 1,250 mcg, oral, Weekly  pantoprazole, 40 mg, oral, Daily before breakfast   Or  esomeprazole, 40 mg, nasoduodenal tube, Daily before breakfast   Or  pantoprazole, 40 mg, intravenous, Daily before breakfast  heparin (porcine), 5,000 Units, subcutaneous, q8h  hydroCHLOROthiazide, 25 mg, oral, Daily  levETIRAcetam, 500 mg, oral, BID  lisinopril, 20 mg, oral, Daily  polyethylene glycol, 17 g, oral, Daily  pravastatin, 40 mg, oral, Nightly  sennosides-docusate sodium, 2 tablet, oral, BID     PRN medications: acetaminophen **OR** acetaminophen **OR** acetaminophen, calcium gluconate, calcium gluconate, hydrALAZINE, labetaloL, magnesium sulfate, magnesium sulfate, melatonin, ondansetron **OR** ondansetron, potassium chloride CR **OR** potassium chloride, potassium chloride CR **OR** potassium chloride niCARdipine, 2.5-15 mg/hr  sodium chloride, 50 mL/hr, Last Rate: 50 mL/hr (01/18/25 0600)         LAB RESULTS:  Results from last 72 hours   Lab Units 01/18/25  0348 01/18/25  0102 01/17/25  1626 01/17/25  0849 01/17/25  0018   GLUCOSE mg/dL  --  137*  --  144* 159*   SODIUM mmol/L 137 135*   < > 132* 132*   POTASSIUM mmol/L  --  3.9  --  4.0 3.6   CHLORIDE mmol/L  --  103  --  97* 96*   CO2 mmol/L  --  25  --  27 25   ANION GAP mmol/L  --  11  --  12 15   BUN mg/dL  --  25*  --  24* 26*   CREATININE mg/dL  --  0.80  --  0.98 0.86   EGFR mL/min/1.73m*2  --  >90  --  87 >90   CALCIUM mg/dL  --  8.4*  --  9.0 9.4   PHOSPHORUS mg/dL  --  2.5  --  3.0 2.3*   ALBUMIN g/dL   --  3.5  --  3.5 3.8   MAGNESIUM mg/dL  --  2.27  --  2.57* 1.83   POCT CALCIUM IONIZED (MMOL/L) IN BLOOD mmol/L  --   --   --   --  1.10    < > = values in this interval not displayed.      Results from last 72 hours   Lab Units 01/18/25  0102 01/17/25  0849   WBC AUTO x10*3/uL 16.4* 19.3*   NRBC AUTO /100 WBCs 0.0 0.0   RBC AUTO x10*6/uL 4.88 5.20   HEMOGLOBIN g/dL 14.3 15.1   HEMATOCRIT % 42.0 43.5   MCV fL 86 84   MCH pg 29.3 29.0   MCHC g/dL 34.0 34.7   RDW % 12.6 12.1   PLATELETS AUTO x10*3/uL 253 278         Results from last 72 hours   Lab Units 01/17/25  0018 01/15/25  0848   PROTIME seconds 13.2* 14.1*   INR  1.2* 1.3*   APTT seconds 30 39*        IMAGING RESULTS:  MR brain w and wo IV contrast   Final Result   Mixed cystic and hypervascular solid mass in the left cerebellar   hemisphere with the cystic component measuring up to 5.3 cm and solid   component measuring 2.6 cm. Differential includes hemangioblastoma,   pilocytic astrocytoma, or ganglia glioma.        Questionable additional lesion within the dorsal aspect of the cord   at C3-4 only included in the field of view on single  image.   Consider cervical spine MRI with and without contrast for further   evaluation.        Left cerebellar lesion results in effacement of the 4th ventricle   with mild ventriculomegaly. Confluent periventricular FLAIR   hyperintensity may represent a combination of transependymal edema   and microangiopathic change.        Inferior cerebellar tonsillar herniation as well as effacement of the   superior cerebellar, prepontine, and quadrigeminal cisterns.        No acute infarct. Moderate burden deep and subcortical white matter   microangiopathic disease also noted.        Signed by: Sean Rivas 1/10/2025 8:50 AM   Dictation workstation:   [id number]      CT chest abdomen pelvis w IV contrast   Final Result   1. Indeterminate multiple bilateral pulmonary nodules measuring up to   0.7 cm in the right lower lobe.  Consider follow up non contrast chest   CT at 3-6 months, then consider CT chest at 18-24 months ([place] et al., Guidelines for management of incidental pulmonary   nodules detected on CT images: From the Fleischner Society 2017,   Radiology. 2017 Jul;284 (1):228-243.)   2. Multiple bilateral simple renal cysts with a Bosniak 2F cyst of   the left kidney as described above.   3. Additional chronic findings as described above.        I personally reviewed the images/study and I agree with the findings   as stated by Dr. Phu Ugalde. This study was interpreted at   [place], [place].        MACRO:   None        Signed by: Vargas Farley 1/10/2025 8:03 AM   Dictation workstation:   [id number]             Assessment/Plan    64 y.o. male with PMH L cerebellar hemangioblastoma s/p resection (2023), HTN, HLD, obesity, PMR. 1/10 p/w dizziness x6 weeks. CTH 5/5x5cm L cerebellar lesion, MRI 5.3 x2.6cm L cerebellar cystic mass, C3-4 dosal cord lesion, 4th ventricular effacement. CT CAP multiple b/l pulmonary nodules, multiple b/l renal cysts. 1/12 MRI CTL spine w/o did not tolerate XIAO, expansile C3 intramedullary lesion, multiple thoracic/lumbar vertebral body lesions, 1/13 angio 1 muscular branch from L V3 supplying tumor, did not embolize d/t tortuosity, CTA H/N L cerebellar mass w/ L AICA and PICA feeders.     Interval Events:   1/14 MRI C-spine w/ intramedullary enhancing mass at C3-C4, TTE EF 55-60% without wall motion abnormalities=, no PFO, normal diastolic filling   1/16 s/p redo left retrosigmoid craniotomy for tumor resection, admitted to NSU post-op for close blood pressure management.    NEURO:  #L cerebellar mass s/p redo L retrosigmoid craniotomy for tumor resection with intra-op EVD  #L cerebellar hemangioblastoma s/p resection (2023)  #Concern for VHL  Assessment:  - Neurologically: see above   Plan:  - NSU  - NSGY primary   - Neuro Checks: Q1H  - HOB>30  degrees   - SBP    - Dex 4q6  - Keppra 500 BID  - RF EVD clamped transduce ICP -> possible removal today  - Repeat CTH 1/18 stable  - Pain: acetaminophen PRN  - Nausea: ondansetron  - PT/OT  - Genetics eval for VHL as OP  - Neuro-onc recs     CARDIOVASCULAR:  #HTN, HLD  Assessment:  - SR on tele  - ECHO 1/14/25: EF 55-60%, no evidence of PFO    Plan:  - Continue to monitor on telemetry  - SBP Goal:     --> PRN: Labetalol, Hydralazine, and Nicardipine   - c/w home lisinopril/hydrochlorothiazide 20mg-25mg qd  - c/w coreg 25mg BID  - c/w pravastatin 40mg qHS     RESPIRATORY:  Assessment:  - On RA  Plan:  - Continuous pulse oximetry   - O2 PRN to maintain SpO2 > 94%, wean as tolerated  - Incentive spirometry while awake     RENAL/:  Assessment:  Results from last 72 hours   Lab Units 01/18/25  0102 01/17/25  0849   BUN mg/dL 25* 24*   CREATININE mg/dL 0.80 0.98     Plan:  - Monitor with daily RFP  - Electrolyte replacement PRN   - Likely remove stanford today     FEN/GI:  #Renal cyst  #Hyponatremia, improving  Assessment:  - Last BM Date: 01/13/25, passing flatus  Plan:  - Monitor and replace electrolytes per protocol  - Diet: Adult diet  - Bowel Regimen: Docusate-Senna BID, Miralax BID, suppository daily  - Goal normonatremia, on 3% at 50 ml/hr -> likely lower to 25ml/hr  - Urology recs- OP follow up for renal cyst      ENDOCRINE:  Assessment:  Results from last 7 days   Lab Units 01/18/25  0348 01/18/25  0102 01/17/25  2352 01/17/25  1957 01/17/25  1626 01/17/25  0849   GLUCOSE mg/dL  --  137*  --   --   --  144*   SODIUM mmol/L 137 135* 136 133* 131* 132*      Plan:  - Accuchecks & ISS Q4H   - Hypoglycemia protocol      HEMATOLOGY:  Assessment:  Results from last 7 days   Lab Units 01/18/25  0102 01/17/25  0849 01/17/25  0018   HEMOGLOBIN g/dL 14.3 15.1 16.1   HEMATOCRIT % 42.0 43.5 44.5   PLATELETS AUTO x10*3/uL 253 278 274   Plan:  - Continue to monitor with daily CBC and Coag panel     INFECTIOUS  DISEASE:  #Leukocytosis likely reactive, improving  Assessment:  Results from last 7 days   Lab Units 25  0102 25  0849 25  0018   WBC AUTO x10*3/uL 16.4* 19.3* 22.1*    - Temp (24hrs), Av.8 °C (98.2 °F), Min:36.6 °C (97.9 °F), Max:36.9 °C (98.4 °F)     Plan:  - Continue to monitor for s/sx of infection  - Pan culture for temperature > 38.4 C     MUSCULOSKELETAL:  - No acute issues     SKIN:  - No acute issues  - Turns and skin care per NSU protocol     ACCESS:  - PIVs   - R subclavian CVC line (--)  - L radial arterial line (--)     PROPHYLAXIS:  - DVT Ppx: SCDs, SQH 5q12   - GI Ppx: Pantoprazole     RESTRAINTS:  Not indicated/Patient does not meet criteria for restraints    Sarahi Marvin MD  Neurology PGY-2  Neuroscience Intensive Care     Plan of care to be discussed with neurocritical care attending    The patient is critically ill with brain tumor with brain compression  Neurologically unchanged  EVD management per neurosurgery: Drain clamped  Cont BP control with goal sbp   Cont steroids per neurosurgery    I have seen and examined the patient.  I have reviewed the patient's laboratory, radiographic, and clinical data.  I have spent 30 minutes providing critical care for the patient.      BRINA Ace M.D.

## 2025-01-18 NOTE — CARE PLAN
The patient's goals for the shift include      The clinical goals for the shift include Patient to remain safe and free from falls and/or injuries. Patient to remain hemodynamically stable with no decline in neurological assessment throughout shift with this nurse    Problem: Fall/Injury  Goal: Not fall by end of shift  Outcome: Progressing  Goal: Be free from injury by end of the shift  Outcome: Progressing  Goal: Verbalize understanding of personal risk factors for fall in the hospital  Outcome: Progressing  Goal: Verbalize understanding of risk factor reduction measures to prevent injury from fall in the home  Outcome: Progressing  Goal: Use assistive devices by end of the shift  Outcome: Progressing  Goal: Pace activities to prevent fatigue by end of the shift  Outcome: Progressing     Problem: Discharge Planning  Goal: Discharge to home or other facility with appropriate resources  Outcome: Progressing     Problem: Chronic Conditions and Co-morbidities  Goal: Patient's chronic conditions and co-morbidity symptoms are monitored and maintained or improved  Outcome: Progressing     Problem: Skin  Goal: Decreased wound size/increased tissue granulation at next dressing change  Outcome: Progressing  Goal: Participates in plan/prevention/treatment measures  Outcome: Progressing  Goal: Prevent/manage excess moisture  Outcome: Progressing  Flowsheets (Taken 1/18/2025 0850)  Prevent/manage excess moisture: Moisturize dry skin  Goal: Prevent/minimize sheer/friction injuries  Outcome: Progressing  Goal: Promote/optimize nutrition  Outcome: Progressing  Flowsheets (Taken 1/18/2025 0850)  Promote/optimize nutrition:   Offer water/supplements/favorite foods   Monitor/record intake including meals  Goal: Promote skin healing  Outcome: Progressing

## 2025-01-18 NOTE — PROGRESS NOTES
"Regis Mixon is a 64 y.o. male on day 8 of admission presenting with Cerebellar mass.    Subjective   NAEON       Objective     Physical Exam  A&Ox3  Face symmetric  RUE 5/5  LUE 5/5  RLE 5/5  LLE 5/5  Sensation intact to light touch throughout all extremities  Incision c/d/I    Last Recorded Vitals  Blood pressure 129/87, pulse 89, temperature 36.9 °C (98.4 °F), temperature source Temporal, resp. rate 16, height 1.778 m (5' 10\"), weight 106 kg (233 lb 11 oz), SpO2 99%.  Intake/Output last 3 Shifts:  I/O last 3 completed shifts:  In: 3591.2 (33.9 mL/kg) [P.O.:180; I.V.:3361.2 (31.7 mL/kg); IV Piggyback:50]  Out: 3555 (33.5 mL/kg) [Urine:3505 (0.9 mL/kg/hr); Blood:50]  Weight: 106 kg     Relevant Results               This patient has a central line   Reason for the central line remaining today? Parenteral medication    This patient has a urinary catheter   Reason for the urinary catheter remaining today? Urine catheter unnecessary, will be removed today               Assessment/Plan   Assessment & Plan  Cerebellar mass    Osteoarthritis    Chronic low back pain    Chronic neck pain    Regis Mixon is a 64 y.o. male with h/o L cerebellar of hemangioblastoma s/p resection (2003), HTN, HLD, obesity, PMR p/w dizziness x6w, CTH 5.5x5 cm L cerebellar lesion, MRI 5.3 x 2.6cm  L cerebellar cystic mass, C3-4 dorsal cord lesion, 4th effacement, CT CAP multiple b/l pulmonary nodules, multiple b/l renal cysts, 1/12 MRI CTL spine w/o did not tolerate XIAO, expansile C3 intramedullary lesion, multiple thoracic/lumbar vertebral body lesions, 1/13 angio 1 muscular branch from L V3 supplying tumor, did not embolize d/t tortuosity, CTA H/N L cerebellar mass w/ L AICA and PICA feeders     1/14 MRI C-spine w/ intramedullary enhancing mass at C3-C4, TTE EF 55-60% without wall motion abnormalities=, no PFO, normal diastolic filling  1/16 s/p L redo retrosig crani for tumor resection, with intra-op EVD, CTH POC with EVD tract " hemorrhage/IVH    Recs  [facility]  RF EVD clamped transduce ICP   CTH today  SBP   Dr. Gil recs  Neuro onc recs  Genetics eval for VHL as OP  Urology recs- OP follow up for renal cyst  PTOT  SCDs, SQH 5q8           Paulie Airas MD

## 2025-01-19 VITALS
HEIGHT: 70 IN | OXYGEN SATURATION: 95 % | DIASTOLIC BLOOD PRESSURE: 100 MMHG | HEART RATE: 74 BPM | WEIGHT: 235.45 LBS | BODY MASS INDEX: 33.71 KG/M2 | RESPIRATION RATE: 17 BRPM | TEMPERATURE: 97.5 F | SYSTOLIC BLOOD PRESSURE: 132 MMHG

## 2025-01-19 LAB
ALBUMIN SERPL BCP-MCNC: 3.2 G/DL (ref 3.4–5)
ANION GAP SERPL CALC-SCNC: 9 MMOL/L (ref 10–20)
BUN SERPL-MCNC: 31 MG/DL (ref 6–23)
CALCIUM SERPL-MCNC: 8.5 MG/DL (ref 8.6–10.6)
CHLORIDE SERPL-SCNC: 110 MMOL/L (ref 98–107)
CO2 SERPL-SCNC: 27 MMOL/L (ref 21–32)
COLLECT DURATION TIME SPEC: 24 HR
CREAT 24H UR-MRATE: 1660 MG/D (ref 800–2100)
CREAT SERPL-MCNC: 0.76 MG/DL (ref 0.5–1.3)
CREAT UR-MCNC: 229 MG/DL
EGFRCR SERPLBLD CKD-EPI 2021: >90 ML/MIN/1.73M*2
ERYTHROCYTE [DISTWIDTH] IN BLOOD BY AUTOMATED COUNT: 13.2 % (ref 11.5–14.5)
GLUCOSE SERPL-MCNC: 125 MG/DL (ref 74–99)
HCT VFR BLD AUTO: 38.4 % (ref 41–52)
HGB BLD-MCNC: 12.9 G/DL (ref 13.5–17.5)
MAGNESIUM SERPL-MCNC: 2.41 MG/DL (ref 1.6–2.4)
MCH RBC QN AUTO: 29.5 PG (ref 26–34)
MCHC RBC AUTO-ENTMCNC: 33.6 G/DL (ref 32–36)
MCV RBC AUTO: 88 FL (ref 80–100)
METANEPH 24H UR-MCNC: 326 UG/L
METANEPH 24H UR-MRATE: 236 UG/D (ref 55–320)
METANEPH+NORMETANEPH UR-IMP: NORMAL
METANEPH/CREAT 24H UR: 142 UG/G CRT (ref 0–300)
NORMETANEPHRINE 24H UR-MCNC: 789 UG/L
NORMETANEPHRINE 24H UR-MRATE: 572 UG/D (ref 114–865)
NORMETANEPHRINE/CREAT 24H UR: 345 UG/G CRT (ref 0–400)
NRBC BLD-RTO: 0 /100 WBCS (ref 0–0)
PHOSPHATE SERPL-MCNC: 2.2 MG/DL (ref 2.5–4.9)
PLATELET # BLD AUTO: 240 X10*3/UL (ref 150–450)
POTASSIUM SERPL-SCNC: 3.8 MMOL/L (ref 3.5–5.3)
RBC # BLD AUTO: 4.37 X10*6/UL (ref 4.5–5.9)
SODIUM SERPL-SCNC: 140 MMOL/L (ref 136–145)
SODIUM SERPL-SCNC: 140 MMOL/L (ref 136–145)
SODIUM SERPL-SCNC: 141 MMOL/L (ref 136–145)
SODIUM SERPL-SCNC: 141 MMOL/L (ref 136–145)
SODIUM SERPL-SCNC: 142 MMOL/L (ref 136–145)
SODIUM SERPL-SCNC: 142 MMOL/L (ref 136–145)
SODIUM SERPL-SCNC: 143 MMOL/L (ref 136–145)
SPECIMEN VOL ?TM UR: 725 ML
WBC # BLD AUTO: 12.1 X10*3/UL (ref 4.4–11.3)

## 2025-01-19 PROCEDURE — 85027 COMPLETE CBC AUTOMATED: CPT

## 2025-01-19 PROCEDURE — 2500000004 HC RX 250 GENERAL PHARMACY W/ HCPCS (ALT 636 FOR OP/ED): Performed by: REGISTERED NURSE

## 2025-01-19 PROCEDURE — 2500000001 HC RX 250 WO HCPCS SELF ADMINISTERED DRUGS (ALT 637 FOR MEDICARE OP)

## 2025-01-19 PROCEDURE — 2020000001 HC ICU ROOM DAILY

## 2025-01-19 PROCEDURE — 2500000001 HC RX 250 WO HCPCS SELF ADMINISTERED DRUGS (ALT 637 FOR MEDICARE OP): Performed by: STUDENT IN AN ORGANIZED HEALTH CARE EDUCATION/TRAINING PROGRAM

## 2025-01-19 PROCEDURE — 84295 ASSAY OF SERUM SODIUM: CPT

## 2025-01-19 PROCEDURE — 2500000004 HC RX 250 GENERAL PHARMACY W/ HCPCS (ALT 636 FOR OP/ED): Performed by: STUDENT IN AN ORGANIZED HEALTH CARE EDUCATION/TRAINING PROGRAM

## 2025-01-19 PROCEDURE — 83735 ASSAY OF MAGNESIUM: CPT

## 2025-01-19 PROCEDURE — 2500000004 HC RX 250 GENERAL PHARMACY W/ HCPCS (ALT 636 FOR OP/ED)

## 2025-01-19 PROCEDURE — 2500000002 HC RX 250 W HCPCS SELF ADMINISTERED DRUGS (ALT 637 FOR MEDICARE OP, ALT 636 FOR OP/ED): Performed by: REGISTERED NURSE

## 2025-01-19 PROCEDURE — 2500000005 HC RX 250 GENERAL PHARMACY W/O HCPCS

## 2025-01-19 PROCEDURE — 2500000001 HC RX 250 WO HCPCS SELF ADMINISTERED DRUGS (ALT 637 FOR MEDICARE OP): Performed by: NEUROLOGICAL SURGERY

## 2025-01-19 PROCEDURE — 37799 UNLISTED PX VASCULAR SURGERY: CPT

## 2025-01-19 RX ADMIN — DEXAMETHASONE SODIUM PHOSPHATE 4 MG: 4 INJECTION, SOLUTION INTRA-ARTICULAR; INTRALESIONAL; INTRAMUSCULAR; INTRAVENOUS; SOFT TISSUE at 00:35

## 2025-01-19 RX ADMIN — POTASSIUM PHOSPHATE, MONOBASIC 500 MG: 500 TABLET, SOLUBLE ORAL at 08:34

## 2025-01-19 RX ADMIN — SENNOSIDES AND DOCUSATE SODIUM 2 TABLET: 50; 8.6 TABLET ORAL at 08:34

## 2025-01-19 RX ADMIN — ACETAMINOPHEN 650 MG: 325 TABLET ORAL at 09:44

## 2025-01-19 RX ADMIN — CYCLOBENZAPRINE 5 MG: 10 TABLET, FILM COATED ORAL at 20:29

## 2025-01-19 RX ADMIN — CARVEDILOL 25 MG: 25 TABLET, FILM COATED ORAL at 08:34

## 2025-01-19 RX ADMIN — HEPARIN SODIUM 5000 UNITS: 5000 INJECTION INTRAVENOUS; SUBCUTANEOUS at 21:54

## 2025-01-19 RX ADMIN — ACETAMINOPHEN 650 MG: 325 TABLET ORAL at 18:49

## 2025-01-19 RX ADMIN — HYDROCHLOROTHIAZIDE 25 MG: 25 TABLET ORAL at 08:34

## 2025-01-19 RX ADMIN — DEXAMETHASONE SODIUM PHOSPHATE 4 MG: 4 INJECTION, SOLUTION INTRA-ARTICULAR; INTRALESIONAL; INTRAMUSCULAR; INTRAVENOUS; SOFT TISSUE at 14:13

## 2025-01-19 RX ADMIN — HEPARIN SODIUM 5000 UNITS: 5000 INJECTION INTRAVENOUS; SUBCUTANEOUS at 05:16

## 2025-01-19 RX ADMIN — DEXAMETHASONE SODIUM PHOSPHATE 4 MG: 4 INJECTION, SOLUTION INTRA-ARTICULAR; INTRALESIONAL; INTRAMUSCULAR; INTRAVENOUS; SOFT TISSUE at 05:12

## 2025-01-19 RX ADMIN — CARVEDILOL 25 MG: 25 TABLET, FILM COATED ORAL at 20:29

## 2025-01-19 RX ADMIN — PANTOPRAZOLE SODIUM 40 MG: 40 TABLET, DELAYED RELEASE ORAL at 06:40

## 2025-01-19 RX ADMIN — SENNOSIDES AND DOCUSATE SODIUM 2 TABLET: 50; 8.6 TABLET ORAL at 20:29

## 2025-01-19 RX ADMIN — LEVETIRACETAM 500 MG: 500 TABLET, FILM COATED ORAL at 08:34

## 2025-01-19 RX ADMIN — POTASSIUM PHOSPHATE, MONOBASIC 500 MG: 500 TABLET, SOLUBLE ORAL at 20:28

## 2025-01-19 RX ADMIN — HYDRALAZINE HYDROCHLORIDE 20 MG: 20 INJECTION INTRAMUSCULAR; INTRAVENOUS at 10:04

## 2025-01-19 RX ADMIN — PRAVASTATIN SODIUM 40 MG: 40 TABLET ORAL at 20:28

## 2025-01-19 RX ADMIN — HEPARIN SODIUM 5000 UNITS: 5000 INJECTION INTRAVENOUS; SUBCUTANEOUS at 14:13

## 2025-01-19 RX ADMIN — LISINOPRIL 20 MG: 20 TABLET ORAL at 08:34

## 2025-01-19 RX ADMIN — CARBOXYMETHYLCELLULOSE SODIUM 1 DROP: 5 SOLUTION/ DROPS OPHTHALMIC at 08:41

## 2025-01-19 RX ADMIN — POTASSIUM CHLORIDE 20 MEQ: 1500 TABLET, EXTENDED RELEASE ORAL at 05:11

## 2025-01-19 RX ADMIN — LEVETIRACETAM 500 MG: 500 TABLET, FILM COATED ORAL at 20:29

## 2025-01-19 RX ADMIN — CARBOXYMETHYLCELLULOSE SODIUM 1 DROP: 5 SOLUTION/ DROPS OPHTHALMIC at 20:28

## 2025-01-19 RX ADMIN — LABETALOL HYDROCHLORIDE 10 MG: 5 INJECTION, SOLUTION INTRAVENOUS at 08:37

## 2025-01-19 RX ADMIN — DEXAMETHASONE SODIUM PHOSPHATE 4 MG: 4 INJECTION, SOLUTION INTRA-ARTICULAR; INTRALESIONAL; INTRAMUSCULAR; INTRAVENOUS; SOFT TISSUE at 21:54

## 2025-01-19 ASSESSMENT — PAIN - FUNCTIONAL ASSESSMENT
PAIN_FUNCTIONAL_ASSESSMENT: 0-10

## 2025-01-19 ASSESSMENT — PAIN SCALES - GENERAL
PAINLEVEL_OUTOF10: 0 - NO PAIN
PAINLEVEL_OUTOF10: 0 - NO PAIN
PAINLEVEL_OUTOF10: 3
PAINLEVEL_OUTOF10: 0 - NO PAIN
PAINLEVEL_OUTOF10: 3
PAINLEVEL_OUTOF10: 0 - NO PAIN
PAINLEVEL_OUTOF10: 5 - MODERATE PAIN

## 2025-01-19 ASSESSMENT — PAIN DESCRIPTION - DESCRIPTORS
DESCRIPTORS: ACHING;DISCOMFORT
DESCRIPTORS: ACHING

## 2025-01-19 ASSESSMENT — PAIN DESCRIPTION - LOCATION: LOCATION: HEAD

## 2025-01-19 NOTE — PROGRESS NOTES
"Regis Mixon is a 64 y.o. male on day 9 of admission presenting with Cerebellar mass.    Subjective   No acute events overnight, ate meals in chair, walked to bathroom    Objective     Physical Exam  A&Ox3  Face symmetric  hypophonic  RUE 5/5  LUE 5/5  RLE 5/5  LLE 5/5  Sensation intact to light touch throughout all extremities  Incision c/d/I  EVD in place    Last Recorded Vitals  Blood pressure 134/88, pulse 80, temperature 36.7 °C (98.1 °F), temperature source Temporal, resp. rate 17, height 1.778 m (5' 10\"), weight 107 kg (235 lb 7.2 oz), SpO2 95%.  Intake/Output last 3 Shifts:  I/O last 3 completed shifts:  In: 1220.2 (11.5 mL/kg) [I.V.:1220.2 (11.5 mL/kg)]  Out: 1175 (11.1 mL/kg) [Urine:1175 (0.3 mL/kg/hr)]  Weight: 106 kg     Relevant Results               This patient has a central line   Reason for the central line remaining today? Parenteral medication    This patient has a urinary catheter   Reason for the urinary catheter remaining today? Urine catheter unnecessary, will be removed today               Assessment/Plan   Assessment & Plan  Cerebellar mass    Osteoarthritis    Chronic low back pain    Chronic neck pain    Regis Mixon is a 64 y.o. male with h/o L cerebellar of hemangioblastoma s/p resection (2003), HTN, HLD, obesity, PMR p/w dizziness x6w, CTH 5.5x5 cm L cerebellar lesion, MRI 5.3 x 2.6cm  L cerebellar cystic mass, C3-4 dorsal cord lesion, 4th effacement, CT CAP multiple b/l pulmonary nodules, multiple b/l renal cysts, 1/12 MRI CTL spine w/o did not tolerate XIAO, expansile C3 intramedullary lesion, multiple thoracic/lumbar vertebral body lesions, 1/13 angio 1 muscular branch from L V3 supplying tumor, did not embolize d/t tortuosity, CTA H/N L cerebellar mass w/ L AICA and PICA feeders     1/14 MRI C-spine w/ intramedullary enhancing mass at C3-C4, TTE EF 55-60% without wall motion abnormalities=, no PFO, normal diastolic filling  1/16 s/p L redo retrosig crani for tumor resection, with " intra-op EVD, CTH POC with EVD tract hemorrhage/IVH  1/18 CTH stable    Recs  [facility]  Will likely dc EVD today  SBP   Dex taper  Dr. Gil recs  Neuro onc recs  Genetics eval for [facility]HL as OP  Urology recs- OP follow up for renal cyst  PTOT-rehab (referral sent 1/17)  SCDs, SQH 5q8      Salima Jennings MD

## 2025-01-19 NOTE — PROGRESS NOTES
[facility]  NEUROSCIENCE INTENSIVE CARE UNIT  DAILY PROGRESS NOTE       Patient Name: Regis Mixon   MRN: [id number]     Admit Date: 1/10/2025     : 1960 AGE: 64 y.o. GENDER: male        Subjective    Regis Mixon is a 65yo M with PMH L cerebellar hemangioblastoma s/p resection (), HTN, HLD, obesity, PMR. 1/10 p/w dizziness x6 weeks. CTH 5/5x5cm L cerebellar lesion, MRI 5.3 x2.6cm L cerebellar cystic mass, C3-4 dosal cord lesion, 4th ventricular effacement. CT CAP multiple b/l pulmonary nodules, multiple b/l renal cysts.  MRI CTL spine w/o did not tolerate XIAO, expansile C3 intramedullary lesion, multiple thoracic/lumbar vertebral body lesions,  angio 1 muscular branch from L V3 supplying tumor, did not embolize d/t tortuosity, CTA H/N L cerebellar mass w/ L AICA and PICA feeders.  MRI C-spine w/ intramedullary enhancing mass at C3-C4, TTE EF 55-60% without wall motion abnormalities=, no PFO, normal diastolic filling,  s/p L redo retrosig crani for tumor resection, with intra-op EVD, CTH POC with EVD tract hemorrhage/IVH.    Overnight:  NAEON. No IV BP PRNs overnight.       Objective   VITALS (24H):  Temp:  [36.1 °C (97 °F)-36.7 °C (98.1 °F)] 36.7 °C (98.1 °F)  Heart Rate:  [] 75  Resp:  [13-18] 15  BP: ()/(51-96) 134/89  Arterial Line BP 1: ()/(43-87) 106/84  INTAKE/OUTPUT:  Intake/Output Summary (Last 24 hours) at 2025 0659  Last data filed at 2025 0500  Gross per 24 hour   Intake 859.17 ml   Output 135 ml   Net 724.17 ml     VENT SETTINGS:        VENTRICULOSTOMY:  EVD @ 0 cm, Clamped  - ICP Mean (mmHg)  Min: 0 mmHg  Max: 5 mmHg    Output by Drain (mL) 25 0700 - 25 1859 25 1900 - 25 0659 25 0700 - 25 1859 25 190 - 25 0659   Intracranial Pressure/Ventriculostomy Right Temporal region  0          PHYSICAL EXAM:  NEURO:  - Awake, AOx4, follows commands  - EOS, PERRL 3mm-->2mm bilaterally, EOMI,  VFF  - R side +FC, 5/5   - LUE +FC, 4+/5  - LLE +FC, 4+/5    CV:  - RRR on telemetry, NSR  RESP:  - No signs of resp distress  - On RA  - Clear to auscultation b/l  :  - Rodriguez draining clear yellow urine to gravity   GI:  - Abdomen NT/ND, soft  - Normoactive bowel sounds  SKIN:  - Intact    MEDICATIONS:  Scheduled: PRN: Continuous:   bisacodyl, 10 mg, rectal, Daily  carvedilol, 25 mg, oral, BID  dexAMETHasone, 4 mg, intravenous, q6h  ergocalciferol, 1,250 mcg, oral, Weekly  pantoprazole, 40 mg, oral, Daily before breakfast   Or  esomeprazole, 40 mg, nasoduodenal tube, Daily before breakfast   Or  pantoprazole, 40 mg, intravenous, Daily before breakfast  heparin (porcine), 5,000 Units, subcutaneous, q8h  hydroCHLOROthiazide, 25 mg, oral, Daily  levETIRAcetam, 500 mg, oral, BID  lisinopril, 20 mg, oral, Daily  polyethylene glycol, 17 g, oral, BID  pravastatin, 40 mg, oral, Nightly  sennosides-docusate sodium, 2 tablet, oral, BID     PRN medications: acetaminophen **OR** acetaminophen **OR** acetaminophen, calcium gluconate, calcium gluconate, cyclobenzaprine, hydrALAZINE, labetaloL, magnesium sulfate, magnesium sulfate, melatonin, ondansetron **OR** ondansetron, potassium chloride CR **OR** potassium chloride, potassium chloride CR **OR** potassium chloride niCARdipine, 2.5-15 mg/hr         LAB RESULTS:  Results from last 72 hours   Lab Units 01/19/25  0353 01/19/25  0038 01/18/25  0348 01/18/25  0102 01/17/25  0849 01/17/25  0018   GLUCOSE mg/dL  --  125*  --  137*   < > 159*   SODIUM mmol/L 143 142   < > 135*   < > 132*   POTASSIUM mmol/L  --  3.8  --  3.9   < > 3.6   CHLORIDE mmol/L  --  110*  --  103   < > 96*   CO2 mmol/L  --  27  --  25   < > 25   ANION GAP mmol/L  --  9*  --  11   < > 15   BUN mg/dL  --  31*  --  25*   < > 26*   CREATININE mg/dL  --  0.76  --  0.80   < > 0.86   EGFR mL/min/1.73m*2  --  >90  --  >90   < > >90   CALCIUM mg/dL  --  8.5*  --  8.4*   < > 9.4   PHOSPHORUS mg/dL  --  2.2*  --  2.5    < > 2.3*   ALBUMIN g/dL  --  3.2*  --  3.5   < > 3.8   MAGNESIUM mg/dL  --  2.41*  --  2.27   < > 1.83   POCT CALCIUM IONIZED (MMOL/L) IN BLOOD mmol/L  --   --   --   --   --  1.10    < > = values in this interval not displayed.      Results from last 72 hours   Lab Units 01/19/25  0037 01/18/25  0102   WBC AUTO x10*3/uL 12.1* 16.4*   NRBC AUTO /100 WBCs 0.0 0.0   RBC AUTO x10*6/uL 4.37* 4.88   HEMOGLOBIN g/dL 12.9* 14.3   HEMATOCRIT % 38.4* 42.0   MCV fL 88 86   MCH pg 29.5 29.3   MCHC g/dL 33.6 34.0   RDW % 13.2 12.6   PLATELETS AUTO x10*3/uL 240 253         Results from last 72 hours   Lab Units 01/17/25  0018   PROTIME seconds 13.2*   INR  1.2*   APTT seconds 30        IMAGING RESULTS:  MR brain w and wo IV contrast   Final Result   Mixed cystic and hypervascular solid mass in the left cerebellar   hemisphere with the cystic component measuring up to 5.3 cm and solid   component measuring 2.6 cm. Differential includes hemangioblastoma,   pilocytic astrocytoma, or ganglia glioma.        Questionable additional lesion within the dorsal aspect of the cord   at C3-4 only included in the field of view on single  image.   Consider cervical spine MRI with and without contrast for further   evaluation.        Left cerebellar lesion results in effacement of the 4th ventricle   with mild ventriculomegaly. Confluent periventricular FLAIR   hyperintensity may represent a combination of transependymal edema   and microangiopathic change.        Inferior cerebellar tonsillar herniation as well as effacement of the   superior cerebellar, prepontine, and quadrigeminal cisterns.        No acute infarct. Moderate burden deep and subcortical white matter   microangiopathic disease also noted.        Signed by: Sean Rivas 1/10/2025 8:50 AM   Dictation workstation:   [id number]      CT chest abdomen pelvis w IV contrast   Final Result   1. Indeterminate multiple bilateral pulmonary nodules measuring up to   0.7 cm in the  right lower lobe. Consider follow up non contrast chest   CT at 3-6 months, then consider CT chest at 18-24 months (Mat Marie et al., Guidelines for management of incidental pulmonary   nodules detected on CT images: From the Fleischner Society 2017,   Radiology. 2017 Jul;284 (1):228-243.)   2. Multiple bilateral simple renal cysts with a Bosniak 2F cyst of   the left kidney as described above.   3. Additional chronic findings as described above.        I personally reviewed the images/study and I agree with the findings   as stated by Dr. Phu Ugalde. This study was interpreted at   [place], [place].        MACRO:   None        Signed by: Vargas Farley 1/10/2025 8:03 AM   Dictation workstation:   [id number]             Assessment/Plan    64 y.o. male with PMH L cerebellar hemangioblastoma s/p resection (2023), HTN, HLD, obesity, PMR. 1/10 p/w dizziness x6 weeks. CTH 5/5x5cm L cerebellar lesion, MRI 5.3 x2.6cm L cerebellar cystic mass, C3-4 dosal cord lesion, 4th ventricular effacement. CT CAP multiple b/l pulmonary nodules, multiple b/l renal cysts. 1/12 MRI CTL spine w/o did not tolerate XIAO, expansile C3 intramedullary lesion, multiple thoracic/lumbar vertebral body lesions, 1/13 angio 1 muscular branch from L V3 supplying tumor, did not embolize d/t tortuosity, CTA H/N L cerebellar mass w/ L AICA and PICA feeders.     Interval Events:   1/14 MRI C-spine w/ intramedullary enhancing mass at C3-C4, TTE EF 55-60% without wall motion abnormalities=, no PFO, normal diastolic filling   1/16 s/p redo left retrosigmoid craniotomy for tumor resection, admitted to [facility] post-op for close blood pressure management.    NEURO:  #L cerebellar mass s/p redo L retrosigmoid craniotomy for tumor resection with intra-op EVD  #L cerebellar hemangioblastoma s/p resection (2023)  #Concern for VHL  Assessment:  - Neurologically: see above   Plan:  - NSU  - NSGY primary   - Neuro  Checks: Q1H  - HOB>30 degrees   - SBP    - Dex 4q6 -> taper  - Keppra 500 BID  - Likely dc EVD today  - Pain: acetaminophen PRN  - Nausea: ondansetron  - PT/OT  - Genetics eval for VHL as OP     CARDIOVASCULAR:  #HTN, HLD  Assessment:  - SR on tele  - ECHO 1/14/25: EF 55-60%, no evidence of PFO    Plan:  - Continue to monitor on telemetry  - SBP Goal:     --> PRN: Labetalol, Hydralazine, and Nicardipine   - c/w home lisinopril/hydrochlorothiazide 20mg-25mg qd  - c/w coreg 25mg BID  - c/w pravastatin 40mg qHS     RESPIRATORY:  Assessment:  - On RA  Plan:  - Continuous pulse oximetry   - O2 PRN to maintain SpO2 > 94%, wean as tolerated  - Incentive spirometry while awake     RENAL/:  Assessment:  Results from last 72 hours   Lab Units 01/19/25  0038 01/18/25  0102   BUN mg/dL 31* 25*   CREATININE mg/dL 0.76 0.80     Plan:  - Monitor with daily RFP  - Electrolyte replacement PRN   - Rodriguez removed     FEN/GI:  #Renal cyst  #Hyponatremia, improving  Assessment:  - Last BM Date: 01/18/25  Plan:  - Monitor and replace electrolytes per protocol  - Diet: Adult diet  - Bowel Regimen: Docusate-Senna BID, Miralax BID  - Goal normonatremia, Na q4h  - Urology recs - OP follow up for renal cyst      ENDOCRINE:  Assessment:  Results from last 7 days   Lab Units 01/19/25  0353 01/19/25  0038 01/19/25  0035 01/18/25  2035 01/18/25  1618 01/18/25  1300 01/18/25  0348 01/18/25  0102   GLUCOSE mg/dL  --  125*  --   --   --   --   --  137*   SODIUM mmol/L 143 142 142 140 140 140   < > 135*    < > = values in this interval not displayed.      Plan:  - Accuchecks & ISS Q4H   - Hypoglycemia protocol      HEMATOLOGY:  Assessment:  Results from last 7 days   Lab Units 01/19/25  0037 01/18/25  0102 01/17/25  0849   HEMOGLOBIN g/dL 12.9* 14.3 15.1   HEMATOCRIT % 38.4* 42.0 43.5   PLATELETS AUTO x10*3/uL 240 253 278   Plan:  - Continue to monitor with daily CBC and Coag panel     INFECTIOUS DISEASE:  #Leukocytosis likely reactive,  improving  Assessment:  Results from last 7 days   Lab Units 25  0037 25  0102 25  0849   WBC AUTO x10*3/uL 12.1* 16.4* 19.3*    - Temp (24hrs), Av.4 °C (97.6 °F), Min:36.1 °C (97 °F), Max:36.7 °C (98.1 °F)     Plan:  - Continue to monitor for s/sx of infection  - Pan culture for temperature > 38.4 C     MUSCULOSKELETAL:  - No acute issues     SKIN:  - No acute issues  - Turns and skin care per NSU protocol     ACCESS:  - PIVs   - R subclavian CVC line (--)  - L radial arterial line (--)     PROPHYLAXIS:  - DVT Ppx: SCDs, SQH 5q12   - GI Ppx: Pantoprazole     RESTRAINTS:  Not indicated/Patient does not meet criteria for restraints.    Sarahi Marvin MD  Neurology PGY-2  Neuroscience Intensive Care     Plan of care to be discussed with neurocritical care attending    The patient is critically ill with brain tumor with brain compression  Neurologically unchanged  EVD management per neurosurgery: Drain clamped  Cont BP control with goal sbp   Cont steroids per neurosurgery    I have seen and examined the patient.  I have reviewed the patient's laboratory, radiographic, and clinical data.  I have spent 30 minutes providing critical care for the patient.      BRINA Ace M.D.

## 2025-01-19 NOTE — CARE PLAN
The patient's goals for the shift include      The clinical goals for the shift include Patient to remain safe and free from falls and/or injuries. Patient to remain hemodynamically stable with no decline in neurological status throughout shift with this nurse.      Problem: Fall/Injury  Goal: Not fall by end of shift  Outcome: Progressing  Goal: Be free from injury by end of the shift  Outcome: Progressing  Goal: Verbalize understanding of personal risk factors for fall in the hospital  Outcome: Progressing  Goal: Verbalize understanding of risk factor reduction measures to prevent injury from fall in the home  Outcome: Progressing  Goal: Use assistive devices by end of the shift  Outcome: Progressing  Goal: Pace activities to prevent fatigue by end of the shift  Outcome: Progressing     Problem: Discharge Planning  Goal: Discharge to home or other facility with appropriate resources  Outcome: Progressing     Problem: Chronic Conditions and Co-morbidities  Goal: Patient's chronic conditions and co-morbidity symptoms are monitored and maintained or improved  Outcome: Progressing     Problem: Skin  Goal: Decreased wound size/increased tissue granulation at next dressing change  Outcome: Progressing  Goal: Participates in plan/prevention/treatment measures  Outcome: Progressing  Flowsheets (Taken 1/19/2025 9449)  Participates in plan/prevention/treatment measures:   Elevate heels   Increase activity/out of bed for meals  Goal: Prevent/manage excess moisture  Outcome: Progressing  Goal: Prevent/minimize sheer/friction injuries  Outcome: Progressing  Goal: Promote/optimize nutrition  Outcome: Progressing  Flowsheets (Taken 1/18/2025 2589)  Promote/optimize nutrition:   Offer water/supplements/favorite foods   Monitor/record intake including meals  Goal: Promote skin healing  Outcome: Progressing     Problem: Pain  Goal: Takes deep breaths with improved pain control throughout the shift  Outcome: Progressing  Goal: Turns  in bed with improved pain control throughout the shift  Outcome: Progressing  Goal: Walks with improved pain control throughout the shift  Outcome: Progressing  Goal: Performs ADL's with improved pain control throughout shift  Outcome: Progressing  Goal: Participates in PT with improved pain control throughout the shift  Outcome: Progressing  Goal: Free from opioid side effects throughout the shift  Outcome: Progressing  Goal: Free from acute confusion related to pain meds throughout the shift  Outcome: Progressing

## 2025-01-20 LAB
ALBUMIN SERPL BCP-MCNC: 3.3 G/DL (ref 3.4–5)
ANION GAP SERPL CALC-SCNC: 12 MMOL/L (ref 10–20)
BUN SERPL-MCNC: 32 MG/DL (ref 6–23)
CALCIUM SERPL-MCNC: 8.9 MG/DL (ref 8.6–10.6)
CHLORIDE SERPL-SCNC: 105 MMOL/L (ref 98–107)
CO2 SERPL-SCNC: 27 MMOL/L (ref 21–32)
CREAT SERPL-MCNC: 0.87 MG/DL (ref 0.5–1.3)
EGFRCR SERPLBLD CKD-EPI 2021: >90 ML/MIN/1.73M*2
ERYTHROCYTE [DISTWIDTH] IN BLOOD BY AUTOMATED COUNT: 12.9 % (ref 11.5–14.5)
GLUCOSE SERPL-MCNC: 126 MG/DL (ref 74–99)
HCT VFR BLD AUTO: 38.5 % (ref 41–52)
HGB BLD-MCNC: 12.7 G/DL (ref 13.5–17.5)
MAGNESIUM SERPL-MCNC: 2.31 MG/DL (ref 1.6–2.4)
MCH RBC QN AUTO: 29.1 PG (ref 26–34)
MCHC RBC AUTO-ENTMCNC: 33 G/DL (ref 32–36)
MCV RBC AUTO: 88 FL (ref 80–100)
NRBC BLD-RTO: 0 /100 WBCS (ref 0–0)
PHOSPHATE SERPL-MCNC: 3.9 MG/DL (ref 2.5–4.9)
PLATELET # BLD AUTO: 252 X10*3/UL (ref 150–450)
POTASSIUM SERPL-SCNC: 4.1 MMOL/L (ref 3.5–5.3)
RBC # BLD AUTO: 4.37 X10*6/UL (ref 4.5–5.9)
SODIUM SERPL-SCNC: 140 MMOL/L (ref 136–145)
SODIUM SERPL-SCNC: 140 MMOL/L (ref 136–145)
WBC # BLD AUTO: 8.4 X10*3/UL (ref 4.4–11.3)

## 2025-01-20 PROCEDURE — 0CJY8ZZ INSPECTION OF MOUTH AND THROAT, VIA NATURAL OR ARTIFICIAL OPENING ENDOSCOPIC: ICD-10-PCS | Performed by: OTOLARYNGOLOGY

## 2025-01-20 PROCEDURE — 97116 GAIT TRAINING THERAPY: CPT | Mod: GP

## 2025-01-20 PROCEDURE — 2500000001 HC RX 250 WO HCPCS SELF ADMINISTERED DRUGS (ALT 637 FOR MEDICARE OP): Performed by: STUDENT IN AN ORGANIZED HEALTH CARE EDUCATION/TRAINING PROGRAM

## 2025-01-20 PROCEDURE — 80069 RENAL FUNCTION PANEL: CPT

## 2025-01-20 PROCEDURE — 37799 UNLISTED PX VASCULAR SURGERY: CPT

## 2025-01-20 PROCEDURE — 97530 THERAPEUTIC ACTIVITIES: CPT | Mod: GO

## 2025-01-20 PROCEDURE — 2060000001 HC INTERMEDIATE ICU ROOM DAILY

## 2025-01-20 PROCEDURE — 85027 COMPLETE CBC AUTOMATED: CPT

## 2025-01-20 PROCEDURE — 2500000001 HC RX 250 WO HCPCS SELF ADMINISTERED DRUGS (ALT 637 FOR MEDICARE OP): Performed by: NEUROLOGICAL SURGERY

## 2025-01-20 PROCEDURE — 97535 SELF CARE MNGMENT TRAINING: CPT | Mod: GO

## 2025-01-20 PROCEDURE — 2500000004 HC RX 250 GENERAL PHARMACY W/ HCPCS (ALT 636 FOR OP/ED): Performed by: STUDENT IN AN ORGANIZED HEALTH CARE EDUCATION/TRAINING PROGRAM

## 2025-01-20 PROCEDURE — 99221 1ST HOSP IP/OBS SF/LOW 40: CPT | Performed by: OTOLARYNGOLOGY

## 2025-01-20 PROCEDURE — 97112 NEUROMUSCULAR REEDUCATION: CPT | Mod: GO

## 2025-01-20 PROCEDURE — B41F1ZZ FLUOROSCOPY OF RIGHT LOWER EXTREMITY ARTERIES USING LOW OSMOLAR CONTRAST: ICD-10-PCS | Performed by: NEUROLOGICAL SURGERY

## 2025-01-20 PROCEDURE — 2500000004 HC RX 250 GENERAL PHARMACY W/ HCPCS (ALT 636 FOR OP/ED)

## 2025-01-20 PROCEDURE — B31F1ZZ FLUOROSCOPY OF LEFT VERTEBRAL ARTERY USING LOW OSMOLAR CONTRAST: ICD-10-PCS | Performed by: NEUROLOGICAL SURGERY

## 2025-01-20 PROCEDURE — 83735 ASSAY OF MAGNESIUM: CPT

## 2025-01-20 PROCEDURE — 84295 ASSAY OF SERUM SODIUM: CPT

## 2025-01-20 RX ORDER — ACETAMINOPHEN 325 MG/1
650 TABLET ORAL EVERY 4 HOURS PRN
Status: CANCELLED
Start: 2025-01-20

## 2025-01-20 RX ADMIN — LABETALOL HYDROCHLORIDE 10 MG: 5 INJECTION, SOLUTION INTRAVENOUS at 13:19

## 2025-01-20 RX ADMIN — DEXAMETHASONE SODIUM PHOSPHATE 4 MG: 4 INJECTION, SOLUTION INTRA-ARTICULAR; INTRALESIONAL; INTRAMUSCULAR; INTRAVENOUS; SOFT TISSUE at 06:25

## 2025-01-20 RX ADMIN — HYDRALAZINE HYDROCHLORIDE 20 MG: 20 INJECTION INTRAMUSCULAR; INTRAVENOUS at 21:11

## 2025-01-20 RX ADMIN — SENNOSIDES AND DOCUSATE SODIUM 2 TABLET: 50; 8.6 TABLET ORAL at 20:03

## 2025-01-20 RX ADMIN — ACETAMINOPHEN 650 MG: 325 TABLET ORAL at 06:25

## 2025-01-20 RX ADMIN — HEPARIN SODIUM 5000 UNITS: 5000 INJECTION INTRAVENOUS; SUBCUTANEOUS at 21:17

## 2025-01-20 RX ADMIN — LISINOPRIL 20 MG: 20 TABLET ORAL at 09:04

## 2025-01-20 RX ADMIN — PANTOPRAZOLE SODIUM 40 MG: 40 TABLET, DELAYED RELEASE ORAL at 06:25

## 2025-01-20 RX ADMIN — PRAVASTATIN SODIUM 40 MG: 40 TABLET ORAL at 20:04

## 2025-01-20 RX ADMIN — LABETALOL HYDROCHLORIDE 10 MG: 5 INJECTION, SOLUTION INTRAVENOUS at 18:39

## 2025-01-20 RX ADMIN — LEVETIRACETAM 500 MG: 500 TABLET, FILM COATED ORAL at 09:04

## 2025-01-20 RX ADMIN — CARVEDILOL 25 MG: 25 TABLET, FILM COATED ORAL at 09:03

## 2025-01-20 RX ADMIN — DEXAMETHASONE SODIUM PHOSPHATE 4 MG: 4 INJECTION, SOLUTION INTRA-ARTICULAR; INTRALESIONAL; INTRAMUSCULAR; INTRAVENOUS; SOFT TISSUE at 14:01

## 2025-01-20 RX ADMIN — DEXAMETHASONE SODIUM PHOSPHATE 4 MG: 4 INJECTION, SOLUTION INTRA-ARTICULAR; INTRALESIONAL; INTRAMUSCULAR; INTRAVENOUS; SOFT TISSUE at 21:17

## 2025-01-20 RX ADMIN — HYDROCHLOROTHIAZIDE 25 MG: 25 TABLET ORAL at 09:03

## 2025-01-20 RX ADMIN — CARVEDILOL 25 MG: 25 TABLET, FILM COATED ORAL at 20:03

## 2025-01-20 RX ADMIN — HEPARIN SODIUM 5000 UNITS: 5000 INJECTION INTRAVENOUS; SUBCUTANEOUS at 06:25

## 2025-01-20 RX ADMIN — LEVETIRACETAM 500 MG: 500 TABLET, FILM COATED ORAL at 20:03

## 2025-01-20 RX ADMIN — HEPARIN SODIUM 5000 UNITS: 5000 INJECTION INTRAVENOUS; SUBCUTANEOUS at 14:01

## 2025-01-20 ASSESSMENT — COGNITIVE AND FUNCTIONAL STATUS - GENERAL
MOVING FROM LYING ON BACK TO SITTING ON SIDE OF FLAT BED WITH BEDRAILS: A LITTLE
WALKING IN HOSPITAL ROOM: A LITTLE
MOBILITY SCORE: 17
TOILETING: A LITTLE
DRESSING REGULAR UPPER BODY CLOTHING: A LITTLE
TURNING FROM BACK TO SIDE WHILE IN FLAT BAD: A LITTLE
CLIMB 3 TO 5 STEPS WITH RAILING: A LOT
MOVING TO AND FROM BED TO CHAIR: A LITTLE
DAILY ACTIVITIY SCORE: 20
STANDING UP FROM CHAIR USING ARMS: A LITTLE
DRESSING REGULAR LOWER BODY CLOTHING: A LITTLE
HELP NEEDED FOR BATHING: A LITTLE

## 2025-01-20 ASSESSMENT — PAIN SCALES - GENERAL
PAINLEVEL_OUTOF10: 0 - NO PAIN
PAINLEVEL_OUTOF10: 1
PAINLEVEL_OUTOF10: 0 - NO PAIN

## 2025-01-20 ASSESSMENT — ACTIVITIES OF DAILY LIVING (ADL): HOME_MANAGEMENT_TIME_ENTRY: 23

## 2025-01-20 ASSESSMENT — PAIN - FUNCTIONAL ASSESSMENT
PAIN_FUNCTIONAL_ASSESSMENT: 0-10

## 2025-01-20 NOTE — CONSULTS
Reason For Consult  Dysphonia     History Of Present Illness  Regis Mixon is a 64 y.o. male with history of left cerebellar hemangioblastoma s/p resection 2003 who underwent redo retrosig crani for tumor resection 1/16/25, now endorsing hoarseness since surgery. Per chart review, he was intubated on the first attempt with a 7-0 ETT for the duration of the case, extubated at conclusion of case. Denies SOB, dysphagia, coughing or choking when eating. Other than prior crani, denies history of surgeries to the head, neck or chest, denies prior similar episodes.      Past Medical History  He has a past medical history of Other specified health status, Personal history of other infectious and parasitic diseases, and PMR (polymyalgia rheumatica) (Multi) (08/14/2023).    Surgical History  He has a past surgical history that includes Other surgical history (05/19/2021) and Other surgical history (05/19/2021).     Social History  He reports that he has never smoked. He has never been exposed to tobacco smoke. He has never used smokeless tobacco. He reports that he does not drink alcohol and does not use drugs.    Family History  Family History   Problem Relation Name Age of Onset    Lung cancer Mother      Lung cancer Father          Allergies  Patient has no known allergies.     Physical Exam  PHYSICAL EXAMINATION:  Constitutional:  No acute distress  Voice:  Breathy voice  Respiration:  Breathing comfortably, no stridor  Cardiovascular:  Well perfused   Eyes:  EOM grossly intact  Neuro:  Alert and oriented times 3, Cranial nerves grossly intact and symmetric bilaterally  Head and Face:  Symmetric facial features, surgical incision to left   Nose:  External nose midline  Oral Cavity/Oropharynx/Lips:  MMM  Skin:  Neck skin is without scar or injury  Psych:  Alert and oriented with appropriate mood and affect    Procedure Note: Flexible Nasolaryngoscopy  Verbal informed consent was obtained from the patient/patient's  "guardian. 4% lidocaine mixed with phenylephrine was prepared and dripped into the nose. It was placed in the left naris. Following an appropriate amount of time to allow for adequate anesthesia, a flexible fiberoptic nasolaryngoscope was placed into the patient's left naris. The nasal cavity, nasopharynx, oropharynx, hypopharynx, and all endolaryngeal structures were visualized and were normal except as listed below. Significant findings included:  -Right cord mobile, left cord mostly immobile with few small twitches, glottic gap present  - Hooding of left arytenoid        Last Recorded Vitals  Blood pressure 133/85, pulse 84, temperature 36.4 °C (97.5 °F), temperature source Temporal, resp. rate 12, height 1.778 m (5' 10\"), weight 107 kg (235 lb 7.2 oz), SpO2 97%.    Relevant Results  {If you would like to pull in Imaging results, type .imgrslt :99}       Assessment/Plan     65 yo male with history of left cerebellar hemangioblastoma s/p resection 2003 now s/p redo left crani for tumor resection 1/16/25 with post op dysphonia, on scope left cord immobile to hypomobile, consistent with neuropraxia from intubation and will likely resolve with time.     Recs:  - Consider SLP eval for diet recs  - Will be formally staffed in AM       Ayana Don MD - PGY2  Otolaryngology - Head & Neck Surgery  [facility]    I am the night resident. I can only be contacted from 5 PM to 6 AM. Page on weekends or off hours.   ENT Consult pager: [phone]  ENT Peds pager: [phone]  ENT Head & Neck Surgery Phone: [phone]  ENT subspecialty team: Emeli individual resident who wrote today's note  ENT Outpatient scheduling number: [phone]  Please Page [phone] or call [phone] if Urgent      "

## 2025-01-20 NOTE — OP NOTE
CRANIOTOMY, RETROSIGMOID APPROACH, WITH TISSUE EXCISION (L) Operative Note     Date: 2025  OR Location: [facility] OR    Name: Regis Mixon, : 1960, Age: 64 y.o., MRN: [id number], Sex: male    Diagnosis  Pre-op Diagnosis      * Cerebellar mass [G93.89] Post-op Diagnosis     * Cerebellar mass [G93.89]     Procedures  L retrosignmoid re-do craniectomy with gross total resection of  left CPA cerebellar hemangioblastoma  With mesh cranioplasty, placement of right frontal external ventricular drain, use of intraoperative stereotactic frameless neuronavigation, intraoperative neuromonitoring with SSEP.  Use of intraoperative microscope.  79938 - IA CRNEC EXC BRAIN TUMOR INFRATENTORIAL/POST FOSSA      Surgeons      * Geetha Ghosh - Primary    Resident/Fellow/Other Assistant:  Surgeons and Role:     * Irma Dewitt MD - Resident - Assisting     * Zulema Ramirez MD - Fellow    Staff:   Circulator: Ray  Circulator: Parker  Scrub Person: Ivory Tran Scrub: Sivan  Relief Circulator: Steven  Relief Circulator: Flakita  Relief Scrub: Susan    Anesthesia Staff: Anesthesiologist: Henrik Santacruz MD; Imtiaz Boss MD  CRNA: SARAH Pennington-CRNA  Anesthesia Resident: Foster Vincent MD; Chata Posada MD  Frontline Breaker: CATALINA Beltran    Procedure Summary  Anesthesia: General  ASA: III  Estimated Blood Loss: 105mL  Intra-op Medications:   Administrations occurring from 0700 to 1250 on 25:   Medication Name Total Dose   lidocaine-epinephrine (Xylocaine W/EPI) 0.5 %-1:200,000 injection 26 mL   carvedilol (Coreg) tablet 25 mg Cannot be calculated   sennosides-docusate sodium (Rosalee-Colace) 8.6-50 mg per tablet 2 tablet Cannot be calculated   cefTRIAXone (Rocephin) 2 g 2 g   dexAMETHasone (Decadron) injection 4 mg Cannot be calculated   dexAMETHasone (Decadron) injection 4 mg/mL 10 mg   dexmedeTOMIDine 4 mcg/mL in 100 mL NS infusion 46.15 mcg   esmolol (Brevibloc)  injection 100 mg   fentaNYL (Sublimaze) injection 50 mcg/mL 150 mcg   furosemide (Lasix) injection 10 mg   glycopyrrolate (Robinul) injection 0.4 mg   heparin (porcine) injection 5,000 Units Cannot be calculated   hydroCHLOROthiazide (HYDRODiuril) tablet 25 mg Cannot be calculated   lactated Ringer's infusion Cannot be calculated   LR infusion 114.5 mL   lidocaine (Xylocaine) injection 2 % 100 mg   lisinopril tablet 20 mg Cannot be calculated   mannitol 20% 25 g   midazolam PF (Versed) injection 1 mg/mL 4 mg   ondansetron (Zofran) 2 mg/mL injection 4 mg   phenylephrine (Glenn-Synephrine) 10 mg/250 mL NS (40 mcg/mL) infusion 5.5 mg   phenylephrine 40 mcg/mL syringe 10 mL 840 mcg   polyethylene glycol (Glycolax, Miralax) packet 17 g Cannot be calculated   propofol (Diprivan) infusion 10 mg/mL 1,697.61 mg   remifentanil (Ultiva) 1,000 mcg in sodium chloride 0.9% 50 mL (20 mcg/mL) infusion 1.75 mg   rocuronium (ZeMuron) 50 mg/5 mL injection 50 mg   succinylcholine (Anectine) 20 mg/mL injection 120 mg   vancomycin 1 g 1.5 g              Anesthesia Record               Intraprocedure I/O Totals          Intake    Dexmedetomidine 0.00 mL    The total shown is the total volume documented since Anesthesia Start was filed.    Clevidipine Drip 0.00 mL    The total shown is the total volume documented since Anesthesia Start was filed.    Remifentanil Drip 0.00 mL    The total shown is the total volume documented since Anesthesia Start was filed.    Propofol Drip 0.00 mL    The total shown is the total volume documented since Anesthesia Start was filed.    Phenylephrine Drip 0.00 mL    The total shown is the total volume documented since Anesthesia Start was filed.    LR infusion 347.00 mL    lactated Ringer's 2500.00 mL    Total Intake 2847 mL       Output    Urine 2585 mL    Est. Blood Loss 50 mL    Total Output 2635 mL       Net    Net Volume 212 mL          Specimen:   ID Type Source Tests Collected by Time   1 : LEFT  CEREBELLAR TUMOR Tissue DURA/SUBDURA SURGICAL PATHOLOGY EXAM Geetha Ghosh MD 1/16/2025 1736                 Drains and/or Catheters:   [REMOVED] Urethral Catheter Double-lumen 16 Fr. (Removed)   Site Assessment Clean;Skin intact 01/18/25 0800   Collection Container Standard drainage bag 01/18/25 0800   Securement Method Securing device (Describe) 01/18/25 0800   Reason for Continuing Urinary Catheterization accurate hourly measurement of urine volume in a critically ill patient that cannot be assessed by other volumes and urine collection strategies 01/18/25 0800   Output (mL) 0 mL 01/18/25 1000       [REMOVED] Intracranial Pressure/Ventriculostomy Right Temporal region (Removed)   Drain Status Clamped 01/19/25 0900   Level 0 cm 01/19/25 0900   Site Assessment Clean;Dry 01/19/25 0900   Drainage No drainage 01/19/25 0900   Output (mL) 0 mL 01/17/25 2000   Dressing Status Clean;Dry;Occlusive 01/19/25 0900   Ventric/ICP Waveform Dampened 01/19/25 0900   Ventric/ICP Interventions Zeroed and calibrated;Leveled 01/19/25 0800   Site Care Performed 01/19/25 0900   Dressing Intervention Removed 01/19/25 0800       Tourniquet Times:         Implants:  Implants       Type Name Action Serial No.      Neuro Interventional Implant CATHETER SET, NEURO VENTRICULAR DRAINAGE W/ANTIBIOTIC IMPREGNATION - [id number] Implanted      Neuro Interventional Implant CLIP, ANEURYSM YASARGIL LY294H - [id number] Implanted      Neuro Interventional Implant MICROCLIP, AVM PHYNOX STR 4MM, STERILE - [id number] Implanted      Neuro Interventional Implant MICROCLIP, AVM PHYNOX STR 4MM, STERILE - [id number] Implanted      Neuro Interventional Implant MICROCLIP, AVM PHYNOX STR 4MM, STERILE - [id number] Implanted      Neuro Interventional Implant PATCH, DURA REPAIR, DURAGUARD, 8 X 14 CM - [id number] Implanted      Implant IMPLANT, CRAINIAL, MEDPOR SHEET MTB 76 X 50 X 1.6MM - [id number] Implanted               Findings: Highly vascular tumor  adherent to tentorium and petrous dura, with large cystic component extending to the deep cerebellar margin.    Indications: Regis Mixon is an 64 y.o. male who is having surgery for Cerebellar mass [G93.89].  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  He is presenting now with headaches, imbalance, and vomiting with evidence of a large cystic and solid lesion consistent with hemangioblastoma.    The patient was seen in the preoperative area. The risks, benefits, complications, treatment options, non-operative alternatives, expected recovery and outcomes were discussed with the patient. The possibilities of reaction to medication, pulmonary aspiration, injury to surrounding structures, bleeding, recurrent infection, the need for additional procedures, failure to diagnose a condition, and creating a complication requiring transfusion or operation were discussed with the patient. The patient concurred with the proposed plan, giving informed consent.  The site of surgery was properly noted/marked if necessary per policy. The patient has been actively warmed in preoperative area. Preoperative antibiotics have been ordered and given within 2 hours of incision. Venous thrombosis prophylaxis have been ordered including bilateral sequential compression devices    Procedure Details: Following the induction of satisfactory general endotracheal anesthesia, placement of a subclavian central venous access, patient was placed supine.  The neuronavigation system was registered using the electromagnetic Stealth using tracer technique.  Landmarks were verified.. The right frontal region was shaved prepped and draped in the usual sterile fashion in preparation for placement of right frontal EVD.  A small stab incision was made with a blade down to the calvarium and a twist roll was utilized to create a bur hole.  The dura was then opened with a blade, and a antibiotic coated ventricular catheter was inserted  with return of clear CSF of first pass at approximately 5 cm, using the neuronavigation for aiming and trajectory of the ventricular catheter placement.  The drain was additionally inserted to approximately 6 cm at the brain surface, and tunneled posteriorly with the trocar and secured with 3-0 nylon suture.  The frontal incision was closed with 4-0 Monocryl.    Following placement of the drain, the patient was placed in lateral decubitus position with the left side up.  An axillary roll was placed.  He was placed three-quarter prone with the right arm over the edge of the bed and secured in a foam sling.  His head was placed in the 3 pin [place] fixation rotated 10 degrees, flexed, and laterally flex to obtain exposure to the left retrosigmoid area.  After careful padding of all pressure points, and confirmation of stable SSEP neuromonitoring, for which baseline had been obtained prior to positioning, attention was turned towards cleansing and shaving the left retrosigmoid region which was then prepped and draped in the usual sterile fashion.  A prior retrosigmoid incision was incorporated into an S shaped incision which would allow further inferior access given the large size of the tumor and its extension towards the foramen.    After final timeout, administration of antibiotics, and hyperosmolar therapy, the incision which had been infiltrated with 1% lidocaine with epinephrine was opened with a skin knife down to the suboccipital muscles which were opened with Bovie electrocautery and down to the calvarium.  The previous site of craniotomy was evident but mostly fused healed bone, with 2 dog bone plates.  These were removed with Leksell rongeur, and an acorn bit was utilized to create bur holes and trough the bone to free the dura from the prior craniotomy flap, and elevate this in a piecemeal fashion.  Additional bone was shaved to enlarge the opening including exposing the transverse and sigmoid sinus  superiorly and laterally, and removal of the bone at the foramen magnum.  C1 was palpable inferiorly, but was not removed.    Attention was turned towards the waxing of the bone edges, and hemostasis of the dura.  Scarring of the dura was evident and therefore a location medial to the scar was identified for opening with a 15 blade.  The dural opening was then extended inferiorly and then in a stellate fashion superiorly and laterally towards the transverse sigmoid sinus.  The dura was adherent to the cerebellar surface and was gently peeled away along the dura to be reflected intact with 4 Nurolon sutures.  Small amounts of drainage from the EVD catheter were utilized to relax the brain.  Examination of the inferior aspect of the opening revealed the cerebellar tonsils low-lying and retraction was gently placed to expose the 11th nerve and arachnoid which was opened for CSF relaxation.  No feeding artery to the hemangioblastoma was yet inside, but the vertebral artery was visualized with the takeoff of the PICA branch.  It was evident that further laxation was needed to allow excoriation laterally for feeding vessels.  Therefore small corticectomy was made at the lateral aspect of the edge of the tumor which was evident based on its orange and red hue on the surface with some arterialized draining veins.  These veins were carefully preserved as the corticectomy was deepened towards the cyst and cyst decompression was obtained.  This now allowed the cerebellum to be gently retracted medially, and at this point the microscope was brought in to the field.  The remainder of the Intraop dural portion of the surgery was performed under microscopic magnification using micro instruments and microsurgical technique.    Under microscopic magnification lower cranial nerves including the 910 complex and rootlets of 12 point visualized working superiorly a large branch of vessel leaping up which is evident as well as a  additional branch which appeared consistent with the feeder to the tumor and was able to be traced towards a solid component of this.  A temporary clip was placed on the larger branch as it was unclear if this was on passage or feeding the tumor the tumor feeding vessel itself was coagulated and cut and additional dissection now was performed circumferentially working around the cyst to further decompress it medially along the cerebral hemisphere surface superiorly up towards the tentorium and then finally along the edge of the tentorial and petrous dura to which the vascular tumor was densely adherent.  Having devascularize the tumor extensively bleeding was able to be controlled as the tumor was peeled away from the transverse sigmoid dural junction intradurally and along the petrous face.  Further dissection laterally then expose the deep aspect of the tumor and brought into view the seventh 8th nerve complex and 5th nerve laterally in the cerebellopontine angle which were carefully preserved.  After this circumferential dissection it was apparent that the larger branch with a temporary clip was not feeding the vessel but on passage and therefore the temporary clip was removed with reconstitution of flow in the branch.  The entire tumor was now able to be mobilized and removed more medially vascularized cyst wall was resected in its most medial aspect small feeder vessels extending deep into the cerebral white matter were evident were coagulated, and 2 AVM clips, Aesculap titanium, were used to control these and remove the entire solid portion of the tumor as well as any vascular cyst wall, after disconnecting a large deep drainage vein that was joining the petrosal vein.  The arterialized vein was coagulated and disconnected and the petrosal vein itself was kept intact.    Irrigation, and meticulous hemostasis were performed.  The cantu of the resection cavity was lined with Surgicel. Attention was turned  towards dural closure.  A Dura-Guard dural substitute graft was sutured into place with running 4 Nurolon suture in watertight fashion, and the suture line was covered with a small layer of Tisseel.  Gelfoam was placed over the exposed dura with additional Tisseel.  The area of the craniectomy was then covered with a Rosario Medpor titanium mesh which was secured with 5 mm titanium screws from the Biomet fixation system.  Copious antibiotic irrigation was performed.  Hemostasis of the suboccipital muscles was performed and the muscle and fascia were closed in layers with 0 Vicryl sutures.  Subcutaneous tissue was closed with interrupted buried 2-0 and 3-0 Vicryl sutures and 3-0 running nylon the skin.  Xeroform and Telfa dressing were applied.    Once dressing had been applied the patient was removed from 3 [facility] fixation placed supine, awakened from anesthesia and extubated.  There were no changes in SSEPs throughout the case.  Small amount of oozing was noted at the EVD exit site at the end of the case and with Betadine and an additional suture was placed to secure this.  Patient was then transported to the intensive care unit in stable condition.          Complications:  None; patient tolerated the procedure well.    Disposition: ICU - extubated and stable.  Condition: stable           Additional Details: Tissue was sent for permanent pathology     Attending Attestation: Dr. Geetha KAUFMAN was available throughout, and was present for and performed all critical portions.    Geetha Ghosh  Phone Number: [phone]

## 2025-01-20 NOTE — PROGRESS NOTES
Occupational Therapy    OT Treatment    Patient Name: Regis Mixon  MRN: [id number]  Department: [facility]  Room: 19/19-A  Today's Date: 1/20/2025  Time Calculation  Start Time: 1242  Stop Time: 1335  Time Calculation (min): 53 min        Assessment:  Barriers to Discharge Home: Physical needs, Cognition needs  Physical Needs: Ambulating household distances limited by function/safety, Intermittent mobility assistance needed, Intermittent ADL assistance needed, High falls risk due to function or environment  Evaluation/Treatment Tolerance: Patient tolerated treatment well  Medical Staff Made Aware: Yes  End of Session Communication: Bedside nurse  End of Session Patient Position: Up in chair, Alarm on  Evaluation/Treatment Tolerance: Patient tolerated treatment well  Medical Staff Made Aware: Yes  Plan:  Treatment Interventions: ADL retraining, Functional transfer training, Cognitive reorientation, Patient/family training, Equipment evaluation/education, Neuromuscular reeducation, Fine motor coordination activities, Compensatory technique education  OT Frequency: 4 times per week  OT Discharge Recommendations: High intensity level of continued care  Equipment Recommended upon Discharge: Wheeled walker  OT Recommended Transfer Status: Minimal assist, Assist of 1  OT - OK to Discharge: Yes  Treatment Interventions: ADL retraining, Functional transfer training, Cognitive reorientation, Patient/family training, Equipment evaluation/education, Neuromuscular reeducation, Fine motor coordination activities, Compensatory technique education    Subjective   Previous Visit Info:  OT Last Visit  OT Received On: 01/20/25  General:  General  Reason for Referral: Pt p/w dizziness. Found to have L cerebellar cystic mass, C3-4 dorsal cord lesion, 4th effacement. 1/12 MRI CTL spine w/o did not tolerate XIAO, expansile C3 intramedullary lesion, multiple thoracic/lumbar vertebral body lesions. 1/13 angio 1 muscular branch from L V3  supplying tumor, did not embolize d/t tortuosity, CTA H/N L cerebellar mass w/ L AICA and PICA feeders. 1/14 MRI C-spine w/ intramedullary enhancing mass at C3-C4. 1/16 s/p L redo retrosig crani for tumor resection, with intra-op EVD, CTH POC with EVD tract hemorrhage/IVH.  Past Medical History Relevant to Rehab: L cerebellar of hemangioblastoma s/p resection (2003), HTN, HLD, obesity, PMR  Family/Caregiver Present: No  Prior to Session Communication: Bedside nurse  Patient Position Received: Bed, 3 rail up, Alarm off, not on at start of session  General Comment: pt pleasant and agreeable to therapy.  Precautions:  Hearing/Visual Limitations: Hearing and vision WFL  Medical Precautions: Fall precautions  Precautions Comment: SBP       Vital Signs     Vitals Session Pre OT During OT Post OT   Heart Rate 74 70s 75   Resp  14  17   SpO2 95 >95 97   /96 140/108, repeat /97 seated in chair  RN provided PRN meds  /96  Repeat /94 134/100     111   ICP            Pain:  Pain Assessment  Pain Assessment: 0-10  0-10 (Numeric) Pain Score: 0 - No pain    Objective    Cognition:  Cognition  Overall Cognitive Status: Within Functional Limits  Orientation Level: Oriented X4  Attention: Within Functional Limits  Memory: Exceptions to WFL  Short-Term Memory: Impaired  Working Memory: Impaired  Problem Solving: Exceptions to WFL  Complex Functional Tasks: Impaired  Safety/Judgement: Exceptions to WFL  Complex Functional Tasks: Minimal  Insight: Within function limits  Impulsive: Within functional limits  Task Initiation: WFL  Flexibility of Thought: Within functional limits  Planning: Reduced planning skills  Processing Speed: Within funtional limits  Cognition Test Scores  Cognition Tests: Cognition Test Performed (Completed MOCA 8.1 this date. See scoring in outcome measures. Pt's score is indicative of mild cognitive impairment.)  Coordination:     Activities of Daily Living: Grooming  Grooming  Level of Assistance: Contact guard  Grooming Where Assessed: Standing sinkside  Grooming Comments: Completed brushing teeth in standing at sink at FWW.    LE Dressing  LE Dressing: Yes  Pants Level of Assistance: Contact guard  Sock Level of Assistance: Close supervision  LE Dressing Where Assessed: Chair  LE Dressing Comments: Pt doffed/donned socks in chair with SBA in figure four position. Pt donned pants with CGA in standing.  Functional Standing Tolerance:     Bed Mobility/Transfers: Bed Mobility  Bed Mobility: Yes  Bed Mobility 1  Bed Mobility 1: Supine to sitting  Level of Assistance 1: Close supervision  Bed Mobility Comments 1: cues for sequencing    Transfers  Transfer: Yes  Transfer 1  Technique 1: Sit to stand, Stand to sit  Transfer Device 1: Walker  Transfer Level of Assistance 1: Contact guard  Trials/Comments 1: Pt completed 5 trials with cues needed for proper hand placement and safety with FWW.      Functional Mobility:  Functional Mobility  Functional Mobility Performed: Yes  Functional Mobility 1  Surface 1: Level tile  Device 1: Rolling walker  Assistance 1: Contact guard  Comments 1: Pt completed functional mobility in room and hallway with CGA with FWW with cues for walker management and sequencing.  Sitting Balance:  Static Sitting Balance  Static Sitting-Level of Assistance: Close supervision  Dynamic Sitting Balance  Dynamic Sitting-Level of Assistance: Close supervision, Contact guard  Standing Balance:  Static Standing Balance  Static Standing-Level of Assistance: Contact guard  Dynamic Standing Balance  Dynamic Standing-Level of Assistance: Contact guard    Therapy/Activity:    Pt sat EOB with supervision. Pt completed 5 trials sit <> stand with CGA at FWW with cues for proper hand placement.    Balance/Neuromuscular Re-Education  Balance/Neuromuscular Re-Education Activity Performed: Yes  Balance/Neuromuscular Re-Education Activity 1: Pt facilitated in dual-tasking with pt demonstrating  difficulty continuing functional mobility during cognitive questions. Pt demonstrate difficulty with alternating attention, working memory, and problem solving. Pt would stop functional mobility during responses to cognitive questions or decline in speed to process and respond to questions.        Outcome Measures:[facility] Daily Activity  Putting on and taking off regular lower body clothing: A little  Bathing (including washing, rinsing, drying): A little  Putting on and taking off regular upper body clothing: A little  Toileting, which includes using toilet, bedpan or urinal: A little  Taking care of personal grooming such as brushing teeth: None  Eating Meals: None  Daily Activity - Total Score: 20        , Early Mobility/Exercise Safety Screen: Proceed with mobilization - No exclusion criteria met  ICU Mobility Scale: Walking with assistance of 1 person [8], and MoCA  Visuospatial/Executive: 1  Naming: 3  Memory (Score '0' as this is an Unscored Section): 0  Attention: Read List of Digits: 2  Attention: Read List of Letters: 1  Attention: Serial Sevens: 3  Language: Repeat: 1  Language: Fluency: 0  Abstraction: 1  Delayed Recall: 2  Orientation: 6  Add 1 Point if </=12 yr Education: 1  MOCA Total Score: 21  Normal = >/= 26/30. Pt's score demonstrates mild cognitive impairment.     Education Documentation  Body Mechanics, taught by Suma Sanchez OT at 1/20/2025  3:27 PM.  Learner: Patient  Readiness: Acceptance  Method: Explanation, Demonstration  Response: Verbalizes Understanding, Demonstrated Understanding  Comment: OT POC    Precautions, taught by Suma Sanchez OT at 1/20/2025  3:27 PM.  Learner: Patient  Readiness: Acceptance  Method: Explanation, Demonstration  Response: Verbalizes Understanding, Demonstrated Understanding  Comment: OT POC    ADL Training, taught by Suma Sanchez OT at 1/20/2025  3:27 PM.  Learner: Patient  Readiness: Acceptance  Method: Explanation, Demonstration  Response:  Verbalizes Understanding, Demonstrated Understanding  Comment: OT POC    Education Comments  No comments found.        OP EDUCATION:       Goals:  Encounter Problems       Encounter Problems (Active)       ADLs       Patient will perform UB and LB bathing with Mod I using AE and adaptive technique as needed. (Progressing)       Start:  01/17/25    Expected End:  01/31/25            Patient with complete upper body dressing with IND. (Progressing)       Start:  01/17/25    Expected End:  01/31/25            Patient with complete lower body dressing with Mod I using AE and adaptive tech as needed. (Progressing)       Start:  01/17/25    Expected End:  01/31/25            Patient will complete daily grooming tasks in standing with Mod I using LRAD. (Progressing)       Start:  01/17/25    Expected End:  01/31/25            Patient will complete toileting including hygiene clothing management/hygiene with Mod I. (Progressing)       Start:  01/17/25    Expected End:  01/31/25               BALANCE       Patient will tolerate static/dynamic standing tasks during functional tasks > 10 min with Mod I using LRAD without LOB.  (Progressing)       Start:  01/17/25    Expected End:  01/31/25               COGNITION/SAFETY       Patient will score WFL on standardized cognitive assessment within reasonable time frame (Progressing)       Start:  01/17/25    Expected End:  01/31/25               COORDINATION       Pt will improve FMC/GMC to WNL to complete ADLs independently using bilateral integration without increase in time.   (Progressing)       Start:  01/17/25    Expected End:  01/31/25               MOBILITY       Pt will perform functional mobility household distances with Mod I using LRAD without LOB and demonstrating good safety awareness.  (Progressing)       Start:  01/17/25    Expected End:  01/31/25               TRANSFERS       Pt will perform bed mobility in simulated home environment with Mod I.   (Progressing)        Start:  01/17/25    Expected End:  01/31/25            Pt will perform functional transfers on various surfaces with Mod I using LRAD with good safety awareness.  (Progressing)       Start:  01/17/25    Expected End:  01/31/25

## 2025-01-20 NOTE — PROGRESS NOTES
"Regis Mixon is a 64 y.o. male on day 10 of admission presenting with Cerebellar mass.    Subjective   No acute events overnight, ate meals in chair, walked to bathroom, walked around NSU    Objective     Physical Exam  A&Ox3  Face symmetric  hypophonic  RUE 5/5  LUE 5/5  RLE 5/5  LLE 5/5  Sensation intact to light touch throughout all extremities  Incision c/d/I    Last Recorded Vitals  Blood pressure 125/87, pulse 77, temperature 36.4 °C (97.5 °F), temperature source Temporal, resp. rate 15, height 1.778 m (5' 10\"), weight 107 kg (235 lb 7.2 oz), SpO2 94%.  Intake/Output last 3 Shifts:  I/O last 3 completed shifts:  In: 899.6 (8.4 mL/kg) [I.V.:899.6 (8.4 mL/kg)]  Out: 135 (1.3 mL/kg) [Urine:135 (0 mL/kg/hr)]  Weight: 106.8 kg     Relevant Results               This patient has a central line   Reason for the central line remaining today? Parenteral medication    This patient has a urinary catheter   Reason for the urinary catheter remaining today? Urine catheter unnecessary, will be removed today               Assessment/Plan   Assessment & Plan  Cerebellar mass    Osteoarthritis    Chronic low back pain    Chronic neck pain    Regis Mixon is a 64 y.o. male with h/o L cerebellar of hemangioblastoma s/p resection (2003), HTN, HLD, obesity, PMR p/w dizziness x6w, CTH 5.5x5 cm L cerebellar lesion, MRI 5.3 x 2.6cm  L cerebellar cystic mass, C3-4 dorsal cord lesion, 4th effacement, CT CAP multiple b/l pulmonary nodules, multiple b/l renal cysts, 1/12 MRI CTL spine w/o did not tolerate XIAO, expansile C3 intramedullary lesion, multiple thoracic/lumbar vertebral body lesions, 1/13 angio 1 muscular branch from L V3 supplying tumor, did not embolize d/t tortuosity, CTA H/N L cerebellar mass w/ L AICA and PICA feeders     1/14 MRI C-spine w/ intramedullary enhancing mass at C3-C4, TTE EF 55-60% without wall motion abnormalities=, no PFO, normal diastolic filling  1/16 s/p L redo retrosig crani for tumor resection, with " intra-op EVD, CTH POC with EVD tract hemorrhage/IVH  1/18 CTH stable  1/19 EVD dc'd, ENT scope immobile L vocal cord    Recs  NSU  SBP   Dex taper  ENT recs - will staff this AM  Neuro onc recs  Genetics eval for [facility] as OP  Urology recs- OP follow up for renal cyst  PTOT-rehab (referral sent 1/17)  SCDs, SQH 5q8      Paulie Arias MD

## 2025-01-20 NOTE — PROGRESS NOTES
Social Work Discharge Planning note:    -Patient discussed during interdisciplinary rounds.   -Team members present: NP and SW  -Plan per medical team: Pt is not medically ready for discharge. Need Endo recommendations; CT head tomorrow.   -Payer: [employer]  -Status: Inpatient  -Discharge disposition:  [facility] willing to accept and are following. SW will request that they start precert once the updated therapy notes are in. SW will continue to follow to assist with discharge planning.   -Anticipated Date of Discharge:  1/22/25    SIOMARA Garcia, COLLEENW

## 2025-01-20 NOTE — CONSULTS
Reason For Consult  Dysphonia     History Of Present Illness  Regis Mixon is a 64 y.o. male with history of left cerebellar hemangioblastoma s/p resection 2003 who underwent redo retrosig crani for tumor resection 1/16/25, now endorsing hoarseness since surgery. Per chart review, he was intubated on the first attempt with a 7-0 ETT for the duration of the case, extubated at conclusion of case. Denies SOB, dysphagia, coughing or choking when eating. Other than prior crani, denies history of surgeries to the head, neck or chest, denies prior similar episodes.      Past Medical History  He has a past medical history of Other specified health status, Personal history of other infectious and parasitic diseases, and PMR (polymyalgia rheumatica) (Multi) (08/14/2023).    Surgical History  He has a past surgical history that includes Other surgical history (05/19/2021) and Other surgical history (05/19/2021).     Social History  He reports that he has never smoked. He has never been exposed to tobacco smoke. He has never used smokeless tobacco. He reports that he does not drink alcohol and does not use drugs.    Family History  Family History   Problem Relation Name Age of Onset    Lung cancer Mother      Lung cancer Father          Allergies  Patient has no known allergies.     Physical Exam  PHYSICAL EXAMINATION:  Constitutional:  No acute distress  Voice:  Breathy voice  Respiration:  Breathing comfortably, no stridor  Cardiovascular:  Well perfused   Eyes:  EOM grossly intact  Neuro:  Alert and oriented times 3, Cranial nerves grossly intact and symmetric bilaterally  Head and Face:  Symmetric facial features, surgical incision to left   Nose:  External nose midline  Oral Cavity/Oropharynx/Lips:  MMM  Skin:  Neck skin is without scar or injury  Psych:  Alert and oriented with appropriate mood and affect    Procedure Note: Flexible Nasolaryngoscopy  Verbal informed consent was obtained from the patient/patient's  "guardian. 4% lidocaine mixed with phenylephrine was prepared and dripped into the nose. It was placed in the left naris. Following an appropriate amount of time to allow for adequate anesthesia, a flexible fiberoptic nasolaryngoscope was placed into the patient's left naris. The nasal cavity, nasopharynx, oropharynx, hypopharynx, and all endolaryngeal structures were visualized and were normal except as listed below. Significant findings included:  -Right cord mobile, left cord  immobile, glottic gap present  - Hooding of left arytenoid        Last Recorded Vitals  Blood pressure (!) 129/96, pulse 78, temperature 36.8 °C (98.2 °F), temperature source Temporal, resp. rate 20, height 1.778 m (5' 10\"), weight 107 kg (235 lb 7.2 oz), SpO2 96%.    Relevant Results         Assessment/Plan     63 yo male with history of left cerebellar hemangioblastoma s/p resection 2003 now s/p redo left crani for tumor resection 1/16/25 with post op dysphonia, on scope left cord immobile to hypomobile, consistent with neuropraxia from intubation and will likely resolve with time.     Recs:  - Consider SLP eval for diet recs  -Patient will need to follow-up with laryngology, if still in hospital will swing by on Wednesday if discharged will follow-up in the outpatient to discuss injection into vocal cord    Seen and discussed with Dr. Calhoun who agrees with assessment and plan.     Amilcar Flood, PGY-2  Rotating Resident, ENT    ENT Consult pager: [phone]  ENT Peds pager: [phone]  ENT Head & Neck Surgery Phone: [phone]  ENT subspecialty team: Emeli individual resident who wrote today's note  ENT Outpatient scheduling number: [phone]  Please Page [phone] or call [phone] if Urgent    "

## 2025-01-20 NOTE — PROGRESS NOTES
Physical Therapy    Physical Therapy Treatment    Patient Name: Regis Mixon  MRN: [id number]  Today's Date: 1/20/2025  Time Calculation  Start Time: 1445  Stop Time: 1512  Time Calculation (min): 27 min       Assessment/Plan   PT Assessment  Barriers to Discharge Home: Physical needs  Physical Needs: High falls risk due to function or environment  Evaluation/Treatment Tolerance: Patient tolerated treatment well  End of Session Communication: Bedside nurse  Assessment Comment: Pt to benefit from ongoing PT services to address the above limitations and to prepare pt for timely return to prior level of function. Pt is not currently at prior level of function, and FGA and 5x STS scores indicate increased falls risk.  End of Session Patient Position: Up in chair, Alarm on  PT Plan  Inpatient/Swing Bed or Outpatient: Inpatient  PT Plan  Treatment/Interventions: Bed mobility, Transfer training, Gait training, Stair training, Balance training, Neuromuscular re-education, Strengthening, Endurance training, Therapeutic exercise, Therapeutic activity, Home exercise program, Postural re-education  PT Plan: Ongoing PT  PT Frequency: 5 times per week  PT Discharge Recommendations: High intensity level of continued care  Equipment Recommended upon Discharge: Wheeled walker  PT Recommended Transfer Status: Assist x1, Assistive device  PT - OK to Discharge: Yes (When medically ready)      General Visit Information:   PT  Visit  PT Received On: 01/20/25  Response to Previous Treatment: Patient with no complaints from previous session.  Reason for Referral: Pt p/w dizziness. Found to have L cerebellar cystic mass, C3-4 dorsal cord lesion, 4th effacement. 1/12 MRI CTL spine w/o did not tolerate XIAO, expansile C3 intramedullary lesion, multiple thoracic/lumbar vertebral body lesions. 1/13 angio 1 muscular branch from L V3 supplying tumor, did not embolize d/t tortuosity, CTA H/N L cerebellar mass w/ L AICA and PICA feeders. 1/14 MRI  C-spine w/ intramedullary enhancing mass at C3-C4. 1/16 s/p L redo retrosig crani for tumor resection, with intra-op EVD, CTH POC with EVD tract hemorrhage/IVH. 1/19 EVD dc'd, ENT scope immobile L vocal cord  Past Medical History Relevant to Rehab: L cerebellar of hemangioblastoma s/p resection (2003), HTN, HLD, obesity, PMR  Prior to Session Communication: Bedside nurse  Patient Position Received: Up in chair, Alarm on  Family/Caregiver Present: Yes  Caregiver Feedback: Pt's wife present and supportive during therapy session  General Comment: Pt pleasant and cooperative     Subjective   Precautions:  Precautions  Hearing/Visual Limitations: WFL  Medical Precautions: Fall precautions  Precautions Comment: SBP ; Per nsgy, ok to be in 130's during PT session.    Vital Signs:  Vital Signs (Past 2hrs)        Date/Time Vitals Session Patient Position Pulse Resp SpO2 BP MAP (mmHg)    01/20/25 1445 Pre PT  Sitting  81  --  95 %  119/85  76     01/20/25 1455 During PT  Sitting  81  --  97 %  131/93  105     01/20/25 1512 Post PT  Sitting  79  --  95 %  137/101  113     01/20/25 1600 --  --  80  19  95 %  129/88  98                     Objective   Pain:  Pain Assessment  Pain Assessment: 0-10  0-10 (Numeric) Pain Score: 0 - No pain  Cognition:  Cognition  Overall Cognitive Status: Within Functional Limits  Orientation Level: Oriented X4  Insight: Within function limits  Impulsive: Within functional limits  Processing Speed: Within funtional limits    Lines/Tubes/Drains:  CVC 01/16/25 Double lumen Right Subclavian (Active)   Number of days: 4     Postural Control:   Postural Control  Postural Control: Within Functional Limits  Head Control: WFL  Trunk Control: WFL sitting, several LOB/near-LOB during gait. See Ambulation.  Righting Reactions: Intact  Protective Responses: Intact  Posture Comment: Rounded shoulders    Balance:   Static Sitting Balance  Static Sitting-Balance Support: Bilateral upper extremity supported,  Feet supported  Static Sitting-Level of Assistance: Distant supervision  Static Sitting-Comment/Number of Minutes: 15 min  Dynamic Sitting Balance  Dynamic Sitting-Balance Support: Bilateral upper extremity supported, Feet supported  Dynamic Sitting-Level of Assistance: Close supervision  Dynamic Sitting-Balance: Forward lean    Static Standing Balance  Static Standing-Balance Support: No upper extremity supported  Static Standing-Level of Assistance: Contact guard  Dynamic Standing Balance  Dynamic Standing-Balance Support: No upper extremity supported  Dynamic Standing-Level of Assistance: Minimum assistance  Dynamic Standing-Balance:  (Narrow DOMINIC gait, turning,)    PT Treatments:                 Bed Mobility  Bed Mobility: No    Ambulation/Gait Training  Ambulation/Gait Training Performed: Yes  Ambulation/Gait Training 1  Surface 1: Level tile  Device 1: Rolling walker  Gait Support Devices: Gait belt  Assistance 1: Contact guard  Quality of Gait 1: Narrow base of support, Decreased step length (Gives inadequate clearance to objects in L field of vision. Able to correct with cues, but needs reminders. Additional cues for self-monitoring activity tolerance.)  Comments/Distance (ft) 1: 200 ft  Ambulation/Gait Training 2  Surface 2: Level tile  Device 2: No device  Gait Support Devices: Gait belt  Assistance 2:  (CGA with occasional bouts of MOD A x1 to correct LOB and near-LOB during dynamic gait activities.)  Quality of Gait 2: Wide base of support, Diminished heel strike, Decreased step length (Able to turn head to L, but demos loss of balance with upward rotation, R rotation, and downward rotation. Demos LOB with tandem walking. Able to tolerate walking backwards and walking with eyes closed, but must slow speed significantly to accommodate.)  Comments/Distance (ft) 2: 100 ft    Transfers  Transfer: Yes  Transfer 1  Transfer From 1: Chair with arms to  Transfer to 1: Stand  Technique 1: Sit to stand, Stand to  sit  Transfer Device 1: Gait belt  Transfer Level of Assistance 1: Contact guard  Trials/Comments 1: >5x with cues for proper hand placement and controlling speed.    Stairs  Stairs: No              Outcome Measures:  [facility] Basic Mobility  Turning from your back to your side while in a flat bed without using bedrails: A little  Moving from lying on your back to sitting on the side of a flat bed without using bedrails: A little  Moving to and from bed to chair (including a wheelchair): A little  Standing up from a chair using your arms (e.g. wheelchair or bedside chair): A little  To walk in hospital room: A little  Climbing 3-5 steps with railing: A lot  Basic Mobility - Total Score: 17                   FSS-ICU  Ambulation: Walks >/ or equal to 150 feet with moderate assistance x1  Rolling: Complete independence  Sitting: Supervision or set-up only  Transfer Sit-to-Stand: Supervision or set-up only  Transfer Supine-to-Sit: Supervision or set-up only  Total Score: 25    ICU Mobility Screen  Early Mobility/Exercise Safety Screen: Proceed with mobilization - No exclusion criteria met  ICU Mobility Scale: Walking with assistance of 1 person       FGA - Functional Gait Assessment  Gait level surface: 2  Change in gait speed: 2  Gait with horizontal head turns: 0  Gait with vertical head turns: 0  Gait and pivot turn: 2  Step over obstacle: 1  Gait with narrow base of support: 1  Gait with eyes closed: 1  Ambulating backwards: 1  Steps: 2  FGA Total Score: 12/30 indicates increased risk of falling.          Other Measures  5x Sit to Stand: Performs in 14.8 sec, no use of arms. Performed from bedside chair. This indicates increased risk of falling. Cut-off score of 12 sec.    Education:  Education Documentation  Precautions, taught by Kelly Tubbs PT at 1/20/2025  4:19 PM.  Learner: Significant Other, Patient  Readiness: Acceptance  Method: Explanation  Response: Verbalizes Understanding    Body Mechanics, taught  by Kelly Tubbs PT at 1/20/2025  4:19 PM.  Learner: Significant Other, Patient  Readiness: Acceptance  Method: Explanation  Response: Verbalizes Understanding    Mobility Training, taught by Kelly Tubbs PT at 1/20/2025  4:19 PM.  Learner: Significant Other, Patient  Readiness: Acceptance  Method: Explanation  Response: Verbalizes Understanding    Education Comments  No comments found.             Encounter Problems       Encounter Problems (Active)       PT Problem       Patient will perform bed mobility with </= close sup to reduce risk of developing decubitus ulcers.  (Met)       Start:  01/17/25    Expected End:  01/31/25    Resolved:  01/20/25    Updated to: Patient will perform bed mobility IND to reduce risk of developing decubitus ulcers.    Update reason: Goal met          Patient will perform sit to stand and stand to sit transfers with </= close sup and LRD to increase functional strength.  (Met)       Start:  01/17/25    Expected End:  01/31/25    Resolved:  01/20/25    Updated to: Patient will perform sit to stand and stand to sit transfers MOD-I with LRD to increase functional strength.    Update reason: Goal met          Patient will ambulate at least 150  ft. with </= close sup and LRD to improve tolerance of community distances.    (Met)       Start:  01/17/25    Expected End:  01/31/25    Resolved:  01/20/25    Updated to: Patient will ambulate at least 1,000  ft. MOD-I with LRD to improve tolerance of community distances.    Update reason: Goal met          Patient will perform the 5-Time Sit to Stand test in <12 sec to indicate decreased falls risk. (Cut-Off Scores: Balance/Vestibular: >14.2 sec if over 60 y.o. Chronic Stroke: >12 sec. MCID: >2.3 sec balance/vestibular.)  (Progressing)       Start:  01/17/25    Expected End:  01/31/25            Patient will ascend and descend 1 platform step with LRD and </= close sup to facilitate safe navigation of stairs in the home.     (Progressing)       Start:  01/17/25    Expected End:  01/31/25            Patient will perform bed mobility IND to reduce risk of developing decubitus ulcers.       Start:  01/20/25    Expected End:  01/31/25                Patient will perform sit to stand and stand to sit transfers MOD-I with LRD to increase functional strength.       Start:  01/20/25    Expected End:  01/31/25                Patient will ambulate at least 1,000  ft. MOD-I with LRD to improve tolerance of community distances.       Start:  01/20/25    Expected End:  01/31/25                Patient will score >/= 22/30 points on the Functional Gait Assessment to indicate decreased risk of falling. (MDC: 5 points stroke, 6 points vestibular, 4 points Parkinson's Disease)        Start:  01/20/25    Expected End:  01/31/25                   Pain - Adult                01/20/25 at 4:19 PM   Kelly Tubbs, PT   Rehab Office: [phone]

## 2025-01-21 ENCOUNTER — APPOINTMENT (OUTPATIENT)
Dept: RADIOLOGY | Facility: HOSPITAL | Age: 65
DRG: 025 | End: 2025-01-21
Payer: COMMERCIAL

## 2025-01-21 LAB
ALBUMIN SERPL BCP-MCNC: 3.5 G/DL (ref 3.4–5)
ANION GAP SERPL CALC-SCNC: 13 MMOL/L (ref 10–20)
BUN SERPL-MCNC: 32 MG/DL (ref 6–23)
CALCIUM SERPL-MCNC: 9.4 MG/DL (ref 8.6–10.6)
CHLORIDE SERPL-SCNC: 100 MMOL/L (ref 98–107)
CO2 SERPL-SCNC: 27 MMOL/L (ref 21–32)
CREAT SERPL-MCNC: 0.74 MG/DL (ref 0.5–1.3)
EGFRCR SERPLBLD CKD-EPI 2021: >90 ML/MIN/1.73M*2
ERYTHROCYTE [DISTWIDTH] IN BLOOD BY AUTOMATED COUNT: 12.7 % (ref 11.5–14.5)
GLUCOSE SERPL-MCNC: 112 MG/DL (ref 74–99)
HCT VFR BLD AUTO: 40 % (ref 41–52)
HGB BLD-MCNC: 13.7 G/DL (ref 13.5–17.5)
MAGNESIUM SERPL-MCNC: 2.28 MG/DL (ref 1.6–2.4)
MCH RBC QN AUTO: 29.5 PG (ref 26–34)
MCHC RBC AUTO-ENTMCNC: 34.3 G/DL (ref 32–36)
MCV RBC AUTO: 86 FL (ref 80–100)
NRBC BLD-RTO: 0 /100 WBCS (ref 0–0)
PHOSPHATE SERPL-MCNC: 3.7 MG/DL (ref 2.5–4.9)
PLATELET # BLD AUTO: 279 X10*3/UL (ref 150–450)
POTASSIUM SERPL-SCNC: 4 MMOL/L (ref 3.5–5.3)
RBC # BLD AUTO: 4.65 X10*6/UL (ref 4.5–5.9)
SODIUM SERPL-SCNC: 136 MMOL/L (ref 136–145)
WBC # BLD AUTO: 10.8 X10*3/UL (ref 4.4–11.3)

## 2025-01-21 PROCEDURE — 37799 UNLISTED PX VASCULAR SURGERY: CPT

## 2025-01-21 PROCEDURE — 80069 RENAL FUNCTION PANEL: CPT

## 2025-01-21 PROCEDURE — 85027 COMPLETE CBC AUTOMATED: CPT

## 2025-01-21 PROCEDURE — 2500000001 HC RX 250 WO HCPCS SELF ADMINISTERED DRUGS (ALT 637 FOR MEDICARE OP): Performed by: STUDENT IN AN ORGANIZED HEALTH CARE EDUCATION/TRAINING PROGRAM

## 2025-01-21 PROCEDURE — 2500000001 HC RX 250 WO HCPCS SELF ADMINISTERED DRUGS (ALT 637 FOR MEDICARE OP): Performed by: NEUROLOGICAL SURGERY

## 2025-01-21 PROCEDURE — 70450 CT HEAD/BRAIN W/O DYE: CPT

## 2025-01-21 PROCEDURE — 2060000001 HC INTERMEDIATE ICU ROOM DAILY

## 2025-01-21 PROCEDURE — 83735 ASSAY OF MAGNESIUM: CPT

## 2025-01-21 PROCEDURE — 2500000004 HC RX 250 GENERAL PHARMACY W/ HCPCS (ALT 636 FOR OP/ED): Performed by: STUDENT IN AN ORGANIZED HEALTH CARE EDUCATION/TRAINING PROGRAM

## 2025-01-21 PROCEDURE — 70450 CT HEAD/BRAIN W/O DYE: CPT | Performed by: RADIOLOGY

## 2025-01-21 RX ORDER — HYDRALAZINE HYDROCHLORIDE 20 MG/ML
20 INJECTION INTRAMUSCULAR; INTRAVENOUS EVERY 30 MIN PRN
Status: DISCONTINUED | OUTPATIENT
Start: 2025-01-21 | End: 2025-01-27 | Stop reason: HOSPADM

## 2025-01-21 RX ORDER — LABETALOL HYDROCHLORIDE 5 MG/ML
10 INJECTION, SOLUTION INTRAVENOUS EVERY 10 MIN PRN
Status: DISCONTINUED | OUTPATIENT
Start: 2025-01-21 | End: 2025-01-27 | Stop reason: HOSPADM

## 2025-01-21 RX ADMIN — HEPARIN SODIUM 5000 UNITS: 5000 INJECTION INTRAVENOUS; SUBCUTANEOUS at 06:29

## 2025-01-21 RX ADMIN — DEXAMETHASONE SODIUM PHOSPHATE 3.2 MG: 4 INJECTION, SOLUTION INTRA-ARTICULAR; INTRALESIONAL; INTRAMUSCULAR; INTRAVENOUS; SOFT TISSUE at 21:17

## 2025-01-21 RX ADMIN — PRAVASTATIN SODIUM 40 MG: 40 TABLET ORAL at 20:06

## 2025-01-21 RX ADMIN — PANTOPRAZOLE SODIUM 40 MG: 40 TABLET, DELAYED RELEASE ORAL at 07:57

## 2025-01-21 RX ADMIN — LISINOPRIL 20 MG: 20 TABLET ORAL at 08:02

## 2025-01-21 RX ADMIN — HEPARIN SODIUM 5000 UNITS: 5000 INJECTION INTRAVENOUS; SUBCUTANEOUS at 14:34

## 2025-01-21 RX ADMIN — CARVEDILOL 25 MG: 25 TABLET, FILM COATED ORAL at 20:06

## 2025-01-21 RX ADMIN — CARVEDILOL 25 MG: 25 TABLET, FILM COATED ORAL at 08:02

## 2025-01-21 RX ADMIN — DEXAMETHASONE SODIUM PHOSPHATE 4 MG: 4 INJECTION, SOLUTION INTRA-ARTICULAR; INTRALESIONAL; INTRAMUSCULAR; INTRAVENOUS; SOFT TISSUE at 06:28

## 2025-01-21 RX ADMIN — LEVETIRACETAM 500 MG: 500 TABLET, FILM COATED ORAL at 20:06

## 2025-01-21 RX ADMIN — HYDROCHLOROTHIAZIDE 25 MG: 25 TABLET ORAL at 08:02

## 2025-01-21 RX ADMIN — LEVETIRACETAM 500 MG: 500 TABLET, FILM COATED ORAL at 08:02

## 2025-01-21 RX ADMIN — HEPARIN SODIUM 5000 UNITS: 5000 INJECTION INTRAVENOUS; SUBCUTANEOUS at 21:17

## 2025-01-21 RX ADMIN — DEXAMETHASONE SODIUM PHOSPHATE 3.2 MG: 4 INJECTION, SOLUTION INTRA-ARTICULAR; INTRALESIONAL; INTRAMUSCULAR; INTRAVENOUS; SOFT TISSUE at 14:31

## 2025-01-21 ASSESSMENT — COGNITIVE AND FUNCTIONAL STATUS - GENERAL
DRESSING REGULAR UPPER BODY CLOTHING: A LITTLE
DAILY ACTIVITIY SCORE: 21
PERSONAL GROOMING: A LITTLE
MOBILITY SCORE: 24
DRESSING REGULAR UPPER BODY CLOTHING: A LITTLE
HELP NEEDED FOR BATHING: A LITTLE
MOBILITY SCORE: 24
HELP NEEDED FOR BATHING: A LITTLE
EATING MEALS: A LITTLE
DRESSING REGULAR LOWER BODY CLOTHING: A LITTLE
TOILETING: A LITTLE
DAILY ACTIVITIY SCORE: 18
DRESSING REGULAR LOWER BODY CLOTHING: A LITTLE

## 2025-01-21 ASSESSMENT — PAIN - FUNCTIONAL ASSESSMENT
PAIN_FUNCTIONAL_ASSESSMENT: 0-10

## 2025-01-21 ASSESSMENT — PAIN SCALES - GENERAL
PAINLEVEL_OUTOF10: 0 - NO PAIN

## 2025-01-21 NOTE — CARE PLAN
Problem: Fall/Injury  Goal: Not fall by end of shift  Outcome: Progressing  Goal: Verbalize understanding of personal risk factors for fall in the hospital  Outcome: Progressing  Goal: Use assistive devices by end of the shift  Outcome: Progressing     Problem: Skin  Goal: Prevent/minimize sheer/friction injuries  Outcome: Progressing     Problem: Pain  Goal: Walks with improved pain control throughout the shift  Outcome: Progressing

## 2025-01-21 NOTE — PROGRESS NOTES
Communication Note    Patient Name: Regis Mixon  MRN: [id number]  Today's Date: 1/21/2025   Room: 19/19-A    Discipline: Occupational Therapy      Missed Visit Reason:  (Pt declined therapy, reported too tired for therapy at this time.)      01/21/25 at 4:36 PM   Suma Sanchez, OT   Rehab Office: [phone]

## 2025-01-21 NOTE — PROGRESS NOTES
"Regis Mixon is a 64 y.o. male on day 11 of admission presenting with Cerebellar mass.    Subjective   No acute events overnight, feels like his appetite is improving    Objective     Physical Exam  A&Ox3  Face symmetric  hypophonic  RUE 5/5  LUE 5/5  RLE 5/5  LLE 5/5  Sensation intact to light touch throughout all extremities  Incision c/d/I    Last Recorded Vitals  Blood pressure (!) 126/95, pulse 69, temperature 36.6 °C (97.9 °F), temperature source Temporal, resp. rate 19, height 1.778 m (5' 10\"), weight 107 kg (235 lb 7.2 oz), SpO2 96%.  Intake/Output last 3 Shifts:  I/O last 3 completed shifts:  In: 1300 (12.2 mL/kg) [P.O.:1300]  Out: 750 (7 mL/kg) [Urine:750 (0.2 mL/kg/hr)]  Weight: 106.8 kg     Relevant Results   This patient has a central line   Reason for the central line remaining today? Parenteral medication    This patient has a urinary catheter   Reason for the urinary catheter remaining today? Urine catheter unnecessary, will be removed today               Assessment/Plan   Assessment & Plan  Cerebellar mass    Osteoarthritis    Chronic low back pain    Chronic neck pain    Regis Mixon is a 64 y.o. male with h/o L cerebellar of hemangioblastoma s/p resection (2003), HTN, HLD, obesity, PMR p/w dizziness x6w, CTH 5.5x5 cm L cerebellar lesion, MRI 5.3 x 2.6cm  L cerebellar cystic mass, C3-4 dorsal cord lesion, 4th effacement, CT CAP multiple b/l pulmonary nodules, multiple b/l renal cysts, 1/12 MRI CTL spine w/o did not tolerate XIAO, expansile C3 intramedullary lesion, multiple thoracic/lumbar vertebral body lesions, 1/13 angio 1 muscular branch from L V3 supplying tumor, did not embolize d/t tortuosity, CTA H/N L cerebellar mass w/ L AICA and PICA feeders     1/14 MRI C-spine w/ intramedullary enhancing mass at C3-C4, TTE EF 55-60% without wall motion abnormalities=, no PFO, normal diastolic filling  1/16 s/p L redo retrosig crani for tumor resection, with intra-op EVD, CTH POC with EVD tract " hemorrhage/IVH  1/18 CTH stable  1/19 EVD dc'd, ENT scope immobile L vocal cord    Recs  MILAGRO (q4 NC at night)  SBP   Dex taper 3q8  Needs shower and to wash head  ENT recs - poss vocal cord injection planning  Neuro onc recs  Genetics eval for [facility] as OP  Urology recs- OP follow up for renal cyst  PTOT-rehab (referral sent 1/17)  SCDs, SQH 5q8      Salima Jennings MD

## 2025-01-21 NOTE — CONSULTS
"Nutrition Initial Assessment:   Nutrition Assessment    Reason for Assessment: Provider consult order (for nutritional supplements)    Patient is a 64 y.o. male presenting with dizziness x6w.    1/14 MRI C-spine w/ intramedullary enhancing mass at C3-C4  1/16 s/p L redo retrosig crani for tumor resection, with intra-op EVD, CTH POC with EVD tract hemorrhage/IVH  1/19 EVD dc'd, ENT scope immobile L vocal cord    PMH: L cerebellar of hemangioblastoma s/p resection (2003), HTN, HLD, obesity       Nutrition History:  Energy Intake: Poor < 50 %  Food and Nutrient History: Intake this admission has been <50% of meals per documentation. Pt reported he does not have much of an appetite and items that he used to like do not taste like they used to. He said last night he had part of a fruit plate and this morning he ate his sausage and fruit (did not like the [place] toast). Mentioned his wife was bringing a bagel and cream cheese - felt he would eat probabaly 50-75% of it. Explained he did not like Boost [facility] but would like to try strawberry Ensure. He said PTA he would eat everything  Food Allergies/Intolerances:  None       Anthropometrics:  Height: 177.8 cm (5' 10\")   Weight: 107 kg (235 lb 7.2 oz)   BMI (Calculated): 33.78  IBW/kg (Dietitian Calculated): 75.5 kg  Percent of IBW: 141 %  Adjusted Body Weight (kg): 83.3 kg    Weight History:   Wt Readings from Last 5 Encounters:   01/19/25 107 kg (235 lb 7.2 oz)   01/09/25 97.5 kg (215 lb)   08/14/24 98.9 kg (218 lb)   02/14/24 98.6 kg (217 lb 6.4 oz)   08/16/23 99.2 kg (218 lb 9.6 oz)     Weight Change %:  Weight History / % Weight Change: Pt reported UBW is 215# (97.7kg)  Significant Weight Loss: No    Nutrition Focused Physical Exam Findings:    Subcutaneous Fat Loss:   Orbital Fat Pads: Well nourished (slightly bulging fat pads)  Buccal Fat Pads: Well nourished (full, rounded cheeks)  Triceps: Well nourished (ample fat tissue)  Muscle Wasting:  Temporalis: Well nourished " (well-defined muscle)  Pectoralis (Clavicular Region): Well nourished (clavicle not visible)  Interosseous: Well nourished (muscle bulges)  Quadriceps: Well nourished (well developed, well rounded)  Gastrocnemius: Well nourished (well developed bulbous muscle)    Nutrition Significant Labs:  CBC Trend:   Results from last 7 days   Lab Units 01/21/25 0353 01/20/25 0227 01/19/25 0037 01/18/25  0102   WBC AUTO x10*3/uL 10.8 8.4 12.1* 16.4*   RBC AUTO x10*6/uL 4.65 4.37* 4.37* 4.88   HEMOGLOBIN g/dL 13.7 12.7* 12.9* 14.3   HEMATOCRIT % 40.0* 38.5* 38.4* 42.0   MCV fL 86 88 88 86   PLATELETS AUTO x10*3/uL 279 252 240 253    , BMP Trend:   Results from last 7 days   Lab Units 01/21/25 0353 01/20/25 0227 01/19/25 0353 01/19/25 0038 01/18/25 0348 01/18/25  0102   GLUCOSE mg/dL 112* 126*  --  125*  --  137*   CALCIUM mg/dL 9.4 8.9  --  8.5*  --  8.4*   SODIUM mmol/L 136 140  140   < > 142   < > 135*   POTASSIUM mmol/L 4.0 4.1  --  3.8  --  3.9   CO2 mmol/L 27 27  --  27  --  25   CHLORIDE mmol/L 100 105  --  110*  --  103   BUN mg/dL 32* 32*  --  31*  --  25*   CREATININE mg/dL 0.74 0.87  --  0.76  --  0.80    < > = values in this interval not displayed.    , A1C:  Lab Results   Component Value Date    HGBA1C 5.5 08/14/2024   , Renal Lab Trend:   Results from last 7 days   Lab Units 01/21/25 0353 01/20/25 0227 01/19/25 0353 01/19/25 0038 01/18/25 0348 01/18/25  0102   POTASSIUM mmol/L 4.0 4.1  --  3.8  --  3.9   PHOSPHORUS mg/dL 3.7 3.9  --  2.2*  --  2.5   SODIUM mmol/L 136 140  140   < > 142   < > 135*   MAGNESIUM mg/dL 2.28 2.31  --  2.41*  --  2.27   EGFR mL/min/1.73m*2 >90 >90  --  >90  --  >90   BUN mg/dL 32* 32*  --  31*  --  25*   CREATININE mg/dL 0.74 0.87  --  0.76  --  0.80    < > = values in this interval not displayed.     Nutrition Specific Medications:  Scheduled medications  dexAMETHasone, 3.2 mg, intravenous, q8h  ergocalciferol, 1,250 mcg, oral, Weekly  pantoprazole, 40 mg, oral, Daily before  breakfast   Or  esomeprazole, 40 mg, nasoduodenal tube, Daily before breakfast   Or  pantoprazole, 40 mg, intravenous, Daily before breakfast  hydroCHLOROthiazide, 25 mg, oral, Daily  polyethylene glycol, 17 g, oral, BID  sennosides-docusate sodium, 2 tablet, oral, BID    I/O:   Last BM Date: 01/21/25; Stool Appearance: Loose (01/21/25 0400)    Dietary Orders (From admission, onward)       Start     Ordered    01/20/25 1032  Oral nutritional supplements  Until discontinued        Question Answer Comment   Deliver with All meals    Select supplement: Boost [facility]        01/20/25 1031    01/17/25 0519  Adult diet Regular  Diet effective now        Question:  Diet type  Answer:  Regular    01/17/25 0518         Estimated Needs:   Total Energy Estimated Needs (kCal): 2000 kCal  Method for Estimating Needs: 25-28kcal/kg per IBW  Total Protein Estimated Needs (g): 90 g  Method for Estimating Needs: 1.2g/kg per IBW  Total Fluid Estimated Needs (mL):  (per team)        Nutrition Diagnosis   Malnutrition Diagnosis  Patient has Malnutrition Diagnosis: No    Nutrition Diagnosis  Patient has Nutrition Diagnosis: Yes  Diagnosis Status (1): New  Nutrition Diagnosis 1: Inadequate oral intake  Related to (1): disclike of hospital food, decreased appetite  As Evidenced by (1): consuming <50% of meals       Nutrition Interventions/Recommendations         Nutrition Prescription:  Individualized Nutrition Prescription Provided for : diet + ONS        Nutrition Interventions:   Interventions: Medical food supplement  Medical Food Supplement: Commercial beverage  Goal: Ensure Plus BID       Nutrition Monitoring and Evaluation   Food/Nutrient Related History Monitoring  Monitoring and Evaluation Plan: Energy intake  Energy Intake: Estimated energy intake  Criteria: PO meets >/=75% of needs    Body Composition/Growth/Weight History  Monitoring and Evaluation Plan: Weight  Weight: Measured weight  Criteria: Weekly weights    Time Spent (min):  60 minutes

## 2025-01-22 LAB
ALBUMIN SERPL BCP-MCNC: 3.7 G/DL (ref 3.4–5)
ANION GAP SERPL CALC-SCNC: 12 MMOL/L (ref 10–20)
BUN SERPL-MCNC: 32 MG/DL (ref 6–23)
CALCIUM SERPL-MCNC: 9.5 MG/DL (ref 8.6–10.6)
CHLORIDE SERPL-SCNC: 99 MMOL/L (ref 98–107)
CO2 SERPL-SCNC: 29 MMOL/L (ref 21–32)
CREAT SERPL-MCNC: 0.83 MG/DL (ref 0.5–1.3)
EGFRCR SERPLBLD CKD-EPI 2021: >90 ML/MIN/1.73M*2
ERYTHROCYTE [DISTWIDTH] IN BLOOD BY AUTOMATED COUNT: 12.8 % (ref 11.5–14.5)
GLUCOSE SERPL-MCNC: 106 MG/DL (ref 74–99)
HCT VFR BLD AUTO: 41.9 % (ref 41–52)
HGB BLD-MCNC: 14.4 G/DL (ref 13.5–17.5)
MCH RBC QN AUTO: 29.1 PG (ref 26–34)
MCHC RBC AUTO-ENTMCNC: 34.4 G/DL (ref 32–36)
MCV RBC AUTO: 85 FL (ref 80–100)
NRBC BLD-RTO: 0 /100 WBCS (ref 0–0)
PHOSPHATE SERPL-MCNC: 4 MG/DL (ref 2.5–4.9)
PLATELET # BLD AUTO: 270 X10*3/UL (ref 150–450)
POTASSIUM SERPL-SCNC: 3.7 MMOL/L (ref 3.5–5.3)
RBC # BLD AUTO: 4.94 X10*6/UL (ref 4.5–5.9)
SODIUM SERPL-SCNC: 136 MMOL/L (ref 136–145)
WBC # BLD AUTO: 12.9 X10*3/UL (ref 4.4–11.3)

## 2025-01-22 PROCEDURE — 2500000001 HC RX 250 WO HCPCS SELF ADMINISTERED DRUGS (ALT 637 FOR MEDICARE OP)

## 2025-01-22 PROCEDURE — 80069 RENAL FUNCTION PANEL: CPT

## 2025-01-22 PROCEDURE — 2500000004 HC RX 250 GENERAL PHARMACY W/ HCPCS (ALT 636 FOR OP/ED)

## 2025-01-22 PROCEDURE — 2060000001 HC INTERMEDIATE ICU ROOM DAILY

## 2025-01-22 PROCEDURE — 99231 SBSQ HOSP IP/OBS SF/LOW 25: CPT | Performed by: OTOLARYNGOLOGY

## 2025-01-22 PROCEDURE — 85027 COMPLETE CBC AUTOMATED: CPT

## 2025-01-22 PROCEDURE — 99232 SBSQ HOSP IP/OBS MODERATE 35: CPT | Performed by: STUDENT IN AN ORGANIZED HEALTH CARE EDUCATION/TRAINING PROGRAM

## 2025-01-22 PROCEDURE — 97112 NEUROMUSCULAR REEDUCATION: CPT | Mod: GP

## 2025-01-22 PROCEDURE — 2500000001 HC RX 250 WO HCPCS SELF ADMINISTERED DRUGS (ALT 637 FOR MEDICARE OP): Performed by: PHYSICIAN ASSISTANT

## 2025-01-22 PROCEDURE — 36415 COLL VENOUS BLD VENIPUNCTURE: CPT

## 2025-01-22 PROCEDURE — 2500000004 HC RX 250 GENERAL PHARMACY W/ HCPCS (ALT 636 FOR OP/ED): Performed by: PHYSICIAN ASSISTANT

## 2025-01-22 PROCEDURE — 97116 GAIT TRAINING THERAPY: CPT | Mod: GP

## 2025-01-22 RX ORDER — PANTOPRAZOLE SODIUM 40 MG/1
40 TABLET, DELAYED RELEASE ORAL
Status: CANCELLED
Start: 2025-01-23

## 2025-01-22 RX ORDER — OXYMETAZOLINE HCL 0.05 %
2 SPRAY, NON-AEROSOL (ML) NASAL ONCE
Status: COMPLETED | OUTPATIENT
Start: 2025-01-22 | End: 2025-01-22

## 2025-01-22 RX ORDER — CARVEDILOL 25 MG/1
25 TABLET ORAL 2 TIMES DAILY
Status: CANCELLED
Start: 2025-01-22

## 2025-01-22 RX ORDER — DEXAMETHASONE 2 MG/1
2 TABLET ORAL EVERY 8 HOURS SCHEDULED
Status: DISCONTINUED | OUTPATIENT
Start: 2025-01-22 | End: 2025-01-26

## 2025-01-22 RX ORDER — LIDOCAINE HYDROCHLORIDE 20 MG/ML
1.25 SOLUTION OROPHARYNGEAL ONCE
Status: DISCONTINUED | OUTPATIENT
Start: 2025-01-22 | End: 2025-01-24

## 2025-01-22 RX ADMIN — DEXAMETHASONE SODIUM PHOSPHATE 3.2 MG: 4 INJECTION, SOLUTION INTRA-ARTICULAR; INTRALESIONAL; INTRAMUSCULAR; INTRAVENOUS; SOFT TISSUE at 05:53

## 2025-01-22 RX ADMIN — LEVETIRACETAM 500 MG: 500 TABLET, FILM COATED ORAL at 20:48

## 2025-01-22 RX ADMIN — DEXAMETHASONE 2 MG: 2 TABLET ORAL at 15:33

## 2025-01-22 RX ADMIN — CARVEDILOL 37.5 MG: 12.5 TABLET, FILM COATED ORAL at 20:48

## 2025-01-22 RX ADMIN — LISINOPRIL 20 MG: 20 TABLET ORAL at 09:02

## 2025-01-22 RX ADMIN — HEPARIN SODIUM 5000 UNITS: 5000 INJECTION INTRAVENOUS; SUBCUTANEOUS at 05:53

## 2025-01-22 RX ADMIN — LABETALOL HYDROCHLORIDE 10 MG: 5 INJECTION INTRAVENOUS at 03:17

## 2025-01-22 RX ADMIN — LEVETIRACETAM 500 MG: 500 TABLET, FILM COATED ORAL at 09:02

## 2025-01-22 RX ADMIN — PANTOPRAZOLE SODIUM 40 MG: 40 INJECTION, POWDER, FOR SOLUTION INTRAVENOUS at 05:53

## 2025-01-22 RX ADMIN — HYDRALAZINE HYDROCHLORIDE 20 MG: 20 INJECTION INTRAMUSCULAR; INTRAVENOUS at 02:35

## 2025-01-22 RX ADMIN — CARVEDILOL 25 MG: 25 TABLET, FILM COATED ORAL at 09:02

## 2025-01-22 RX ADMIN — HYDROCHLOROTHIAZIDE 25 MG: 25 TABLET ORAL at 09:02

## 2025-01-22 RX ADMIN — Medication 2 SPRAY: at 09:03

## 2025-01-22 RX ADMIN — HEPARIN SODIUM 5000 UNITS: 5000 INJECTION INTRAVENOUS; SUBCUTANEOUS at 15:33

## 2025-01-22 RX ADMIN — DEXAMETHASONE 2 MG: 2 TABLET ORAL at 20:49

## 2025-01-22 RX ADMIN — PRAVASTATIN SODIUM 40 MG: 40 TABLET ORAL at 20:48

## 2025-01-22 RX ADMIN — HEPARIN SODIUM 5000 UNITS: 5000 INJECTION INTRAVENOUS; SUBCUTANEOUS at 20:49

## 2025-01-22 ASSESSMENT — COGNITIVE AND FUNCTIONAL STATUS - GENERAL
WALKING IN HOSPITAL ROOM: A LITTLE
MOVING FROM LYING ON BACK TO SITTING ON SIDE OF FLAT BED WITH BEDRAILS: A LITTLE
MOBILITY SCORE: 24
STANDING UP FROM CHAIR USING ARMS: A LITTLE
CLIMB 3 TO 5 STEPS WITH RAILING: A LITTLE
MOBILITY SCORE: 18
TURNING FROM BACK TO SIDE WHILE IN FLAT BAD: A LITTLE
MOVING TO AND FROM BED TO CHAIR: A LITTLE
DAILY ACTIVITIY SCORE: 24

## 2025-01-22 ASSESSMENT — PAIN SCALES - GENERAL
PAINLEVEL_OUTOF10: 0 - NO PAIN
PAINLEVEL_OUTOF10: 0 - NO PAIN

## 2025-01-22 ASSESSMENT — PAIN - FUNCTIONAL ASSESSMENT
PAIN_FUNCTIONAL_ASSESSMENT: 0-10
PAIN_FUNCTIONAL_ASSESSMENT: 0-10

## 2025-01-22 NOTE — PROGRESS NOTES
"Regis Mixon is a 64 y.o. male on day 12 of admission presenting with Cerebellar mass.    Subjective   No acute events overnight, reported good appetite and walking around, plans to shower this AM    Objective     Physical Exam  A&Ox3  Face symmetric  hypophonic  RUE 5/5  LUE 5/5  RLE 5/5  LLE 5/5  Sensation intact to light touch throughout all extremities  Incision c/d/I    Last Recorded Vitals  Blood pressure 123/80, pulse 72, temperature 36.5 °C (97.7 °F), temperature source Temporal, resp. rate 17, height 1.778 m (5' 10\"), weight 107 kg (235 lb 7.2 oz), SpO2 92%.  Intake/Output last 3 Shifts:  I/O last 3 completed shifts:  In: 1400 (13.1 mL/kg) [P.O.:1400]  Out: 0 (0 mL/kg)   Weight: 106.8 kg     Relevant Results   This patient has a central line   Reason for the central line remaining today? Parenteral medication    This patient has a urinary catheter   Reason for the urinary catheter remaining today? Urine catheter unnecessary, will be removed today               Assessment/Plan   Assessment & Plan  Cerebellar mass    Osteoarthritis    Chronic low back pain    Chronic neck pain    Regis Mixon is a 64 y.o. male with h/o L cerebellar of hemangioblastoma s/p resection (2003), HTN, HLD, obesity, PMR p/w dizziness x6w, CTH 5.5x5 cm L cerebellar lesion, MRI 5.3 x 2.6cm  L cerebellar cystic mass, C3-4 dorsal cord lesion, 4th effacement, CT CAP multiple b/l pulmonary nodules, multiple b/l renal cysts, 1/12 MRI CTL spine w/o did not tolerate XIAO, expansile C3 intramedullary lesion, multiple thoracic/lumbar vertebral body lesions, 1/13 angio 1 muscular branch from L V3 supplying tumor, did not embolize d/t tortuosity, CTA H/N L cerebellar mass w/ L AICA and PICA feeders     1/14 MRI C-spine w/ intramedullary enhancing mass at C3-C4, TTE EF 55-60% without wall motion abnormalities=, no PFO, normal diastolic filling  1/16 s/p L redo retrosig crani for tumor resection, with intra-op EVD, CTH POC with EVD tract " hemorrhage/IVH  1/18 CTH stable  1/19 EVD dc'd, ENT scope immobile L vocal cord    Recs  MILAGRO (q4 NC at night)  SBP   Possibly increase antihypertensives today  Dex taper  Needs shower and to wash head  ENT recs - poss vocal cord injection planning  Neuro onc recs  Genetics eval for [facility] as OP  Urology recs- OP follow up for renal cyst  PTOT-rehab (referral sent 1/17)  SCDs, SQH 5q8      Salima Jennings MD

## 2025-01-22 NOTE — PROGRESS NOTES
ENT DAILY PROGRESS NOTE  Name: Regis Mixon  MRN: [id number]  : 1960    Identification Statement  The patient is a 64 y.o. male with past medical history significant for but not limited to cerebellar hemangioblastoma s/p resection  who underwent redo retrosig crani for tumor resection on 25. ENT initially consulted for dysphonia following surgery. Flexible nasolaryngoscopy performed on 25 demonstrated left immobile cord, glottic gap and hooding of left arytenoid.     Subjective  Patient seen and examined. He reports improvement in voice quality. He denies shortness of breath. Denies fevers, nausea, vomiting. He reports tolerating a regular diet. Denies dysphagia.     Objective  Temp:  [36.2 °C (97.2 °F)-36.6 °C (97.9 °F)] 36.6 °C (97.9 °F)  Heart Rate:  [65-87] 74  Resp:  [13-21] 19  BP: (114-143)/(73-98) 123/90  Gen: Alert, awake, conversational, no acute distress  Resp: Breathing comfortably on room air, no stridor  Voice: intermittent breathiness mixed with moderate level hoarseness  Head and Face: healing scalp incision  Oral Cavity: MMM  Ears: Normal external ears, hearing intact to spoken voice   Nose: External nose midline   Neck: trachea midline, no tenderness to palpation  Neuro: CN VII symmetric and intact bilaterally, facial sensation intact bilaterally  Psych: appropriate mood and affect        Intake/Output Summary (Last 24 hours) at 2025 1025  Last data filed at 2025 0000  Gross per 24 hour   Intake 350 ml   Output 750 ml   Net -400 ml       Labs  Results for orders placed or performed during the hospital encounter of 01/10/25 (from the past 24 hours)   Renal Function Panel   Result Value Ref Range    Glucose 106 (H) 74 - 99 mg/dL    Sodium 136 136 - 145 mmol/L    Potassium 3.7 3.5 - 5.3 mmol/L    Chloride 99 98 - 107 mmol/L    Bicarbonate 29 21 - 32 mmol/L    Anion Gap 12 10 - 20 mmol/L    Urea Nitrogen 32 (H) 6 - 23 mg/dL    Creatinine 0.83 0.50 - 1.30 mg/dL    eGFR  >90 >60 mL/min/1.73m*2    Calcium 9.5 8.6 - 10.6 mg/dL    Phosphorus 4.0 2.5 - 4.9 mg/dL    Albumin 3.7 3.4 - 5.0 g/dL   CBC   Result Value Ref Range    WBC 12.9 (H) 4.4 - 11.3 x10*3/uL    nRBC 0.0 0.0 - 0.0 /100 WBCs    RBC 4.94 4.50 - 5.90 x10*6/uL    Hemoglobin 14.4 13.5 - 17.5 g/dL    Hematocrit 41.9 41.0 - 52.0 %    MCV 85 80 - 100 fL    MCH 29.1 26.0 - 34.0 pg    MCHC 34.4 32.0 - 36.0 g/dL    RDW 12.8 11.5 - 14.5 %    Platelets 270 150 - 450 x10*3/uL       Assessment  Regis Mixon is a 64 y.o. male with past medical history significant for but not limited to cerebellar hemangioblastoma s/p resection 20034 who underwent redo retrosig crani for tumor resection on 1/16/25. ENT initially consulted for dysphonia following surgery. Flexible nasolaryngoscopy performed on 1/20/25 demonstrated left immobile cord, glottic gap and hooding of left arytenoid. Patient reports improving voice quality since initial examination. Denies shortness of breath. SpO2 93% on room air. He reports tolerating a full diet.     Plan:  -continued care per primary  -ENT will plan to perform repeat flexible nasolaryngoscopy with staff attending on 1/22/25  -further recommendations pending bedside flexible nasolaryngoscopy    Patient and plan discussed with staff attending, Dr. Javed.     Lory Kelly PA-C  Otolaryngology- Head & Neck Surgery    ENT Consult pager: [phone]  Please page if urgent

## 2025-01-22 NOTE — PROGRESS NOTES
Transitional Care Coordination Progress Note:  This nurse met with pt and his family, advised that precert for  [facility] was submitted yesterday, auth pending. This nurse will continue to update as appropriate.     Becky Murillo RN, BSN  Transitional Care Coordinator  Office: [phone]  Secure chat via Haiku

## 2025-01-22 NOTE — PROGRESS NOTES
Physical Therapy    Physical Therapy Treatment    Patient Name: Regis Mixon  MRN: [id number]  Department: [facility] [address]  Room: [address] [place] [address]  Today's Date: 1/22/2025  Time Calculation  Start Time: 1405  Stop Time: 1446  Time Calculation (min): 41 min    Assessment/Plan   PT Assessment  Barriers to Discharge Home: Physical needs  Physical Needs: High falls risk due to function or environment  Evaluation/Treatment Tolerance: Patient tolerated treatment well  Medical Staff Made Aware: Yes  End of Session Communication: Bedside nurse, Care Coordinator  Assessment Comment: Pt motivated with excellent effort.  Making steady progress but still with significant balance deficits.  FGA 13/30 indicating high falls risk.  Remains appropriate for high intensity PT at time of d/c.  PT Plan  Inpatient/Swing Bed or Outpatient: Inpatient  PT Plan  Treatment/Interventions: Bed mobility, Transfer training, Gait training, Stair training, Balance training, Neuromuscular re-education, Strengthening, Endurance training, Therapeutic exercise, Therapeutic activity, Home exercise program, Postural re-education  PT Plan: Ongoing PT  PT Frequency: 5 times per week  PT Discharge Recommendations: High intensity level of continued care  Equipment Recommended upon Discharge: Wheeled walker  PT Recommended Transfer Status: Assist x1, Assistive device  PT - OK to Discharge: Yes (When medically ready)    General Visit Information:   PT  Visit  PT Received On: 01/22/25  Response to Previous Treatment: Patient with no complaints from previous session.  General  Family/Caregiver Present: Yes  Caregiver Feedback: wife present and supportive  Prior to Session Communication: Bedside nurse  Patient Position Received: Bed, 3 rail up, Alarm off, not on at start of session, Alarm off, caregiver present  General Comment: Pt pleasant, cooperative and agreeable to PT. Expressing frustration with being in hospital and wanting to go home. Able to ambulate in juares with and  without walker, and begin stair training.  Tolerated well.    Subjective   Precautions:  Precautions  Medical Precautions: Fall precautions    Objective   Pain:  Pain Assessment  Pain Assessment: 0-10  0-10 (Numeric) Pain Score: 0 - No pain  Cognition:  Cognition  Overall Cognitive Status: Within Functional Limits  Arousal/Alertness: Appropriate responses to stimuli  Orientation Level: Oriented X4  Following Commands: Follows all commands and directions without difficulty  Attention: Within Functional Limits    Activity Tolerance:  Activity Tolerance  Endurance: Endurance does not limit participation in activity  Treatments:    Balance/Neuromuscular Re-Education  Balance/Neuromuscular Re-Education Activity 1: Performed multiple balance challenges during ambulation including head turns, changes in speed, tandem walking, walking with eyes closed and walking backward.  With and without FWW.  Pt requiring Min A with occasional Mod A while using FWW with balance challgenges.  More consistent need for Mod A without use of FWW during balance challenges    Bed Mobility 1  Bed Mobility 1: Supine to sitting, Sitting to supine  Level of Assistance 1: Close supervision    Ambulation/Gait Training 1  Surface 1: Level tile  Device 1: Rolling walker  Assistance 1: Contact guard (Primarily CGA with variable Min/Mod A during balance challenges)  Quality of Gait 1: Narrow base of support, Decreased step length (increased lateral sway and path deviation.  LOB with balance challenges requiring mostly Min A, and once time Mod A)  Comments/Distance (ft) 1: 50 feet x4  Ambulation/Gait Training 2  Surface 2: Level tile  Device 2: No device  Gait Support Devices: Gait belt  Quality of Gait 2: Wide base of support, Diminished heel strike, Decreased step length (Primarily Min A, however requiring frequent Mod A for LOB with balance challenges resulting in stumbling and lateral LOB)  Comments/Distance (ft) 2: 50 feet x4  Transfer 1  Transfer  From 1: Sit to, Stand to  Transfer to 1: Sit  Transfer Level of Assistance 1: Close supervision, Minimal verbal cues  Trials/Comments 1: 5x sit to stand with arms crossed on chest, no FWW    Outcome Measures:    [facility] Basic Mobility  Turning from your back to your side while in a flat bed without using bedrails: A little  Moving from lying on your back to sitting on the side of a flat bed without using bedrails: A little  Moving to and from bed to chair (including a wheelchair): A little  Standing up from a chair using your arms (e.g. wheelchair or bedside chair): A little  To walk in hospital room: A little  Climbing 3-5 steps with railing: A little  Basic Mobility - Total Score: 18    FGA - Functional Gait Assessment  Gait level surface: 2  Change in gait speed: 2  Gait with horizontal head turns: 1  Gait with vertical head turns: 0  Gait and pivot turn: 2  Step over obstacle: 1  Gait with narrow base of support: 1  Gait with eyes closed: 1  Ambulating backwards: 1  Steps: 2  FGA Total Score: 13    Other Measures  5x Sit to Stand: Performs in 9.4 sec, no use of arms. Performed from juares bench. Pt has improved past the 12 second cut off for high falls risk, but remains below norm for his age group (7.8 sec)    Education Documentation  Body Mechanics, taught by Francesco Leslie, PT at 1/22/2025  3:34 PM.  Learner: Patient  Readiness: Eager  Method: Explanation  Response: Verbalizes Understanding    Mobility Training, taught by Francesco Leslie, PT at 1/22/2025  3:34 PM.  Learner: Patient  Readiness: Eager  Method: Explanation  Response: Verbalizes Understanding    Education Comments  No comments found.      OP EDUCATION:       Encounter Problems       Encounter Problems (Active)       PT Problem       Patient will perform bed mobility with </= close sup to reduce risk of developing decubitus ulcers.  (Met)       Start:  01/17/25    Expected End:  01/31/25    Resolved:  01/20/25    Updated to: Patient will perform  bed mobility IND to reduce risk of developing decubitus ulcers.    Update reason: Goal met          Patient will perform sit to stand and stand to sit transfers with </= close sup and LRD to increase functional strength.  (Met)       Start:  01/17/25    Expected End:  01/31/25    Resolved:  01/20/25    Updated to: Patient will perform sit to stand and stand to sit transfers MOD-I with LRD to increase functional strength.    Update reason: Goal met          Patient will ambulate at least 150  ft. with </= close sup and LRD to improve tolerance of community distances.    (Met)       Start:  01/17/25    Expected End:  01/31/25    Resolved:  01/20/25    Updated to: Patient will ambulate at least 1,000  ft. MOD-I with LRD to improve tolerance of community distances.    Update reason: Goal met          Patient will perform the 5-Time Sit to Stand test in <12 sec to indicate decreased falls risk. (Cut-Off Scores: Balance/Vestibular: >14.2 sec if over 60 y.o. Chronic Stroke: >12 sec. MCID: >2.3 sec balance/vestibular.)  (Met)       Start:  01/17/25    Expected End:  01/31/25    Resolved:  01/22/25         Patient will ascend and descend 1 platform step with LRD and </= close sup to facilitate safe navigation of stairs in the home.    (Progressing)       Start:  01/17/25    Expected End:  01/31/25            Patient will perform bed mobility IND to reduce risk of developing decubitus ulcers. (Progressing)       Start:  01/20/25    Expected End:  01/31/25                Patient will perform sit to stand and stand to sit transfers MOD-I with LRD to increase functional strength. (Progressing)       Start:  01/20/25    Expected End:  01/31/25                Patient will ambulate at least 1,000  ft. MOD-I with LRD to improve tolerance of community distances. (Progressing)       Start:  01/20/25    Expected End:  01/31/25                Patient will score >/= 22/30 points on the Functional Gait Assessment to indicate decreased  risk of falling. (MDC: 5 points stroke, 6 points vestibular, 4 points Parkinson's Disease)  (Progressing)       Start:  01/20/25    Expected End:  01/31/25                   Pain - Adult

## 2025-01-23 LAB
ALBUMIN SERPL BCP-MCNC: 3.8 G/DL (ref 3.4–5)
ALBUMIN SERPL BCP-MCNC: 3.9 G/DL (ref 3.4–5)
ANION GAP SERPL CALC-SCNC: 12 MMOL/L (ref 10–20)
ANION GAP SERPL CALC-SCNC: 12 MMOL/L (ref 10–20)
BUN SERPL-MCNC: 35 MG/DL (ref 6–23)
BUN SERPL-MCNC: 36 MG/DL (ref 6–23)
CALCIUM SERPL-MCNC: 9.1 MG/DL (ref 8.6–10.6)
CALCIUM SERPL-MCNC: 9.3 MG/DL (ref 8.6–10.6)
CHLORIDE SERPL-SCNC: 95 MMOL/L (ref 98–107)
CHLORIDE SERPL-SCNC: 99 MMOL/L (ref 98–107)
CO2 SERPL-SCNC: 24 MMOL/L (ref 21–32)
CO2 SERPL-SCNC: 29 MMOL/L (ref 21–32)
CREAT SERPL-MCNC: 0.89 MG/DL (ref 0.5–1.3)
CREAT SERPL-MCNC: 0.97 MG/DL (ref 0.5–1.3)
EGFRCR SERPLBLD CKD-EPI 2021: 88 ML/MIN/1.73M*2
EGFRCR SERPLBLD CKD-EPI 2021: >90 ML/MIN/1.73M*2
ERYTHROCYTE [DISTWIDTH] IN BLOOD BY AUTOMATED COUNT: 12.4 % (ref 11.5–14.5)
GLUCOSE SERPL-MCNC: 106 MG/DL (ref 74–99)
GLUCOSE SERPL-MCNC: 107 MG/DL (ref 74–99)
HCT VFR BLD AUTO: 42.6 % (ref 41–52)
HGB BLD-MCNC: 14.5 G/DL (ref 13.5–17.5)
LAB AP ASR DISCLAIMER: NORMAL
LABORATORY COMMENT REPORT: NORMAL
MCH RBC QN AUTO: 29.5 PG (ref 26–34)
MCHC RBC AUTO-ENTMCNC: 34 G/DL (ref 32–36)
MCV RBC AUTO: 87 FL (ref 80–100)
NRBC BLD-RTO: 0 /100 WBCS (ref 0–0)
OSMOLALITY SERPL: 290 MOSM/KG (ref 280–300)
OSMOLALITY UR: 913 MOSM/KG (ref 200–1200)
PATH REPORT.FINAL DX SPEC: NORMAL
PATH REPORT.GROSS SPEC: NORMAL
PATH REPORT.RELEVANT HX SPEC: NORMAL
PATH REPORT.TOTAL CANCER: NORMAL
PHOSPHATE SERPL-MCNC: 3.7 MG/DL (ref 2.5–4.9)
PHOSPHATE SERPL-MCNC: 3.8 MG/DL (ref 2.5–4.9)
PLATELET # BLD AUTO: 290 X10*3/UL (ref 150–450)
POTASSIUM SERPL-SCNC: 3.7 MMOL/L (ref 3.5–5.3)
POTASSIUM SERPL-SCNC: 4 MMOL/L (ref 3.5–5.3)
RBC # BLD AUTO: 4.92 X10*6/UL (ref 4.5–5.9)
SODIUM SERPL-SCNC: 131 MMOL/L (ref 136–145)
SODIUM SERPL-SCNC: 132 MMOL/L (ref 136–145)
SP GR UR STRIP.AUTO: 1.01
WBC # BLD AUTO: 12.1 X10*3/UL (ref 4.4–11.3)

## 2025-01-23 PROCEDURE — 2060000001 HC INTERMEDIATE ICU ROOM DAILY

## 2025-01-23 PROCEDURE — 2500000001 HC RX 250 WO HCPCS SELF ADMINISTERED DRUGS (ALT 637 FOR MEDICARE OP)

## 2025-01-23 PROCEDURE — 83930 ASSAY OF BLOOD OSMOLALITY: CPT

## 2025-01-23 PROCEDURE — 85027 COMPLETE CBC AUTOMATED: CPT

## 2025-01-23 PROCEDURE — 36415 COLL VENOUS BLD VENIPUNCTURE: CPT

## 2025-01-23 PROCEDURE — 2500000004 HC RX 250 GENERAL PHARMACY W/ HCPCS (ALT 636 FOR OP/ED): Performed by: PHYSICIAN ASSISTANT

## 2025-01-23 PROCEDURE — 81003 URINALYSIS AUTO W/O SCOPE: CPT

## 2025-01-23 PROCEDURE — 2500000004 HC RX 250 GENERAL PHARMACY W/ HCPCS (ALT 636 FOR OP/ED)

## 2025-01-23 PROCEDURE — 83935 ASSAY OF URINE OSMOLALITY: CPT

## 2025-01-23 PROCEDURE — 82436 ASSAY OF URINE CHLORIDE: CPT

## 2025-01-23 PROCEDURE — 80069 RENAL FUNCTION PANEL: CPT

## 2025-01-23 RX ADMIN — SENNOSIDES AND DOCUSATE SODIUM 2 TABLET: 50; 8.6 TABLET ORAL at 21:39

## 2025-01-23 RX ADMIN — HEPARIN SODIUM 5000 UNITS: 5000 INJECTION INTRAVENOUS; SUBCUTANEOUS at 14:58

## 2025-01-23 RX ADMIN — PANTOPRAZOLE SODIUM 40 MG: 40 TABLET, DELAYED RELEASE ORAL at 05:29

## 2025-01-23 RX ADMIN — PRAVASTATIN SODIUM 40 MG: 40 TABLET ORAL at 21:50

## 2025-01-23 RX ADMIN — DEXAMETHASONE 2 MG: 2 TABLET ORAL at 05:29

## 2025-01-23 RX ADMIN — HEPARIN SODIUM 5000 UNITS: 5000 INJECTION INTRAVENOUS; SUBCUTANEOUS at 21:39

## 2025-01-23 RX ADMIN — CARVEDILOL 37.5 MG: 12.5 TABLET, FILM COATED ORAL at 21:39

## 2025-01-23 RX ADMIN — SODIUM CHLORIDE 1000 ML: 0.9 INJECTION, SOLUTION INTRAVENOUS at 18:06

## 2025-01-23 RX ADMIN — CARVEDILOL 37.5 MG: 12.5 TABLET, FILM COATED ORAL at 08:13

## 2025-01-23 RX ADMIN — HEPARIN SODIUM 5000 UNITS: 5000 INJECTION INTRAVENOUS; SUBCUTANEOUS at 05:29

## 2025-01-23 RX ADMIN — LISINOPRIL 20 MG: 20 TABLET ORAL at 08:12

## 2025-01-23 RX ADMIN — HYDROCHLOROTHIAZIDE 25 MG: 25 TABLET ORAL at 08:12

## 2025-01-23 RX ADMIN — LEVETIRACETAM 500 MG: 500 TABLET, FILM COATED ORAL at 21:39

## 2025-01-23 RX ADMIN — DEXAMETHASONE 2 MG: 2 TABLET ORAL at 14:58

## 2025-01-23 RX ADMIN — DEXAMETHASONE 2 MG: 2 TABLET ORAL at 21:39

## 2025-01-23 RX ADMIN — LEVETIRACETAM 500 MG: 500 TABLET, FILM COATED ORAL at 08:13

## 2025-01-23 ASSESSMENT — PAIN SCALES - GENERAL: PAINLEVEL_OUTOF10: 0 - NO PAIN

## 2025-01-23 NOTE — CARE PLAN
The patient's goals for the shift include  getting adequate rest to promote healing.    The clinical goals for the shift include pt will remain hemodynamically stable throughout the shift

## 2025-01-23 NOTE — PROGRESS NOTES
"Regis Mixon is a 64 y.o. male on day 13 of admission presenting with Cerebellar mass.    Subjective   No acute events overnight, showered yesterday, been out of bed, pain controlled    Objective     Physical Exam  A&Ox3  Face symmetric  hypophonic  RUE 5/5  LUE 5/5  RLE 5/5  LLE 5/5  Sensation intact to light touch throughout all extremities  Incision c/d/I    Last Recorded Vitals  Blood pressure (!) 119/96, pulse 73, temperature 36.5 °C (97.7 °F), temperature source Temporal, resp. rate 17, height 1.778 m (5' 10\"), weight 107 kg (235 lb 7.2 oz), SpO2 95%.  Intake/Output last 3 Shifts:  I/O last 3 completed shifts:  In: 500 (4.7 mL/kg) [P.O.:500]  Out: 750 (7 mL/kg) [Urine:750 (0.2 mL/kg/hr)]  Weight: 106.8 kg     Relevant Results   This patient has a central line   Reason for the central line remaining today? Parenteral medication    This patient has a urinary catheter   Reason for the urinary catheter remaining today? Urine catheter unnecessary, will be removed today               Assessment/Plan   Assessment & Plan  Cerebellar mass    Osteoarthritis    Chronic low back pain    Chronic neck pain    Regis Mixon is a 64 y.o. male with h/o L cerebellar of hemangioblastoma s/p resection (2003), HTN, HLD, obesity, PMR p/w dizziness x6w, CTH 5.5x5 cm L cerebellar lesion, MRI 5.3 x 2.6cm  L cerebellar cystic mass, C3-4 dorsal cord lesion, 4th effacement, CT CAP multiple b/l pulmonary nodules, multiple b/l renal cysts, 1/12 MRI CTL spine w/o did not tolerate XIAO, expansile C3 intramedullary lesion, multiple thoracic/lumbar vertebral body lesions, 1/13 angio 1 muscular branch from L V3 supplying tumor, did not embolize d/t tortuosity, CTA H/N L cerebellar mass w/ L AICA and PICA feeders     1/14 MRI C-spine w/ intramedullary enhancing mass at C3-C4, TTE EF 55-60% without wall motion abnormalities=, no PFO, normal diastolic filling  1/16 s/p L redo retrosig crani for tumor resection, with intra-op EVD, CTH POC with EVD " tract hemorrhage/IVH  1/18 CTH stable  1/19 EVD dc'd, ENT scope immobile L vocal cord    Recs  MILAGRO (q4 NC at night), will consider downgrading to tele  SBP   Dex taper  Neuro onc recs  Genetics eval for [facility] as OP  Urology recs- OP follow up for renal cyst  PTOT-rehab (referral sent 1/17)  SCDs, [facility] 5q8      Francesco Franco MD

## 2025-01-23 NOTE — PROGRESS NOTES
ENT DAILY PROGRESS NOTE  Name: Regis Mixon  MRN: [id number]  : 1960    Identification Statement  The patient is a 64 y.o. male with past medical history significant for but not limited to cerebellar hemangioblastoma s/p resection  who underwent redo retrosig crani for tumor resection on 25. ENT initially consulted for dysphonia following surgery. Flexible nasolaryngoscopy performed on 25 demonstrated left immobile cord, glottic gap and hooding of left arytenoid.     Subjective  Patient seen and examined. He reports improvement in voice quality. He denies shortness of breath. Denies fevers, nausea, vomiting. He reports tolerating a regular diet. Denies dysphagia.  Update p.m. staffing: Patient feels voice is mildly improved.  He is not concerned and just wants to leave the hospital.  He is refusing any further nasolaryngoscopy.  He would just like to follow-up outpatient.    Objective  Temp:  [36.4 °C (97.5 °F)-36.8 °C (98.2 °F)] 36.8 °C (98.2 °F)  Heart Rate:  [65-83] 76  Resp:  [13-21] 19  BP: (114-143)/(73-98) 128/82  Gen: Alert, awake, conversational, no acute distress  Resp: Breathing comfortably on room air, no stridor  Voice: intermittent breathiness mixed with moderate level hoarseness  Head and Face: healing scalp incision  Oral Cavity: MMM  Ears: Normal external ears, hearing intact to spoken voice   Nose: External nose midline   Neck: trachea midline, no tenderness to palpation  Neuro: CN VII symmetric and intact bilaterally, facial sensation intact bilaterally  Psych: appropriate mood and affect        Intake/Output Summary (Last 24 hours) at 2025  Last data filed at 2025 0000  Gross per 24 hour   Intake --   Output 750 ml   Net -750 ml       Labs  Results for orders placed or performed during the hospital encounter of 01/10/25 (from the past 24 hours)   Renal Function Panel   Result Value Ref Range    Glucose 106 (H) 74 - 99 mg/dL    Sodium 136 136 - 145 mmol/L     Potassium 3.7 3.5 - 5.3 mmol/L    Chloride 99 98 - 107 mmol/L    Bicarbonate 29 21 - 32 mmol/L    Anion Gap 12 10 - 20 mmol/L    Urea Nitrogen 32 (H) 6 - 23 mg/dL    Creatinine 0.83 0.50 - 1.30 mg/dL    eGFR >90 >60 mL/min/1.73m*2    Calcium 9.5 8.6 - 10.6 mg/dL    Phosphorus 4.0 2.5 - 4.9 mg/dL    Albumin 3.7 3.4 - 5.0 g/dL   CBC   Result Value Ref Range    WBC 12.9 (H) 4.4 - 11.3 x10*3/uL    nRBC 0.0 0.0 - 0.0 /100 WBCs    RBC 4.94 4.50 - 5.90 x10*6/uL    Hemoglobin 14.4 13.5 - 17.5 g/dL    Hematocrit 41.9 41.0 - 52.0 %    MCV 85 80 - 100 fL    MCH 29.1 26.0 - 34.0 pg    MCHC 34.4 32.0 - 36.0 g/dL    RDW 12.8 11.5 - 14.5 %    Platelets 270 150 - 450 x10*3/uL       Assessment  Regis Mixon is a 64 y.o. male with past medical history significant for but not limited to cerebellar hemangioblastoma s/p resection 20034 who underwent redo retrosig crani for tumor resection on 1/16/25. ENT initially consulted for dysphonia following surgery. Flexible nasolaryngoscopy performed on 1/20/25 demonstrated left immobile cord, glottic gap and hooding of left arytenoid. Patient reports improving voice quality since initial examination. Denies shortness of breath. SpO2 93% on room air. He reports tolerating a full diet.     Plan:  -continued care per primary  -Follow ENT outpatient, to be scheduled by our staff    Seen and discussed with Dr. Javed who agrees with assessment and plan.     Amilcar Flood, PGY-2  Rotating Resident, ENT    ENT Consult pager: [phone]  ENT Peds pager: [phone]  ENT Head & Neck Surgery Phone: [phone]  ENT subspecialty team: Emeli individual resident who wrote today's note  ENT Outpatient scheduling number: [phone]  Please Page [phone] or call [phone] if Urgent

## 2025-01-23 NOTE — PROGRESS NOTES
Transitional Care Coordination Progress Note:  Plan per Medical/Surgical team: Pt to have a CT today.  Discharge Disposition:  [facility]  Potential Barriers: none  ADOD: 1/25    This nurse advised by pts bedside nurse that his wife would like to speak with this nurse regarding discharge planning.  Telephone call to pts wife, advised that auth pending, awaiting update from  [facility].  Pts wife requesting to speak with someone from the team Neurosurgery team notified.     Becky Murillo RN, BSN  Transitional Care Coordinator  Office: [phone]  Secure chat via Haiku

## 2025-01-24 ENCOUNTER — APPOINTMENT (OUTPATIENT)
Dept: RADIOLOGY | Facility: HOSPITAL | Age: 65
DRG: 025 | End: 2025-01-24
Payer: COMMERCIAL

## 2025-01-24 LAB
ALBUMIN SERPL BCP-MCNC: 3.6 G/DL (ref 3.4–5)
ANION GAP SERPL CALC-SCNC: 14 MMOL/L (ref 10–20)
BUN SERPL-MCNC: 35 MG/DL (ref 6–23)
CALCIUM SERPL-MCNC: 9.2 MG/DL (ref 8.6–10.6)
CHLORIDE SERPL-SCNC: 96 MMOL/L (ref 98–107)
CHLORIDE UR-SCNC: 160 MMOL/L
CHLORIDE/CREATININE (MMOL/G) IN URINE: 162 MMOL/G CREAT (ref 23–275)
CO2 SERPL-SCNC: 25 MMOL/L (ref 21–32)
CREAT SERPL-MCNC: 0.84 MG/DL (ref 0.5–1.3)
CREAT UR-MCNC: 98.5 MG/DL (ref 20–370)
EGFRCR SERPLBLD CKD-EPI 2021: >90 ML/MIN/1.73M*2
ERYTHROCYTE [DISTWIDTH] IN BLOOD BY AUTOMATED COUNT: 12.2 % (ref 11.5–14.5)
GLUCOSE SERPL-MCNC: 109 MG/DL (ref 74–99)
HCT VFR BLD AUTO: 41.8 % (ref 41–52)
HGB BLD-MCNC: 14.2 G/DL (ref 13.5–17.5)
MCH RBC QN AUTO: 29 PG (ref 26–34)
MCHC RBC AUTO-ENTMCNC: 34 G/DL (ref 32–36)
MCV RBC AUTO: 86 FL (ref 80–100)
NRBC BLD-RTO: 0 /100 WBCS (ref 0–0)
PHOSPHATE SERPL-MCNC: 3.6 MG/DL (ref 2.5–4.9)
PLATELET # BLD AUTO: 306 X10*3/UL (ref 150–450)
POTASSIUM SERPL-SCNC: 4 MMOL/L (ref 3.5–5.3)
POTASSIUM UR-SCNC: 34 MMOL/L
POTASSIUM/CREAT UR-RTO: 35 MMOL/G CREAT
RBC # BLD AUTO: 4.89 X10*6/UL (ref 4.5–5.9)
SODIUM SERPL-SCNC: 131 MMOL/L (ref 136–145)
SODIUM UR-SCNC: 199 MMOL/L
SODIUM/CREAT UR-RTO: 202 MMOL/G CREAT
WBC # BLD AUTO: 11.3 X10*3/UL (ref 4.4–11.3)

## 2025-01-24 PROCEDURE — 80069 RENAL FUNCTION PANEL: CPT | Performed by: NURSE PRACTITIONER

## 2025-01-24 PROCEDURE — 36415 COLL VENOUS BLD VENIPUNCTURE: CPT | Performed by: NURSE PRACTITIONER

## 2025-01-24 PROCEDURE — 2500000001 HC RX 250 WO HCPCS SELF ADMINISTERED DRUGS (ALT 637 FOR MEDICARE OP): Performed by: STUDENT IN AN ORGANIZED HEALTH CARE EDUCATION/TRAINING PROGRAM

## 2025-01-24 PROCEDURE — 1100000001 HC PRIVATE ROOM DAILY

## 2025-01-24 PROCEDURE — 2500000004 HC RX 250 GENERAL PHARMACY W/ HCPCS (ALT 636 FOR OP/ED)

## 2025-01-24 PROCEDURE — 70450 CT HEAD/BRAIN W/O DYE: CPT

## 2025-01-24 PROCEDURE — 2500000001 HC RX 250 WO HCPCS SELF ADMINISTERED DRUGS (ALT 637 FOR MEDICARE OP)

## 2025-01-24 PROCEDURE — 70450 CT HEAD/BRAIN W/O DYE: CPT | Performed by: RADIOLOGY

## 2025-01-24 PROCEDURE — 97530 THERAPEUTIC ACTIVITIES: CPT | Mod: GP

## 2025-01-24 PROCEDURE — 85027 COMPLETE CBC AUTOMATED: CPT | Performed by: NURSE PRACTITIONER

## 2025-01-24 PROCEDURE — 97116 GAIT TRAINING THERAPY: CPT | Mod: GP

## 2025-01-24 RX ORDER — CARVEDILOL 12.5 MG/1
37.5 TABLET ORAL 2 TIMES DAILY
Status: CANCELLED
Start: 2025-01-24

## 2025-01-24 RX ORDER — CARVEDILOL 25 MG/1
25 TABLET ORAL 2 TIMES DAILY
Status: DISCONTINUED | OUTPATIENT
Start: 2025-01-24 | End: 2025-01-27

## 2025-01-24 RX ADMIN — ERGOCALCIFEROL 1250 MCG: 1.25 CAPSULE ORAL at 08:49

## 2025-01-24 RX ADMIN — LEVETIRACETAM 500 MG: 500 TABLET, FILM COATED ORAL at 08:51

## 2025-01-24 RX ADMIN — POLYETHYLENE GLYCOL 3350 17 G: 17 POWDER, FOR SOLUTION ORAL at 08:50

## 2025-01-24 RX ADMIN — HEPARIN SODIUM 5000 UNITS: 5000 INJECTION INTRAVENOUS; SUBCUTANEOUS at 13:49

## 2025-01-24 RX ADMIN — SENNOSIDES AND DOCUSATE SODIUM 2 TABLET: 50; 8.6 TABLET ORAL at 08:49

## 2025-01-24 RX ADMIN — CARVEDILOL 37.5 MG: 12.5 TABLET, FILM COATED ORAL at 08:49

## 2025-01-24 RX ADMIN — SENNOSIDES AND DOCUSATE SODIUM 2 TABLET: 50; 8.6 TABLET ORAL at 21:19

## 2025-01-24 RX ADMIN — DEXAMETHASONE 2 MG: 2 TABLET ORAL at 05:00

## 2025-01-24 RX ADMIN — PRAVASTATIN SODIUM 40 MG: 40 TABLET ORAL at 21:26

## 2025-01-24 RX ADMIN — DEXAMETHASONE 2 MG: 2 TABLET ORAL at 21:18

## 2025-01-24 RX ADMIN — HEPARIN SODIUM 5000 UNITS: 5000 INJECTION INTRAVENOUS; SUBCUTANEOUS at 21:19

## 2025-01-24 RX ADMIN — HEPARIN SODIUM 5000 UNITS: 5000 INJECTION INTRAVENOUS; SUBCUTANEOUS at 05:00

## 2025-01-24 RX ADMIN — ACETAMINOPHEN 650 MG: 325 TABLET ORAL at 21:18

## 2025-01-24 RX ADMIN — LEVETIRACETAM 500 MG: 500 TABLET, FILM COATED ORAL at 21:18

## 2025-01-24 RX ADMIN — CARVEDILOL 25 MG: 25 TABLET, FILM COATED ORAL at 21:18

## 2025-01-24 RX ADMIN — DEXAMETHASONE 2 MG: 2 TABLET ORAL at 13:49

## 2025-01-24 RX ADMIN — LISINOPRIL 20 MG: 20 TABLET ORAL at 08:50

## 2025-01-24 RX ADMIN — PANTOPRAZOLE SODIUM 40 MG: 40 TABLET, DELAYED RELEASE ORAL at 05:00

## 2025-01-24 ASSESSMENT — COGNITIVE AND FUNCTIONAL STATUS - GENERAL
DAILY ACTIVITIY SCORE: 23
MOVING TO AND FROM BED TO CHAIR: A LITTLE
MOBILITY SCORE: 24
WALKING IN HOSPITAL ROOM: A LITTLE
STANDING UP FROM CHAIR USING ARMS: A LITTLE
TURNING FROM BACK TO SIDE WHILE IN FLAT BAD: A LITTLE
WALKING IN HOSPITAL ROOM: A LITTLE
MOVING FROM LYING ON BACK TO SITTING ON SIDE OF FLAT BED WITH BEDRAILS: A LITTLE
CLIMB 3 TO 5 STEPS WITH RAILING: A LITTLE
TOILETING: A LITTLE
MOBILITY SCORE: 21
STANDING UP FROM CHAIR USING ARMS: A LITTLE
DAILY ACTIVITIY SCORE: 24
CLIMB 3 TO 5 STEPS WITH RAILING: A LITTLE
MOBILITY SCORE: 18

## 2025-01-24 ASSESSMENT — PAIN SCALES - GENERAL
PAINLEVEL_OUTOF10: 3
PAINLEVEL_OUTOF10: 0 - NO PAIN

## 2025-01-24 ASSESSMENT — PAIN - FUNCTIONAL ASSESSMENT
PAIN_FUNCTIONAL_ASSESSMENT: 0-10

## 2025-01-24 NOTE — PROGRESS NOTES
"Regis Mixon is a 64 y.o. male on day 14 of admission presenting with Cerebellar mass.    Subjective   No acute events overnight, been out of bed, pain controlled    Objective     Physical Exam  A&Ox3  Face symmetric  Hypophonic (improving)  RUE 5/5  LUE 5/5  RLE 5/5  LLE 5/5  Sensation intact to light touch throughout all extremities  Incision c/d/I    Last Recorded Vitals  Blood pressure 118/80, pulse 72, temperature 36.7 °C (98.1 °F), temperature source Temporal, resp. rate 18, height 1.778 m (5' 10\"), weight 107 kg (235 lb 7.2 oz), SpO2 92%.  Intake/Output last 3 Shifts:  I/O last 3 completed shifts:  In: 972.1 (9.1 mL/kg) [I.V.:972.1 (9.1 mL/kg)]  Out: - (0 mL/kg)   Weight: 106.8 kg     Relevant Results   This patient has a central line   Reason for the central line remaining today? Parenteral medication    This patient has a urinary catheter   Reason for the urinary catheter remaining today? Urine catheter unnecessary, will be removed today               Assessment/Plan   Assessment & Plan  Cerebellar mass    Osteoarthritis    Chronic low back pain    Chronic neck pain    Regis Mixon is a 64 y.o. male with h/o L cerebellar of hemangioblastoma s/p resection (2003), HTN, HLD, obesity, PMR p/w dizziness x6w, CTH 5.5x5 cm L cerebellar lesion, MRI 5.3 x 2.6cm  L cerebellar cystic mass, C3-4 dorsal cord lesion, 4th effacement, CT CAP multiple b/l pulmonary nodules, multiple b/l renal cysts, 1/12 MRI CTL spine w/o did not tolerate XIAO, expansile C3 intramedullary lesion, multiple thoracic/lumbar vertebral body lesions, 1/13 angio 1 muscular branch from L V3 supplying tumor, did not embolize d/t tortuosity, CTA H/N L cerebellar mass w/ L AICA and PICA feeders     1/14 MRI C-spine w/ intramedullary enhancing mass at C3-C4, TTE EF 55-60% without wall motion abnormalities=, no PFO, normal diastolic filling  1/16 s/p L redo retrosig crani for tumor resection, with intra-op EVD, CTH POC with EVD tract " hemorrhage/IVH  1/18 CTH stable  1/19 EVD dc'd, ENT scope immobile L vocal cord    Recs  MILAGRO (q4 NC at night), will consider downgrading to tele  SBP   Dex taper  Neuro onc recs  CTH this AM  AM labs   Genetics eval for [facility] as OP  Urology recs- OP follow up for renal cyst  PTOT-rehab (referral sent 1/17)  SCDs, [facility]      Den Thompson MD    Attending Attestation:    I examined the patient this morning, he is doing well neurologically. His voice is improving. His incisions are flat and healing well. His head CT shows resolution of his IVH. His Na 132, urine electrolytes showing concern for SIADH, we will fluid restrict him and repeat labs in the AM. Plan for rehab tomorrow vs. Monday. He will need a head CT in 2 weeks and follow up in clinic on 2/4.

## 2025-01-24 NOTE — PROGRESS NOTES
Transitional Care Coordination Progress Note:  Per  [facility] pts insurance authorization denied pts rehab stay. Facility to submit for an expedited appeal today. [facility]'t [facility].  This nurse met with patient and notified of the above. SNF level of care discussed with pt, and he denied wanting to discharge to SNF.  Pt requesting to discharge home with [facility] if denial is upheld.  Pts wife updated on the above and would like to await the results of the expedited appeal.    Addendum 1356: Per [facility] pts insurance offering a P2P: can be called into [phone], deadline not provided. Neurosurgery team notified.     Addendum 1554: Per [facility] the soonest P2P can be scheduled is Monday.   This nurse notified pts wife who stated she filed for a family appeal, in addition to  [facility] filing an expedited appeal.   Becky Murillo RN, BSN  Transitional Care Coordinator  Office: [phone]  Secure chat via Haiku

## 2025-01-24 NOTE — PROGRESS NOTES
Physical Therapy    Physical Therapy Treatment    Patient Name: Regis Mixon  MRN: [id number]  Department: [place] [address]  Room: [address] [place] [address]  Today's Date: 1/24/2025  Time Calculation  Start Time: 1448  Stop Time: 1515  Time Calculation (min): 27 min    Assessment/Plan   PT Assessment  Barriers to Discharge Home: Caregiver assistance, Physical needs  Caregiver Assistance: Caregiver assistance needed per identified barriers - however, level of patient's required assistance exceeds assistance available at home  Physical Needs: High falls risk due to function or environment  End of Session Communication: Bedside nurse  Assessment Comment: Pt motivated with excellent effort.  Making steady progress but still with significant balance deficits, leading to high falls risk.  Remains appropriate for high intensity PT at time of d/c.  End of Session Patient Position: Bed, 3 rail up, Alarm off, not on at start of session, Alarm off, caregiver present  PT Plan  Inpatient/Swing Bed or Outpatient: Inpatient  PT Plan  Treatment/Interventions: Bed mobility, Transfer training, Gait training, Stair training, Balance training, Neuromuscular re-education, Strengthening, Endurance training, Therapeutic exercise, Therapeutic activity, Home exercise program, Postural re-education  PT Plan: Ongoing PT  PT Frequency: 5 times per week  PT Discharge Recommendations: High intensity level of continued care  Equipment Recommended upon Discharge: Wheeled walker  PT Recommended Transfer Status: Assist x1  PT - OK to Discharge: Yes (When medically ready)      General Visit Information:   PT  Visit  PT Received On: 01/24/25  Response to Previous Treatment: Patient with no complaints from previous session.  General  Family/Caregiver Present: Yes  Caregiver Feedback: wife present and supportive  Prior to Session Communication: Bedside nurse  Patient Position Received: Bed, 3 rail up, Alarm off, not on at start of session, Alarm off, caregiver  present  Preferred Learning Style: visual  General Comment: Pt pleasant & cooperative.  States has not slept well in past 2 nights, but willing to participate.  Frustrated with long hospitalization.    Subjective   Precautions:  Precautions  Medical Precautions: Fall precautions     Date/Time Vitals Session Patient Position Pulse Resp SpO2 BP MAP (mmHg)    01/24/25 1400 --  --  71  16  92 %  107/80  --     01/24/25 1448 Pre PT  Lying  71  --  91 %  106/73  84                 Objective   Pain:  Pain Assessment  Pain Assessment: 0-10  0-10 (Numeric) Pain Score: 0 - No pain  Cognition:  Cognition  Overall Cognitive Status: Within Functional Limits  Following Commands: Follows all commands and directions without difficulty  Coordination:     Postural Control:  Postural Control  Postural Control: Within Functional Limits  Static Sitting Balance  Static Sitting-Balance Support: Feet supported  Static Sitting-Level of Assistance: Distant supervision  Dynamic Sitting Balance  Dynamic Sitting-Balance Support: Feet supported  Dynamic Sitting-Level of Assistance: Close supervision  Dynamic Sitting-Balance: Lateral lean, Forward lean  Static Standing Balance  Static Standing-Balance Support: Bilateral upper extremity supported  Static Standing-Level of Assistance: Close supervision  Dynamic Standing Balance  Dynamic Standing-Balance Support: Bilateral upper extremity supported  Dynamic Standing-Level of Assistance: Contact guard  Dynamic Standing-Balance: Turning    Activity Tolerance:  Activity Tolerance  Endurance: Endurance does not limit participation in activity  Treatments:  Therapeutic Activity  Therapeutic Activity Performed: Yes  Therapeutic Activity 1: Pt performs multiple balance activities during functional mobility.  Pt completes head turning/nodding, direct changes, changes in speed & multi tasking activities during ambulation.  Pt requiring CGA-Donita for slight balance loss laterally.  Often requiring to pause  mobility to complete other tasks.    Bed Mobility  Bed Mobility: Yes  Bed Mobility 1  Bed Mobility 1: Supine to sitting, Sitting to supine  Level of Assistance 1: Close supervision  Bed Mobility Comments 1: HOB slightly elevated    Ambulation/Gait Training  Ambulation/Gait Training Performed: Yes  Ambulation/Gait Training 1  Surface 1: Level tile  Device 1: Rolling walker  Assistance 1: Contact guard (occ Donita for LOB with multitasking & directional challenges)  Quality of Gait 1: Narrow base of support, Decreased step length (noted inc lateral sway with mild path deviations)  Comments/Distance (ft) 1: 50'x3  Transfers  Transfer: Yes  Transfer 1  Technique 1: Sit to stand, Stand to sit  Transfer Level of Assistance 1: Contact guard, Minimal verbal cues  Trials/Comments 1: x2 trials; cueing for safe hand placement    Stairs  Stairs: No    Outcome Measures:  [facility] Basic Mobility  Turning from your back to your side while in a flat bed without using bedrails: A little  Moving from lying on your back to sitting on the side of a flat bed without using bedrails: A little  Moving to and from bed to chair (including a wheelchair): A little  Standing up from a chair using your arms (e.g. wheelchair or bedside chair): A little  To walk in hospital room: A little  Climbing 3-5 steps with railing: A little  Basic Mobility - Total Score: 18    Education Documentation  Precautions, taught by Beth Reaves, PT at 1/24/2025  3:27 PM.  Learner: Significant Other, Patient  Readiness: Acceptance  Method: Explanation  Response: Verbalizes Understanding  Comment: safety with mobility, dc planning    Body Mechanics, taught by Beth Reaves PT at 1/24/2025  3:27 PM.  Learner: Significant Other, Patient  Readiness: Acceptance  Method: Explanation  Response: Verbalizes Understanding  Comment: safety with mobility, dc planning    Mobility Training, taught by Beth Reaves PT at 1/24/2025  3:27 PM.  Learner: Significant Other,  Patient  Readiness: Acceptance  Method: Explanation  Response: Verbalizes Understanding  Comment: safety with mobility, dc planning    Education Comments  No comments found.        OP EDUCATION:       Encounter Problems       Encounter Problems (Active)       PT Problem       Patient will perform bed mobility with </= close sup to reduce risk of developing decubitus ulcers.  (Met)       Start:  01/17/25    Expected End:  01/31/25    Resolved:  01/20/25    Updated to: Patient will perform bed mobility IND to reduce risk of developing decubitus ulcers.    Update reason: Goal met          Patient will perform sit to stand and stand to sit transfers with </= close sup and LRD to increase functional strength.  (Met)       Start:  01/17/25    Expected End:  01/31/25    Resolved:  01/20/25    Updated to: Patient will perform sit to stand and stand to sit transfers MOD-I with LRD to increase functional strength.    Update reason: Goal met          Patient will ambulate at least 150  ft. with </= close sup and LRD to improve tolerance of community distances.    (Met)       Start:  01/17/25    Expected End:  01/31/25    Resolved:  01/20/25    Updated to: Patient will ambulate at least 1,000  ft. MOD-I with LRD to improve tolerance of community distances.    Update reason: Goal met          Patient will perform the 5-Time Sit to Stand test in <12 sec to indicate decreased falls risk. (Cut-Off Scores: Balance/Vestibular: >14.2 sec if over 60 y.o. Chronic Stroke: >12 sec. MCID: >2.3 sec balance/vestibular.)  (Met)       Start:  01/17/25    Expected End:  01/31/25    Resolved:  01/22/25         Patient will ascend and descend 1 platform step with LRD and </= close sup to facilitate safe navigation of stairs in the home.    (Progressing)       Start:  01/17/25    Expected End:  01/31/25            Patient will perform bed mobility IND to reduce risk of developing decubitus ulcers. (Progressing)       Start:  01/20/25    Expected  End:  01/31/25                Patient will perform sit to stand and stand to sit transfers MOD-I with LRD to increase functional strength. (Progressing)       Start:  01/20/25    Expected End:  01/31/25                Patient will ambulate at least 1,000  ft. MOD-I with LRD to improve tolerance of community distances. (Progressing)       Start:  01/20/25    Expected End:  01/31/25                Patient will score >/= 22/30 points on the Functional Gait Assessment to indicate decreased risk of falling. (MDC: 5 points stroke, 6 points vestibular, 4 points Parkinson's Disease)  (Progressing)       Start:  01/20/25    Expected End:  01/31/25                   Pain - Adult              01/24/25 at 3:29 PM - Beth Reaves, PT

## 2025-01-24 NOTE — CARE PLAN
The patient's goals for the shift include  getting adequate rest to promote healing and getting testing done for discharge.    The clinical goals for the shift include pt will remain safe and free from injury throughout the shift

## 2025-01-25 LAB
ALBUMIN SERPL BCP-MCNC: 3.6 G/DL (ref 3.4–5)
ANION GAP SERPL CALC-SCNC: 10 MMOL/L (ref 10–20)
BUN SERPL-MCNC: 40 MG/DL (ref 6–23)
CALCIUM SERPL-MCNC: 9.1 MG/DL (ref 8.6–10.6)
CHLORIDE SERPL-SCNC: 99 MMOL/L (ref 98–107)
CO2 SERPL-SCNC: 28 MMOL/L (ref 21–32)
CREAT SERPL-MCNC: 1.02 MG/DL (ref 0.5–1.3)
EGFRCR SERPLBLD CKD-EPI 2021: 83 ML/MIN/1.73M*2
ERYTHROCYTE [DISTWIDTH] IN BLOOD BY AUTOMATED COUNT: 12.1 % (ref 11.5–14.5)
GLUCOSE SERPL-MCNC: 102 MG/DL (ref 74–99)
HCT VFR BLD AUTO: 41.8 % (ref 41–52)
HGB BLD-MCNC: 14.5 G/DL (ref 13.5–17.5)
MCH RBC QN AUTO: 29.8 PG (ref 26–34)
MCHC RBC AUTO-ENTMCNC: 34.7 G/DL (ref 32–36)
MCV RBC AUTO: 86 FL (ref 80–100)
NRBC BLD-RTO: 0 /100 WBCS (ref 0–0)
PHOSPHATE SERPL-MCNC: 3.9 MG/DL (ref 2.5–4.9)
PLATELET # BLD AUTO: 295 X10*3/UL (ref 150–450)
POTASSIUM SERPL-SCNC: 3.9 MMOL/L (ref 3.5–5.3)
RBC # BLD AUTO: 4.86 X10*6/UL (ref 4.5–5.9)
SODIUM SERPL-SCNC: 133 MMOL/L (ref 136–145)
WBC # BLD AUTO: 11.2 X10*3/UL (ref 4.4–11.3)

## 2025-01-25 PROCEDURE — 84100 ASSAY OF PHOSPHORUS: CPT | Performed by: PHYSICIAN ASSISTANT

## 2025-01-25 PROCEDURE — 2500000004 HC RX 250 GENERAL PHARMACY W/ HCPCS (ALT 636 FOR OP/ED)

## 2025-01-25 PROCEDURE — 2500000001 HC RX 250 WO HCPCS SELF ADMINISTERED DRUGS (ALT 637 FOR MEDICARE OP): Performed by: STUDENT IN AN ORGANIZED HEALTH CARE EDUCATION/TRAINING PROGRAM

## 2025-01-25 PROCEDURE — 2500000001 HC RX 250 WO HCPCS SELF ADMINISTERED DRUGS (ALT 637 FOR MEDICARE OP)

## 2025-01-25 PROCEDURE — 97116 GAIT TRAINING THERAPY: CPT | Mod: GP,CQ

## 2025-01-25 PROCEDURE — 1100000001 HC PRIVATE ROOM DAILY

## 2025-01-25 PROCEDURE — 36415 COLL VENOUS BLD VENIPUNCTURE: CPT | Performed by: PHYSICIAN ASSISTANT

## 2025-01-25 PROCEDURE — 85027 COMPLETE CBC AUTOMATED: CPT | Performed by: PHYSICIAN ASSISTANT

## 2025-01-25 RX ADMIN — LISINOPRIL 20 MG: 20 TABLET ORAL at 08:55

## 2025-01-25 RX ADMIN — LEVETIRACETAM 500 MG: 500 TABLET, FILM COATED ORAL at 08:54

## 2025-01-25 RX ADMIN — HEPARIN SODIUM 5000 UNITS: 5000 INJECTION INTRAVENOUS; SUBCUTANEOUS at 21:34

## 2025-01-25 RX ADMIN — PRAVASTATIN SODIUM 40 MG: 40 TABLET ORAL at 21:33

## 2025-01-25 RX ADMIN — PANTOPRAZOLE SODIUM 40 MG: 40 TABLET, DELAYED RELEASE ORAL at 06:38

## 2025-01-25 RX ADMIN — DEXAMETHASONE 2 MG: 2 TABLET ORAL at 06:38

## 2025-01-25 RX ADMIN — DEXAMETHASONE 2 MG: 2 TABLET ORAL at 21:34

## 2025-01-25 RX ADMIN — CARVEDILOL 25 MG: 25 TABLET, FILM COATED ORAL at 21:34

## 2025-01-25 RX ADMIN — DEXAMETHASONE 2 MG: 2 TABLET ORAL at 13:38

## 2025-01-25 RX ADMIN — LEVETIRACETAM 500 MG: 500 TABLET, FILM COATED ORAL at 21:33

## 2025-01-25 RX ADMIN — ACETAMINOPHEN 650 MG: 325 TABLET ORAL at 03:26

## 2025-01-25 RX ADMIN — CARVEDILOL 25 MG: 25 TABLET, FILM COATED ORAL at 08:54

## 2025-01-25 RX ADMIN — HEPARIN SODIUM 5000 UNITS: 5000 INJECTION INTRAVENOUS; SUBCUTANEOUS at 06:38

## 2025-01-25 RX ADMIN — HEPARIN SODIUM 5000 UNITS: 5000 INJECTION INTRAVENOUS; SUBCUTANEOUS at 13:38

## 2025-01-25 ASSESSMENT — COGNITIVE AND FUNCTIONAL STATUS - GENERAL
CLIMB 3 TO 5 STEPS WITH RAILING: A LITTLE
CLIMB 3 TO 5 STEPS WITH RAILING: A LITTLE
MOVING FROM LYING ON BACK TO SITTING ON SIDE OF FLAT BED WITH BEDRAILS: A LITTLE
TURNING FROM BACK TO SIDE WHILE IN FLAT BAD: A LITTLE
MOBILITY SCORE: 18
MOBILITY SCORE: 23
DAILY ACTIVITIY SCORE: 24
WALKING IN HOSPITAL ROOM: A LITTLE
STANDING UP FROM CHAIR USING ARMS: A LITTLE
MOVING TO AND FROM BED TO CHAIR: A LITTLE

## 2025-01-25 ASSESSMENT — PAIN - FUNCTIONAL ASSESSMENT: PAIN_FUNCTIONAL_ASSESSMENT: 0-10

## 2025-01-25 ASSESSMENT — PAIN SCALES - GENERAL: PAINLEVEL_OUTOF10: 0 - NO PAIN

## 2025-01-25 NOTE — PROGRESS NOTES
Physical Therapy    Physical Therapy Treatment    Patient Name: Regis Mixon  MRN: [id number]  Department: [place] [address]  Room: [address] [place] [address]  Today's Date: 1/25/2025  Time Calculation  Start Time: 1108  Stop Time: 1138  Time Calculation (min): 30 min         Assessment/Plan   PT Assessment  PT Assessment Results: Decreased strength, Impaired balance, Decreased mobility  End of Session Communication: Bedside nurse, Physician  Assessment Comment: Pt progressed ambulation to 200' using FWW with SBA. Pt able to ascend/descend 4 stairs using 1 HR with SBA. Pt demonstrates impaired balance when ambulating without an AD. Pt ambulated 10'x2 using no AD experiencing LOB x3 but able to self correct by grabbing the wall. Pt tends to reach for objects for assistance when ambulating without AD.  End of Session Patient Position: Bed, 3 rail up, Alarm off, not on at start of session  PT Plan  Inpatient/Swing Bed or Outpatient: Inpatient  PT Plan  Treatment/Interventions: Bed mobility, Transfer training, Gait training, Stair training, Balance training, Neuromuscular re-education, Strengthening, Endurance training, Therapeutic exercise, Therapeutic activity, Home exercise program, Postural re-education  PT Plan: Ongoing PT  PT Frequency: 5 times per week  PT Discharge Recommendations: High intensity level of continued care  Equipment Recommended upon Discharge: Wheeled walker  PT Recommended Transfer Status: Assist x1  PT - OK to Discharge: Yes (When medically ready)      General Visit Information:   PT  Visit  PT Received On: 01/25/25  General  Family/Caregiver Present: No  Caregiver Feedback:  (wife in room after therapy treatment, wife stated she was not comfortable with pt going home and wants him to go to [place] for balance deficits.)  Prior to Session Communication: Bedside nurse  Patient Position Received: Bed, 2 rail up, Alarm off, not on at start of session  General Comment: Pt supine in bed upon arrival. Pt pleasant and agreeable to  therapy.    Subjective   Precautions:  Precautions  Medical Precautions: Fall precautions            Objective   Pain:     Cognition:  Cognition  Overall Cognitive Status: Within Functional Limits    Activity Tolerance:  Activity Tolerance  Endurance: Endurance does not limit participation in activity  Treatments:       Bed Mobility  Bed Mobility: Yes  Bed Mobility 1  Bed Mobility 1: Supine to sitting  Level of Assistance 1: Close supervision  Bed Mobility Comments 1: HOB slightly elevated  Bed Mobility 2  Bed Mobility  2: Sitting to supine  Level of Assistance 2: Close supervision    Ambulation/Gait Training  Ambulation/Gait Training Performed: Yes  Ambulation/Gait Training 1  Surface 1: Level tile  Device 1: No device  Assistance 1: Close supervision  Quality of Gait 1: Narrow base of support (unsteady)  Comments/Distance (ft) 1: 10' x2 pt unsteady with LOB x3 but able to self correct. Pt grabs for wall and objects in the room for assistance.  Ambulation/Gait Training 2  Surface 2: Level tile  Device 2: Rolling walker  Assistance 2: Close supervision  Quality of Gait 2: Decreased step length, Diminished heel strike  Comments/Distance (ft) 2: 150' + 200' with seated rest between trials.  Transfers  Transfer: Yes  Transfer 1  Transfer From 1: Sit to, Stand to  Transfer to 1: Stand, Sit  Technique 1: Sit to stand, Stand to sit  Transfer Device 1: Walker  Transfer Level of Assistance 1: Close supervision  Transfers 2  Transfer From 2: Bed to, Stand to  Transfer to 2: Stand, Bed  Technique 2: Sit to stand, Stand to sit  Transfer Device 2:  (no AD)  Transfer Level of Assistance 2: Close supervision  Transfers 3  Transfer From 3: Sit to, Toilet to  Transfer to 3: Toilet, Stand  Technique 3: Sit to stand, Stand to sit  Transfer Device 3:  (no AD)  Transfer Level of Assistance 3: Close supervision    Stairs  Stairs: Yes  Stairs  Rails 1: Right  Curb Step 1: No  Device 1: Railing  Assistance 1: Close  supervision  Comment/Number of Steps 1: 4 descending non reciprocal pattern and ascending with reciprocal pattern    Outcome Measures:  [facility] Basic Mobility  Turning from your back to your side while in a flat bed without using bedrails: A little  Moving from lying on your back to sitting on the side of a flat bed without using bedrails: A little  Moving to and from bed to chair (including a wheelchair): A little  Standing up from a chair using your arms (e.g. wheelchair or bedside chair): A little  To walk in hospital room: A little  Climbing 3-5 steps with railing: A little  Basic Mobility - Total Score: 18    Education Documentation  Mobility Training, taught by Joan Johns PTA at 1/25/2025  4:01 PM.  Learner: Patient  Readiness: Acceptance  Method: Explanation  Response: Verbalizes Understanding  Comment: Educated pt in proper B LE sequencing with stair negotiation.    Education Comments  No comments found.        OP EDUCATION:       Encounter Problems       Encounter Problems (Active)       PT Problem       Patient will perform bed mobility with </= close sup to reduce risk of developing decubitus ulcers.  (Met)       Start:  01/17/25    Expected End:  01/31/25    Resolved:  01/20/25    Updated to: Patient will perform bed mobility IND to reduce risk of developing decubitus ulcers.    Update reason: Goal met          Patient will perform sit to stand and stand to sit transfers with </= close sup and LRD to increase functional strength.  (Met)       Start:  01/17/25    Expected End:  01/31/25    Resolved:  01/20/25    Updated to: Patient will perform sit to stand and stand to sit transfers MOD-I with LRD to increase functional strength.    Update reason: Goal met          Patient will ambulate at least 150  ft. with </= close sup and LRD to improve tolerance of community distances.    (Met)       Start:  01/17/25    Expected End:  01/31/25    Resolved:  01/20/25    Updated to: Patient will ambulate at least  1,000  ft. MOD-I with LRD to improve tolerance of community distances.    Update reason: Goal met          Patient will perform the 5-Time Sit to Stand test in <12 sec to indicate decreased falls risk. (Cut-Off Scores: Balance/Vestibular: >14.2 sec if over 60 y.o. Chronic Stroke: >12 sec. MCID: >2.3 sec balance/vestibular.)  (Met)       Start:  01/17/25    Expected End:  01/31/25    Resolved:  01/22/25         Patient will ascend and descend 1 platform step with LRD and </= close sup to facilitate safe navigation of stairs in the home.    (Progressing)       Start:  01/17/25    Expected End:  01/31/25            Patient will perform bed mobility IND to reduce risk of developing decubitus ulcers. (Progressing)       Start:  01/20/25    Expected End:  01/31/25                Patient will perform sit to stand and stand to sit transfers MOD-I with LRD to increase functional strength. (Progressing)       Start:  01/20/25    Expected End:  01/31/25                Patient will ambulate at least 1,000  ft. MOD-I with LRD to improve tolerance of community distances. (Progressing)       Start:  01/20/25    Expected End:  01/31/25                Patient will score >/= 22/30 points on the Functional Gait Assessment to indicate decreased risk of falling. (MDC: 5 points stroke, 6 points vestibular, 4 points Parkinson's Disease)  (Progressing)       Start:  01/20/25    Expected End:  01/31/25                   Pain - Adult

## 2025-01-25 NOTE — PROGRESS NOTES
"Regis Mixon is a 64 y.o. male on day 15 of admission presenting with Cerebellar mass.    Subjective          Objective     Physical Exam  A&Ox3  Face symmetric  Hypophonic (improving)  RUE 5/5  LUE 5/5  RLE 5/5  LLE 5/5  Sensation intact to light touch throughout all extremities  Incision c/d/I    Last Recorded Vitals  Blood pressure 111/71, pulse 69, temperature 36.7 °C (98.1 °F), temperature source Temporal, resp. rate 15, height 1.778 m (5' 10\"), weight 107 kg (235 lb 7.2 oz), SpO2 92%.  Intake/Output last 3 Shifts:  I/O last 3 completed shifts:  In: 480 (4.5 mL/kg) [P.O.:480]  Out: 400 (3.7 mL/kg) [Urine:400 (0.1 mL/kg/hr)]  Weight: 106.8 kg     Relevant Results                              Assessment/Plan   Assessment & Plan  Cerebellar mass    Osteoarthritis    Chronic low back pain    Chronic neck pain    Regis Mixon is a 64 y.o. male with h/o L cerebellar of hemangioblastoma s/p resection (2003), HTN, HLD, obesity, PMR p/w dizziness x6w, CTH 5.5x5 cm L cerebellar lesion, MRI 5.3 x 2.6cm  L cerebellar cystic mass, C3-4 dorsal cord lesion, 4th effacement, CT CAP multiple b/l pulmonary nodules, multiple b/l renal cysts, 1/12 MRI CTL spine w/o did not tolerate XIAO, expansile C3 intramedullary lesion, multiple thoracic/lumbar vertebral body lesions, 1/13 angio 1 muscular branch from L V3 supplying tumor, did not embolize d/t tortuosity, CTA H/N L cerebellar mass w/ L AICA and PICA feeders     1/14 MRI C-spine w/ intramedullary enhancing mass at C3-C4, TTE EF 55-60% without wall motion abnormalities=, no PFO, normal diastolic filling  1/16 s/p L redo retrosig crani for tumor resection, with intra-op EVD, CTH POC with EVD tract hemorrhage/IVH  1/18 CTH stable  1/19 EVD dc'd, ENT scope immobile L vocal cord  1/24 CTH decr IVH, hemorrhage    Recs  MILAGRO (q4 NC at night), will consider downgrading to tele  SBP   1L free water restrictions  Dex taper  Neuro onc recs  AM labs   Genetics eval for [facility] as " OP  Urology recs- OP follow up for renal cyst  PTOT-rehab (referral sent 1/17)  SCDs, [facility]              Giselle Benavides MD

## 2025-01-25 NOTE — CARE PLAN
The patient's goals for the shift include      The clinical goals for the shift include Pt will reamin safe throughout shift    Over the shift, the patient met these goals.

## 2025-01-26 LAB
ALBUMIN SERPL BCP-MCNC: 3.6 G/DL (ref 3.4–5)
ANION GAP SERPL CALC-SCNC: 14 MMOL/L (ref 10–20)
BUN SERPL-MCNC: 41 MG/DL (ref 6–23)
CALCIUM SERPL-MCNC: 9.2 MG/DL (ref 8.6–10.6)
CHLORIDE SERPL-SCNC: 99 MMOL/L (ref 98–107)
CO2 SERPL-SCNC: 25 MMOL/L (ref 21–32)
CREAT SERPL-MCNC: 0.96 MG/DL (ref 0.5–1.3)
EGFRCR SERPLBLD CKD-EPI 2021: 89 ML/MIN/1.73M*2
ERYTHROCYTE [DISTWIDTH] IN BLOOD BY AUTOMATED COUNT: 12.5 % (ref 11.5–14.5)
GLUCOSE SERPL-MCNC: 88 MG/DL (ref 74–99)
HCT VFR BLD AUTO: 43.3 % (ref 41–52)
HGB BLD-MCNC: 14.4 G/DL (ref 13.5–17.5)
MCH RBC QN AUTO: 29.1 PG (ref 26–34)
MCHC RBC AUTO-ENTMCNC: 33.3 G/DL (ref 32–36)
MCV RBC AUTO: 88 FL (ref 80–100)
NRBC BLD-RTO: 0 /100 WBCS (ref 0–0)
PHOSPHATE SERPL-MCNC: 2.9 MG/DL (ref 2.5–4.9)
PLATELET # BLD AUTO: 318 X10*3/UL (ref 150–450)
POTASSIUM SERPL-SCNC: 3.7 MMOL/L (ref 3.5–5.3)
RBC # BLD AUTO: 4.95 X10*6/UL (ref 4.5–5.9)
SODIUM SERPL-SCNC: 134 MMOL/L (ref 136–145)
WBC # BLD AUTO: 12 X10*3/UL (ref 4.4–11.3)

## 2025-01-26 PROCEDURE — 36415 COLL VENOUS BLD VENIPUNCTURE: CPT | Performed by: PHYSICIAN ASSISTANT

## 2025-01-26 PROCEDURE — 2500000001 HC RX 250 WO HCPCS SELF ADMINISTERED DRUGS (ALT 637 FOR MEDICARE OP)

## 2025-01-26 PROCEDURE — 2500000004 HC RX 250 GENERAL PHARMACY W/ HCPCS (ALT 636 FOR OP/ED): Performed by: STUDENT IN AN ORGANIZED HEALTH CARE EDUCATION/TRAINING PROGRAM

## 2025-01-26 PROCEDURE — 85027 COMPLETE CBC AUTOMATED: CPT | Performed by: PHYSICIAN ASSISTANT

## 2025-01-26 PROCEDURE — 2500000001 HC RX 250 WO HCPCS SELF ADMINISTERED DRUGS (ALT 637 FOR MEDICARE OP): Performed by: STUDENT IN AN ORGANIZED HEALTH CARE EDUCATION/TRAINING PROGRAM

## 2025-01-26 PROCEDURE — 1100000001 HC PRIVATE ROOM DAILY

## 2025-01-26 PROCEDURE — 2500000004 HC RX 250 GENERAL PHARMACY W/ HCPCS (ALT 636 FOR OP/ED)

## 2025-01-26 PROCEDURE — 80069 RENAL FUNCTION PANEL: CPT | Performed by: PHYSICIAN ASSISTANT

## 2025-01-26 RX ORDER — DEXAMETHASONE 2 MG/1
2 TABLET ORAL EVERY 12 HOURS SCHEDULED
Status: DISCONTINUED | OUTPATIENT
Start: 2025-01-26 | End: 2025-01-27 | Stop reason: HOSPADM

## 2025-01-26 RX ADMIN — CARVEDILOL 25 MG: 25 TABLET, FILM COATED ORAL at 20:30

## 2025-01-26 RX ADMIN — HEPARIN SODIUM 5000 UNITS: 5000 INJECTION INTRAVENOUS; SUBCUTANEOUS at 13:25

## 2025-01-26 RX ADMIN — DEXAMETHASONE 2 MG: 2 TABLET ORAL at 06:17

## 2025-01-26 RX ADMIN — PANTOPRAZOLE SODIUM 40 MG: 40 TABLET, DELAYED RELEASE ORAL at 06:16

## 2025-01-26 RX ADMIN — LISINOPRIL 20 MG: 20 TABLET ORAL at 08:05

## 2025-01-26 RX ADMIN — LEVETIRACETAM 500 MG: 500 TABLET, FILM COATED ORAL at 20:30

## 2025-01-26 RX ADMIN — LEVETIRACETAM 500 MG: 500 TABLET, FILM COATED ORAL at 08:05

## 2025-01-26 RX ADMIN — HEPARIN SODIUM 5000 UNITS: 5000 INJECTION INTRAVENOUS; SUBCUTANEOUS at 06:16

## 2025-01-26 RX ADMIN — HEPARIN SODIUM 5000 UNITS: 5000 INJECTION INTRAVENOUS; SUBCUTANEOUS at 20:32

## 2025-01-26 RX ADMIN — CARVEDILOL 25 MG: 25 TABLET, FILM COATED ORAL at 08:05

## 2025-01-26 RX ADMIN — PRAVASTATIN SODIUM 40 MG: 40 TABLET ORAL at 20:30

## 2025-01-26 RX ADMIN — DEXAMETHASONE 2 MG: 2 TABLET ORAL at 20:30

## 2025-01-26 ASSESSMENT — PAIN SCALES - GENERAL: PAINLEVEL_OUTOF10: 0 - NO PAIN

## 2025-01-26 ASSESSMENT — PAIN - FUNCTIONAL ASSESSMENT: PAIN_FUNCTIONAL_ASSESSMENT: 0-10

## 2025-01-27 ENCOUNTER — APPOINTMENT (OUTPATIENT)
Dept: CARDIOLOGY | Facility: HOSPITAL | Age: 65
DRG: 025 | End: 2025-01-27
Payer: COMMERCIAL

## 2025-01-27 VITALS
TEMPERATURE: 97 F | DIASTOLIC BLOOD PRESSURE: 62 MMHG | RESPIRATION RATE: 18 BRPM | HEART RATE: 65 BPM | WEIGHT: 235.45 LBS | BODY MASS INDEX: 33.71 KG/M2 | SYSTOLIC BLOOD PRESSURE: 97 MMHG | OXYGEN SATURATION: 96 % | HEIGHT: 70 IN

## 2025-01-27 VITALS
HEIGHT: 70 IN | DIASTOLIC BLOOD PRESSURE: 73 MMHG | WEIGHT: 235.45 LBS | OXYGEN SATURATION: 94 % | RESPIRATION RATE: 18 BRPM | TEMPERATURE: 97.2 F | HEART RATE: 59 BPM | SYSTOLIC BLOOD PRESSURE: 108 MMHG | BODY MASS INDEX: 33.71 KG/M2

## 2025-01-27 LAB
ALBUMIN SERPL BCP-MCNC: 3.7 G/DL (ref 3.4–5)
ANION GAP SERPL CALC-SCNC: 14 MMOL/L (ref 10–20)
BUN SERPL-MCNC: 41 MG/DL (ref 6–23)
CALCIUM SERPL-MCNC: 9.1 MG/DL (ref 8.6–10.6)
CHLORIDE SERPL-SCNC: 96 MMOL/L (ref 98–107)
CO2 SERPL-SCNC: 25 MMOL/L (ref 21–32)
CREAT SERPL-MCNC: 1.07 MG/DL (ref 0.5–1.3)
EGFRCR SERPLBLD CKD-EPI 2021: 78 ML/MIN/1.73M*2
ERYTHROCYTE [DISTWIDTH] IN BLOOD BY AUTOMATED COUNT: 12.4 % (ref 11.5–14.5)
GLUCOSE SERPL-MCNC: 107 MG/DL (ref 74–99)
HCT VFR BLD AUTO: 43.8 % (ref 41–52)
HGB BLD-MCNC: 14.8 G/DL (ref 13.5–17.5)
MAGNESIUM SERPL-MCNC: 2.45 MG/DL (ref 1.6–2.4)
MCH RBC QN AUTO: 29.1 PG (ref 26–34)
MCHC RBC AUTO-ENTMCNC: 33.8 G/DL (ref 32–36)
MCV RBC AUTO: 86 FL (ref 80–100)
NRBC BLD-RTO: 0 /100 WBCS (ref 0–0)
PHOSPHATE SERPL-MCNC: 3.4 MG/DL (ref 2.5–4.9)
PLATELET # BLD AUTO: 314 X10*3/UL (ref 150–450)
POTASSIUM SERPL-SCNC: 4 MMOL/L (ref 3.5–5.3)
RBC # BLD AUTO: 5.08 X10*6/UL (ref 4.5–5.9)
SODIUM SERPL-SCNC: 131 MMOL/L (ref 136–145)
WBC # BLD AUTO: 11.4 X10*3/UL (ref 4.4–11.3)

## 2025-01-27 PROCEDURE — 2500000004 HC RX 250 GENERAL PHARMACY W/ HCPCS (ALT 636 FOR OP/ED): Performed by: STUDENT IN AN ORGANIZED HEALTH CARE EDUCATION/TRAINING PROGRAM

## 2025-01-27 PROCEDURE — 2500000001 HC RX 250 WO HCPCS SELF ADMINISTERED DRUGS (ALT 637 FOR MEDICARE OP)

## 2025-01-27 PROCEDURE — 83735 ASSAY OF MAGNESIUM: CPT | Performed by: PHYSICIAN ASSISTANT

## 2025-01-27 PROCEDURE — 99239 HOSP IP/OBS DSCHRG MGMT >30: CPT | Performed by: PHYSICIAN ASSISTANT

## 2025-01-27 PROCEDURE — 2500000001 HC RX 250 WO HCPCS SELF ADMINISTERED DRUGS (ALT 637 FOR MEDICARE OP): Performed by: STUDENT IN AN ORGANIZED HEALTH CARE EDUCATION/TRAINING PROGRAM

## 2025-01-27 PROCEDURE — 85027 COMPLETE CBC AUTOMATED: CPT

## 2025-01-27 PROCEDURE — 93010 ELECTROCARDIOGRAM REPORT: CPT | Performed by: STUDENT IN AN ORGANIZED HEALTH CARE EDUCATION/TRAINING PROGRAM

## 2025-01-27 PROCEDURE — 36415 COLL VENOUS BLD VENIPUNCTURE: CPT

## 2025-01-27 PROCEDURE — 80069 RENAL FUNCTION PANEL: CPT

## 2025-01-27 PROCEDURE — 2500000004 HC RX 250 GENERAL PHARMACY W/ HCPCS (ALT 636 FOR OP/ED)

## 2025-01-27 PROCEDURE — 93005 ELECTROCARDIOGRAM TRACING: CPT

## 2025-01-27 PROCEDURE — 2500000002 HC RX 250 W HCPCS SELF ADMINISTERED DRUGS (ALT 637 FOR MEDICARE OP, ALT 636 FOR OP/ED): Performed by: PHYSICIAN ASSISTANT

## 2025-01-27 RX ORDER — DEXAMETHASONE 1 MG/1
TABLET ORAL
Start: 2025-01-27 | End: 2025-02-01

## 2025-01-27 RX ORDER — AMOXICILLIN 250 MG
2 CAPSULE ORAL 2 TIMES DAILY
Start: 2025-01-27

## 2025-01-27 RX ORDER — LEVETIRACETAM 500 MG/1
500 TABLET ORAL 2 TIMES DAILY
Start: 2025-01-27

## 2025-01-27 RX ORDER — CARVEDILOL 25 MG/1
25 TABLET ORAL 2 TIMES DAILY
Start: 2025-01-27

## 2025-01-27 RX ORDER — CARVEDILOL 12.5 MG/1
12.5 TABLET ORAL 2 TIMES DAILY
Status: CANCELLED | OUTPATIENT
Start: 2025-01-27

## 2025-01-27 RX ORDER — CYCLOBENZAPRINE HCL 5 MG
5 TABLET ORAL 3 TIMES DAILY PRN
Start: 2025-01-27

## 2025-01-27 RX ORDER — ACETAMINOPHEN 325 MG/1
650 TABLET ORAL EVERY 6 HOURS PRN
Start: 2025-01-27

## 2025-01-27 RX ORDER — POLYETHYLENE GLYCOL 3350 17 G/17G
17 POWDER, FOR SOLUTION ORAL 2 TIMES DAILY
Start: 2025-01-27

## 2025-01-27 RX ORDER — CARVEDILOL 12.5 MG/1
12.5 TABLET ORAL 2 TIMES DAILY
Start: 2025-01-27

## 2025-01-27 RX ORDER — HEPARIN SODIUM 5000 [USP'U]/ML
5000 INJECTION, SOLUTION INTRAVENOUS; SUBCUTANEOUS EVERY 8 HOURS
Start: 2025-01-27

## 2025-01-27 RX ORDER — PANTOPRAZOLE SODIUM 40 MG/1
40 TABLET, DELAYED RELEASE ORAL
Start: 2025-01-27

## 2025-01-27 RX ORDER — POTASSIUM CHLORIDE 20 MEQ/1
40 TABLET, EXTENDED RELEASE ORAL ONCE
Status: COMPLETED | OUTPATIENT
Start: 2025-01-27 | End: 2025-01-27

## 2025-01-27 RX ORDER — CARVEDILOL 12.5 MG/1
12.5 TABLET ORAL 2 TIMES DAILY
Status: DISCONTINUED | OUTPATIENT
Start: 2025-01-27 | End: 2025-01-27 | Stop reason: HOSPADM

## 2025-01-27 RX ADMIN — DEXAMETHASONE 2 MG: 2 TABLET ORAL at 08:00

## 2025-01-27 RX ADMIN — LEVETIRACETAM 500 MG: 500 TABLET, FILM COATED ORAL at 08:00

## 2025-01-27 RX ADMIN — PANTOPRAZOLE SODIUM 40 MG: 40 TABLET, DELAYED RELEASE ORAL at 05:29

## 2025-01-27 RX ADMIN — CARVEDILOL 25 MG: 25 TABLET, FILM COATED ORAL at 08:00

## 2025-01-27 RX ADMIN — POTASSIUM CHLORIDE 40 MEQ: 1500 TABLET, EXTENDED RELEASE ORAL at 17:14

## 2025-01-27 RX ADMIN — HEPARIN SODIUM 5000 UNITS: 5000 INJECTION INTRAVENOUS; SUBCUTANEOUS at 05:29

## 2025-01-27 RX ADMIN — LISINOPRIL 20 MG: 20 TABLET ORAL at 08:00

## 2025-01-27 RX ADMIN — HEPARIN SODIUM 5000 UNITS: 5000 INJECTION INTRAVENOUS; SUBCUTANEOUS at 13:59

## 2025-01-27 ASSESSMENT — PAIN - FUNCTIONAL ASSESSMENT
PAIN_FUNCTIONAL_ASSESSMENT: 0-10
PAIN_FUNCTIONAL_ASSESSMENT: 0-10

## 2025-01-27 ASSESSMENT — PAIN SCALES - GENERAL
PAINLEVEL_OUTOF10: 0 - NO PAIN
PAINLEVEL_OUTOF10: 0 - NO PAIN

## 2025-01-27 NOTE — DISCHARGE SUMMARY
Discharge Diagnosis  Cerebellar mass    Test Results Pending At Discharge  Pending Labs       No current pending labs.          Hospital Course  Regis Mixon is a 64 y.o. male with a past medical history of HTN, HLD, obesity and prior L cerebellar hemangioblastoma s/p resection in 2003 who [facility] ED 1[facility]9 with dizziness x6 weeks. CTH demonstrated 5.5x5cm L cerebellar lesion. Patient admitted to neurosurgery service for further workup.     1/10 Medicine consulted for preoperative evaluation, RCRI 0.   1/12 MRI CTL spine (did not tolerated contrasted scan) with expansile C3 intramedullary lesion, multiple thoracic/lumbar bony lesions.   1/13 s/p angio 1 muscular branch from L V3 supplying tumor, did not embolize due to tortuosity. CTA H/N with L cerebellar mass with L AICA and PICA feeders. Urology consulted for noted renal cysts on CT CAP --> plan for outpatient follow up. Oncology consulted. Ophthalmology evaluated with normal eye exam, no evidence of optic or retinal hemangioblastomas.   1/14 MRI CS with anesthesia demonstrated intramedullary enhancing mass at C3-4. TTE with EF 55-60%, no PFO, no wall motion abnormalities, normal diastolic filling. Preoperative medicine consulted, RCRI 1, coreg increased to 25mg BID due to poorly controled BP.   1/16 s/p L redo retrosig crani for tumor resection, with intra-op EVD, CTH POC with EVD tract hemorrhage/IVH   1/18 CTH stable  1/19 EVD dc'd, ENT scope--> immobile L vocal cord   1/21 CTH with decreased IVH   1/22 ENT reevaluated patient who refused further scope or vocal cord injection, plan for outpatient follow up.   1/24 CTH decreased IVH. Progressive hyponatremia, concern for SIADH, patient placed on 1 liter fluid restriction with improvement in serum sodium.     PT/OT evaluated patient and recommended acute rehab placement at discharge.     On the day of discharge, the patient was seen and evaluated by the neurosurgery team and deemed suitable for  discharge. The patient was given detailed discharge instructions and were scheduled to follow up as an outpatient.    Pertinent Physical Exam At Time of Discharge  Constitutional: A&Ox3, calm and cooperative, NAD. Hypophonic (improving)   Eyes: PERRL, EOMI.   ENMT: Moist mucous membranes, no apparent injuries or lesions.  Cardiovascular: Normal rate and regular rhythm. 2+ equal pulses of the distal extremities.  Respiratory/Thorax: CTAB, regular respirations on RA. Good symmetric chest expansion.   Gastrointestinal: Abdomen soft, non tender.   Neurological:   A&Ox3  Face symmetric, Facial SILT   Tongue midline and symmetric  RUE D5 / B5 / T5 / HG 5/ IO 5  LUE D5 / B5 / T5 / HG 5/ IO 5  RLE HF5 / KE 5/ DF 5/ PF 5  LLE HF5 / KE 5/ DF 5/ PF 5  No clonus, neg Lr  SILT  Psychological: Appropriate mood and behavior.   Skin: Warm and dry. Cranial incision C/D/I.     Home Medications     Medication List      START taking these medications     acetaminophen 325 mg tablet; Commonly known as: Tylenol; Take 2 tablets   (650 mg) by mouth every 6 hours if needed for mild pain (1 - 3).   carvedilol 25 mg tablet; Commonly known as: Coreg; Take 1 tablet (25 mg)   by mouth 2 times a day.   cyclobenzaprine 5 mg tablet; Commonly known as: Flexeril; Take 1 tablet   (5 mg) by mouth 3 times a day as needed for muscle spasms.   dexAMETHasone 1 mg tablet; Commonly known as: Decadron; Take 2 tablets   (2 mg) by mouth every 12 hours for 1 day, THEN 2 tablets (2 mg) once every   24 hours for 2 days, THEN 1 tablet (1 mg) once every 24 hours for 2 days.;   Start taking on: January 27, 2025   heparin (porcine) 5,000 unit/mL injection; Inject 1 mL (5,000 Units)   under the skin every 8 hours. Until improved ambulation   levETIRAcetam 500 mg tablet; Commonly known as: Keppra; Take 1 tablet   (500 mg) by mouth 2 times a day. Continue until discussed at follow up   neurosurgery appointment   pantoprazole 40 mg EC tablet; Commonly known as:  ProtoNix; Take 1 tablet   (40 mg) by mouth once daily in the morning. Take before meals. Do not   crush, chew, or split.   polyethylene glycol 17 gram packet; Commonly known as: Glycolax,   Miralax; Take 17 g by mouth 2 times a day.   sennosides-docusate sodium 8.6-50 mg tablet; Commonly known as:   Rosalee-Colace; Take 2 tablets by mouth 2 times a day.     CONTINUE taking these medications     lisinopriL-hydrochlorothiazide 20-12.5 mg tablet; TAKE 1 TABLET BY MOUTH   EVERY DAY   pitavastatin calcium 4 mg tablet; Commonly known as: Livalo; TAKE 1   TABLET DAILY     STOP taking these medications     hydroCHLOROthiazide 12.5 mg tablet; Commonly known as: Microzide   nebivolol 10 mg tablet; Commonly known as: Bystolic   Zoryve 0.3 % cream; Generic drug: roflumilast     Outpatient Follow-Up  Future Appointments   Date Time Provider Department Center   1/29/2025  1:45 PM Dylan Weiner MD [id number] [place]   1/31/2025  3:15 PM ELY CT 1 EL[facility] [place]   2/3/2025  3:30 PM Caty Tyson, APRN-CNP [id number] [facility]   2/17/2025  9:15 AM Terence Nagel MD [id number] [place]   2/26/2025  2:30 PM Sherry Javed MD [id number] [facility]       Ananya Singleton PA-C    Total face to face time spent with patient/family of >30 minutes, with >50% of the time spent discussing plan of care/management, counseling/educating on disease processes, explaining results of diagnostic testing.

## 2025-01-27 NOTE — PROGRESS NOTES
"Regis Mixon is a 64 y.o. male on day 17 of admission presenting with Cerebellar mass.    Subjective   NAEON, doing well, out of bed       Objective     Physical Exam  A&Ox3  Face symmetric  Hypophonic (improving)  RUE 5/5  LUE 5/5  RLE 5/5  LLE 5/5  Sensation intact to light touch throughout all extremities  Incision c/d/I    Last Recorded Vitals  Blood pressure 92/65, pulse 61, temperature 36 °C (96.8 °F), temperature source Axillary, resp. rate 18, height 1.778 m (5' 10\"), weight 107 kg (235 lb 7.2 oz), SpO2 94%.  Intake/Output last 3 Shifts:  I/O last 3 completed shifts:  In: 765 (7.2 mL/kg) [P.O.:765]  Out: - (0 mL/kg)   Weight: 106.8 kg                Assessment/Plan   Assessment & Plan  Cerebellar mass    Osteoarthritis    Chronic low back pain    Chronic neck pain    Regis Mixon is a 64 y.o. male with h/o L cerebellar of hemangioblastoma s/p resection (2003), HTN, HLD, obesity, PMR p/w dizziness x6w, CTH 5.5x5 cm L cerebellar lesion, MRI 5.3 x 2.6cm  L cerebellar cystic mass, C3-4 dorsal cord lesion, 4th effacement, CT CAP multiple b/l pulmonary nodules, multiple b/l renal cysts, 1/12 MRI CTL spine w/o did not tolerate XIAO, expansile C3 intramedullary lesion, multiple thoracic/lumbar vertebral body lesions, 1/13 angio 1 muscular branch from L V3 supplying tumor, did not embolize d/t tortuosity, CTA H/N L cerebellar mass w/ L AICA and PICA feeders     1/14 MRI C-spine w/ intramedullary enhancing mass at C3-C4, TTE EF 55-60% without wall motion abnormalities=, no PFO, normal diastolic filling  1/16 s/p L redo retrosig crani for tumor resection, with intra-op EVD, CTH POC with EVD tract hemorrhage/IVH  1/18 CTH stable  1/19 EVD dc'd, ENT scope immobile L vocal cord  1/24 CTH decr IVH, hemorrhage     Recs  tele  SBP   1L free water restrictions  AM labs- Na  Dex taper  Neuro onc recs  Genetics eval for VHL as OP  Urology recs- OP follow up for renal cyst  PTOT-rehab P2P today  SCDs, SQH     "         Francesco Franco MD

## 2025-01-27 NOTE — PROGRESS NOTES
Transitional Care Coordination Progress Note:  Plan per Medical/Surgical team: Pt medically ready for discharge.  Discharge Disposition: Acute Rehab vs Home with C.  Potential Barriers: pending the outcome of P2P.  ADOD: medically ready    Per EUGENIE Singleton P2P to be completed today, no time provided. PA to call pts insurance company today to determine what time the P2P will be completed.  Per pt if denial is upheld he does not want to pursue SNF, is agreeable to discharge home with [facility].     Addendum 1147:  This nurse notified by EUGENIE Singleton that patients appeal overturned.  [facility] confirmed that appeal was overturned. This nurse requested to know if facility can accept pt today.   Telephone call to pts wife, advised of the above.     Addendum 1245: [facility] confirmed that they can accept pt after 2:30 today, transport requested, awaiting confirmation for SOC.    Addendum 1341: 4pm transport confirmed.  EUGENIE Singleton notified.  Pts bedside nurse and unit secretary notified Pt to discharge to [facility] today at 4pm, report # [phone].   Telephone call to pts wife, advised of the above.     Addendum 1542: Per PA pt had an episode of Vtach, requested that transport but pushed back 1-2 hours.  This nurse called [facility] who confirmed transport time for 6:30pm. PA and [facility] notified.  Pts bedside nurse and unit secretary updated.      Becky Murillo RN, BSN  Transitional Care Coordinator  Office: [phone]  Secure chat via [employer]iku

## 2025-01-27 NOTE — NURSING NOTE
Regis Mixon was discharged to [facility] today 1/27/25. His wife was at bedside and took all of his belongings with her. All IV's were removed and report was called to the facility. No questions at discharge and patient has floor number if he would have any questions.

## 2025-01-28 DIAGNOSIS — E87.1 HYPONATREMIA: ICD-10-CM

## 2025-01-29 ENCOUNTER — APPOINTMENT (OUTPATIENT)
Dept: UROLOGY | Facility: CLINIC | Age: 65
End: 2025-01-29
Payer: COMMERCIAL

## 2025-01-29 LAB
ATRIAL RATE: 62 BPM
P AXIS: 57 DEGREES
P OFFSET: 202 MS
P ONSET: 137 MS
PR INTERVAL: 146 MS
Q ONSET: 210 MS
QRS COUNT: 10 BEATS
QRS DURATION: 96 MS
QT INTERVAL: 428 MS
QTC CALCULATION(BAZETT): 434 MS
QTC FREDERICIA: 432 MS
R AXIS: 34 DEGREES
T AXIS: 35 DEGREES
T OFFSET: 424 MS
VENTRICULAR RATE: 62 BPM

## 2025-01-31 ENCOUNTER — HOSPITAL ENCOUNTER (OUTPATIENT)
Dept: RADIOLOGY | Facility: HOSPITAL | Age: 65
Discharge: HOME | End: 2025-01-31
Payer: COMMERCIAL

## 2025-01-31 DIAGNOSIS — G93.89 CEREBELLAR MASS: ICD-10-CM

## 2025-01-31 PROCEDURE — 70450 CT HEAD/BRAIN W/O DYE: CPT

## 2025-02-03 ENCOUNTER — OFFICE VISIT (OUTPATIENT)
Dept: NEUROSURGERY | Facility: HOSPITAL | Age: 65
End: 2025-02-03
Payer: COMMERCIAL

## 2025-02-03 VITALS
HEART RATE: 80 BPM | HEIGHT: 70 IN | BODY MASS INDEX: 28.06 KG/M2 | SYSTOLIC BLOOD PRESSURE: 101 MMHG | WEIGHT: 196 LBS | DIASTOLIC BLOOD PRESSURE: 61 MMHG | RESPIRATION RATE: 17 BRPM

## 2025-02-03 DIAGNOSIS — D43.2 HEMANGIOBLASTOMA OF BRAIN (MULTI): Primary | ICD-10-CM

## 2025-02-03 PROCEDURE — 3008F BODY MASS INDEX DOCD: CPT | Performed by: NURSE PRACTITIONER

## 2025-02-03 PROCEDURE — 3074F SYST BP LT 130 MM HG: CPT | Performed by: NURSE PRACTITIONER

## 2025-02-03 PROCEDURE — 3078F DIAST BP <80 MM HG: CPT | Performed by: NURSE PRACTITIONER

## 2025-02-03 PROCEDURE — 99211 OFF/OP EST MAY X REQ PHY/QHP: CPT | Performed by: NURSE PRACTITIONER

## 2025-02-03 RX ORDER — PRAVASTATIN SODIUM 20 MG/1
40 TABLET ORAL NIGHTLY
COMMUNITY

## 2025-02-03 RX ORDER — LISINOPRIL 20 MG/1
1 TABLET ORAL
COMMUNITY
Start: 2025-01-30

## 2025-02-03 NOTE — PATIENT INSTRUCTIONS
Welcome to Caty Tyson, CNP clinic. We are here to assist you through your Neurological Surgery care at [facility]. My office number is [phone]. This number is the most direct way to communicate with the office. The office fax number is [phone].     Please wean your steroids as listed below:       Starting 02/04/25:   Take 2 tablets (2 mg) once every 24 hours for 2 days THEN   Tablet 1 tablet (1 mg) once every 24 hours for 2 days. THEN STOP     If there are any issues with weaning your steroid please call my office.

## 2025-02-03 NOTE — PROGRESS NOTES
"[facility]  Neurosurgery    History of Present Illness    Regis Mixon is a 64-year-old male with a past medical history of cerebellar hemangioblastoma s/p resection (2003), HTN, HLD, and obesity who presented to the ED on 12/29/24 with complaints of dizziness x 6 weeks. CTH revealed 5.5x5 cm L cerebellar lesion. MRI with L cerebellar cystic mass. C3-4 dorsal cord lesion with 4th effacement consistent for hemangioma, multiple small foci that are concerning for neoplasm. CT C/A/P with multiple bilateral pulmonary nodules and bilateral renal cyst. DSA 01/13/24 with muscular branch from L V3 supplying tumor; embolization not recommended.  CTA H/N with L cerebellar mass with L AICA and PICA feeders. No previous VHL workup. No ocular lesions on exam. He is now s/p L retrosignmoid re-do craniectomy with gross total resection on 1/16/25 with Dr. Akers. He presents to neurosurgery clinic today for a two-week post-op visit     Patient denies HA, vision changes, dizziness, and recent falls. He was discharged from rehab and is now back at home with his wife. He has been doing outpatient PT and has one more session scheduled. He is currently ambulating with a walker. He is scheduled to see genetics on 4/25. He denies incisional pain and drainage.                    Objective      Vitals:   /61   Pulse 80   Resp 17   Ht 1.778 m (5' 10\")   Wt 88.9 kg (196 lb)   BMI 28.12 kg/m²         Physical Exam:  A&Ox3  Fluent speech  EOMI  AVILA; strength 5/5; no drift  Face and shoulder shrug symmetrical  SILT  Tongue midline  No focal motor deficits on exam  Gait normal          Relevant Results:            Assessment & Plan      Diagnosis:  Regis was seen today for post-op.  Diagnoses and all orders for this visit:  Hemangioblastoma of brain (Multi)  -     CT head wo IV contrast; Future  -     Disability Placard          Provider Impression:         Medical History     Past Medical History:   Diagnosis Date    Other " specified health status     No pertinent past medical history    Personal history of other infectious and parasitic diseases     History of genital warts    PMR (polymyalgia rheumatica) (Multi) 08/14/2023     Past Surgical History:   Procedure Laterality Date    OTHER SURGICAL HISTORY  05/19/2021    Brain surgery    OTHER SURGICAL HISTORY  05/19/2021    Colonoscopy     Social History     Tobacco Use    Smoking status: Never     Passive exposure: Never    Smokeless tobacco: Never   Vaping Use    Vaping status: Never Used   Substance Use Topics    Alcohol use: Never    Drug use: Never     Family History   Problem Relation Name Age of Onset    Lung cancer Mother      Lung cancer Father       No Known Allergies  Current Outpatient Medications   Medication Instructions    acetaminophen (TYLENOL) 650 mg, oral, Every 6 hours PRN    carvedilol (COREG) 25 mg, oral, 2 times daily    carvedilol (COREG) 12.5 mg, oral, 2 times daily    cyclobenzaprine (FLEXERIL) 5 mg, oral, 3 times daily PRN    dexAMETHasone (Decadron) 1 mg tablet Take 2 tablets (2 mg) by mouth every 12 hours for 1 day, THEN 2 tablets (2 mg) once every 24 hours for 2 days, THEN 1 tablet (1 mg) once every 24 hours for 2 days.    heparin (porcine) 5,000 Units, subcutaneous, Every 8 hours, Until improved ambulation    levETIRAcetam (KEPPRA) 500 mg, oral, 2 times daily, Continue until discussed at follow up neurosurgery appointment    lisinopril 20 mg tablet 1 tablet, Daily (0630)    lisinopriL-hydrochlorothiazide 20-12.5 mg tablet 1 tablet, oral, Daily    pantoprazole (PROTONIX) 40 mg, oral, Daily before breakfast, Do not crush, chew, or split.    pitavastatin calcium (Livalo) 4 mg tablet TAKE 1 TABLET DAILY    polyethylene glycol (GLYCOLAX, MIRALAX) 17 g, oral, 2 times daily    pravastatin (PRAVACHOL) 40 mg, Nightly    sennosides-docusate sodium (Rosalee-Colace) 8.6-50 mg tablet 2 tablets, oral, 2 times daily                          THEN 1 tablet (1 mg) once every 24 hours for 2 days.    heparin (porcine) 5,000 Units, subcutaneous, Every 8 hours, Until improved ambulation    levETIRAcetam (KEPPRA) 500 mg, oral, 2 times daily, Continue until discussed at follow up neurosurgery appointment    lisinopril 20 mg tablet 1 tablet, Daily (0630)    lisinopriL-hydrochlorothiazide 20-12.5 mg tablet 1 tablet, oral, Daily    pantoprazole (PROTONIX) 40 mg, oral, Daily before breakfast, Do not crush, chew, or split.    pitavastatin calcium (Livalo) 4 mg tablet TAKE 1 TABLET DAILY    polyethylene glycol (GLYCOLAX, MIRALAX) 17 g, oral, 2 times daily    pravastatin (PRAVACHOL) 40 mg, Nightly    sennosides-docusate sodium (Rosalee-Colace) 8.6-50 mg tablet 2 tablets, oral, 2 times daily

## 2025-02-13 ENCOUNTER — TELEPHONE (OUTPATIENT)
Dept: NEUROSURGERY | Facility: HOSPITAL | Age: 65
End: 2025-02-13
Payer: COMMERCIAL

## 2025-02-17 ENCOUNTER — APPOINTMENT (OUTPATIENT)
Dept: PRIMARY CARE | Facility: CLINIC | Age: 65
End: 2025-02-17
Payer: COMMERCIAL

## 2025-02-17 VITALS
WEIGHT: 200 LBS | OXYGEN SATURATION: 96 % | RESPIRATION RATE: 16 BRPM | HEART RATE: 86 BPM | BODY MASS INDEX: 28.63 KG/M2 | TEMPERATURE: 97.4 F | HEIGHT: 70 IN | SYSTOLIC BLOOD PRESSURE: 108 MMHG | DIASTOLIC BLOOD PRESSURE: 82 MMHG

## 2025-02-17 DIAGNOSIS — E66.3 OVERWEIGHT WITH BODY MASS INDEX (BMI) OF 28 TO 28.9 IN ADULT: ICD-10-CM

## 2025-02-17 DIAGNOSIS — D43.2 HEMANGIOBLASTOMA OF BRAIN (MULTI): ICD-10-CM

## 2025-02-17 DIAGNOSIS — G93.89 CEREBELLAR MASS: ICD-10-CM

## 2025-02-17 DIAGNOSIS — Z98.890 S/P CRANIOTOMY: ICD-10-CM

## 2025-02-17 DIAGNOSIS — E78.2 MIXED HYPERLIPIDEMIA: ICD-10-CM

## 2025-02-17 DIAGNOSIS — I10 BENIGN ESSENTIAL HTN: Primary | ICD-10-CM

## 2025-02-17 PROCEDURE — 1036F TOBACCO NON-USER: CPT | Performed by: FAMILY MEDICINE

## 2025-02-17 PROCEDURE — 99214 OFFICE O/P EST MOD 30 MIN: CPT | Performed by: FAMILY MEDICINE

## 2025-02-17 PROCEDURE — 3008F BODY MASS INDEX DOCD: CPT | Performed by: FAMILY MEDICINE

## 2025-02-17 PROCEDURE — 3074F SYST BP LT 130 MM HG: CPT | Performed by: FAMILY MEDICINE

## 2025-02-17 PROCEDURE — 3079F DIAST BP 80-89 MM HG: CPT | Performed by: FAMILY MEDICINE

## 2025-02-17 RX ORDER — CARVEDILOL 25 MG/1
25 TABLET ORAL 2 TIMES DAILY
Qty: 180 TABLET | Refills: 3 | Status: SHIPPED | OUTPATIENT
Start: 2025-02-17 | End: 2026-02-17

## 2025-02-17 RX ORDER — PANTOPRAZOLE SODIUM 40 MG/1
40 TABLET, DELAYED RELEASE ORAL
Qty: 90 TABLET | Refills: 0 | Status: SHIPPED | OUTPATIENT
Start: 2025-02-17 | End: 2026-02-17

## 2025-02-17 RX ORDER — LISINOPRIL 20 MG/1
20 TABLET ORAL
Qty: 90 TABLET | Refills: 3 | Status: SHIPPED | OUTPATIENT
Start: 2025-02-17 | End: 2026-02-17

## 2025-02-17 RX ORDER — PRAVASTATIN SODIUM 20 MG/1
40 TABLET ORAL NIGHTLY
Qty: 180 TABLET | Refills: 3 | Status: SHIPPED | OUTPATIENT
Start: 2025-02-17 | End: 2026-02-17

## 2025-02-17 ASSESSMENT — PATIENT HEALTH QUESTIONNAIRE - PHQ9
SUM OF ALL RESPONSES TO PHQ9 QUESTIONS 1 AND 2: 0
2. FEELING DOWN, DEPRESSED OR HOPELESS: NOT AT ALL
1. LITTLE INTEREST OR PLEASURE IN DOING THINGS: NOT AT ALL

## 2025-02-17 NOTE — PROGRESS NOTES
HPI: Regis Mixon is a 64 y.o. male presenting for follow-up.  I last saw the patient on 8/14/2024    He had neurosurgery follow up on 1/ 16  He says he's feeling good, his strength is starting to come back  He is going to be tested for vhl    He went to ED on 1/27/2025 with a past medical history of HTN, HLD, obesity and prior L cerebellar hemangioblastoma s/p resection in 2003 who presented to the [facility] ED 1/9 with dizziness x6 weeks. CTH demonstrated 5.5x5cm L cerebellar lesion. Patient admitted to neurosurgery service for tumour excision.    Past medical, surgical, and family history reviewed.  Reviewed and documented all medications   Pt eating well, exercising as tolerated and taking medications as directed    Has no medical concerns for rooming MA    ROS    Except positives as noted in the CC & HPI   Constitutional: Denies fevers, chills, night sweats, fatigue, weight changes, change in appetite   Eyes: Denies blurry vision, double vision   ENT: Denies otalgia, trouble hearing, tinnitus, vertigo, nasal congestion, rhinorrhea, sore throat   Neck: Denies swelling, masses   Cardiovascular: Denies chest pain, palpitations, edema, orthopnea, syncope   Respiratory: Denies dyspnea, cough, wheezing, postural nocturnal dyspnea   Gastrointestinal: Denies abdominal pain, nausea, vomiting, diarrhea, constipation, melena, hematochezia   Genitourinary: Denies dysuria, hematuria, frequency, urgency   Musculoskeletal: Denies back pain, neck pain, arthralgias, myalgias   Integumentary: Denies skin lesions, rashes, masses   Neurological: Denies dizziness, headaches, confusion, limb weakness, paresthesias, syncope, convulsions   Psychiatric: Denies depression, anxiety, homicidal ideations, suicidal ideations, sleep disturbances   Endocrine: Denies polyphagia, polydipsia, polyuria, weakness, hair thinning, heat intolerance, cold intolerance, weight changes   Heme/Lymph: Denies easy bruising, easy bleeding, swollen glands  "    Vitals:    02/17/25 0913 02/17/25 1013   BP: 126/80 108/82   BP Location:  Right arm   Patient Position:  Sitting   BP Cuff Size:  Adult long   Pulse: 86    Resp: 16    Temp: 36.3 °C (97.4 °F)    SpO2: 96%    Weight: 90.7 kg (200 lb)    Height: 1.778 m (5' 10\")            PHYSICAL EXAM    GENERAL APPEARANCE: well-developed, well-nourished, 64 y.o. male in no acute distress. Patient is ambulating with walker.    SKIN: warm, pink and dry without rash or concerning lesions. Left posterior scalp wound is well healed.    MENTAL STATUS: alert and oriented × 3. Normal mood and affect appropriate to mood.    NECK: supple without lymphadenopathy. Carotid pulses are normal without bruits. Thyroid - is normal in midline without nodules.    CHEST: lungs are clear to auscultation without rales, rhonchi or wheezes.    HEART: regular, rate and rhythm without murmurs, rubs or gallops.    ABDOMEN: soft, mildly obese, protuberant, nondistended. No masses, hepatomegaly or splenomegaly is noted.    EXTREMITIES: no cyanosis, clubbing or edema. Pedal pulses are 2+ normal at the dorsalis pedis and posterior tibial pulses bilaterally.    NEUROLOGICAL: cranial nerves II through XII are grossly intact. Motor strength 5/5 at all fours.    Below is the patient's most recent value for Albumin, ALT, AST, BUN, Calcium, Chloride, Cholesterol, CO2, Creatinine, GFR, Glucose, HDL, Hematocrit, Hemoglobin, Hemoglobin A1C, LDL, Magnesium, Phosphorus, Platelets, Potassium, PSA, Sodium, Triglycerides, and WBC.   Lab Results   Component Value Date    ALBUMIN 3.7 01/27/2025    ALT 27 01/09/2025    AST 19 01/09/2025    BUN 40 (H) 01/28/2025    CALCIUM 8.6 01/28/2025     01/28/2025    CHOL 200 (H) 08/14/2024    CO2 23 01/28/2025    CREATININE 0.96 01/28/2025    HDL 43.3 08/14/2024    HCT 40.7 (L) 01/28/2025    HGB 14.0 01/28/2025    HGBA1C 5.5 08/14/2024    MG 2.45 (H) 01/27/2025    PHOS 3.4 01/27/2025     01/28/2025    K 4.0 01/28/2025    " PSA 0.80 05/19/2021     (L) 01/28/2025    TRIG 314 (H) 08/14/2024    WBC 11.1 01/28/2025         ASSESSMENT:  1. Benign essential HTN  carvedilol (Coreg) 25 mg tablet    lisinopril 20 mg tablet      2. S/P craniotomy  carvedilol (Coreg) 25 mg tablet    pantoprazole (ProtoNix) 40 mg EC tablet      3. Mixed hyperlipidemia  pravastatin (Pravachol) 20 mg tablet      4. Hemangioblastoma of brain (Multi)        5. Cerebellar mass        6. Overweight with body mass index (BMI) of 28 to 28.9 in adult            PLAN:    Patient to continue current medications (with any exceptions as noted) and diet. Follow-up in 6-8 week(s), otherwise as needed.      Patient was given refill(s) on:   Lisinopril 20 mg, TAKE 1 TAB BY MOUTH DAILY   Carvedilol 25 mg, TAKE 1 TAB BY MOUTH TWICE DAILY   Pantoprazole Sodium 40 mg, TAKE 1 TAB BY MOUTH DAILY   Pravastatin Sodium 20 mg, TAKE 1 TAB BY MOUTH DAILY     Rx(s) sent to pharmacy.      Patient is to follow up with Neuro surgery as scheduled.     Scribe Attestation  By signing my name below, IMarlen Scribe   attest that this documentation has been prepared under the direction and in the presence of Terence Nagel MD.

## 2025-02-26 ENCOUNTER — APPOINTMENT (OUTPATIENT)
Dept: OTOLARYNGOLOGY | Facility: HOSPITAL | Age: 65
End: 2025-02-26
Payer: COMMERCIAL

## 2025-03-02 DIAGNOSIS — Z98.890 S/P CRANIOTOMY: ICD-10-CM

## 2025-03-02 RX ORDER — LEVETIRACETAM 500 MG/1
500 TABLET ORAL 2 TIMES DAILY
Qty: 60 TABLET | Refills: 2 | Status: SHIPPED | OUTPATIENT
Start: 2025-03-02

## 2025-03-04 ENCOUNTER — HOSPITAL ENCOUNTER (OUTPATIENT)
Dept: RADIOLOGY | Facility: CLINIC | Age: 65
Discharge: HOME | End: 2025-03-04
Payer: COMMERCIAL

## 2025-03-04 DIAGNOSIS — D43.2 HEMANGIOBLASTOMA OF BRAIN (MULTI): ICD-10-CM

## 2025-03-04 PROCEDURE — 70450 CT HEAD/BRAIN W/O DYE: CPT

## 2025-03-11 ENCOUNTER — OFFICE VISIT (OUTPATIENT)
Dept: NEUROSURGERY | Facility: HOSPITAL | Age: 65
End: 2025-03-11
Payer: COMMERCIAL

## 2025-03-11 VITALS
HEIGHT: 70 IN | BODY MASS INDEX: 29.35 KG/M2 | RESPIRATION RATE: 17 BRPM | DIASTOLIC BLOOD PRESSURE: 96 MMHG | WEIGHT: 205 LBS | SYSTOLIC BLOOD PRESSURE: 142 MMHG | HEART RATE: 80 BPM

## 2025-03-11 DIAGNOSIS — D43.2 HEMANGIOBLASTOMA OF BRAIN (MULTI): Primary | ICD-10-CM

## 2025-03-11 PROCEDURE — 3077F SYST BP >= 140 MM HG: CPT | Performed by: NEUROLOGICAL SURGERY

## 2025-03-11 PROCEDURE — 1036F TOBACCO NON-USER: CPT | Performed by: NEUROLOGICAL SURGERY

## 2025-03-11 PROCEDURE — 99211 OFF/OP EST MAY X REQ PHY/QHP: CPT | Performed by: NEUROLOGICAL SURGERY

## 2025-03-11 PROCEDURE — 3008F BODY MASS INDEX DOCD: CPT | Performed by: NEUROLOGICAL SURGERY

## 2025-03-11 PROCEDURE — 3080F DIAST BP >= 90 MM HG: CPT | Performed by: NEUROLOGICAL SURGERY

## 2025-03-11 NOTE — PROGRESS NOTES
"[facility]  Neurosurgery    History of Present Illness      Regis Mixon is a 64-year-old male with a past medical history of cerebellar hemangioblastoma s/p resection (2003), HTN, HLD, and obesity who presented to the ED on 12/29/24 with complaints of dizziness x 6 weeks. CTH revealed 5.5x5 cm L cerebellar lesion. MRI with L cerebellar cystic mass. C3-4 dorsal cord lesion with 4th effacement consistent for hemangioblastoma multiple small foci that are concerning for neoplasm. CT C/A/P with multiple bilateral pulmonary nodules and bilateral renal cyst. DSA 01/13/24 with muscular branch from L V3 supplying tumor; embolization not recommended.  CTA H/N with L cerebellar mass with L AICA and PICA feeders. No previous VHL workup. No ocular lesions on exam. He is now s/p L retrosignmoid re-do craniectomy with gross total resection on 1/16/25. Final surgical pathology hemangioblastoma. He is pending genetics evaluation in April. Patient presents to clinic for POV.     Patient reported that he feels great, his dizziness improved, does not need a walker to ambulate anymore, and denied any unsteadiness or falls. Denied any headaches or vision changes. Says that his smell is back and he has his appetite back as well. He has been living at home and is retired without any issues.       Objective      Vitals:   BP (!) 142/96   Pulse 80   Resp 17   Ht 1.778 m (5' 10\")   Wt 93 kg (205 lb)   BMI 29.41 kg/m²       Physical Exam:  A&Ox3  Face symmetric  RUE 5/5  LUE 5/5  RLE 5/5  LLE 5/5  Sensation intact to light touch throughout all extremities  No dysmetria  Proprioception intact  No rebollar's, no clonus  Normal gait  Incision well healed    Relevant Results:    Postop CTH:         Imaging dates: 03/04/25 and 01/31/25       Preop MRI 01/10/25:             Assessment & Plan      Diagnosis:  Regis was seen today for post-op.  Diagnoses and all orders for this visit:  Hemangioblastoma of brain (Multi)  -     MR brain w " and wo IV contrast; Future      Provider Impression:   Patient doing well postoperatively, CTH with improved ventriculomegaly, expected postoperative changes  - Patient has been off of steroids, can stop PPI  - Plan to wean keppra to 500 nightly for 1 week, 250 nightly for 1 week, then off  - Ok to relax lifting restrictions, ok to drive from neurosurgical standpoint. Patient advised to exercise caution and gradually increase driving distance with somebody accompanying him.  - Plan for 3 month post operative MRI brain and c spine with and without contrast for new baseline imaging.     Otherwise for patient's suspected VHL:  - Patient has genetics follow up in April for VHL work up  - Patient needs neuro onc follow up - may be a candidate for welireg in future  - Patient needs urology follow up for renal cysts       Medical History     Past Medical History:   Diagnosis Date    Other specified health status     No pertinent past medical history    Personal history of other infectious and parasitic diseases     History of genital warts    PMR (polymyalgia rheumatica) (Multi) 08/14/2023     Past Surgical History:   Procedure Laterality Date    OTHER SURGICAL HISTORY  05/19/2021    Brain surgery    OTHER SURGICAL HISTORY  05/19/2021    Colonoscopy     Social History     Tobacco Use    Smoking status: Never     Passive exposure: Never    Smokeless tobacco: Never   Vaping Use    Vaping status: Never Used   Substance Use Topics    Alcohol use: Never    Drug use: Never     Family History   Problem Relation Name Age of Onset    Lung cancer Mother      Lung cancer Father       No Known Allergies  Current Outpatient Medications   Medication Instructions    acetaminophen (TYLENOL) 650 mg, oral, Every 6 hours PRN    carvedilol (COREG) 25 mg, oral, 2 times daily    levETIRAcetam (KEPPRA) 500 mg, oral, 2 times daily, Continue until discussed at follow up neurosurgery appointment    lisinopril 20 mg, oral, Daily (0630)     pantoprazole (PROTONIX) 40 mg, oral, Daily before breakfast, Do not crush, chew, or split.    pravastatin (PRAVACHOL) 40 mg, oral, Nightly

## 2025-04-01 DIAGNOSIS — F40.240 CLAUSTROPHOBIA: Primary | ICD-10-CM

## 2025-04-01 RX ORDER — LORAZEPAM 0.5 MG/1
TABLET ORAL
Qty: 2 TABLET | Refills: 0 | Status: SHIPPED | OUTPATIENT
Start: 2025-04-01

## 2025-04-01 NOTE — PROGRESS NOTES
Patient is scheduled for MRI brain w/wo. Due to claustrophobia / anxiety will provide patient with oral lorazepam.     I have personally reviewed the OARRS report for this patient. I have considered the risks of abuse, dependence, addition, and diversion.     Recommend patient take lorazepam 0.5mg 45 minutes prior to his MRI. If needed he may repeat this dose immediately before his imaging. A  is recommended while taking this medication.

## 2025-04-12 ENCOUNTER — HOSPITAL ENCOUNTER (OUTPATIENT)
Dept: RADIOLOGY | Facility: HOSPITAL | Age: 65
Discharge: HOME | End: 2025-04-12
Payer: COMMERCIAL

## 2025-04-12 DIAGNOSIS — D43.2 HEMANGIOBLASTOMA OF BRAIN (MULTI): ICD-10-CM

## 2025-04-12 PROCEDURE — 70553 MRI BRAIN STEM W/O & W/DYE: CPT

## 2025-04-12 PROCEDURE — A9575 INJ GADOTERATE MEGLUMI 0.1ML: HCPCS | Performed by: NURSE PRACTITIONER

## 2025-04-12 PROCEDURE — 72156 MRI NECK SPINE W/O & W/DYE: CPT

## 2025-04-12 PROCEDURE — 2550000001 HC RX 255 CONTRASTS: Performed by: NURSE PRACTITIONER

## 2025-04-12 PROCEDURE — 70553 MRI BRAIN STEM W/O & W/DYE: CPT | Performed by: RADIOLOGY

## 2025-04-12 RX ORDER — GADOTERATE MEGLUMINE 376.9 MG/ML
0.2 INJECTION INTRAVENOUS
Status: COMPLETED | OUTPATIENT
Start: 2025-04-12 | End: 2025-04-12

## 2025-04-12 RX ADMIN — GADOTERATE MEGLUMINE 18.5 ML: 376.9 INJECTION INTRAVENOUS at 12:32

## 2025-04-14 ENCOUNTER — APPOINTMENT (OUTPATIENT)
Dept: PRIMARY CARE | Facility: CLINIC | Age: 65
End: 2025-04-14
Payer: COMMERCIAL

## 2025-04-15 ENCOUNTER — PATIENT MESSAGE (OUTPATIENT)
Dept: NEUROSURGERY | Facility: HOSPITAL | Age: 65
End: 2025-04-15

## 2025-04-15 DIAGNOSIS — D43.2 HEMANGIOBLASTOMA OF BRAIN (MULTI): Primary | ICD-10-CM

## 2025-04-16 ENCOUNTER — TELEPHONE (OUTPATIENT)
Dept: HEMATOLOGY/ONCOLOGY | Facility: HOSPITAL | Age: 65
End: 2025-04-16
Payer: COMMERCIAL

## 2025-04-16 NOTE — TELEPHONE ENCOUNTER
Called to see if they'd like to change their NPV to 4/29 but unfortunately Marcelina has school state testing that day as she is a . We will be keeping the NPV with Dr. Jones on 5/6.    Ayana DU RN

## 2025-04-22 NOTE — PROGRESS NOTES
Subjective   Patient ID: Regis Mixon is a 64 y.o. male who presents for Follow Up of Hyperlipidemia, Cerebellar Mass and Hypertension. I last saw the patient on 2/17/2025  .     HPI  Patient has various imaging reports in chart. He was seen by neurosurgery on 3/11. Patient states that his dizziness has improved.     Patient was told to get CT of chest to follow up on lung nodules.    Patient would like his medications reviewed to see what can be taken away.     Patient was taken off nebivolol by the hospital. States that BP has been elevated since. He is taking carvedilol in it's place.    Patient is trying to obtain SSD.    Past medical, surgical, and family history reviewed.  Reviewed and documented all medications   Pt eating well, exercising as tolerated and taking medications as directed.      Review of Systems  Except positives as noted in the CC & HPI      Constitutional: Denies fevers, chills, night sweats, fatigue, weight changes, change in appetite    Eyes: Denies blurry vision, double vision    ENT: Denies otalgia, trouble hearing, tinnitus, vertigo, nasal congestion, rhinorrhea, sore throat    Neck: Denies swelling, masses    Cardiovascular: Denies chest pain, palpitations, edema, orthopnea, syncope    Respiratory: Denies dyspnea, cough, wheezing, postural nocturnal dyspnea    Gastrointestinal: Denies abdominal pain, nausea, vomiting, diarrhea, constipation, melena, hematochezia    Genitourinary: Denies dysuria, hematuria  Musculoskeletal: Denies back pain, neck pain, arthralgias, myalgias    Integumentary: Denies skin lesions, rashes, masses    Neurological: Denies dizziness, headaches, confusion, limb weakness, paresthesias, syncope, convulsions    Psychiatric: Denies depression, anxiety, homicidal ideations, suicidal ideations, sleep disturbances    Endocrine: Denies polyphagia, polydipsia, polyuria, weakness, hair thinning, heat intolerance, cold intolerance, weight changes    Heme/Lymph: Denies  "easy bruising, easy bleeding, swollen glands    Objective   Vitals:    04/23/25 0806   BP: 108/72   Pulse: 75   Resp: 16   Temp: 36.4 °C (97.6 °F)   SpO2: 98%   Weight: 97.8 kg (215 lb 9.6 oz)   Height: 1.778 m (5' 10\")      142/88 on recheck of BP in the right arm.     Physical Exam    GENERAL APPEARANCE: well-developed, well-nourished, 64 y.o. male in no acute distress. Patient is ambulating with walker.     SKIN: warm, pink and dry without rash or concerning lesions. Left posterior scalp wound is well healed.     MENTAL STATUS: alert and oriented × 3. Normal mood and affect appropriate to mood.     NECK: supple without lymphadenopathy. Carotid pulses are normal without bruits. Thyroid - is normal in midline without nodules.     CHEST: lungs are clear to auscultation without rales, rhonchi or wheezes.     HEART: regular, rate and rhythm without murmurs, rubs or gallops.     ABDOMEN: soft, mildly obese, protuberant, nondistended. No masses, hepatomegaly or splenomegaly is noted.     EXTREMITIES: no cyanosis, clubbing or edema. Pedal pulses are 2+ normal at the dorsalis pedis and posterior tibial pulses bilaterally.     NEUROLOGICAL: cranial nerves II through XII are grossly intact. Motor strength 5/5 at all fours.    Assessment   1. Benign essential HTN  CBC and Auto Differential    Comprehensive Metabolic Panel    lisinopriL-hydrochlorothiazide 20-12.5 mg tablet      2. Mixed hyperlipidemia  Lipid Panel    pitavastatin magnesium 4 mg tablet      3. Hemangioblastoma of brain (Multi)        4. Pulmonary nodules/lesions, multiple  CT chest wo IV contrast      5. Class 1 obesity due to excess calories with serious comorbidity and body mass index (BMI) of 30.0 to 30.9 in adult        6. Screening PSA (prostate specific antigen)          Patient to continue current medications (with any exceptions as noted) and diet. Follow-up in 3 month(s) otherwise as needed.      Patient is to return for fasting CBC and Auto " Differential, Comprehensive Metabolic Panel, Lipid profile labs at their convenience prior to their next appointment. Fasting is nothing to eat or drink except water or black coffee for 8-12 hours. Will call patient with results when available.       Ordered CT chest wo IV contrast for pulmonary nodules. Will call patient with results when available.        Patient is to discontinue the lisinopril once daily and we will start back on lisinopril - hydrochlorothiazide.    Patient is to discontinue the pravastatin and we will start back on pitavastatin.    Encouraged patient to monitor blood pressures at home.     Patient will call the office if his SBP < 100 mm hg.    Scribe Attestation  By signing my name below, IMarlen , Hannyibe   attest that this documentation has been prepared under the direction and in the presence of Deepthi Nagel MD.      This note has been transcribed using a medical scribe and there is a possibility of unintentional typing misprints.     All medical record entries made by the scribe were at my direction and personally dictated by me. I have reviewed the chart and agree that the record accurately reflects my personal performance of the history, physical exam, discussion, and plan.     DEEPTHI NAGEL M.D.

## 2025-04-23 ENCOUNTER — APPOINTMENT (OUTPATIENT)
Dept: PRIMARY CARE | Facility: CLINIC | Age: 65
End: 2025-04-23
Payer: COMMERCIAL

## 2025-04-23 VITALS
TEMPERATURE: 97.6 F | RESPIRATION RATE: 16 BRPM | BODY MASS INDEX: 30.86 KG/M2 | HEART RATE: 75 BPM | WEIGHT: 215.6 LBS | SYSTOLIC BLOOD PRESSURE: 108 MMHG | DIASTOLIC BLOOD PRESSURE: 72 MMHG | HEIGHT: 70 IN | OXYGEN SATURATION: 98 %

## 2025-04-23 DIAGNOSIS — R91.8 PULMONARY NODULES/LESIONS, MULTIPLE: ICD-10-CM

## 2025-04-23 DIAGNOSIS — E66.811 CLASS 1 OBESITY DUE TO EXCESS CALORIES WITH SERIOUS COMORBIDITY AND BODY MASS INDEX (BMI) OF 30.0 TO 30.9 IN ADULT: ICD-10-CM

## 2025-04-23 DIAGNOSIS — I10 BENIGN ESSENTIAL HTN: Primary | ICD-10-CM

## 2025-04-23 DIAGNOSIS — Z12.5 SCREENING PSA (PROSTATE SPECIFIC ANTIGEN): ICD-10-CM

## 2025-04-23 DIAGNOSIS — D43.2 HEMANGIOBLASTOMA OF BRAIN (MULTI): ICD-10-CM

## 2025-04-23 DIAGNOSIS — E78.2 MIXED HYPERLIPIDEMIA: ICD-10-CM

## 2025-04-23 DIAGNOSIS — E66.09 CLASS 1 OBESITY DUE TO EXCESS CALORIES WITH SERIOUS COMORBIDITY AND BODY MASS INDEX (BMI) OF 30.0 TO 30.9 IN ADULT: ICD-10-CM

## 2025-04-23 PROCEDURE — 3078F DIAST BP <80 MM HG: CPT | Performed by: FAMILY MEDICINE

## 2025-04-23 PROCEDURE — 1036F TOBACCO NON-USER: CPT | Performed by: FAMILY MEDICINE

## 2025-04-23 PROCEDURE — 3074F SYST BP LT 130 MM HG: CPT | Performed by: FAMILY MEDICINE

## 2025-04-23 PROCEDURE — 3008F BODY MASS INDEX DOCD: CPT | Performed by: FAMILY MEDICINE

## 2025-04-23 PROCEDURE — 99214 OFFICE O/P EST MOD 30 MIN: CPT | Performed by: FAMILY MEDICINE

## 2025-04-23 RX ORDER — LISINOPRIL AND HYDROCHLOROTHIAZIDE 12.5; 2 MG/1; MG/1
1 TABLET ORAL DAILY
COMMUNITY
Start: 2025-04-23

## 2025-04-23 ASSESSMENT — ANXIETY QUESTIONNAIRES
7. FEELING AFRAID AS IF SOMETHING AWFUL MIGHT HAPPEN: NOT AT ALL
6. BECOMING EASILY ANNOYED OR IRRITABLE: NOT AT ALL
3. WORRYING TOO MUCH ABOUT DIFFERENT THINGS: NOT AT ALL
4. TROUBLE RELAXING: NOT AT ALL
1. FEELING NERVOUS, ANXIOUS, OR ON EDGE: NOT AT ALL
IF YOU CHECKED OFF ANY PROBLEMS ON THIS QUESTIONNAIRE, HOW DIFFICULT HAVE THESE PROBLEMS MADE IT FOR YOU TO DO YOUR WORK, TAKE CARE OF THINGS AT HOME, OR GET ALONG WITH OTHER PEOPLE: NOT DIFFICULT AT ALL
2. NOT BEING ABLE TO STOP OR CONTROL WORRYING: NOT AT ALL
5. BEING SO RESTLESS THAT IT IS HARD TO SIT STILL: NOT AT ALL
GAD7 TOTAL SCORE: 0

## 2025-04-23 ASSESSMENT — ENCOUNTER SYMPTOMS
DEPRESSION: 0
LOSS OF SENSATION IN FEET: 0
OCCASIONAL FEELINGS OF UNSTEADINESS: 0

## 2025-04-23 ASSESSMENT — PATIENT HEALTH QUESTIONNAIRE - PHQ9
1. LITTLE INTEREST OR PLEASURE IN DOING THINGS: NOT AT ALL
2. FEELING DOWN, DEPRESSED OR HOPELESS: NOT AT ALL
SUM OF ALL RESPONSES TO PHQ9 QUESTIONS 1 & 2: 0

## 2025-04-25 ENCOUNTER — LAB (OUTPATIENT)
Dept: LAB | Facility: HOSPITAL | Age: 65
End: 2025-04-25
Payer: COMMERCIAL

## 2025-04-25 ENCOUNTER — APPOINTMENT (OUTPATIENT)
Dept: GENETICS | Facility: CLINIC | Age: 65
End: 2025-04-25
Payer: COMMERCIAL

## 2025-04-25 VITALS
HEART RATE: 79 BPM | DIASTOLIC BLOOD PRESSURE: 96 MMHG | WEIGHT: 215 LBS | BODY MASS INDEX: 30.85 KG/M2 | TEMPERATURE: 98.5 F | SYSTOLIC BLOOD PRESSURE: 136 MMHG

## 2025-04-25 DIAGNOSIS — D48.19 HEMANGIOBLASTOMA: Primary | ICD-10-CM

## 2025-04-25 DIAGNOSIS — G93.89 OTHER SPECIFIED DISORDERS OF BRAIN: Primary | ICD-10-CM

## 2025-04-25 NOTE — PROGRESS NOTES
MEDICAL GENETICS INITIAL VISIT NOTE    Patient full name: Regis Mixon  MRN: [id number]  YOB: 1960  Present during this visit: The patient and wife (Marcelina)    Primary care provider: MD Ananya Snider PA-C    Medical history was obtained from the patient. Prior to this appointment, I reviewed medical records from outpatient medical records and scanned documents, if available.     History of present illness:    It was a pleasure to see Mr. Mixon in clinic today. Mr. Mixon is a 64 y.o. male who was referred to the Medical Genetics Clinic at [facility] for evaluation of hereditary predisposition to cancer.     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 Earlier this year, he was admitted for resection of a L cerebellar hemangioblastoma presenting with dizziness. Per patient, it is possible that it is a recurrence from the one in 2003. During that admission, eye exam was without retinal hemangioblastomas. MRI of the spine showed several findings that could be spinal hemangioblastomas. Abdominal and chest imagings showed multiple hepatic/renal cysts and pulmonary nodules. He was referred to Genetics to rule out VHL.     He is otherwise healthy and feels well. Reports some concerns re: short term memory. No neurodevelopmental disorders or birthmarks.     PMH - HTN, HLP  PSH - neurosurgery x2    Social history:   Semi retired. . Does not smoke. Drinks socially. Wife is a  (5th grade)    Family history:  A five-generation family tree was obtained and scanned to chart.  Pertinent history   Mother (d 62) dx with Cushing disease, neuroendocrine tumor (location?) at 60. The tumor(s) might be at the adrenal glands. Before she passed away, she was found to have metastatic cancer of unknown origin from a lymph node biopsy. The primary was likely above the abdomen, reportedly.   Mat first cousin lung ca  Mat first cousin  prostate ca, ?mets  Mat uncle lung ca  Father (d) lung ca  Half brother, meningioma dx 40s  Pat first cousin breast ca dx55    Ashkenazi Latter-day: Denied  Consanguinity: Denied    Physical examination:  General: Alert. No acute distress. Well-nourished.  Head and face: No significant craniofacial dysmorphic features. No perioral hyperpigmented, telangiectasia, or papulomatous lesions. Not macrocephalic.   Lungs and chest wall: Clear to auscultation bilaterally.   CVS: Regular rate and rhythm. No murmur.   Extremities: Hands and fingers appear normal. Not edematous. No tenderness.   Neurologic: EOMI without nystagmus. No ptosis. No facial palsy. Tongue in midline. No dysarthria. Normal gait without abnormal movement.   Psychiatric: Cooperative. Appropriate mood and affect.    Results:    Eye exam - normal    MRI brain 1/10/25  IMPRESSION:  - Mixed cystic and hypervascular solid mass in the left cerebellar hemisphere with the cystic component measuring up to 5.3 cm and solid component measuring 2.6 cm. Differential includes hemangioblastoma, pilocytic astrocytoma, or ganglia glioma.   - Questionable additional lesion within the dorsal aspect of the cord at C3-4 only included in the field of view on single  image. Consider cervical spine MRI with and without contrast for further evaluation.  - Left cerebellar lesion results in effacement of the 4th ventricle with mild ventriculomegaly. Confluent periventricular FLAIR hyperintensity may represent a combination of transependymal edema and microangiopathic change.  - Inferior cerebellar tonsillar herniation as well as effacement of the superior cerebellar, prepontine, and quadrigeminal cisterns.  - No acute infarct. Moderate burden deep and subcortical white matter microangiopathic disease also noted.      MRI C spine 1/14/2025  IMPRESSION:  1. Enhancing nodule left side of the spinal cord of the upper C4 level with associated vaguely defined T2 cord signal extending  from the lower C2 level to the C6 superior endplate level which is mildly expansile. Suspect this represents another hemangioblastoma.  2. Multifocal nodular and curvilinear discontinuous enhancement along the margin of the spinal cord, predominantly posterior cord, that extends along essentially the entirety of the cervical spine into the visualized upper thoracic spine. Suspect these represent additional tiny hemangioblastomas.  3. Correlate for history of von Hippel-Lindau syndrome.  4. Multilevel degenerative cervical spondylosis as described above.      MRI C/T/L spine   IMPRESSION:  1. Limited, incomplete exam.  2. Expansile T2/stir hyperintense signal within the cervical spinal cord. Underlying lesion not excluded. Evaluation with  contrast-enhanced MRI is recommended.   3. No evidence of osseous metastatic disease within the limitations of exam. Findings suggestive of atypical hemangioma within T11 vertebral body.  4. Mild multilevel degenerative changes of the spine most pronounced at C5-6 and L4-5.  5. Partially visualized cystic posterior fossa lesion better evaluated on MRI brain dated 01/10/2025.    CT C/A/P 1/10/2025  FINDINGS:  CHEST:      LUNG/PLEURA/LARGE AIRWAYS:  Minimal nonobstructive mucus in the upper trachea. Otherwise the trachea and central bronchi are patent without endobronchial lesions. Round noncalcified pulmonary nodule measures up to 0.5 cm in the left lower lobe (series 204, image 181). There is an additional 0.4 cm pulmonary nodule of the right upper lobe (series 204, image 131) and a 0.7 cm pulmonary nodule of the right lower lobe (image 204). Scattered calcified granulomas. No lung masses or consolidations are present. There is no pleural effusion or pneumothorax. Bibasilar atelectasis is noted.      VESSELS:  Aorta and pulmonary arteries are normal caliber.  Mild atherosclerotic changes are noted of the aorta and branching vessels. Minimal coronary artery calcifications are  present.      HEART:  The heart is normal in size.  There is no pericardial effusion.      MEDIASTINUM AND PEDRITO:  No mediastinal, hilar or axillary lymphadenopathy is present.  The esophagus is normal.      CHEST WALL AND LOWER NECK:  The soft tissues of the chest wall demonstrate no gross abnormality. The visualized thyroid gland appears within normal limits.      ABDOMEN:      LIVER:  The liver is normal in size and attenuation. There are several hypoattenuating lesions measuring up to 1.7 cm in the left hepatic lobe (series 201, image 76) likely representing hepatic cysts/hemangiomas.      BILE DUCTS:  The intrahepatic and extrahepatic ducts are not dilated.      GALLBLADDER:  The gallbladder is nondistended and without evidence of radiopaque stones.      PANCREAS:  The pancreas appears unremarkable without evidence of ductal dilatation or masses.      SPLEEN:  The spleen is normal in size without focal lesions.      ADRENAL GLANDS:  Bilateral adrenal glands appear normal.      KIDNEYS AND URETERS:  Multiple bilateral presumed simple renal cysts. Exophytic left midpole lesion measures 2.5 x 2.2 cm and demonstrates intermediate density with layering calcific focus measuring up to 0.6 cm (series 201, image 112). Otherwise, no hydroureteronephrosis or nephroureterolithiasis.      PELVIS:      BLADDER:  The urinary bladder appears normal without abnormal wall thickening.      REPRODUCTIVE ORGANS:  The prostate is not enlarged.      BOWEL:  The stomach is unremarkable.  The small and large bowel are normal in caliber and demonstrate no wall thickening. Diverticulosis without evidence of diverticulitis. The appendix appears normal.      VESSELS:  The aorta and IVC appear normal. IVC filter at the L2-3 level.      PERITONEUM/RETROPERITONEUM/LYMPH NODES:  No ascites or free air, no fluid collection.  No abdominopelvic lymphadenopathy is present.      BONE AND SOFT TISSUE:  No suspicious osseous lesions are identified.  Degenerative discogenic disease is noted in the lower thoracic and lumbar spine. There are multiple heterogeneous lesions involving the T12, L2, and L3 vertebral bodies as best seen on series 206, image 71, image 70, and image 69 which likely represent hemangiomas. The abdominal wall soft  tissues appear normal.      IMPRESSION:  1. Indeterminate multiple bilateral pulmonary nodules measuring up to 0.7 cm in the right lower lobe. Consider follow up non contrast chest CT at 3-6 months, then consider CT chest at 18-24 months (Mat Marie et al., Guidelines for management of incidental pulmonary nodules detected on CT images: From the Fleischner Society 2017,  Radiology. 2017 Jul;284 (1):228-243.)   2. Multiple bilateral simple renal cysts with a Bosniak 2F cyst of the left kidney as described above.  3. Additional chronic findings as described above.    Pathology 1/16/2025  A.  Brain, biopsy, excision:  - Hemangioblastoma.    Tumor cells are Immunoreactive with Inhibin and Are Negative with:  CD10, CAM5.2 and EMA.  Findings, in conjunction with the morphology, consistent with the diagnosis issued above.    Assessment:  Mr. Mixon is a 64 y.o. male with cerebellar hemangioblastoma (HB), spinal lesions that could be HB, hepatic cysts, renal cysts, a Bosniak 2F renal cyst, and pulmonary nodules. Mother had neuroendocrine tumor, Cushing disease, and metastatic cancer of unknown origin.     We reviewed that HB can be sporadic or can be part of VHL. We reviewed clinical features of VHL. Below are the diagnostic criteria for VHL.          If the [place]serratian Criteria is used, Mr. Mixon meets the clinical criteria based on hemangioblastoma(s) and multiple kidney cysts. If the Surinamese Criteria is used, Mr. Mixon MAY meet the clinical criteria if the hamangioblastoma in 2025 is a second primary tumor (not due to a recurrence of the tumor from 2003), or if the spinal lesions are hemangioblastomas. Furthermore, if his mother (who had a  neuroendocrine tumor) had VHL, then Mr. Mixon would meet the criteria with one HB diagnosed.     I recommend genetic testing to confirm the diagnosis of VHL. Identification of the variant will allow for testing other family members and it might be easier for insurance authorization of medication such as belzutifan. Genetic testing is clinically indicated.     We could test only the VHL gene or obtain a broader panel. This is not unreasonable because:  1) the diagnosis is mother is uncertain; she may have other single-gene causes of the neuroendocrine tumor;   2) There are some family members with other types of cancer such as breast cancer and prostate cancer.   The patient agreed to obtain a broad panel, acknowledging a possibility of identifying variants of uncertain significance.     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 I reviewed a role of an endocrinologist for surveillance of NET -- annual plasma or 24-hr urine for fractionated metanephrines and MRI of abdomen every 2 years. A referral to Endocrinology placed today.     Benefits of having genetic test include 1) to establish a molecular diagnosis; 2) to provide further medical recommendations specific to each diagnosis; 3) for familial cascade testing, recurrence risk estimation, and family planning; and 4) to allow for participation in support group organizations and enrolment in clinical trials, if any.  Pretest genetic counseling was provided, including the nature of the test; three types of test result (positive, negative, and uncertain); the fact that a negative result does not exclude a possibility of genetic disorders; retention of de-identified genetic data at the testing company. The patient provided a verbal informed consent.      Plan:  -[facility], CancerNext Expanded (DNA/RNA). TAT 4 weeks. Sample: blood obtained today.   -See Neurosurgery and Neuro-Oncology for CNS tumors.  -See Urology for renal cysts.   -A referral to Dr. Ferrell (Endocrinology) placed today for surveillance of neuroendocrine tumors.   -Return to clinic when results are available.    Thank you for allowing me to participate in the care of this patient. Please do not hesitate to contact me if you have any questions.     Manisha Lazo MD    Medical Geneticist  [facility]  Phone: [phone]  Fax: [phone]   Address: [address] [place] [address]  Time spent (this information is required by insurance): face-to-face 65min; preparation 10min; documentation 25min; placing order(s) for genetic testing 5min; total time spent 105min

## 2025-04-25 NOTE — LETTER
04/25/25    Ananya Singleton PA-C  [address]  Department Of Surgery-Plastic Surgery  [place] [address]      Dear Dr. Ananya Singleton PA-C,    Thank you for referring your patient, Regis Mixon, to receive care in my office. I have enclosed a summary of the care provided to Regis on 04/25/25.    Please contact me with any questions you may have regarding the visit.    Sincerely,         Manisha Lazo MD  [address]  [place] [address]  [facility] [address]    CC: No Recipients

## 2025-04-25 NOTE — LETTER
04/25/25    Terence Nagel MD  [address]  [facility] [address]      Dear Dr. Terence Nagel MD,    I am writing to confirm that your patient, Regis Mixon  received care in my office on 04/25/25. I have enclosed a summary of the care provided to Regis for your reference.    Please contact me with any questions you may have regarding the visit.    Sincerely,         Manisha Lazo MD  [address] [facility] [address]  [facility] [address]    CC: No Recipients

## 2025-04-29 ENCOUNTER — APPOINTMENT (OUTPATIENT)
Dept: HEMATOLOGY/ONCOLOGY | Facility: HOSPITAL | Age: 65
End: 2025-04-29
Payer: COMMERCIAL

## 2025-05-05 ENCOUNTER — TELEPHONE (OUTPATIENT)
Dept: HEMATOLOGY/ONCOLOGY | Facility: HOSPITAL | Age: 65
End: 2025-05-05
Payer: COMMERCIAL

## 2025-05-05 NOTE — TELEPHONE ENCOUNTER
Reason for Conversation  NPV with Dr. Jones     Background   Called and left message with time and location for NPV with Dr. Jones tomorrow Tuesday 5/6 at 11am.    Disposition   Left message with callback number if they have any questions.    Ayana DU RN

## 2025-05-06 ENCOUNTER — OFFICE VISIT (OUTPATIENT)
Dept: HEMATOLOGY/ONCOLOGY | Facility: HOSPITAL | Age: 65
End: 2025-05-06
Payer: COMMERCIAL

## 2025-05-06 ENCOUNTER — OFFICE VISIT (OUTPATIENT)
Dept: UROLOGY | Facility: HOSPITAL | Age: 65
End: 2025-05-06
Payer: COMMERCIAL

## 2025-05-06 VITALS
TEMPERATURE: 97.2 F | OXYGEN SATURATION: 98 % | HEART RATE: 81 BPM | SYSTOLIC BLOOD PRESSURE: 160 MMHG | HEIGHT: 70 IN | WEIGHT: 211.2 LBS | BODY MASS INDEX: 30.24 KG/M2 | RESPIRATION RATE: 20 BRPM | DIASTOLIC BLOOD PRESSURE: 101 MMHG

## 2025-05-06 DIAGNOSIS — D48.0 HEMANGIOBLASTOMA OF SPINE: ICD-10-CM

## 2025-05-06 DIAGNOSIS — F41.9 ANXIETY: ICD-10-CM

## 2025-05-06 DIAGNOSIS — D43.2 HEMANGIOBLASTOMA OF BRAIN (MULTI): ICD-10-CM

## 2025-05-06 DIAGNOSIS — Q85.83 VON HIPPEL-LINDAU SYNDROME (MULTI): Primary | ICD-10-CM

## 2025-05-06 DIAGNOSIS — N28.1 RENAL CYST: ICD-10-CM

## 2025-05-06 DIAGNOSIS — R31.29 PERSISTENT MICROSCOPIC HEMATURIA: Primary | ICD-10-CM

## 2025-05-06 DIAGNOSIS — I10 BENIGN ESSENTIAL HTN: ICD-10-CM

## 2025-05-06 DIAGNOSIS — R39.9 LOWER URINARY TRACT SYMPTOMS (LUTS): ICD-10-CM

## 2025-05-06 LAB
POC APPEARANCE, URINE: CLEAR
POC BILIRUBIN, URINE: NEGATIVE
POC BLOOD, URINE: ABNORMAL
POC COLOR, URINE: YELLOW
POC GLUCOSE, URINE: NEGATIVE MG/DL
POC KETONES, URINE: NEGATIVE MG/DL
POC LEUKOCYTES, URINE: NEGATIVE
POC NITRITE,URINE: NEGATIVE
POC PH, URINE: 6 PH
POC PROTEIN, URINE: NEGATIVE MG/DL
POC SPECIFIC GRAVITY, URINE: 1.02
POC UROBILINOGEN, URINE: 0.2 EU/DL

## 2025-05-06 PROCEDURE — 99205 OFFICE O/P NEW HI 60 MIN: CPT | Performed by: PSYCHIATRY & NEUROLOGY

## 2025-05-06 PROCEDURE — 3077F SYST BP >= 140 MM HG: CPT | Performed by: PSYCHIATRY & NEUROLOGY

## 2025-05-06 PROCEDURE — 3008F BODY MASS INDEX DOCD: CPT | Performed by: PSYCHIATRY & NEUROLOGY

## 2025-05-06 PROCEDURE — 99214 OFFICE O/P EST MOD 30 MIN: CPT | Performed by: UROLOGY

## 2025-05-06 PROCEDURE — 81003 URINALYSIS AUTO W/O SCOPE: CPT | Performed by: UROLOGY

## 2025-05-06 PROCEDURE — 99215 OFFICE O/P EST HI 40 MIN: CPT | Performed by: PSYCHIATRY & NEUROLOGY

## 2025-05-06 PROCEDURE — 3080F DIAST BP >= 90 MM HG: CPT | Performed by: PSYCHIATRY & NEUROLOGY

## 2025-05-06 PROCEDURE — 1036F TOBACCO NON-USER: CPT | Performed by: UROLOGY

## 2025-05-06 PROCEDURE — 99204 OFFICE O/P NEW MOD 45 MIN: CPT | Performed by: UROLOGY

## 2025-05-06 RX ORDER — LORAZEPAM 0.5 MG/1
0.5 TABLET ORAL EVERY 8 HOURS PRN
Qty: 2 TABLET | Refills: 0 | Status: SHIPPED | OUTPATIENT
Start: 2025-05-06

## 2025-05-06 RX ORDER — LISINOPRIL 20 MG/1
20 TABLET ORAL DAILY
COMMUNITY

## 2025-05-06 ASSESSMENT — PAIN SCALES - GENERAL: PAINLEVEL_OUTOF10: 0-NO PAIN

## 2025-05-06 NOTE — PROGRESS NOTES
NPV     HISTORY OF PRESENT ILLNESS:   Regis Mixon is a 64 y.o. 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 Earlier this year, he was admitted for resection of a L cerebellar hemangioblastoma presenting with dizziness. Per patient, it is possible that it is a recurrence from the one in 2003. During that admission, eye exam was without retinal hemangioblastomas. MRI of the spine showed several findings that could be spinal hemangioblastomas. Abdominal and chest imagings showed multiple hepatic/renal cysts and pulmonary nodules. He was referred to Genetics to rule out VHL.     CT abd/pel (1/10/25) -   KIDNEYS AND URETERS:  Multiple bilateral presumed simple renal cysts. Exophytic left  midpole lesion measures 2.5 x 2.2 cm and demonstrates intermediate  density with layering calcific focus measuring up to 0.6 cm (series  201, image 112). Otherwise, no hydroureteronephrosis or  nephroureterolithiasis.     No urinary complaints.  No dysuria, or gross hematuria.  No flank pain    PAST MEDICAL HISTORY:  Medical History[1]    PAST SURGICAL HISTORY:  Surgical History[2]     ALLERGIES:   Allergies[3]     MEDICATIONS:   Current Outpatient Medications   Medication Instructions    acetaminophen (TYLENOL) 650 mg, oral, Every 6 hours PRN    carvedilol (COREG) 25 mg, oral, 2 times daily    levETIRAcetam (KEPPRA) 500 mg, oral, 2 times daily, Continue until discussed at follow up neurosurgery appointment    lisinopriL-hydrochlorothiazide 20-12.5 mg tablet 1 tablet, Daily    lisinopril 20 mg, Daily    LORazepam (ATIVAN) 0.5 mg, oral, Every 8 hours PRN, Take 1 tab 45 min prior to MRIs and take second tab if needed during study.    pantoprazole (PROTONIX) 40 mg, oral, Daily before breakfast, Do not crush, chew, or split.    pitavastatin magnesium 4 mg, Daily        PHYSICAL EXAM:  There were no vitals taken for this visit.  Constitutional: Patient  appears well-developed and well-nourished. No distress.    Pulmonary/Chest: Effort normal. No respiratory distress.   Musculoskeletal: Normal range of motion.    Neurological: Alert and oriented to person, place, and time.  Psychiatric: Normal mood and affect. Behavior is normal. Thought content normal.      Labs    Lab Results   Component Value Date    PSA 0.80 05/19/2021     Lab Results   Component Value Date    GFRMALE 65 08/16/2023     Lab Results   Component Value Date    CREATININE 0.96 01/28/2025     Lab Results   Component Value Date    CHOL 200 (H) 08/14/2024     Lab Results   Component Value Date    HDL 43.3 08/14/2024     Lab Results   Component Value Date    CHHDL 4.6 08/14/2024     Lab Results   Component Value Date    LDLF 110 (H) 08/16/2023     Lab Results   Component Value Date    VLDL 63 (H) 08/14/2024     Lab Results   Component Value Date    TRIG 314 (H) 08/14/2024     Lab Results   Component Value Date    HGBA1C 5.5 08/14/2024       Lab Results   Component Value Date    HCT 40.7 (L) 01/28/2025       Imaging    Procedures      Assessment:      1. Persistent microscopic hematuria        2. Hemangioblastoma of brain (Multi)  Referral to Urology      3. Lower urinary tract symptoms (LUTS)  Measure post void residual    POCT UA Automated manually resulted      4. Renal cyst          Regis Mixon is a 64 y.o. male here for consultation    Imaging reviewed     Plan:   1)  Has fu imaging in June  2) Virtual appt with Dr Otto after imaging, likely can just observe renal cysts  3) Discussed cystoscopy to complete hematuria workup, wants to hold off for now       [1]   Past Medical History:  Diagnosis Date    Other specified health status     No pertinent past medical history    Personal history of other infectious and parasitic diseases     History of genital warts    PMR (polymyalgia rheumatica) (Multi) 08/14/2023   [2]   Past Surgical History:  Procedure Laterality Date    OTHER SURGICAL HISTORY   05/19/2021    Brain surgery    OTHER SURGICAL HISTORY  05/19/2021    Colonoscopy   [3] No Known Allergies

## 2025-05-06 NOTE — PATIENT INSTRUCTIONS
Please schedule your CT L spine in 8 weeks.    When you schedule this, send us what date it is and we can schedule a virtual visit.    Please take your Blood Pressure once in the morning and once in the evening for 2 weeks. Please send those results to your PCP and our office here.     [facility] [place].    Ophthalmology [facility] consult will be placed.    Please call us with any questions or concerns at [phone] opt. 5, opt. 2  For scheduling concerns please call [phone] option 1

## 2025-05-06 NOTE — PROGRESS NOTES
[facility]  Neuro-Oncology Clinic        Patient ID: Regis Mixon  Referring Physician: Caty Tyson, APRN-CNP  [address]  Department of [place]rological [place] [address]  Primary Care Provider: Terence Nagel MD      Subjective    HPI  Regis Mixon is a 64 y.o. male with a history of cerebellar hemangioblastoma s/p resection in , hyperlipidemia, hypertension and obesity presented to the ED on 2024 reporting dizziness for approximately 6 weeks.  CTH at that time revealed a 5.5 x 5 cm left cerebellar lesion.  MRI showed a left cerebellar cystic mass along with the C3-4 dorsal cord lesion with fourth ventricular effacement that was consistent with a hemangioblastoma.  Multiple small foci of enhancement were also noted that were concerning for neoplasm.  CT C/A/P showed multiple bilateral pulmonary nodules and bilateral renal cyst.  DSA 2024 with muscular branch from left V3 supplying the tumor; embolization not recommended.  CTA head/neck with left cerebellar mass with left AICA and PICA feeders.    Underwent left retrosigmoid redo craniectomy with gross total resection on 2025 with final surgical pathology showing hemangioblastoma.    Interval history  2025 (initial clinic visit): Denies any new dizziness.  Feels the symptoms resolved after surgery.  Denies any new visual changes or changes in concentration.    Review of Systems - Oncology   10 point ROS negative except for that mentioned in the HPI.    Medical History[1]    Surgical History[2]    Family History[3]  Mother: Diagnosed with Cushing disease and a possible neuroendocrine tumor at age 60  Maternal first cousin: Lung cancer  Maternal first cousin: Prostate cancer  Maternal uncle: Lung cancer  Father: Lung cancer ()  Half brother: Meningioma (diagnosed in his 40s)  Paternal first cousin: Breast cancer (diagnosed at age 55)    Regis Mixon  reports that he has never  "smoked. He has never been exposed to tobacco smoke. He has never used smokeless tobacco.  He  reports no history of alcohol use.  He  reports no history of drug use.    PHYSICAL EXAM  Objective   BSA: 2.17 meters squared  BP (!) 160/101   Pulse 81   Temp 36.2 °C (97.2 °F) (Core)   Resp 20   Ht (S) 1.77 m (5' 9.69\")   Wt 95.8 kg (211 lb 3.2 oz)   SpO2 98%   BMI 30.58 kg/m²   Karnofsky Score: 100 - Fully active, able to carry on all pre-disease performed without restriction    Gen:   CVS/Pulm:  Abdomen:    Neuro  Mental Status:  CN:  Motor:   Reflexes:  Sensation:  Gait:    LABS      Pathology 1/16/25  A.  Brain, biopsy, excision:  - Hemangioblastoma.  See note     Note:  Tumor cells are Immunoreactive with Inhibin and Are Negative with:  CD10, CAM5.2 and EMA.  Findings, in conjunction with the morphology, consistent with the diagnosis issued above.        IMAGING  === 04/12/25 ===    MR BRAIN W AND WO CONTRAST    - Impression -  Postoperative changes of a left suboccipital craniectomy with mesh  reconstruction. Linear enhancement along the surgical margins is  likely reactive.    There is an extra-axial fluid collection within the posterior fossa  on the left measuring 2.5 cm, similar to the prior CT head  03/04/2025. There is mass effect upon the adjacent cerebellum. There  is mild narrowing of the 4th ventricle which remains patent.    No new nodular enhancement is noted.    There is mild encephalomalacia and gliosis along the lateral margin  of the cerebellum on the left.    There is susceptibility artifact within the left cerebellum from  metallic surgical clips.    The ventricles are similar in size and configuration compared to the  prior exam.    MACRO:  None    Signed by: Purvi Campbell 4/14/2025 11:10 AM  Dictation workstation:   [id number]      MRI C-spine 4/12/25 (radiology read)  Expansile hyperintense signal within the cervical cord extending from  C2 through C6 appears slightly more prominent than " the prior exam,  however may be secondary to differences in technique.      The enhancing nodule within the cervical cord on the left at C3-C4 is  unchanged measuring 1 cm.      Nodular enhancement along the surface of the cord, posterior greater  than anterior, is unchanged compared to the prior exam.      Degenerative changes of the cervical spine without evidence of  high-grade spinal canal stenosis. Varying degrees of neural foraminal  narrowing as detailed above.    MRI C/T/L spine w/out contrast (radiology read)  1. Limited, incomplete exam.  2. Expansile T2/stir hyperintense signal within the cervical spinal  cord. Underlying lesion not excluded. Evaluation with  contrast-enhanced MRI is recommended.  3. No evidence of osseous metastatic disease within the limitations  of exam. Findings suggestive of atypical hemangioma within T11  vertebral body.  4. Mild multilevel degenerative changes of the spine most pronounced  at C5-6 and L4-5.  5. Partially visualized cystic posterior fossa lesion better  evaluated on MRI brain dated 01/10/2025.    MRI Brain 1/10/25 (radiology read)  Mixed cystic and hypervascular solid mass in the left cerebellar  hemisphere with the cystic component measuring up to 5.3 cm and solid  component measuring 2.6 cm. Differential includes hemangioblastoma,  pilocytic astrocytoma, or ganglia glioma.      Questionable additional lesion within the dorsal aspect of the cord  at C3-4 only included in the field of view on single  image.  Consider cervical spine MRI with and without contrast for further  evaluation.      Left cerebellar lesion results in effacement of the 4th ventricle  with mild ventriculomegaly. Confluent periventricular FLAIR  hyperintensity may represent a combination of transependymal edema  and microangiopathic change.      Inferior cerebellar tonsillar herniation as well as effacement of the  superior cerebellar, prepontine, and quadrigeminal cisterns.      No acute  infarct. Moderate burden deep and subcortical white matter  microangiopathic disease also noted.    CT Chest/abdomen/pelvis w/ contrast 1/10/25 (radiology read)  1. Indeterminate multiple bilateral pulmonary nodules measuring up to  0.7 cm in the right lower lobe. Consider follow up non contrast chest  CT at 3-6 months, then consider CT chest at 18-24 months (Mat Marie et al., Guidelines for management of incidental pulmonary  nodules detected on CT images: From the Fleischner Society 2017,  Radiology. 2017 Jul;284 (1):228-243.)  2. Multiple bilateral simple renal cysts with a Bosniak 2F cyst of  the left kidney as described above.  3. Additional chronic findings as described above.    ASSESSMENT  Regis Mixon is a 64 y.o. male with a history of cerebellar hemangioblastoma s/p resection in 2003, hyperlipidemia, hypertension and obesity presented to the ED on 12/29/2024 reporting dizziness for approximately 6 weeks.  Imaging showed a left cerebellar cystic lesion along with effacement of the fourth ventricle.  He underwent craniectomy and resection of the left cerebellar cystic lesion on 1/16/2025 with pathology showing hemangioblastoma.    PLAN    #Clinical von Hippel-Lindau disease  - Has been evaluated by genetics colleagues who obtained testing which has been sent out to [facility]  - Will await results    #Chemotherapy/treatment  - Discussed belzutifan which has been approved for VHL related lesions.  Discussed some of the potential side effects associated with it and that this would be a long-term medication to reduce tumor size  - Often this will be started in those patients that are symptomatic or who have lesions that have the potential to soon cause symptoms and that cannot be resected.  - Currently do not feel that we need to start this urgently but if there is growth of the hemangioblastomas or if on renal evaluation (will see urology later today) there is a need to start from a  perspective  then can start.  - Information about belzutifan given to patient and spouse.    #CNS hemangioblastomas  - Left cerebellar hemangioblastoma: S/p resection 1/16/2025.  Will coordinate with neurosurgery colleagues to determine when to order the next MRI brain.      - C3-4 hemangioblastoma  When MRI cervical spine from January 2025 is compared to one from April 2025 enhancement appears to be about the same though FLAIR changes might be slightly worse that this could be a technical difference.  Patient is currently asymptomatic and so we will do close follow-up imaging to see if we can determine if there is accelerated rate of growth.  - Plan for MRI cervical/thoracic/lumbar spine with and without contrast in approximately 8 weeks (prior thoracic and lumbar MRIs with and without contrast and so we will order with contrast studies to obtain a better view)  - Ativan sent for studies as patient gets anxious    #Ophtho  No evidence of retinal hemangioblastomas or optic nerve hemangioblastomas noted on inpatient ophthalmologic evaluation in January 2025  - Recommend annual ophthalmology exams; referral to  ophthalmology placed    #Renal   -Multiple bilateral simple renal cysts with a Bosniak 2F cyst of  the left kidney as described above.  - Will defer to urology colleagues for recommendations on imaging frequency and any possible interventions    #GI  - No evidence of pancreatic pseudocyst or other lesions on CT chest/abdomen/pelvis from January 2025    #Endocrine  -No evidence of adrenal tumors on CT chest/abdomen/pelvis from January 2025  - Urine metanephrine and normetanephrine levels from January 2025 were within normal limits  - Endocrinology referral previously placed by another team; currently scheduled for Oct 2025. Will see if he can be seen sooner. Owen if we plan to start Belzutifan.     #Hypertension  - Elevated at today's visit but patient states that it is normally well-controlled at home when he checks  -  Asked to keep a log of his blood pressures for 2 weeks.  Should take 1 reading in the morning and 1 reading at night.  Asked to send this to his PCP and myself.  - Patient currently on carvedilol 25 mg twice daily and lisinopril-hydrochlorothiazide 20-12.5 mg once daily  - Will defer to Dr. Nagel, his PCP, for BP management    #Follow-up  -Plan for televisit a few days after MRI spine is completed  - Call or return sooner should new or worsening neurological deficits occur    I personally spent 60 minutes today, exclusive of procedures, providing care for this patient, including preparation, face to face time, documentation and other services such as review of medical records, diagnostic result, patient education, counseling, coordination of care as specified in the encounter.     Moises Jones MD  Neuro-Oncology                               [1]   Past Medical History:  Diagnosis Date    Other specified health status     No pertinent past medical history    Personal history of other infectious and parasitic diseases     History of genital warts    PMR (polymyalgia rheumatica) (Multi) 08/14/2023   [2]   Past Surgical History:  Procedure Laterality Date    OTHER SURGICAL HISTORY  05/19/2021    Brain surgery    OTHER SURGICAL HISTORY  05/19/2021    Colonoscopy   [3]   Family History  Problem Relation Name Age of Onset    Lung cancer Mother      Lung cancer Father

## 2025-05-07 PROBLEM — D48.0: Status: ACTIVE | Noted: 2025-05-07

## 2025-05-08 ENCOUNTER — HOSPITAL ENCOUNTER (OUTPATIENT)
Dept: RADIOLOGY | Facility: CLINIC | Age: 65
Discharge: HOME | End: 2025-05-08
Payer: COMMERCIAL

## 2025-05-08 DIAGNOSIS — R91.8 PULMONARY NODULES/LESIONS, MULTIPLE: ICD-10-CM

## 2025-05-08 PROCEDURE — 71250 CT THORAX DX C-: CPT

## 2025-05-08 PROCEDURE — 71250 CT THORAX DX C-: CPT | Performed by: RADIOLOGY

## 2025-05-11 NOTE — RESULT ENCOUNTER NOTE
Please call the patient regarding his abnormal result.  CT of the chest reveals multifocal lung nodules without significant interval change.  The largest is 7 mm in the right lower lobe.  Consider follow-up chest CT at 6 -12 months.  Then consider CT at 18-24 months.

## 2025-05-20 ENCOUNTER — TELEPHONE (OUTPATIENT)
Dept: PRIMARY CARE | Facility: CLINIC | Age: 65
End: 2025-05-20
Payer: COMMERCIAL

## 2025-05-20 NOTE — TELEPHONE ENCOUNTER
Patient in office stating that he will be starting a new medication and has recorded his BP readings for the 2.5 weeks and he wants the doctor to know about the readings.  The readings are scanned into the chart for physician to go over and contact patient if there are any questions.   Patient stated that the carvedilol is not working for him, he would like to discuss medications. Patient declined being seen in office after offering him multiple openings that were available for this week please contact patient   Please advise

## 2025-05-21 DIAGNOSIS — I10 BENIGN ESSENTIAL HTN: Primary | ICD-10-CM

## 2025-05-21 LAB
COMMENTS - MP RESULT TYPE: NORMAL
SCAN RESULT: NORMAL

## 2025-05-21 RX ORDER — NEBIVOLOL 10 MG/1
10 TABLET ORAL DAILY
Qty: 90 TABLET | Refills: 3 | Status: SHIPPED | OUTPATIENT
Start: 2025-05-21 | End: 2026-05-21

## 2025-05-27 ENCOUNTER — APPOINTMENT (OUTPATIENT)
Dept: GENETICS | Facility: CLINIC | Age: 65
End: 2025-05-27
Payer: COMMERCIAL

## 2025-05-27 DIAGNOSIS — Q85.83 VON HIPPEL-LINDAU SYNDROME (MULTI): Primary | ICD-10-CM

## 2025-05-27 DIAGNOSIS — D43.2 HEMANGIOBLASTOMA OF BRAIN (MULTI): Primary | ICD-10-CM

## 2025-05-27 DIAGNOSIS — D48.19 HEMANGIOBLASTOMA: ICD-10-CM

## 2025-05-27 PROCEDURE — 99417 PROLNG OP E/M EACH 15 MIN: CPT | Performed by: INTERNAL MEDICINE

## 2025-05-27 PROCEDURE — 99215 OFFICE O/P EST HI 40 MIN: CPT | Performed by: INTERNAL MEDICINE

## 2025-05-27 NOTE — PROGRESS NOTES
MEDICAL GENETICS FOLLOW-UP VISIT NOTE    Patient full name: Regis Mixon  MRN: [id number]  YOB: 1960  Present during this visit: The patient and wife    Primary care provider: Terence Nagel MD    Medical history was obtained from the patient and wife. Prior to this appointment, I reviewed medical records from outpatient medical records and scanned documents, if available. This visit was completed via telephone. All issues as below were discussed and addressed but no physical exam was performed. If it was felt that the patient should be evaluated in clinic then they were directed there. The patient consented to visit.    Interval history:  Mr. Mixon is a 64 y.o. 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     From my last note:  Mr. Mixon is a 64 y.o. male who was referred to the Medical Genetics Clinic at [facility] for evaluation of hereditary predisposition to cancer.      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 Earlier this year, he was admitted for resection of a L cerebellar hemangioblastoma presenting with dizziness. Per patient, it is possible that it is a recurrence from the one in 2003. During that admission, eye exam was without retinal hemangioblastomas. MRI of the spine showed several findings that could be spinal hemangioblastomas. Abdominal and chest imagings showed multiple hepatic/renal cysts and pulmonary nodules. He was referred to Genetics to rule out VHL.      He is otherwise healthy and feels well. Reports some concerns re: short term memory. No neurodevelopmental disorders or birthmarks.      PMH - HTN, HLP  PSH - neurosurgery x2     Social history:   Semi retired. . Does not smoke. Drinks socially. Wife is a  (5th grade)     Family  history:  A five-generation family tree was obtained and scanned to chart.  Pertinent history   Mother (d 62) dx with Cushing disease, neuroendocrine tumor (location?) at 60. The tumor(s) might be at the adrenal glands. Before she passed away, she was found to have metastatic cancer of unknown origin from a lymph node biopsy. The primary was likely above the abdomen, reportedly.   Mat first cousin lung ca  Mat first cousin prostate ca, ?mets  Mat uncle lung ca  Father (d) lung ca  Half brother, meningioma dx 40s  Pat first cousin breast ca dx55     Ashkenazi Sabianism: Denied  Consanguinity: Denied     Physical examination:  General: Alert. No acute distress. Well-nourished.  Head and face: No significant craniofacial dysmorphic features. No perioral hyperpigmented, telangiectasia, or papulomatous lesions. Not macrocephalic.   Lungs and chest wall: Clear to auscultation bilaterally.   CVS: Regular rate and rhythm. No murmur.   Extremities: Hands and fingers appear normal. Not edematous. No tenderness.   Neurologic: EOMI without nystagmus. No ptosis. No facial palsy. Tongue in midline. No dysarthria. Normal gait without abnormal movement.   Psychiatric: Cooperative. Appropriate mood and affect.    Results:    Genetic testing  [employer] CancerNext Expanded panel, DNA/RNA sequencing, 5/21/2025  A variant of uncertain significance in AIP, p.A276T,  (c.826G>A)   Genes Analyzed (77 total): AIP, ALK, APC, FRANCISCO, BAP1,BARD1, BMPR1A, BRCA1, BRCA2, BRIP1, CDC73, CDH1, CDK4, CDKN1B, CDKN2A, CEBPA, CHEK2, DICER1, ETV6, FH, FLCN,GATA2, LZTR1, MAX, MEN1, MET, MLH1, MSH2, MSH6, MUTYH, NF1, NF2, NTHL1, PALB2, PHOX2B, PMS2, POT1, HZGEK0Z, PTCH1,PTEN, RAD51C, RAD51D, RB1, RET, RPS20, RUNX1, SDHA, SDHAF2, SDHB, SDHC, SDHD, SMAD4, SMARCA4, SMARCB1, SMARCE1,STK11, SUFU, QTAH176, TP53, TSC1, TSC2, VHL and WT1 (sequencing and deletion/duplication); AXIN2, CTNNA1, DDX41, EGFR,HOXB13, KIT, MBD4, MITF, MSH3, PDGFRA, POLD1 and POLE  (sequencing only); EPCAM and GREM1 (deletion/duplication only     Eye exam - normal     MRI brain 1/10/25  IMPRESSION:  - Mixed cystic and hypervascular solid mass in the left cerebellar hemisphere with the cystic component measuring up to 5.3 cm and solid component measuring 2.6 cm. Differential includes hemangioblastoma, pilocytic astrocytoma, or ganglia glioma.   - Questionable additional lesion within the dorsal aspect of the cord at C3-4 only included in the field of view on single  image. Consider cervical spine MRI with and without contrast for further evaluation.  - Left cerebellar lesion results in effacement of the 4th ventricle with mild ventriculomegaly. Confluent periventricular FLAIR hyperintensity may represent a combination of transependymal edema and microangiopathic change.  - Inferior cerebellar tonsillar herniation as well as effacement of the superior cerebellar, prepontine, and quadrigeminal cisterns.  - No acute infarct. Moderate burden deep and subcortical white matter microangiopathic disease also noted.      MRI C spine 1/14/2025  IMPRESSION:  1. Enhancing nodule left side of the spinal cord of the upper C4 level with associated vaguely defined T2 cord signal extending from the lower C2 level to the C6 superior endplate level which is mildly expansile. Suspect this represents another hemangioblastoma.  2. Multifocal nodular and curvilinear discontinuous enhancement along the margin of the spinal cord, predominantly posterior cord, that extends along essentially the entirety of the cervical spine into the visualized upper thoracic spine. Suspect these represent additional tiny hemangioblastomas.  3. Correlate for history of von Hippel-Lindau syndrome.  4. Multilevel degenerative cervical spondylosis as described above.      MRI C/T/L spine   IMPRESSION:  1. Limited, incomplete exam.  2. Expansile T2/stir hyperintense signal within the cervical spinal cord. Underlying lesion not  excluded. Evaluation with  contrast-enhanced MRI is recommended.   3. No evidence of osseous metastatic disease within the limitations of exam. Findings suggestive of atypical hemangioma within T11 vertebral body.  4. Mild multilevel degenerative changes of the spine most pronounced at C5-6 and L4-5.  5. Partially visualized cystic posterior fossa lesion better evaluated on MRI brain dated 01/10/2025.     CT C/A/P 1/10/2025  FINDINGS:  CHEST:      LUNG/PLEURA/LARGE AIRWAYS:  Minimal nonobstructive mucus in the upper trachea. Otherwise the trachea and central bronchi are patent without endobronchial lesions. Round noncalcified pulmonary nodule measures up to 0.5 cm in the left lower lobe (series 204, image 181). There is an additional 0.4 cm pulmonary nodule of the right upper lobe (series 204, image 131) and a 0.7 cm pulmonary nodule of the right lower lobe (image 204). Scattered calcified granulomas. No lung masses or consolidations are present. There is no pleural effusion or pneumothorax. Bibasilar atelectasis is noted.      VESSELS:  Aorta and pulmonary arteries are normal caliber.  Mild atherosclerotic changes are noted of the aorta and branching vessels. Minimal coronary artery calcifications are present.      HEART:  The heart is normal in size.  There is no pericardial effusion.      MEDIASTINUM AND PEDRITO:  No mediastinal, hilar or axillary lymphadenopathy is present.  The esophagus is normal.      CHEST WALL AND LOWER NECK:  The soft tissues of the chest wall demonstrate no gross abnormality. The visualized thyroid gland appears within normal limits.      ABDOMEN:      LIVER:  The liver is normal in size and attenuation. There are several hypoattenuating lesions measuring up to 1.7 cm in the left hepatic lobe (series 201, image 76) likely representing hepatic cysts/hemangiomas.      BILE DUCTS:  The intrahepatic and extrahepatic ducts are not dilated.      GALLBLADDER:  The gallbladder is nondistended and  without evidence of radiopaque stones.      PANCREAS:  The pancreas appears unremarkable without evidence of ductal dilatation or masses.      SPLEEN:  The spleen is normal in size without focal lesions.      ADRENAL GLANDS:  Bilateral adrenal glands appear normal.      KIDNEYS AND URETERS:  Multiple bilateral presumed simple renal cysts. Exophytic left midpole lesion measures 2.5 x 2.2 cm and demonstrates intermediate density with layering calcific focus measuring up to 0.6 cm (series 201, image 112). Otherwise, no hydroureteronephrosis or nephroureterolithiasis.      PELVIS:      BLADDER:  The urinary bladder appears normal without abnormal wall thickening.      REPRODUCTIVE ORGANS:  The prostate is not enlarged.      BOWEL:  The stomach is unremarkable.  The small and large bowel are normal in caliber and demonstrate no wall thickening. Diverticulosis without evidence of diverticulitis. The appendix appears normal.      VESSELS:  The aorta and IVC appear normal. IVC filter at the L2-3 level.      PERITONEUM/RETROPERITONEUM/LYMPH NODES:  No ascites or free air, no fluid collection.  No abdominopelvic lymphadenopathy is present.      BONE AND SOFT TISSUE:  No suspicious osseous lesions are identified. Degenerative discogenic disease is noted in the lower thoracic and lumbar spine. There are multiple heterogeneous lesions involving the T12, L2, and L3 vertebral bodies as best seen on series 206, image 71, image 70, and image 69 which likely represent hemangiomas. The abdominal wall soft  tissues appear normal.      IMPRESSION:  1. Indeterminate multiple bilateral pulmonary nodules measuring up to 0.7 cm in the right lower lobe. Consider follow up non contrast chest CT at 3-6 months, then consider CT chest at 18-24 months (Mat Nguyenhobenjamin et al., Guidelines for management of incidental pulmonary nodules detected on CT images: From the Fleischner Society 2017,  Radiology. 2017 Jul;284 (1):228-243.)   2. Multiple  bilateral simple renal cysts with a Bosniak 2F cyst of the left kidney as described above.  3. Additional chronic findings as described above.     Pathology 1/16/2025  A.  Brain, biopsy, excision:  - Hemangioblastoma.    Tumor cells are Immunoreactive with Inhibin and Are Negative with:  CD10, CAM5.2 and EMA.  Findings, in conjunction with the morphology, consistent with the diagnosis issued above.    Assessment:  Mr. Mixon is a 64 y.o. male with cerebellar hemangioblastoma (HB), spinal lesions that could be HB, hepatic cysts, renal cysts, a Bosniak 2F renal cyst, and pulmonary nodules. Mother had neuroendocrine tumor, Cushing disease, and metastatic cancer of unknown origin.     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 The concept of VUS was reviewed. When this variant is reclassified in the future, I will notify the patient. Monoallelic (one change) pathogenic (disease-causing) variants in AIP cause familial isolated pituitary adenoma. This variant is very less likely causative because there is no personal history or family history of pituitary adenoma.     In short, testing did not identify a mutation in the VHL gene.     ###A clinical diagnosis of Von-Hippel Lindau syndrome  We reviewed that HB can be sporadic or can be part of VHL. We reviewed clinical features of VHL. Below are the diagnostic criteria for VHL.          If the Burkinan Criteria is used, Mr. Mixon meets the clinical criteria based on hemangioblastoma(s) and multiple kidney cysts. If the Sami Criteria is used, Mr. Mixon MAY meet the clinical criteria if the hamangioblastoma in 2025 is a second primary tumor (not due to a recurrence of the tumor from 2003), or if the spinal lesions are hemangioblastomas. Furthermore, if his mother (who had a neuroendocrine tumor) had VHL,  then Mr. Mixon would meet the criteria with one HB diagnosed.     Although there are some uncertainties on the [place] criteria, it is prudent to manage him as having a clinical diagnosis of VHL. This is because VHL is a cancer-predisposition syndrome. Having a clinical diagnosis of VHL would allow for providing tumor surveillance especially for neuroendocrine tumors and for renal cell carcinoma. Therefore, at present, I would diagnose him with VHL clinically.     From Neli: Von Hippel-Lindau syndrome (VHL) is characterized by hemangioblastomas of the brain, spinal cord, and retina; renal cysts and clear cell renal cell carcinoma; pheochromocytoma and paraganglioma; pancreatic cysts and neuroendocrine tumors; endolymphatic sac tumors; and epididymal and broad ligament cystadenomas. Retinal hemangioblastomas may be the initial manifestation of VHL and can cause vision loss. Cerebellar hemangioblastomas may be associated with headache, vomiting, gait disturbances, or ataxia. Spinal hemangioblastomas and related syrinx usually present with pain. Sensory and motor loss may develop with cord compression. Renal cell carcinoma occurs in about 70% of individuals with VHL and is the leading cause of mortality. Pheochromocytomas can be asymptomatic but may cause sustained or episodic hypertension. Pancreatic lesions often remain asymptomatic and rarely cause endocrine or exocrine insufficiency. Endolymphatic sac tumors can cause hearing loss of varying severity, which can be a presenting symptom. Cystadenomas of the epididymis are relatively common. They rarely cause problems, unless bilateral, in which case they may result in infertility.    With regard to surveillance:         ###Next steps and plan  -Follow-up with Neurosurgery and Neuro-Oncology for imaging surveillance of the CNS.   -To see Urology soon for renal cysts. Their benign nature was reviewed.  -See Endocrinology 6/13/2025 for surveillance of  pheo/neuroendocrine tumor with biomarkers and imaging studies. Symptoms of catecholamine excess were discussed.   -An order for a hearing test placed. Symptoms of ELST discussed. Instructed patient to reach out to provider if he develops hearing loss, tinnitus, vertigo. To reach out to Neurosurgery if a dedicated MRI of the IAC can be ordered together with the brain next time. Note that two guidelines (PMID: 55316156, PMID: 97926332) provide different recommendations on the screening: The former suggests MRI IAC with MRI brain while the latter suggests a baseline MRI IAC between 15 to 20 years old.   -Annual eye exam for retinal hemangioblastoma; a referral placed by Neuro-Oncology.   -Ideally, I'd like to review his  mother's medical records. I reached out to Legal on how to proceed.  -Instructed patient to reach out to me if any tumor is being resected. We could obtain genetic testing in two  tumors to diagnose mosaic VHL.   -Support group: [email or web address].org  -According to the guidelines (PMID: 85643284), all first-degree relatives of an individual clinically diagnosed with VHL should undergo the same screening protocol including eye exam, hearing test, MRI (CNS and abdomen), and screening for catecholamine excess. I will reach out to Endocrinology and Neuro-Oncology to review whether which service is appropriate for ordering MRI. His children and sister to come to see me.     Reference:   1) Neli: https://www.ncbi.nlm.nih.gov/books/ONP9877  2) von Hippel-Lindau disease: Updated guideline for diagnosis and surveillance, PMID: 57507160    Follow-up 2 years    Manisha Lazo MD    Medical Geneticist  [facility]  Phone: [phone]  Fax: [phone]   Address: [address] [place] [address]  Time spent (this information is required by insurance): face-to-face 60min; preparation 5min; documentation 25min; care coordination 10min; total time spent  100min

## 2025-05-30 ENCOUNTER — TELEPHONE (OUTPATIENT)
Dept: GENETICS | Facility: CLINIC | Age: 65
End: 2025-05-30
Payer: COMMERCIAL

## 2025-05-30 NOTE — TELEPHONE ENCOUNTER
Received a voicemail from Cassidy, a nurse reviewer at [facility] about a prior auth for hereditary cancer genetic testing ordered by Dr. Lazo. The PA was submitted by Armando. She let us know that the PA had been denied.     We have 14 calendar days to request a [id number] by calling [phone]. This information will also be mailed to us.

## 2025-06-02 ENCOUNTER — TELEPHONE (OUTPATIENT)
Dept: HEMATOLOGY/ONCOLOGY | Facility: HOSPITAL | Age: 65
End: 2025-06-02
Payer: COMMERCIAL

## 2025-06-02 DIAGNOSIS — D43.2 HEMANGIOBLASTOMA OF BRAIN (MULTI): ICD-10-CM

## 2025-06-02 NOTE — TELEPHONE ENCOUNTER
Imaging 6/9 Monday. Ok for virtual tues 6/10 or fri 6/13. Called and left message moncho Stacy to call back to make an appointment. Order is in.    Ayana WOODRUFF RN

## 2025-06-02 NOTE — TELEPHONE ENCOUNTER
Imaging Monday 6/9. Calling to set up FUV after imaging. Spoke with Marcelina and pt scheduled for virtual 6/13 at 2pm.    Ayana DU RN

## 2025-06-09 ENCOUNTER — APPOINTMENT (OUTPATIENT)
Dept: RADIOLOGY | Facility: HOSPITAL | Age: 65
End: 2025-06-09
Payer: COMMERCIAL

## 2025-06-11 ENCOUNTER — SOCIAL WORK (OUTPATIENT)
Dept: CASE MANAGEMENT | Facility: HOSPITAL | Age: 65
End: 2025-06-11
Payer: COMMERCIAL

## 2025-06-11 NOTE — PROGRESS NOTES
As this SW has not had a chance to meet pt, [facility] is sending letter of introduction and D[facility] flier via [employer] Mail tomorrow. SW to follow as needed.

## 2025-06-13 ENCOUNTER — OFFICE VISIT (OUTPATIENT)
Dept: OPHTHALMOLOGY | Facility: CLINIC | Age: 65
End: 2025-06-13
Payer: COMMERCIAL

## 2025-06-13 ENCOUNTER — APPOINTMENT (OUTPATIENT)
Dept: HEMATOLOGY/ONCOLOGY | Facility: CLINIC | Age: 65
End: 2025-06-13
Payer: COMMERCIAL

## 2025-06-13 ENCOUNTER — APPOINTMENT (OUTPATIENT)
Dept: ENDOCRINOLOGY | Facility: CLINIC | Age: 65
End: 2025-06-13
Payer: COMMERCIAL

## 2025-06-13 VITALS
HEIGHT: 70 IN | HEART RATE: 77 BPM | SYSTOLIC BLOOD PRESSURE: 131 MMHG | BODY MASS INDEX: 30.21 KG/M2 | DIASTOLIC BLOOD PRESSURE: 89 MMHG | WEIGHT: 211 LBS

## 2025-06-13 DIAGNOSIS — E04.9 GOITER: ICD-10-CM

## 2025-06-13 DIAGNOSIS — H25.813 COMBINED FORMS OF AGE-RELATED CATARACT OF BOTH EYES: ICD-10-CM

## 2025-06-13 DIAGNOSIS — E24.2 CUSHINGOID SIDE EFFECT OF STEROIDS (MULTI): Primary | ICD-10-CM

## 2025-06-13 DIAGNOSIS — D31.42 NEVUS OF IRIS OF LEFT EYE: Primary | ICD-10-CM

## 2025-06-13 DIAGNOSIS — G93.89 CEREBELLAR MASS: ICD-10-CM

## 2025-06-13 DIAGNOSIS — Q85.83 VON HIPPEL-LINDAU SYNDROME (MULTI): ICD-10-CM

## 2025-06-13 PROCEDURE — 1036F TOBACCO NON-USER: CPT | Performed by: INTERNAL MEDICINE

## 2025-06-13 PROCEDURE — 99204 OFFICE O/P NEW MOD 45 MIN: CPT | Performed by: INTERNAL MEDICINE

## 2025-06-13 PROCEDURE — 3079F DIAST BP 80-89 MM HG: CPT | Performed by: INTERNAL MEDICINE

## 2025-06-13 PROCEDURE — 3008F BODY MASS INDEX DOCD: CPT | Performed by: INTERNAL MEDICINE

## 2025-06-13 PROCEDURE — 3075F SYST BP GE 130 - 139MM HG: CPT | Performed by: INTERNAL MEDICINE

## 2025-06-13 ASSESSMENT — ENCOUNTER SYMPTOMS
MUSCULOSKELETAL NEGATIVE: 0
HEMATOLOGIC/LYMPHATIC NEGATIVE: 0
NEUROLOGICAL NEGATIVE: 1
CARDIOVASCULAR NEGATIVE: 0
ALLERGIC/IMMUNOLOGIC NEGATIVE: 0
GASTROINTESTINAL NEGATIVE: 0
EYES NEGATIVE: 1
RESPIRATORY NEGATIVE: 0
ENDOCRINE NEGATIVE: 0
PSYCHIATRIC NEGATIVE: 0
CONSTITUTIONAL NEGATIVE: 0

## 2025-06-13 ASSESSMENT — SLIT LAMP EXAM - LIDS
COMMENTS: NORMAL
COMMENTS: NORMAL

## 2025-06-13 ASSESSMENT — CONF VISUAL FIELD
OS_INFERIOR_NASAL_RESTRICTION: 0
OS_NORMAL: 1
OS_SUPERIOR_NASAL_RESTRICTION: 0
OD_INFERIOR_TEMPORAL_RESTRICTION: 0
OD_NORMAL: 1
OS_SUPERIOR_TEMPORAL_RESTRICTION: 0
OD_INFERIOR_NASAL_RESTRICTION: 0
OD_SUPERIOR_NASAL_RESTRICTION: 0
OS_INFERIOR_TEMPORAL_RESTRICTION: 0
OD_SUPERIOR_TEMPORAL_RESTRICTION: 0

## 2025-06-13 ASSESSMENT — TONOMETRY
OS_IOP_MMHG: 12
IOP_METHOD: GOLDMANN APPLANATION
OD_IOP_MMHG: 12

## 2025-06-13 ASSESSMENT — EXTERNAL EXAM - RIGHT EYE: OD_EXAM: NORMAL

## 2025-06-13 ASSESSMENT — CUP TO DISC RATIO
OS_RATIO: 0.6
OD_RATIO: 0.4

## 2025-06-13 ASSESSMENT — VISUAL ACUITY
OD_CC: 20/20
OS_CC: 20/20
METHOD: SNELLEN - LINEAR

## 2025-06-13 ASSESSMENT — EXTERNAL EXAM - LEFT EYE: OS_EXAM: NORMAL

## 2025-06-13 NOTE — PROGRESS NOTES
64 YOM with POH VHL status post (s/p) L cerebellar hemangioblastoma resection 2003,  recurrence in 12/2025 now status post (s/p) additional resection and craniotomy 1/2025 managed by neurosurgery C3-C4 cord lesion.    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

## 2025-06-13 NOTE — PROGRESS NOTES
I saw and evaluated the patient. I personally obtained the key and critical portions of the history and physical exam or was physically present for key and critical portions performed by the resident. I reviewed the resident documentation and discussed the patient with the resident. I agree with the resident medical decision making as documented in the note.    Patrick Ferrell MD

## 2025-06-13 NOTE — PROGRESS NOTES
Subjective   Patient ID: Regis Mixon is a 64 y.o. male who presents for VHL    HPI  64 y.o. male with PMHx of HTN, L cerebellar hemangioblastoma (HB) diagnosed at 42 (2003) s/p surgical resection in 2003 with recent recurrence s/p L retrosignmoid re-do craniectomy with gross total resection of left CPA cerebellar hemangioblastoma in 1/16/25. 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    Background Hx:  -Patient was seen in genetic clinic with dr. Manisha Lazo in 4/2025.   Of note, Mother had neuroendocrine tumor, Cushing Cushing syndrome ectopic secretion, and metastatic cancer of unknown origin with pulmonary carcinoid tumor was on differential diagnoses. . 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   -Seen by oncology in 5/2025, Plan for MRI cervical/thoracic/lumbar spine this month , plan to consider chemotherapy afterwards, per oncology note, Currently do not feel that we need to start this urgently but if there is growth of the hemangioblastomas or if on renal evaluation (will see urology later today) there is a need to start from a  perspective then can start.       Today; patient reports feeling ok. Denies any active complaints  No headaches  No blurry vision   Very good level of energy   Reports very good appetite with 20 lbs weight  "gain since hospital discharge since January , he received dexamethasone during his  hospitals stay after the surgery, GC was tapered down and weaned off, and he has been off dexamethasone since February  No hot flashes or night sweats  No flushing, headaches, palpitations, or diaphoresis  Occasional dry eyes and sometimes tearing, redness, photosensitivisty  No changes in BM  No tremors   No muscle weakness or cramps  No polyuria or polydipsia   No stretch marks  No easy bruising     HTN;  Diagnosed in 2021, BP was elevated for a while before that but eventually get it evaluated and started on nebivolol 10 mg daily, and lisinopril-hydrochlorothiazide 20-12.5 daily since the diagnosis if HTN   While was hospitalized in January, he was switched to carvedilol 25 mg , but he was switched back to his home regimen after discharge  -Pt brought BP log with him today for the past 2 months, average 103-110/70-75    -No evidence of adrenal tumors on CT chest/abdomen/pelvis from January 2025  - Urine metanephrine and normetanephrine levels from January 2025 were within normal limits      Fhx:  Mom with neuroendocrine tumor, Cushing syndrome ectopic secretion, and metastatic cancer of unknown origin., pulmonary carcinoid tumor was on differential diagnoses. Mother passed away in 2007, does not recall everything   No other family members with similar presentation or with VHL or HB.    His daughter underwent the genetic testing, result in process  His son will do it this month, he might have elevated BP as per patient    Father (d) lung ca  Half brother, meningioma dx 40s      Shx:  Retired  Never smoke, no excessive etoh use, no illicit drug use      Review of Systems  Reviewed 10 points and neg except stated above    Objective   Vitals:    06/13/25 0904   BP: 131/89   Pulse: 77   Weight: 95.7 kg (211 lb)   Height: 1.778 m (5' 10\")        Physical Exam  Constitutional: NAD, well groomed. AOx3. Cooperative  Skin/Hair: Warm, dry " skin. B/l supraclavicular fat, gynecomastia   HEENT: EOMI, Anicteric scleras, eyes are red today, No lid lag or lid retraction, No TTP of ocular globes.   Neck: Soft, supple. Non tender, full ROM, no lymphadenopathy. Thyroid gland palpation: goiter 25 g, non nodular, soft consistency, non tender, no bruit.   Cardiovascular: RRR, no rub or murmurs.  Respiratory: CTAB, no accessory muscle use.  Abdomen: Soft, nontender to palpation.  Extremities: Preserved peripheral pulses, No peripheral edema.  Neuro: Moving all extremities spontaneously. CN's grossly intact. No balance or gait disturbances. DTRs: no delay in relaxation phase.      Labs:   Latest Reference Range & Units 01/28/25 05:48   GLUCOSE 74 - 99 mg/dL 118 (H)   SODIUM 136 - 145 mmol/L 131 (L)   POTASSIUM 3.5 - 5.3 mmol/L 4.0   CHLORIDE 98 - 107 mmol/L 102   Bicarbonate 21 - 32 mmol/L 23   Anion Gap 10 - 20 mmol/L 10   Blood Urea Nitrogen 6 - 23 mg/dL 40 (H)   Creatinine 0.50 - 1.30 mg/dL 0.96   EGFR >60 mL/min/1.73m*2 89   Calcium 8.6 - 10.3 mg/dL 8.6       Imaging:  CT C/A/P 1/10/2025  FINDINGS:  CHEST:      LUNG/PLEURA/LARGE AIRWAYS:  Minimal nonobstructive mucus in the upper trachea. Otherwise the trachea and central bronchi are patent without endobronchial lesions. Round noncalcified pulmonary nodule measures up to 0.5 cm in the left lower lobe (series 204, image 181). There is an additional 0.4 cm pulmonary nodule of the right upper lobe (series 204, image 131) and a 0.7 cm pulmonary nodule of the right lower lobe (image 204). Scattered calcified granulomas. No lung masses or consolidations are present. There is no pleural effusion or pneumothorax. Bibasilar atelectasis is noted.      VESSELS:  Aorta and pulmonary arteries are normal caliber.  Mild atherosclerotic changes are noted of the aorta and branching vessels. Minimal coronary artery calcifications are present.      HEART:  The heart is normal in size.  There is no pericardial effusion.       MEDIASTINUM AND PEDRITO:  No mediastinal, hilar or axillary lymphadenopathy is present.  The esophagus is normal.      CHEST WALL AND LOWER NECK:  The soft tissues of the chest wall demonstrate no gross abnormality. The visualized thyroid gland appears within normal limits.      ABDOMEN:      LIVER:  The liver is normal in size and attenuation. There are several hypoattenuating lesions measuring up to 1.7 cm in the left hepatic lobe (series 201, image 76) likely representing hepatic cysts/hemangiomas.      BILE DUCTS:  The intrahepatic and extrahepatic ducts are not dilated.      GALLBLADDER:  The gallbladder is nondistended and without evidence of radiopaque stones.      PANCREAS:  The pancreas appears unremarkable without evidence of ductal dilatation or masses.      SPLEEN:  The spleen is normal in size without focal lesions.      ADRENAL GLANDS:  Bilateral adrenal glands appear normal.      KIDNEYS AND URETERS:  Multiple bilateral presumed simple renal cysts. Exophytic left midpole lesion measures 2.5 x 2.2 cm and demonstrates intermediate density with layering calcific focus measuring up to 0.6 cm (series 201, image 112). Otherwise, no hydroureteronephrosis or nephroureterolithiasis.      PELVIS:      BLADDER:  The urinary bladder appears normal without abnormal wall thickening.      REPRODUCTIVE ORGANS:  The prostate is not enlarged.      BOWEL:  The stomach is unremarkable.  The small and large bowel are normal in caliber and demonstrate no wall thickening. Diverticulosis without evidence of diverticulitis. The appendix appears normal.      VESSELS:  The aorta and IVC appear normal. IVC filter at the L2-3 level.      PERITONEUM/RETROPERITONEUM/LYMPH NODES:  No ascites or free air, no fluid collection.  No abdominopelvic lymphadenopathy is present.      BONE AND SOFT TISSUE:  No suspicious osseous lesions are identified. Degenerative discogenic disease is noted in the lower thoracic and lumbar spine. There are  multiple heterogeneous lesions involving the T12, L2, and L3 vertebral bodies as best seen on series 206, image 71, image 70, and image 69 which likely represent hemangiomas. The abdominal wall soft  tissues appear normal.      IMPRESSION:  1. Indeterminate multiple bilateral pulmonary nodules measuring up to 0.7 cm in the right lower lobe. Consider follow up non contrast chest CT at 3-6 months, then consider CT chest at 18-24 months (Mat Marie et al., Guidelines for management of incidental pulmonary nodules detected on CT images: From the Fleischner Society 2017,  Radiology. 2017 Jul;284 (1):228-243.)   2. Multiple bilateral simple renal cysts with a Bosniak 2F cyst of the left kidney as described above.  3. Additional chronic findings as described above.     Pathology 1/16/2025  A.  Brain, biopsy, excision:  - Hemangioblastoma.    Tumor cells are Immunoreactive with Inhibin and Are Negative with:  CD10, CAM5.2 and EMA.  Findings, in conjunction with the morphology, consistent with the diagnosis issued above.        Assessment/Plan   64 y.o. male with PMHx of HTN, L cerebellar hemangioblastoma (HB) diagnosed at 42 (2003) s/p surgical resection in 2003 with recent recurrence s/p L retrosignmoid re-do craniectomy with gross total resection of left CPA cerebellar hemangioblastoma in 1/16/25. 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    Patient with controlled BP on nebivolol 10 mg daily, and lisinopril-hydrochlorothiazide 20-12.5 daily   Reports  20 lbs weight gain since hospital discharge since January , he received dexamethasone during his  hospitals stay after the surgery, GC was tapered down and weaned off, and he has  been off dexamethasone since February  Denies any  flushing, headaches, palpitations, or diaphoresis. No hot flashes or night sweats     -No evidence of adrenal tumors on CT chest/abdomen/pelvis from January 2025  - Urine metanephrine and normetanephrine levels from January 2025 were within normal limits    On exam today; noted with B/l supraclavicular fat, gynecomastia . Also noted with red eyes and goiter.     Plan:  [ ] will send labs today including ACTH, cortisol, DHEAS, LH, FSH, SHBG, testosterone, estradiol  [ ] plasma catecholamine , plasma metanephrine, chromogranin A  [ ] TSH, FT4, FT3, TPO, TSI, TRAb     Will contact the patient after labs results  Case seen, examined, and discussed with Dr. Arafah Diana Sawass Najjar, MD 06/13/25 8:56 AM

## 2025-06-15 LAB
ACTH PLAS-MCNC: NORMAL PG/ML
ALBUMIN SERPL-MCNC: 4.9 G/DL (ref 3.6–5.1)
ALP SERPL-CCNC: 58 U/L (ref 35–144)
ALT SERPL-CCNC: 17 U/L (ref 9–46)
ANION GAP SERPL CALCULATED.4IONS-SCNC: 9 MMOL/L (CALC) (ref 7–17)
AST SERPL-CCNC: 19 U/L (ref 10–35)
BILIRUB SERPL-MCNC: 0.3 MG/DL (ref 0.2–1.2)
BUN SERPL-MCNC: 21 MG/DL (ref 7–25)
CALCIUM SERPL-MCNC: 10 MG/DL (ref 8.6–10.3)
CATECHOLS PLAS-MCNC: NORMAL PG/ML
CGA SERPL-MCNC: NORMAL NG/ML
CHLORIDE SERPL-SCNC: 100 MMOL/L (ref 98–110)
CO2 SERPL-SCNC: 31 MMOL/L (ref 20–32)
CORTIS SERPL-MCNC: 14.9 MCG/DL
CREAT SERPL-MCNC: 1.07 MG/DL (ref 0.7–1.35)
DHEA-S SERPL-MCNC: 53 MCG/DL (ref 20–217)
DOPAMINE SERPL-MCNC: NORMAL PG/ML
EGFRCR SERPLBLD CKD-EPI 2021: 78 ML/MIN/1.73M2
EPINEPH PLAS-MCNC: NORMAL PG/ML
ESTRADIOL SERPL-MCNC: 35 PG/ML
FSH SERPL-ACNC: 4.2 MIU/ML (ref 1.4–12.8)
GLUCOSE SERPL-MCNC: 98 MG/DL (ref 65–99)
LH SERPL-ACNC: 1.9 MIU/ML (ref 1.6–15.2)
METANEPH FREE SERPL-MCNC: NORMAL PG/ML
METANEPHS SERPL-MCNC: NORMAL PG/ML
NOREPINEPH PLAS-MCNC: NORMAL PG/ML
NORMETANEPH FREE SERPL-MCNC: NORMAL PG/ML
POTASSIUM SERPL-SCNC: 4.4 MMOL/L (ref 3.5–5.3)
PROT SERPL-MCNC: 7.7 G/DL (ref 6.1–8.1)
PTH-INTACT SERPL-MCNC: 27 PG/ML (ref 16–77)
SHBG SERPL-SCNC: 22 NMOL/L (ref 22–77)
SODIUM SERPL-SCNC: 140 MMOL/L (ref 135–146)
T3FREE SERPL-MCNC: 3.2 PG/ML (ref 2.3–4.2)
T4 FREE SERPL-MCNC: 1.2 NG/DL (ref 0.8–1.8)
TESTOST FREE SERPL-MCNC: NORMAL PG/ML
TESTOST SERPL-MCNC: NORMAL NG/DL
THYROPEROXIDASE AB SERPL-ACNC: NORMAL [IU]/ML
TSH BII SER-ACNC: NORMAL
TSH SERPL-ACNC: 4.4 MIU/L (ref 0.4–4.5)
TSI SER-ACNC: NORMAL

## 2025-06-17 ENCOUNTER — APPOINTMENT (OUTPATIENT)
Dept: UROLOGY | Facility: HOSPITAL | Age: 65
End: 2025-06-17
Payer: COMMERCIAL

## 2025-06-18 ENCOUNTER — HOSPITAL ENCOUNTER (OUTPATIENT)
Dept: RADIOLOGY | Facility: HOSPITAL | Age: 65
Discharge: HOME | End: 2025-06-18
Payer: COMMERCIAL

## 2025-06-18 DIAGNOSIS — Q85.83 VON HIPPEL-LINDAU SYNDROME (MULTI): ICD-10-CM

## 2025-06-18 DIAGNOSIS — D43.2 HEMANGIOBLASTOMA OF BRAIN (MULTI): ICD-10-CM

## 2025-06-18 PROCEDURE — 72157 MRI CHEST SPINE W/O & W/DYE: CPT

## 2025-06-18 PROCEDURE — 72156 MRI NECK SPINE W/O & W/DYE: CPT

## 2025-06-18 PROCEDURE — A9575 INJ GADOTERATE MEGLUMI 0.1ML: HCPCS | Performed by: PSYCHIATRY & NEUROLOGY

## 2025-06-18 PROCEDURE — 2550000001 HC RX 255 CONTRASTS: Performed by: PSYCHIATRY & NEUROLOGY

## 2025-06-18 PROCEDURE — 72158 MRI LUMBAR SPINE W/O & W/DYE: CPT

## 2025-06-18 RX ORDER — GADOTERATE MEGLUMINE 376.9 MG/ML
20 INJECTION INTRAVENOUS
Status: COMPLETED | OUTPATIENT
Start: 2025-06-18 | End: 2025-06-18

## 2025-06-18 RX ADMIN — GADOTERATE MEGLUMINE 19 ML: 376.9 INJECTION INTRAVENOUS at 15:31

## 2025-06-20 ENCOUNTER — TELEMEDICINE (OUTPATIENT)
Dept: HEMATOLOGY/ONCOLOGY | Facility: CLINIC | Age: 65
End: 2025-06-20
Payer: COMMERCIAL

## 2025-06-20 DIAGNOSIS — D48.0 HEMANGIOBLASTOMA OF SPINE: ICD-10-CM

## 2025-06-20 DIAGNOSIS — Q85.83 VON HIPPEL-LINDAU SYNDROME (MULTI): Primary | ICD-10-CM

## 2025-06-20 LAB
ACTH PLAS-MCNC: 20 PG/ML (ref 6–50)
ALBUMIN SERPL-MCNC: 4.9 G/DL (ref 3.6–5.1)
ALP SERPL-CCNC: 58 U/L (ref 35–144)
ALT SERPL-CCNC: 17 U/L (ref 9–46)
ANION GAP SERPL CALCULATED.4IONS-SCNC: 9 MMOL/L (CALC) (ref 7–17)
AST SERPL-CCNC: 19 U/L (ref 10–35)
BILIRUB SERPL-MCNC: 0.3 MG/DL (ref 0.2–1.2)
BUN SERPL-MCNC: 21 MG/DL (ref 7–25)
CALCIUM SERPL-MCNC: 10 MG/DL (ref 8.6–10.3)
CATECHOLS PLAS-MCNC: NORMAL PG/ML
CGA SERPL-MCNC: 72 NG/ML
CHLORIDE SERPL-SCNC: 100 MMOL/L (ref 98–110)
CO2 SERPL-SCNC: 31 MMOL/L (ref 20–32)
CORTIS SERPL-MCNC: 14.9 MCG/DL
CREAT SERPL-MCNC: 1.07 MG/DL (ref 0.7–1.35)
DHEA-S SERPL-MCNC: 53 MCG/DL (ref 20–217)
DOPAMINE SERPL-MCNC: NORMAL PG/ML
EGFRCR SERPLBLD CKD-EPI 2021: 78 ML/MIN/1.73M2
EPINEPH PLAS-MCNC: NORMAL PG/ML
ESTRADIOL SERPL-MCNC: 35 PG/ML
FSH SERPL-ACNC: 4.2 MIU/ML (ref 1.4–12.8)
GLUCOSE SERPL-MCNC: 98 MG/DL (ref 65–99)
LH SERPL-ACNC: 1.9 MIU/ML (ref 1.6–15.2)
METANEPH FREE SERPL-MCNC: 61 PG/ML
METANEPHS SERPL-MCNC: 298 PG/ML
NOREPINEPH PLAS-MCNC: NORMAL PG/ML
NORMETANEPH FREE SERPL-MCNC: 237 PG/ML
POTASSIUM SERPL-SCNC: 4.4 MMOL/L (ref 3.5–5.3)
PROT SERPL-MCNC: 7.7 G/DL (ref 6.1–8.1)
PTH-INTACT SERPL-MCNC: 27 PG/ML (ref 16–77)
SHBG SERPL-SCNC: 22 NMOL/L (ref 22–77)
SODIUM SERPL-SCNC: 140 MMOL/L (ref 135–146)
T3FREE SERPL-MCNC: 3.2 PG/ML (ref 2.3–4.2)
T4 FREE SERPL-MCNC: 1.2 NG/DL (ref 0.8–1.8)
TESTOST FREE SERPL-MCNC: 35 PG/ML (ref 35–155)
TESTOST SERPL-MCNC: 201 NG/DL (ref 250–1100)
THYROPEROXIDASE AB SERPL-ACNC: 1 IU/ML
TSH BII SER-ACNC: 1.57 IU/L
TSH SERPL-ACNC: 4.4 MIU/L (ref 0.4–4.5)
TSI SER-ACNC: <89 % BASELINE

## 2025-06-20 PROCEDURE — 99417 PROLNG OP E/M EACH 15 MIN: CPT | Performed by: PSYCHIATRY & NEUROLOGY

## 2025-06-20 PROCEDURE — 99215 OFFICE O/P EST HI 40 MIN: CPT | Performed by: PSYCHIATRY & NEUROLOGY

## 2025-06-20 NOTE — PROGRESS NOTES
[facility]  Neuro-Oncology Clinic        Patient ID: Regis Mixon  Referring Physician: No referring provider defined for this encounter.  Primary Care Provider: Terence Nagel MD      Subjective    HPI  Regis Mixon is a 64 y.o. male with a history of cerebellar hemangioblastoma s/p resection in 2003, hyperlipidemia, hypertension and obesity presented to the ED on 12/29/2024 reporting dizziness for approximately 6 weeks.  CTH at that time revealed a 5.5 x 5 cm left cerebellar lesion.  MRI showed a left cerebellar cystic mass along with the C3-4 dorsal cord lesion with fourth ventricular effacement that was consistent with a hemangioblastoma.  Multiple small foci of enhancement were also noted that were concerning for neoplasm.  CT C/A/P showed multiple bilateral pulmonary nodules and bilateral renal cyst.  DSA 1/13/2024 with muscular branch from left V3 supplying the tumor; embolization not recommended.  CTA head/neck with left cerebellar mass with left AICA and PICA feeders.    Underwent left retrosigmoid redo craniectomy with gross total resection on 1/16/2025 with final surgical pathology showing hemangioblastoma.    Interval history  5/6/2025 (initial clinic visit): Denies any new dizziness.  Feels the symptoms resolved after surgery.  Denies any new visual changes or changes in concentration.    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 He is accompanied by his wife.    He reports no change in his condition compared to a few months prior. Concerns were raised about fluid in the cervical spine, which is being monitored to ensure it does not increase in size. The patient has been under the care of an endocrinologist, who has conducted tests for Cushing's disease. Initial tests during a previous hospitalization were negative, which was reassuring. Elevated lab results, including metanephrines, were  noted, and follow-up with the endocrinologist is ongoing to monitor these levels.    The patient mentions that the MRI report did not indicate any abnormalities in the thoracic spine, but there were findings in the cervical spine, which have remained stable. He has a scheduled appointment with Dr. Koo, a urologist, on 2025. A previous consultation with one of Dr. Koo's associates addressed erectile dysfunction and noted that the kidney cyst was not a cause for concern. However, the patient decided to switch to Dr. Koo due to his expertise in the lumbar region and kidneys.    Additionally, the patient is approaching the 3-month dom for a follow-up chest scan due to lung nodules, with subsequent follow-ups planned every 6 months.      Review of Systems - Oncology   10 point ROS negative except for that mentioned in the HPI.    Medical History[1]    Surgical History[2]    Family History[3]  Mother: Diagnosed with Cushing disease and a possible neuroendocrine tumor at age 60  Maternal first cousin: Lung cancer  Maternal first cousin: Prostate cancer  Maternal uncle: Lung cancer  Father: Lung cancer ()  Half brother: Meningioma (diagnosed in his 40s)  Paternal first cousin: Breast cancer (diagnosed at age 55)    Regis Mixon  reports that he has never smoked. He has never been exposed to tobacco smoke. He has never used smokeless tobacco.  He  reports no history of alcohol use.  He  reports no history of drug use.    PHYSICAL EXAM  Objective   BSA: There is no height or weight on file to calculate BSA.  There were no vitals taken for this visit.  Karnofsky Score: 100 - Fully active, able to carry on all pre-disease performed without restriction    (Exam from 25)  Gen: awake and alert. In no acute distress.   CVS/Pulm: No dyspnea noted  Abdomen: Soft. Non-distended.     Neuro  Mental Status: awake and alert. No dysarthria or aphasia noted. Oriented to self and wife in room. Able to  recall medical history.   CN: pupils equal and reactive to light. EOMI. No nystagmus noted. Visual fields full to finger counting. No facial asymmetry. Sensation intact on face bilaterally. Midline palate elevation and tongue protrusion.   Motor: 5/5 in all extremities.   Sensation: intact to light touch throughout   Gait: Normal gait. Including tandem gait.     LABS      Pathology 1/16/25  A.  Brain, biopsy, excision:  - Hemangioblastoma.  See note     Note:  Tumor cells are Immunoreactive with Inhibin and Are Negative with:  CD10, CAM5.2 and EMA.  Findings, in conjunction with the morphology, consistent with the diagnosis issued above.      [facility] 4/25/25      IMAGING    MRI C/T/L spine 6/18/25 (radiology read)  1. Stable lesions located within or along the cervical spinal cord  and stable T2 hyperintense signal involving majority of the cervical  spinal cord.  2. Subcentimeter focus of enhancement along the left lateral cord at  the level of T1-T2 and punctate focus of enhancement located just  left lateral to the thoracic spinal cord at the level of T12, likely  within a cauda equina nerve root. Cannot adequately compare to the  prior study as only precontrast sagittal slices were obtained through  the thoracic spine on the prior study.  3. No striking abnormal enhancement or mass is seen within the lumbar  spinal canal.  4. Multilevel degenerative changes of the spine as detailed above.    === 04/12/25 ===    MR BRAIN W AND WO CONTRAST    - Impression -  Postoperative changes of a left suboccipital craniectomy with mesh  reconstruction. Linear enhancement along the surgical margins is  likely reactive.    There is an extra-axial fluid collection within the posterior fossa  on the left measuring 2.5 cm, similar to the prior CT head  03/04/2025. There is mass effect upon the adjacent cerebellum. There  is mild narrowing of the 4th ventricle which remains patent.    No new nodular enhancement is  noted.    There is mild encephalomalacia and gliosis along the lateral margin  of the cerebellum on the left.    There is susceptibility artifact within the left cerebellum from  metallic surgical clips.    The ventricles are similar in size and configuration compared to the  prior exam.    MACRO:  None    Signed by: Purvi Campbell 4/14/2025 11:10 AM  Dictation workstation:   [id number]      MRI C-spine 4/12/25 (radiology read)  Expansile hyperintense signal within the cervical cord extending from  C2 through C6 appears slightly more prominent than the prior exam,  however may be secondary to differences in technique.      The enhancing nodule within the cervical cord on the left at C3-C4 is  unchanged measuring 1 cm.      Nodular enhancement along the surface of the cord, posterior greater  than anterior, is unchanged compared to the prior exam.      Degenerative changes of the cervical spine without evidence of  high-grade spinal canal stenosis. Varying degrees of neural foraminal  narrowing as detailed above.    MRI C/T/L spine w/out contrast (radiology read)  1. Limited, incomplete exam.  2. Expansile T2/stir hyperintense signal within the cervical spinal  cord. Underlying lesion not excluded. Evaluation with  contrast-enhanced MRI is recommended.  3. No evidence of osseous metastatic disease within the limitations  of exam. Findings suggestive of atypical hemangioma within T11  vertebral body.  4. Mild multilevel degenerative changes of the spine most pronounced  at C5-6 and L4-5.  5. Partially visualized cystic posterior fossa lesion better  evaluated on MRI brain dated 01/10/2025.    MRI Brain 1/10/25 (radiology read)  Mixed cystic and hypervascular solid mass in the left cerebellar  hemisphere with the cystic component measuring up to 5.3 cm and solid  component measuring 2.6 cm. Differential includes hemangioblastoma,  pilocytic astrocytoma, or ganglia glioma.      Questionable additional lesion within the  dorsal aspect of the cord  at C3-4 only included in the field of view on single  image.  Consider cervical spine MRI with and without contrast for further  evaluation.      Left cerebellar lesion results in effacement of the 4th ventricle  with mild ventriculomegaly. Confluent periventricular FLAIR  hyperintensity may represent a combination of transependymal edema  and microangiopathic change.      Inferior cerebellar tonsillar herniation as well as effacement of the  superior cerebellar, prepontine, and quadrigeminal cisterns.      No acute infarct. Moderate burden deep and subcortical white matter  microangiopathic disease also noted.    CT Chest/abdomen/pelvis w/ contrast 1/10/25 (radiology read)  1. Indeterminate multiple bilateral pulmonary nodules measuring up to  0.7 cm in the right lower lobe. Consider follow up non contrast chest  CT at 3-6 months, then consider CT chest at 18-24 months (Mat Marie et al., Guidelines for management of incidental pulmonary  nodules detected on CT images: From the Fleischner Society 2017,  Radiology. 2017 Jul;284 (1):228-243.)  2. Multiple bilateral simple renal cysts with a Bosniak 2F cyst of  the left kidney as described above.  3. Additional chronic findings as described above.    ASSESSMENT  Regis Mixon is a 64 y.o. male with a history of cerebellar hemangioblastoma s/p resection in 2003, hyperlipidemia, hypertension and obesity presented to the ED on 12/29/2024 reporting dizziness for approximately 6 weeks.  Imaging showed a left cerebellar cystic lesion along with effacement of the fourth ventricle.  He underwent craniectomy and resection of the left cerebellar cystic lesion on 1/16/2025 with pathology showing hemangioblastoma.    PLAN    #Clinical von Hippel-Lindau disease  - Has been evaluated by genetics colleagues who obtained testing with [facility] which did not reveal a germline VHL mutation.   -Discussed with Dr. Lazo from genetics and agree that  patient fits clinical criteria for VHL (though unusual that germline testing is negative).   -Discussed with patient about sending CNS tissue for NGS testing with [facility]. One reason is to see if there is a VHL mutation in the tumor (or if there is another mutation we are not aware of). Another reason is that if there should be growth in tumors in the future and resection is not recommended, then with VHL confirmation in tumor, Belzutifan treatment could be an option.     #Chemotherapy/treatment  - Discussed belzutifan which has been approved for VHL related lesions.  Discussed some of the potential side effects associated with it and that this would be a long-term medication to reduce tumor size  - Often this will be started in those patients that are symptomatic or who have lesions that have the potential to soon cause symptoms and that cannot be resected.  - Currently do not feel that we need to start this urgently but if there is growth of the hemangioblastomas or if on renal evaluation (will see urology later today) there is a need to start from a  perspective then can start.  - Information about belzutifan given to patient and spouse.    #CNS hemangioblastomas  - Left cerebellar hemangioblastoma: S/p resection 1/16/2025.  Will defer to [facility] colleagues with regards to ordering imaging for now.   -NGS testing on tissue as above     - C3-4 hemangioblastoma  On personal review of MRI C-spine from 6/18/25, hemangioblastoma appears about similar in size compared to April 2025 imaging     -T1-T2/ T-12 level punctate areas of focus  Monitor for now     #Ophtho  No evidence of retinal hemangioblastomas or optic nerve hemangioblastomas noted on inpatient ophthalmologic evaluation in January 2025  - Recommend annual ophthalmology exams; referral to  ophthalmology placed    #Renal   -Multiple bilateral simple renal cysts with a Bosniak 2F cyst of  the left kidney as described above.  - Will defer to urology colleagues  for recommendations on imaging frequency and any possible interventions  -Appt with Dr. Koo on 7/18/25     #GI  - No evidence of pancreatic pseudocyst or other lesions on CT chest/abdomen/pelvis from January 2025    #Endocrine  -No evidence of adrenal tumors on CT chest/abdomen/pelvis from January 2025  - Urine metanephrine and normetanephrine levels from January 2025 were within normal limits  - Has established care with endocrinology (Dr. Ferrell). Plasma metanephrine levels slightly elevated on labs from 6/13/25. Patient should reach out to Dr. Ferrell's office if he does not hear anything about the results by next week.     #Hypertension  - Elevated at visit on 5/6/25 patient states that it is normally well-controlled at home when he checks  - Asked to keep a log of his blood pressures for 2 weeks.  Should take 1 reading in the morning and 1 reading at night.  Asked to send this to his PCP and myself.  - Patient currently on carvedilol 25 mg twice daily and lisinopril-hydrochlorothiazide 20-12.5 mg once daily  - Will defer to Dr. Nagel, his PCP, for BP management    #Chest nodules  -will defer to Dr. Nagel for surveillance imaging per protocol for lung nodules     #Follow-up  -Plan for video visit in approx 4-6 weeks to discuss NGS testing results   - Call or return sooner should new or worsening neurological deficits occur    I personally spent 60 minutes today, exclusive of procedures, providing care for this patient, including preparation, face to face time, documentation and other services such as review of medical records, diagnostic result, patient education, counseling, coordination of care as specified in the encounter.     Moises Jones MD  Neuro-Oncology                                [1]   Past Medical History:  Diagnosis Date    Other specified health status     No pertinent past medical history    Personal history of other infectious and parasitic diseases     History of genital  warts    PMR (polymyalgia rheumatica) (Multi) 08/14/2023   [2]   Past Surgical History:  Procedure Laterality Date    OTHER SURGICAL HISTORY  05/19/2021    Brain surgery    OTHER SURGICAL HISTORY  05/19/2021    Colonoscopy   [3]   Family History  Problem Relation Name Age of Onset    Lung cancer Mother      Lung cancer Father

## 2025-06-23 ENCOUNTER — DOCUMENTATION (OUTPATIENT)
Dept: ENDOCRINOLOGY | Facility: HOSPITAL | Age: 65
End: 2025-06-23
Payer: COMMERCIAL

## 2025-06-23 NOTE — PROGRESS NOTES
Labs obtained after last visit resulted in:     Latest Reference Range & Units 06/13/25 11:09   NORMETANEPHRINE, FREE <=148 pg/mL 237 (H)   METANEPHRINE, FREE <=57 pg/mL 61 (H)   TOTAL, FREE (MN + NMN) <=205 pg/mL 298 (H)   CHROMOGRANIN A, LC/MS/MS ADULTS: <311 ng/mL 72   CORTISOL, TOTAL, LC/MS mcg/dL 14.9   FSH 1.4 - 12.8 mIU/mL 4.2   PARATHYROID HORMONE, INTACT 16 - 77 pg/mL 27   T3, FREE 2.3 - 4.2 pg/mL 3.2   T4, FREE 0.8 - 1.8 ng/dL 1.2   LH 1.6 - 15.2 mIU/mL 1.9   TSH 0.40 - 4.50 mIU/L 4.40   DHEA SULFATE 20 - 217 mcg/dL 53   ESTRADIOL < OR = 39 pg/mL 35   SEX HORMONE BINDING GLOBULIN 22 - 77 nmol/L 22   TESTOSTERONE, TOTAL,  - 1,100 ng/dL 201 (L)   TESTOSTERONE, FREE 35.0 - 155.0 pg/mL 35.0   ACTH, PLASMA 6 - 50 pg/mL 20   EPINEPHRINE  Pending (P)   NOREPINEPHRINE  Pending (P)   DOPAMINE  Pending (P)   CATECHOLAMINES, TOTAL  Pending (P)        Latest Reference Range & Units 06/13/25 11:09   THYROID PEROXIDASE ANTIBODIES <9 IU/mL 1   TSI (THYROID STIMULATING IMMUNOGLOBULIN) <140 % baseline <89   TRAB <=2.00 IU/L 1.57       Labs showing mildly elevated normetanephrine likely in setting of nebivolol use. His BP is very well controlled on current home antihypertensive meds and there is no concerns of paraganglioma at this time.  We will plan to repeat labs in 6 months then annually.   We will address plan when patient presents to his scheduled visit in October/2025    No concerns of cushing syndrome or thyroid disease based on his labs results.    I called Mr. Mixon and updated him with labs results. He is reassured.     Case discussed with dr. Ferrell.

## 2025-06-24 LAB
ACTH PLAS-MCNC: 20 PG/ML (ref 6–50)
ALBUMIN SERPL-MCNC: 4.9 G/DL (ref 3.6–5.1)
ALP SERPL-CCNC: 58 U/L (ref 35–144)
ALT SERPL-CCNC: 17 U/L (ref 9–46)
ANION GAP SERPL CALCULATED.4IONS-SCNC: 9 MMOL/L (CALC) (ref 7–17)
AST SERPL-CCNC: 19 U/L (ref 10–35)
BILIRUB SERPL-MCNC: 0.3 MG/DL (ref 0.2–1.2)
BUN SERPL-MCNC: 21 MG/DL (ref 7–25)
CALCIUM SERPL-MCNC: 10 MG/DL (ref 8.6–10.3)
CATECHOLS PLAS-MCNC: 1378 PG/ML
CGA SERPL-MCNC: 72 NG/ML
CHLORIDE SERPL-SCNC: 100 MMOL/L (ref 98–110)
CO2 SERPL-SCNC: 31 MMOL/L (ref 20–32)
CORTIS SERPL-MCNC: 14.9 MCG/DL
CREAT SERPL-MCNC: 1.07 MG/DL (ref 0.7–1.35)
DHEA-S SERPL-MCNC: 53 MCG/DL (ref 20–217)
DOPAMINE SERPL-MCNC: 33 PG/ML
EGFRCR SERPLBLD CKD-EPI 2021: 78 ML/MIN/1.73M2
EPINEPH PLAS-MCNC: 54 PG/ML
ESTRADIOL SERPL-MCNC: 35 PG/ML
FSH SERPL-ACNC: 4.2 MIU/ML (ref 1.4–12.8)
GLUCOSE SERPL-MCNC: 98 MG/DL (ref 65–99)
LH SERPL-ACNC: 1.9 MIU/ML (ref 1.6–15.2)
METANEPH FREE SERPL-MCNC: 61 PG/ML
METANEPHS SERPL-MCNC: 298 PG/ML
NOREPINEPH PLAS-MCNC: 1291 PG/ML
NORMETANEPH FREE SERPL-MCNC: 237 PG/ML
POTASSIUM SERPL-SCNC: 4.4 MMOL/L (ref 3.5–5.3)
PROT SERPL-MCNC: 7.7 G/DL (ref 6.1–8.1)
PTH-INTACT SERPL-MCNC: 27 PG/ML (ref 16–77)
SHBG SERPL-SCNC: 22 NMOL/L (ref 22–77)
SODIUM SERPL-SCNC: 140 MMOL/L (ref 135–146)
T3FREE SERPL-MCNC: 3.2 PG/ML (ref 2.3–4.2)
T4 FREE SERPL-MCNC: 1.2 NG/DL (ref 0.8–1.8)
TESTOST FREE SERPL-MCNC: 35 PG/ML (ref 35–155)
TESTOST SERPL-MCNC: 201 NG/DL (ref 250–1100)
THYROPEROXIDASE AB SERPL-ACNC: 1 IU/ML
TSH BII SER-ACNC: 1.57 IU/L
TSH SERPL-ACNC: 4.4 MIU/L (ref 0.4–4.5)
TSI SER-ACNC: <89 % BASELINE

## 2025-06-26 ENCOUNTER — CLINICAL SUPPORT (OUTPATIENT)
Dept: AUDIOLOGY | Facility: CLINIC | Age: 65
End: 2025-06-26
Payer: COMMERCIAL

## 2025-06-26 DIAGNOSIS — Q85.83 VON HIPPEL-LINDAU SYNDROME (MULTI): ICD-10-CM

## 2025-06-26 DIAGNOSIS — H90.42 SENSORINEURAL HEARING LOSS (SNHL) OF LEFT EAR WITH UNRESTRICTED HEARING OF RIGHT EAR: Primary | ICD-10-CM

## 2025-06-26 DIAGNOSIS — H90.3 BILATERAL HIGH FREQUENCY SENSORINEURAL HEARING LOSS: ICD-10-CM

## 2025-06-26 PROCEDURE — 92550 TYMPANOMETRY & REFLEX THRESH: CPT | Mod: 52 | Performed by: AUDIOLOGIST

## 2025-06-26 PROCEDURE — 92557 COMPREHENSIVE HEARING TEST: CPT | Performed by: AUDIOLOGIST

## 2025-06-26 ASSESSMENT — PAIN SCALES - GENERAL: PAINLEVEL_OUTOF10: 0 - NO PAIN

## 2025-06-26 ASSESSMENT — ENCOUNTER SYMPTOMS: OCCASIONAL FEELINGS OF UNSTEADINESS: 0

## 2025-06-26 ASSESSMENT — PAIN - FUNCTIONAL ASSESSMENT: PAIN_FUNCTIONAL_ASSESSMENT: 0-10

## 2025-06-26 NOTE — PROGRESS NOTES
AUDIOLOGY ADULT AUDIOMETRIC EVALUATION      Name:  Regis Mixon  :  1960  Age:  64 y.o.  Date of Evaluation: 25    History:  Reason for visit:  Mr. Regis Mixon was seen today for an evaluation of hearing.   The patient was kindly referred by their , Manisha Lazo MD.   The patient's current primary care physician is, Terence Nagel MD.  Chief Complaint   Patient presents with    Ear Fullness     VHL disease      Reported hearing testing was ordered due to his current medical history of a hemangioblastomas and Von Hippel Lindau disease.   Stated he has been treated by providers with  as well as [facility]. Stated around  he was diagnosed with a hemangioblastoma and underwent surgery. Following a second surgery performed around , he noticed decreased hearing in the left ear as well as some ear pressure. Stated he was told due to the surgery the hearing may decrease, however it should return to baseline. The patient reported at this time he feels the hearing in the left ear has improved, however, it may be slightly reduced compared to the right ear. Denied any concerns for hearing loss, difficulty communicating, or increased effort/energy when communicating.  Reported he has noticed some occasional crackling sensations in the left ear, with a feeling of congestion or a clogged ear at times. Stated this sensation has been occurring for approximately the past twenty years.   History of seasonal allergies and currently utilizes over the counter medications with success.   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  Stated he has been aware of some infrequent slight ringing tinnitus at times in the left ear. The tinnitus has not been bothersome and has remained stable.   Due to his medical history he continues to follow annually with his ophthalmologist and indicated  recent retina testing was all normal.   Denied any current otalgia, aural fullness, ear pressure, dizziness/vertigo, ear surgery, recent ear/sinus infections, recent falls, diabetes, chemotherapy/radiation, heart/kidney problems, recent heart attack/strokes, new onset of headaches, sinus/throat concerns, speech/memory concerns, ear drainage, or sudden hearing loss.    SCREENINGS  Steadi Fall Risk  One or more falls in the last year? No  How many Times?    Was the patient injured in the fall?    Has trouble stepping onto curb?    Advised to use a cane or walker to get around safely?    Often has to rush to toilet?    Feels unsteady when walking? No  Has lost some feeling in feet?    Often feels sad or depressed?    Steadies self on furniture while walking at home?    Takes medicine that makes them feel lightheaded or more tired than usual?    Worried about Falling? No  Takes medicine to sleep or improve mood?    Needs to push with hands when rising from a chair?      Domestic Violence Screening  Are you currently or have you recently been threatened or abused physically, emotionally, or secually by anyone? No  Do you feel UNSAFE going back to the place you are living? No    Pain Assessment  Pain Assessment: 0-10  0-10 (Numeric) Pain Score: 0 - No pain    EVALUATION     See Audiogram    RESULTS:    Otoscopic Evaluation:   Right Ear: Otoscopy revealed a clear healthy canal and a healthy tympanic membrane was visualized.  Left Ear: Otoscopy revealed a clear healthy canal and a healthy tympanic membrane was visualized.    Immittance:  Immittance Measures: 226 Hz   Right Ear: Tympanometric testing revealed a normal type A tympanogram with normal middle ear pressure and normal static compliance.  Left Ear: Tympanometric testing revealed a normal type A tympanogram with normal middle ear pressure and normal static compliance.    Right Ear: Ipsilateral acoustic reflexes were present at, 500-4,000 Hz, at expected sensation  levels.  Left Ear: Ipsilateral acoustic reflexes were present at, 500-4,000 Hz, at expected sensation levels.  Results are consistent with normal middle ear function, bilaterally    Test technique:  Pure Tone Audiometry via Transducer: Insert earphones    Reliability:   excellent    Pure Tone Audiometry:    Right Ear: Audiometric testing indicated normal to near normal peripheral hearing sensitivity through 4,000 Hz, sloping to a mild high frequency sensorineural hearing loss above.   Left Ear:   Audiometric testing indicated a slight to mild sensorineural hearing loss through 500 Hz, rising to normal to near normal peripheral hearing sensitivity through 2,000 Hz, sloping to a mild high frequency sensorineural hearing loss through 6,000 Hz, sloping to a moderate high frequency sensorineural hearing loss above.   Note slight asymmetry in the left ear.       Speech Audiometry:   Right Ear:  Speech Reception Threshold (SRT) was obtained at 20 dBHL                  Word Recognition scores were excellent (100%) in quiet when words were presented at 60 dBHL  Left Ear:  Speech Reception Threshold (SRT) was obtained at 30 dBHL                  Word Recognition scores were excellent (100%) in quiet when words were presented at 70 dBHL  Testing was performed with recorded NU-6 speech words in quiet. Speech thresholds were in good agreement with the pure tone averages in each ear.     QuickSin testing indicated a score of 6.5 in the right ear. Results are consistent with a mild (3-7 dB) signal to noise ratio loss. Results suggest the patient may hear almost as well as those with normal hearing are able to hear in the presence of background noise.   QuickSin testing indicated a score of 6.5 in the left ear. Results are consistent with a mild (3-7 dB) signal to noise ratio loss. Results suggest the patient may hear almost as well as those with normal hearing are able to hear in the presence of background noise.     Distortion  Product Otoacoustic Emissions:        Right Ear: emissions: Present emissions from 1,500 - 6,000 Hz        Left Ear:  emissions: Present emissions from 1,000 - 5,000 Hz  Present OAEs suggest normal cochlear outer hair cell function.  Absent OAEs are consistent with some degree of hearing loss.  Objective test results are consistent with normal behavioral pure tone audiometric testing.     IMPRESSIONS:  Today's test results are consistent with normal middle ear functioning, bilaterally and an essentially mild sensorineural hearing loss in the left ear. Note high frequency sensorineural hearing loss above 4,000 Hz in each ear.  Counseled the patient may experience differences between the ears when listening on the phone, however, in general may not experience noticeable hearing differences. General communication should still feel relaxed and comfortable, however, when around loud environments or significant background noise, the patient may noticed some slight difficulty communicating. The patient was counseled with regard to the findings.    RECOMMENDATIONS:  * Continue medical follow up with Manisha Lazo MD and Terence Nagel MD.  * Retest as medically indicated, or sooner if a change in hearing sensitivity is noticed.   * Wear hearing protection while in the presence of loud sounds.   * Use tinnitus coping strategies as needed, such as sound apps on a smart phone, utilizing calming noise in the room, running a fan at night, etc.   * Use effective communication strategies such as asking the speaker to gain attention prior to speaking, speaking in the same room, repeating words that were heard, etc.    PATIENT EDUCATION:   Discussed results and recommendations with the patient.  Questions were addressed and the patient was encouraged to contact our department should concerns arise.  The patient was seen from 9:00-9:45 am.

## 2025-07-01 ENCOUNTER — OFFICE VISIT (OUTPATIENT)
Dept: NEUROSURGERY | Facility: HOSPITAL | Age: 65
End: 2025-07-01
Payer: COMMERCIAL

## 2025-07-01 VITALS
HEIGHT: 69 IN | HEART RATE: 83 BPM | WEIGHT: 212 LBS | BODY MASS INDEX: 31.4 KG/M2 | RESPIRATION RATE: 17 BRPM | SYSTOLIC BLOOD PRESSURE: 125 MMHG | DIASTOLIC BLOOD PRESSURE: 87 MMHG

## 2025-07-01 DIAGNOSIS — D43.2 HEMANGIOBLASTOMA OF BRAIN (MULTI): Primary | ICD-10-CM

## 2025-07-01 DIAGNOSIS — G93.89 CEREBELLAR MASS: Primary | ICD-10-CM

## 2025-07-01 PROCEDURE — 3008F BODY MASS INDEX DOCD: CPT | Performed by: NEUROLOGICAL SURGERY

## 2025-07-01 PROCEDURE — 3079F DIAST BP 80-89 MM HG: CPT | Performed by: NEUROLOGICAL SURGERY

## 2025-07-01 PROCEDURE — 3074F SYST BP LT 130 MM HG: CPT | Performed by: NEUROLOGICAL SURGERY

## 2025-07-01 PROCEDURE — 1036F TOBACCO NON-USER: CPT | Performed by: NEUROLOGICAL SURGERY

## 2025-07-01 PROCEDURE — 99213 OFFICE O/P EST LOW 20 MIN: CPT | Performed by: NEUROLOGICAL SURGERY

## 2025-07-01 NOTE — PROGRESS NOTES
"[facility]  Neurosurgery    History of Present Illness      Regis Mixon is a 64-year-old male with a past medical history of cerebellar hemangioblastoma s/p resection (2003), HTN, HLD, and obesity who presented to the ED on 12/29/24 with complaints of dizziness x 6 weeks. CTH revealed 5.5x5 cm L cerebellar lesion. MRI with L cerebellar cystic mass. C3-4 dorsal cord lesion with 4th effacement consistent for hemangioblastoma multiple small foci that are concerning for neoplasm. CT C/A/P with multiple bilateral pulmonary nodules and bilateral renal cyst. DSA 01/13/24 with muscular branch from L V3 supplying tumor; embolization not recommended.  CTA H/N with L cerebellar mass with L AICA and PICA feeders. No previous VHL workup. No ocular lesions on exam. He is now s/p L retrosignmoid re-do craniectomy with gross total resection on 1/16/25. Final surgical pathology hemangioblastoma. Opthalmologic evaluation without evidence of retinal hemangioblastoma or optic nerve hemangioblastoma.     Following with endocrinology - metanephrine slightly elevated, to be repeated in 6 months. Patient was evaluated by genetics who completed testing with [employer] which was not concerning for germline VHL mutation. 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 He was recommended for treatment with belzutifan but will remain on imaging surveillance at this time. 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 He presents to clinic today for FUV.     He reports he is doing well.  Denies headaches, gait difficulties, or any new neurological symptoms.          Objective      Vitals:   /87   Pulse 83   Resp 17   Ht 1.753 m (5' 9\")   Wt 96.2 kg (212 lb)   BMI 31.31 kg/m²         Physical Exam:    Patient is awake and alert.  Flextra ocular " movements.  Full strength in all extremities bilateral.  No Hilario's.  His gait is steady.  He can hold heel-to-toe walk.  He has slight imbalance with Romberg testing with some backward swaying.  His previous left suboccipital incision is well-healed.      Relevant Results:    MRI C/T/L w/wo 06/18/25:         Post op MRI Brain w/wo 04/12/2025:         Pre - op MRI 01/10/25:                  Assessment & Plan      Diagnosis:  Regis was seen today for follow-up.  Diagnoses and all orders for this visit:  Hemangioblastoma of brain (Multi)      Provider Impression:     Patient is doing well neurologically.  I reviewed his MR imaging including cervical spine from 6/18/2025; the tumor itself in the cervical area is similar in size approximately 7 to 8 mm, but it appears that the associated edema now extends further superiorly to C2 level, and below the C6 level more extensive than in January.    Will plan to review his imaging at tumor board.  Given that he is neurologically asymptomatic from the cervical lesion, we would likely recommend continued surveillance.  We did discuss with him that he should alert us if he demonstrates any new neurological symptoms.  He is already scheduled for a follow-up brain MRI 6 months since his prior in October, including IAC protocol to ensure he has no evidence of ELS T.    We did discuss that his cervical lesion if it did warrant treatment, could be entertained for surgery, although he is a candidate potentially for medical therapy  also.    We will contact him again after tumor board with recommendations for timing of next spine imaging.  I would anticipate this to be in 3 to 6 months.    Total time  for this visit was 20 minutes        Medical History     Medical History[1]  Surgical History[2]  Social History[3]  Family History[4]  Allergies[5]  Current Outpatient Medications   Medication Instructions    acetaminophen (TYLENOL) 650 mg, oral, Every 6 hours PRN     lisinopriL-hydrochlorothiazide 20-12.5 mg tablet 1 tablet, Daily    LORazepam (ATIVAN) 0.5 mg, oral, Every 8 hours PRN, Take 1 tab 45 min prior to MRIs and take second tab if needed during study.    nebivolol (BYSTOLIC) 10 mg, oral, Daily    pitavastatin magnesium 4 mg, Daily                              [1]   Past Medical History:  Diagnosis Date    Other specified health status     No pertinent past medical history    Personal history of other infectious and parasitic diseases     History of genital warts    PMR (polymyalgia rheumatica) (Multi) 08/14/2023   [2]   Past Surgical History:  Procedure Laterality Date    OTHER SURGICAL HISTORY  05/19/2021    Brain surgery    OTHER SURGICAL HISTORY  05/19/2021    Colonoscopy   [3]   Social History  Tobacco Use    Smoking status: Never     Passive exposure: Never    Smokeless tobacco: Never   Vaping Use    Vaping status: Never Used   Substance Use Topics    Alcohol use: Never    Drug use: Never   [4]   Family History  Problem Relation Name Age of Onset    Lung cancer Mother      Lung cancer Father     [5] No Known Allergies

## 2025-07-07 ENCOUNTER — APPOINTMENT (OUTPATIENT)
Dept: OPHTHALMOLOGY | Facility: CLINIC | Age: 65
End: 2025-07-07
Payer: COMMERCIAL

## 2025-07-07 ENCOUNTER — TELEPHONE (OUTPATIENT)
Dept: GENETICS | Facility: CLINIC | Age: 65
End: 2025-07-07

## 2025-07-07 DIAGNOSIS — H40.002 GLAUCOMA SUSPECT OF LEFT EYE: ICD-10-CM

## 2025-07-07 DIAGNOSIS — Q85.83 VON HIPPEL-LINDAU SYNDROME (MULTI): ICD-10-CM

## 2025-07-07 DIAGNOSIS — H40.001 GLAUCOMA SUSPECT OF RIGHT EYE: ICD-10-CM

## 2025-07-07 DIAGNOSIS — Q85.83 VON HIPPEL-LINDAU SYNDROME (MULTI): Primary | ICD-10-CM

## 2025-07-07 DIAGNOSIS — H25.813 COMBINED FORMS OF AGE-RELATED CATARACT OF BOTH EYES: Primary | ICD-10-CM

## 2025-07-07 PROCEDURE — 99213 OFFICE O/P EST LOW 20 MIN: CPT | Performed by: OPHTHALMOLOGY

## 2025-07-07 PROCEDURE — 92020 GONIOSCOPY: CPT | Performed by: OPHTHALMOLOGY

## 2025-07-07 PROCEDURE — 92133 CPTRZD OPH DX IMG PST SGM ON: CPT | Performed by: OPHTHALMOLOGY

## 2025-07-07 PROCEDURE — 76514 ECHO EXAM OF EYE THICKNESS: CPT | Performed by: OPHTHALMOLOGY

## 2025-07-07 PROCEDURE — 92083 EXTENDED VISUAL FIELD XM: CPT | Performed by: OPHTHALMOLOGY

## 2025-07-07 ASSESSMENT — PACHYMETRY
OD_CT(UM): 602
OS_CT(UM): 605

## 2025-07-07 ASSESSMENT — VISUAL ACUITY
OD_CC: 20/20
CORRECTION_TYPE: GLASSES
METHOD: SNELLEN - LINEAR
OS_CC: 20/20

## 2025-07-07 ASSESSMENT — CONF VISUAL FIELD
OD_SUPERIOR_TEMPORAL_RESTRICTION: 0
OD_NORMAL: 1
OS_NORMAL: 1
OD_SUPERIOR_NASAL_RESTRICTION: 0
OD_INFERIOR_NASAL_RESTRICTION: 0
OD_INFERIOR_TEMPORAL_RESTRICTION: 0
OS_INFERIOR_TEMPORAL_RESTRICTION: 0
OS_INFERIOR_NASAL_RESTRICTION: 0
OS_SUPERIOR_NASAL_RESTRICTION: 0
OS_SUPERIOR_TEMPORAL_RESTRICTION: 0

## 2025-07-07 ASSESSMENT — TONOMETRY
IOP_METHOD: GOLDMANN APPLANATION
OS_IOP_MMHG: 16
OD_IOP_MMHG: 17

## 2025-07-07 ASSESSMENT — ENCOUNTER SYMPTOMS
NEUROLOGICAL NEGATIVE: 0
CONSTITUTIONAL NEGATIVE: 0
MUSCULOSKELETAL NEGATIVE: 0
PSYCHIATRIC NEGATIVE: 0
EYES NEGATIVE: 1
ENDOCRINE NEGATIVE: 0
RESPIRATORY NEGATIVE: 0
GASTROINTESTINAL NEGATIVE: 0
CARDIOVASCULAR NEGATIVE: 0
ALLERGIC/IMMUNOLOGIC NEGATIVE: 0
HEMATOLOGIC/LYMPHATIC NEGATIVE: 0

## 2025-07-07 ASSESSMENT — CUP TO DISC RATIO
OS_RATIO: 0.55
OD_RATIO: 0.4

## 2025-07-07 ASSESSMENT — EXTERNAL EXAM - RIGHT EYE: OD_EXAM: NORMAL

## 2025-07-07 ASSESSMENT — SLIT LAMP EXAM - LIDS
COMMENTS: NORMAL
COMMENTS: NORMAL

## 2025-07-07 ASSESSMENT — EXTERNAL EXAM - LEFT EYE: OS_EXAM: NORMAL

## 2025-07-07 NOTE — TELEPHONE ENCOUNTER
Genetics note:    Mild hearing loss is noted, asymmetric. Discussed with Audiology -- likely age-related. If the MRI of the IAC is unremarkable, ENT consultation is optional. One benefit of ENT consultation is to establish care in case a sudden-onset hearing loss develops in the future. Discussed with patient via MyChart. A referral placed in case he is interested.     Manisha Lazo MD  Medical Geneticist

## 2025-07-07 NOTE — PROGRESS NOTES
Gonioscopy       Gonioscopy         Right Left    Temporal c/d40f1+ c/d40f1+    Nasal c/d40f1+ c/d40f1+    Superior c/d40f1+ c/d40f1+    Inferior c/d40f1+ c/d40f1+                   Pachymetry       Pachymetry (7/7/2025)         Right Left    Thickness 602 605                   Armenta Visual Field - OU - Both Eyes          Normal field         OCT, Optic Nerve - OU - Both Eyes          Robust OU              Pathophysiology of glaucoma potential blinding nature of disease reviewed  Importance of f/u and compliance stressed    No current outpatient medications on file. (Ophthalmology pharm classes)       Current Outpatient Medications (Other)   Medication Sig Dispense Refill    acetaminophen (Tylenol) 325 mg tablet Take 2 tablets (650 mg) by mouth every 6 hours if needed for mild pain (1 - 3).      lisinopriL-hydrochlorothiazide 20-12.5 mg tablet Take 1 tablet by mouth once daily.      LORazepam (Ativan) 0.5 mg tablet Take 1 tablet (0.5 mg) by mouth every 8 hours if needed for anxiety for up to 2 doses. Take 1 tab 45 min prior to MRIs and take second tab if needed during study. (Patient not taking: Reported on 7/1/2025) 2 tablet 0    nebivolol (Bystolic) 10 mg tablet Take 1 tablet (10 mg) by mouth once daily. 90 tablet 3    pitavastatin magnesium 4 mg tablet Take 4 mg by mouth once daily.          Impression from today disease is glaucoma suspect based on c/d asymmetry  No evidence of glaucoam today  Ok to monitoor off therapy  Rtc 12 months for full glaucoma eval AND dfe

## 2025-07-09 ENCOUNTER — TUMOR BOARD CONFERENCE (OUTPATIENT)
Dept: HEMATOLOGY/ONCOLOGY | Facility: HOSPITAL | Age: 65
End: 2025-07-09
Payer: COMMERCIAL

## 2025-07-16 ENCOUNTER — TUMOR BOARD CONFERENCE (OUTPATIENT)
Dept: HEMATOLOGY/ONCOLOGY | Facility: HOSPITAL | Age: 65
End: 2025-07-16
Payer: COMMERCIAL

## 2025-07-16 NOTE — TUMOR BOARD NOTE
CNS Tumor Board Recommendations       Patient was presented by Dr. Geetha Ghosh at our CNS Tumor Board on 07/16/2025 which included representatives from Radiation oncology, Surgical oncology, Neuro-oncology, Pathology, Radiology, Research, Neurosurgery, Social Work (Neurosurgery).     Current patient presents with history of the following treatment history: PMH cerebellar hemangioblastoma s/p resection (2003), HTN, HLD, and obesity who presented to the ED on 12/29/24 with complaints of dizziness x 6 weeks. CTH revealed 5.5x5 cm L cerebellar lesion. MRI with L cerebellar cystic mass. C3-4 dorsal cord lesion with 4th effacement consistent for hemangioblastoma multiple small foci that are concerning for neoplasm. CT C/A/P with multiple bilateral pulmonary nodules and bilateral renal cyst. DSA 01/13/24 with muscular branch from L V3 supplying tumor; embolization not recommended. CTA H/N with L cerebellar mass with L AICA and PICA feeders. No previous VHL workup. No ocular lesions on exam. He is now s/p L retrosignmoid re-do craniectomy with gross total resection on 1/16/25. Final surgical pathology hemangioblastoma.     Evaluated by genetics and negative for VHL despite clinical presentation. CARIS testing negative.     MRI C/T spine: Thoracic lesions are small and stable which is similar in size from 04/2025. Cervical lesion with extension lesion from C1-C6/7 with mild expansile appearance of the cord which is similar when compared to 04/2025 imaging. When compared to his MR Cervical 01/14 there has been progression of lesion.     The CNS Tumor Board tumor board considered available treatment options and made the following recommendations: Surgical resection given worsening edema  if patient is agreeable. Otherwise additional methylation studies to be completed.     Clinical Trial Status: N/A     National site-specific guidelines were discussed with respect to the case.

## 2025-07-18 ENCOUNTER — APPOINTMENT (OUTPATIENT)
Age: 65
End: 2025-07-18
Payer: COMMERCIAL

## 2025-07-18 DIAGNOSIS — N28.1 RENAL CYST: Primary | ICD-10-CM

## 2025-07-18 PROCEDURE — 1036F TOBACCO NON-USER: CPT | Performed by: STUDENT IN AN ORGANIZED HEALTH CARE EDUCATION/TRAINING PROGRAM

## 2025-07-18 PROCEDURE — 99204 OFFICE O/P NEW MOD 45 MIN: CPT | Performed by: STUDENT IN AN ORGANIZED HEALTH CARE EDUCATION/TRAINING PROGRAM

## 2025-07-18 NOTE — ASSESSMENT & PLAN NOTE
L cerebellar hemangioblastoma at 42 (2003) s/p surgery, this puts him at risk of having VHL syndrome. Genetic testing was not concerning for germline VHL mutation.      We will order a kidney MRI to assess if the cyst changes in size or character.     PLAN:  Kidney MRI, BMP, with FUV in 6 months.  This needs followed because of the IIF nature and because of his ? Concerns for VHL and the risk of forming RCC.    Orders:    MR kidney w and wo IV contrast; Future    Basic metabolic panel; Future

## 2025-07-18 NOTE — PROGRESS NOTES
Subjective    Regis Mixon is a 64 y.o. male with renal cyst and L cerebellar hemangioblastoma and who presents for a virtual NPV.     He was diagnosed with L cerebellar hemangioblastoma at 42 (2003) s/p surgery, this puts him at risk of having VHL syndrome.     MRI of the spine showed several findings that could be spinal hemangioblastomas. Abdominal and chest imagings showed multiple hepatic/renal cysts and pulmonary nodules.     Following with endocrinology - metanephrine slightly elevated, to be repeated in 6 months.     Patient was evaluated by genetics who completed testing with [facility] which was not concerning for germline VHL mutation but others are managing him as if he has VHL.    We went over constellation of issues with VHL.  He does have a known Bosniak IIF renal cyst.  He has no other renal masses.  He does not have a family history of VHL.      PMHx: cerebellar hemangioblastoma s/p resection (2003), HTN, HLD, and obesity       PSHx:  has a past surgical history that includes Other surgical history (05/19/2021); Other surgical history (05/19/2021); and Vasectomy (6/03).      Social:   Tobacco Use: Low Risk  (7/7/2025)    Patient History     Smoking Tobacco Use: Never     Smokeless Tobacco Use: Never     Passive Exposure: Never         Family: Cancer-related family history includes Breast cancer in his mother; Cancer in his father and mother; Lung cancer in his father and mother.          Review of Systems    All systems were reviewed. Anything negative was noted in the HPI.    Objective   Physical Exam  Gen: No acute distress      Psych: Alert and oriented x3      Neuro:  Normal ROM     Resp: Nonlabored respirations      CV: Regular rate and rhythm      Abd: S, NT, ND.     : Deferred     Skin: Warm, dry and intact without rashes      Lymphatics: No peripheral edema           Assessment & Plan  Renal cyst  L cerebellar hemangioblastoma at 42 (2003) s/p surgery, this puts him at risk of having VHL  syndrome. Genetic testing was not concerning for germline VHL mutation.      We will order a kidney MRI to assess if the cyst changes in size or character.     PLAN:  Kidney MRI, BMP, with FUV in 6 months.  This needs followed because of the IIF nature and because of his ? Concerns for VHL and the risk of forming RCC.    Orders:    MR kidney w and wo IV contrast; Future    Basic metabolic panel; Future                           Scribe Attestation  By signing my name below, ILeia Scribe   attest that this documentation has been prepared under the direction and in the presence of Jesus Otto MD MPH

## 2025-07-23 ENCOUNTER — APPOINTMENT (OUTPATIENT)
Dept: PRIMARY CARE | Facility: CLINIC | Age: 65
End: 2025-07-23
Payer: COMMERCIAL

## 2025-07-23 VITALS
DIASTOLIC BLOOD PRESSURE: 82 MMHG | OXYGEN SATURATION: 97 % | BODY MASS INDEX: 31.9 KG/M2 | TEMPERATURE: 97.6 F | WEIGHT: 215.4 LBS | HEIGHT: 69 IN | HEART RATE: 83 BPM | RESPIRATION RATE: 16 BRPM | SYSTOLIC BLOOD PRESSURE: 114 MMHG

## 2025-07-23 DIAGNOSIS — I10 BENIGN ESSENTIAL HTN: ICD-10-CM

## 2025-07-23 DIAGNOSIS — E78.2 MIXED HYPERLIPIDEMIA: ICD-10-CM

## 2025-07-23 DIAGNOSIS — Q85.83 VON HIPPEL-LINDAU SYNDROME (MULTI): ICD-10-CM

## 2025-07-23 DIAGNOSIS — D48.0 HEMANGIOBLASTOMA OF SPINE: ICD-10-CM

## 2025-07-23 DIAGNOSIS — R91.8 PULMONARY NODULES/LESIONS, MULTIPLE: ICD-10-CM

## 2025-07-23 DIAGNOSIS — H93.13 TINNITUS OF BOTH EARS: ICD-10-CM

## 2025-07-23 DIAGNOSIS — E66.09 CLASS 1 OBESITY DUE TO EXCESS CALORIES WITH SERIOUS COMORBIDITY AND BODY MASS INDEX (BMI) OF 31.0 TO 31.9 IN ADULT: ICD-10-CM

## 2025-07-23 DIAGNOSIS — Z00.00 ANNUAL PHYSICAL EXAM: Primary | ICD-10-CM

## 2025-07-23 DIAGNOSIS — Z12.5 ENCOUNTER FOR SCREENING FOR MALIGNANT NEOPLASM OF PROSTATE: Primary | ICD-10-CM

## 2025-07-23 DIAGNOSIS — D43.2 HEMANGIOBLASTOMA OF BRAIN (MULTI): ICD-10-CM

## 2025-07-23 DIAGNOSIS — E66.811 CLASS 1 OBESITY DUE TO EXCESS CALORIES WITH SERIOUS COMORBIDITY AND BODY MASS INDEX (BMI) OF 31.0 TO 31.9 IN ADULT: ICD-10-CM

## 2025-07-23 LAB — PSA SERPL-MCNC: 0.73 NG/ML

## 2025-07-23 PROCEDURE — 1036F TOBACCO NON-USER: CPT | Performed by: FAMILY MEDICINE

## 2025-07-23 PROCEDURE — 99396 PREV VISIT EST AGE 40-64: CPT | Performed by: FAMILY MEDICINE

## 2025-07-23 PROCEDURE — 3008F BODY MASS INDEX DOCD: CPT | Performed by: FAMILY MEDICINE

## 2025-07-23 PROCEDURE — 3078F DIAST BP <80 MM HG: CPT | Performed by: FAMILY MEDICINE

## 2025-07-23 PROCEDURE — 3074F SYST BP LT 130 MM HG: CPT | Performed by: FAMILY MEDICINE

## 2025-07-23 RX ORDER — NEBIVOLOL 10 MG/1
10 TABLET ORAL DAILY
Qty: 90 TABLET | Refills: 3 | Status: SHIPPED | OUTPATIENT
Start: 2025-07-23 | End: 2026-07-23

## 2025-07-23 RX ORDER — LISINOPRIL AND HYDROCHLOROTHIAZIDE 12.5; 2 MG/1; MG/1
1 TABLET ORAL DAILY
Qty: 90 TABLET | Refills: 3 | Status: SHIPPED | OUTPATIENT
Start: 2025-07-23

## 2025-07-23 ASSESSMENT — ENCOUNTER SYMPTOMS
DEPRESSION: 0
OCCASIONAL FEELINGS OF UNSTEADINESS: 0
LOSS OF SENSATION IN FEET: 0

## 2025-07-23 ASSESSMENT — PATIENT HEALTH QUESTIONNAIRE - PHQ9
1. LITTLE INTEREST OR PLEASURE IN DOING THINGS: NOT AT ALL
SUM OF ALL RESPONSES TO PHQ9 QUESTIONS 1 & 2: 0
2. FEELING DOWN, DEPRESSED OR HOPELESS: NOT AT ALL

## 2025-07-23 ASSESSMENT — ANXIETY QUESTIONNAIRES
7. FEELING AFRAID AS IF SOMETHING AWFUL MIGHT HAPPEN: NOT AT ALL
4. TROUBLE RELAXING: NOT AT ALL
1. FEELING NERVOUS, ANXIOUS, OR ON EDGE: NOT AT ALL
GAD7 TOTAL SCORE: 0
2. NOT BEING ABLE TO STOP OR CONTROL WORRYING: NOT AT ALL
3. WORRYING TOO MUCH ABOUT DIFFERENT THINGS: NOT AT ALL
5. BEING SO RESTLESS THAT IT IS HARD TO SIT STILL: NOT AT ALL
IF YOU CHECKED OFF ANY PROBLEMS ON THIS QUESTIONNAIRE, HOW DIFFICULT HAVE THESE PROBLEMS MADE IT FOR YOU TO DO YOUR WORK, TAKE CARE OF THINGS AT HOME, OR GET ALONG WITH OTHER PEOPLE: NOT DIFFICULT AT ALL
6. BECOMING EASILY ANNOYED OR IRRITABLE: NOT AT ALL

## 2025-07-23 NOTE — PROGRESS NOTES
Subjective   Patient ID: Regis Mixon is a 64 y.o. male who presents for Follow Up of Hypertension, Hyperlipidemia and Hemangioblastoma of Brain. I last saw the patient on 4/23/2025  .     HPI  Patient had labs drawn this morning.    Patient went to get a hearing test 3 weeks ago and told the examiner that he has ringing once in a while that is tolerable. She advised him to tell PCP to give her a script for this. He states that he has been putting drops which helps but isn't effective enough. He states that when he is moving he does not hear it as bad.     Blood pressures at home range in the 110/70s range.    Past medical, surgical, and family history reviewed.  Reviewed and documented all medications   Pt eating well, exercising as tolerated and taking medications as directed.      Review of Systems  Except positives as noted in the CC & HPI      Constitutional: Denies fevers, chills, night sweats, fatigue, weight changes, change in appetite    Eyes: Denies blurry vision, double vision    ENT: Denies otalgia, trouble hearing, tinnitus, vertigo, nasal congestion, rhinorrhea, sore throat    Neck: Denies swelling, masses    Cardiovascular: Denies chest pain, palpitations, edema, orthopnea, syncope    Respiratory: Denies dyspnea, cough, wheezing, postural nocturnal dyspnea    Gastrointestinal: Denies abdominal pain, nausea, vomiting, diarrhea, constipation, melena, hematochezia    Genitourinary: Denies dysuria, hematuria  Musculoskeletal: Denies back pain, neck pain, arthralgias, myalgias    Integumentary: Denies skin lesions, rashes, masses    Neurological: Denies dizziness, headaches, confusion, limb weakness, paresthesias, syncope, convulsions    Psychiatric: Denies depression, anxiety, homicidal ideations, suicidal ideations, sleep disturbances    Endocrine: Denies polyphagia, polydipsia, polyuria, weakness, hair thinning, heat intolerance, cold intolerance, weight changes    Heme/Lymph: Denies easy bruising,  "easy bleeding, swollen glands    Objective   Visit Vitals  /82 (BP Location: Right arm, Patient Position: Sitting, BP Cuff Size: Large adult)   Pulse 83   Temp 36.4 °C (97.6 °F)   Resp 16   Ht 1.753 m (5' 9\")   Wt 97.7 kg (215 lb 6.4 oz)   SpO2 97%   BMI 31.81 kg/m²   Smoking Status Never   BSA 2.18 m²       Physical Exam    Gen. Appearance - well-developed, well-nourished, 64 y.o. male in no acute distress. Patient is ambulating with walker.     Skin - warm and dry without rash or concerning lesions. Left posterior scalp wound is well healed.     Mental Status - alert and oriented times 3. Normal mood and affect appropriate to mood.     Ears - TMs shiny and move well with insufflation. Ear canals are clear bilaterally.     Mouth - pharynx is pink without exudates. Dentition is normal appearing. Tongue and uvula move in the midline.      Neck - supple without lymphadenopathy. Carotid pulses are normal without bruits. Thyroid is normal in midline without nodules.    Chest - lungs are clear to auscultation without rales, rhonchi or wheezes.     Heart - regular, rate, and rhythm without murmurs, rubs or gallops.     Abdomen - soft, flat, nondistended, nontender . No masses, hepatomegaly or splenomegaly is noted. No rebound, rigidity or guarding is noted. Bowel sounds are normoactive.     Extremities - no cyanosis, clubbing or edema. Pedal pulses are 2+ normal at the dorsalis pedis and posterior tibial pulses bilaterally.     Neurological - cranial nerves II through XII are grossly intact. Motor strength 5/5 at all fours.     Assessment   1. Annual physical exam        2. Benign essential HTN  nebivolol (Bystolic) 10 mg tablet    lisinopriL-hydrochlorothiazide 20-12.5 mg tablet      3. Mixed hyperlipidemia        4. Hemangioblastoma of brain (Multi)        5. Pulmonary nodules/lesions, multiple  CT chest wo IV contrast      6. Tinnitus of both ears        7. Hemangioblastoma of spine        8. Von Hippel-Lindau " syndrome (Multi)        9. Class 1 obesity due to excess calories with serious comorbidity and body mass index (BMI) of 31.0 to 31.9 in adult            Patient to continue current medications (with any exceptions as noted) and diet. Follow-up in 6 month(s) otherwise as needed.      Ordered CT chest to be done in Nov, 2025. Will call patient with results when available.       Patient was given refill(s) on:   Nebivolol HCl 10 mg, TAKE 1 TAB BY MOUTH DAILY   LisinopriL-hydrochlorothiazide 20-12.5 mg tablet, TAKE 1 TAB BY MOUTH DAILY     Rx(s) sent to pharmacy.      Patient to take [facility] inner ear plus 3 tablets once daily.    Patient is to follow up with Dr. Ferrell in Endocrinology, Dr. Otto in Urology, Dr. Woodson in Hem Onc and in neurosurgery as scheduled.     His PE form was completed.    Patient is to continue to monitor his blood pressure at home.    Scribe Attestation  By signing my name below, IMarlen , Scribe   attest that this documentation has been prepared under the direction and in the presence of Deepthi Castillo MD.      This note has been transcribed using a medical scribe and there is a possibility of unintentional typing misprints.     All medical record entries made by the scribe were at my direction and personally dictated by me. I have reviewed the chart and agree that the record accurately reflects my personal performance of the history, physical exam, discussion, and plan.     DEEPTHI CASTILLO M.D.

## 2025-07-24 LAB
ALBUMIN SERPL-MCNC: 4.7 G/DL (ref 3.6–5.1)
ALP SERPL-CCNC: 61 U/L (ref 35–144)
ALT SERPL-CCNC: 19 U/L (ref 9–46)
ANION GAP SERPL CALCULATED.4IONS-SCNC: 9 MMOL/L (CALC) (ref 7–17)
AST SERPL-CCNC: 18 U/L (ref 10–35)
BASOPHILS # BLD AUTO: 22 CELLS/UL (ref 0–200)
BASOPHILS NFR BLD AUTO: 0.3 %
BILIRUB SERPL-MCNC: 0.5 MG/DL (ref 0.2–1.2)
BUN SERPL-MCNC: 18 MG/DL (ref 7–25)
CALCIUM SERPL-MCNC: 9.9 MG/DL (ref 8.6–10.3)
CHLORIDE SERPL-SCNC: 102 MMOL/L (ref 98–110)
CHOLEST SERPL-MCNC: 192 MG/DL
CHOLEST/HDLC SERPL: 4.9 (CALC)
CO2 SERPL-SCNC: 30 MMOL/L (ref 20–32)
CREAT SERPL-MCNC: 1.16 MG/DL (ref 0.7–1.35)
EGFRCR SERPLBLD CKD-EPI 2021: 71 ML/MIN/1.73M2
EOSINOPHIL # BLD AUTO: 67 CELLS/UL (ref 15–500)
EOSINOPHIL NFR BLD AUTO: 0.9 %
ERYTHROCYTE [DISTWIDTH] IN BLOOD BY AUTOMATED COUNT: 13.1 % (ref 11–15)
GLUCOSE SERPL-MCNC: 100 MG/DL (ref 65–99)
HCT VFR BLD AUTO: 48.4 % (ref 38.5–50)
HDLC SERPL-MCNC: 39 MG/DL
HGB BLD-MCNC: 15.9 G/DL (ref 13.2–17.1)
LDLC SERPL CALC-MCNC: 113 MG/DL (CALC)
LYMPHOCYTES # BLD AUTO: 1909 CELLS/UL (ref 850–3900)
LYMPHOCYTES NFR BLD AUTO: 25.8 %
MCH RBC QN AUTO: 29.2 PG (ref 27–33)
MCHC RBC AUTO-ENTMCNC: 32.9 G/DL (ref 32–36)
MCV RBC AUTO: 88.8 FL (ref 80–100)
MONOCYTES # BLD AUTO: 370 CELLS/UL (ref 200–950)
MONOCYTES NFR BLD AUTO: 5 %
NEUTROPHILS # BLD AUTO: 5032 CELLS/UL (ref 1500–7800)
NEUTROPHILS NFR BLD AUTO: 68 %
NONHDLC SERPL-MCNC: 153 MG/DL (CALC)
PLATELET # BLD AUTO: 247 THOUSAND/UL (ref 140–400)
PMV BLD REES-ECKER: 10.1 FL (ref 7.5–12.5)
POTASSIUM SERPL-SCNC: 4.3 MMOL/L (ref 3.5–5.3)
PROT SERPL-MCNC: 7.1 G/DL (ref 6.1–8.1)
RBC # BLD AUTO: 5.45 MILLION/UL (ref 4.2–5.8)
SODIUM SERPL-SCNC: 141 MMOL/L (ref 135–146)
TRIGL SERPL-MCNC: 280 MG/DL
WBC # BLD AUTO: 7.4 THOUSAND/UL (ref 3.8–10.8)

## 2025-07-25 DIAGNOSIS — E78.1 HYPERTRIGLYCERIDEMIA: Primary | ICD-10-CM

## 2025-07-25 RX ORDER — ICOSAPENT ETHYL 1 G/1
2 CAPSULE ORAL
COMMUNITY
End: 2025-07-25 | Stop reason: SDUPTHER

## 2025-07-27 RX ORDER — ICOSAPENT ETHYL 1 G/1
2 CAPSULE ORAL
Qty: 360 CAPSULE | Refills: 3 | Status: SHIPPED | OUTPATIENT
Start: 2025-07-27 | End: 2026-07-27

## 2025-07-29 ENCOUNTER — OFFICE VISIT (OUTPATIENT)
Dept: NEUROSURGERY | Facility: HOSPITAL | Age: 65
End: 2025-07-29
Payer: COMMERCIAL

## 2025-07-29 VITALS
DIASTOLIC BLOOD PRESSURE: 88 MMHG | SYSTOLIC BLOOD PRESSURE: 146 MMHG | RESPIRATION RATE: 17 BRPM | HEART RATE: 79 BPM | WEIGHT: 215 LBS | HEIGHT: 70 IN | BODY MASS INDEX: 30.78 KG/M2

## 2025-07-29 DIAGNOSIS — D43.2 HEMANGIOBLASTOMA OF BRAIN (MULTI): Primary | ICD-10-CM

## 2025-07-29 PROCEDURE — 99214 OFFICE O/P EST MOD 30 MIN: CPT | Performed by: NEUROLOGICAL SURGERY

## 2025-07-29 PROCEDURE — 3079F DIAST BP 80-89 MM HG: CPT | Performed by: NEUROLOGICAL SURGERY

## 2025-07-29 PROCEDURE — 1036F TOBACCO NON-USER: CPT | Performed by: NEUROLOGICAL SURGERY

## 2025-07-29 PROCEDURE — 3077F SYST BP >= 140 MM HG: CPT | Performed by: NEUROLOGICAL SURGERY

## 2025-07-29 PROCEDURE — 3008F BODY MASS INDEX DOCD: CPT | Performed by: NEUROLOGICAL SURGERY

## 2025-07-29 NOTE — PROGRESS NOTES
"[facility]  Neurosurgery    History of Present Illness      Regis Mixon is a 64-year-old male with a past medical history of cerebellar hemangioblastoma s/p resection (2003), HTN, HLD, and obesity who presented to the ED on 12/29/24 with complaints of dizziness x 6 weeks. CTH revealed 5.5x5 cm L cerebellar lesion. MRI with L cerebellar cystic mass. C3-4 dorsal cord lesion with 4th effacement consistent for hemangioblastoma multiple small foci that are concerning for neoplasm. CT C/A/P with multiple bilateral pulmonary nodules and bilateral renal cyst. DSA 01/13/24 with muscular branch from L V3 supplying tumor; embolization not recommended.  CTA H/N with L cerebellar mass with L AICA and PICA feeders. No previous VHL workup. No ocular lesions on exam. He is now s/p L retrosignmoid re-do craniectomy with gross total resection on 1/16/25. Final surgical pathology consistent with hemangioblastoma. Opthalmologic evaluation without evidence of retinal hemangioblastoma or optic nerve hemangioblastoma.    Patient continues to follow with endocrinology for metanephrine elevation. 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     Case was reviewed at CNS tumor board and due to cervical lesion with worsening edema was recommended for surgical resection. Patient presents to clinic for FUV.       Objective      Vitals:   /88   Pulse 79   Resp 17   Ht 1.778 m (5' 10\")   Wt 97.5 kg (215 lb)   BMI 30.85 kg/m²       Physical Exam:  Alert, oriented x 3  Speech fluent and clear  EOMI  Face symmetric  Facial sensation intact  Tongue midline  Follows commands x 4 with full strength  Sensation grossly intact to light touch  No " Hoffmans  No clonus  Slight imbalance with Romberg  Gait otherwise normal  No dysmetria    Relevant Results: I have personally reviewed the results below.     No new imaging to review today, but reviewed cervical spine MRI from 1/14/25 and 6/18/25 which show overall stable size of the mass, but progressive increase in the amount of edema surrounding cord.      Assessment & Plan      Diagnosis:  Regis was seen today for follow-up.  Diagnoses and all orders for this visit:  Hemangioblastoma of brain (Multi)      Provider Impression:   This is a 64 year old man with a history of cerebellar hemangioblastoma s/p resection who also has multiple spinal presumed hemangioblastomas, most notably a lesion in the mid cervical spine. The overall size of the mass has been stable, but there has been increase in the amount of surrounding edema over 6 months. This was discussed with our colleagues at tumor board, and the overall consensus was to recommend surgical resection at this time given the increase in edema. Medical options, including steroids,  were felt to be limited and not long term solutions    We discussed this recommendation with Regis and his wife. We discussed the risks and benefits of surgery, including risk of death, paralysis, permanent disability, loss of ability to walk, loss of bowel/bladder control unexpected complications as well as the alternative of continued observation.     They would like to proceed with surgery. We would recommend diagnostic cerebral angiogram as an outpatient, followed by admission for embolization, if indicated, and surgical resection.    Checked with endocrine about slightly elevated metanephrine levels - Dr Najjar indicated no further work-up needed preop    Total time for this visit was 35 minutes      Medical History     Medical History[1]  Surgical History[2]  Social History[3]  Family History[4]  Allergies[5]  Current Outpatient Medications   Medication Instructions     acetaminophen (TYLENOL) 650 mg, oral, Every 6 hours PRN    icosapent ethyL (VASCEPA) 2 g, oral, 2 times daily (morning and late afternoon)    lisinopriL-hydrochlorothiazide 20-12.5 mg tablet 1 tablet, oral, Daily    nebivolol (BYSTOLIC) 10 mg, oral, Daily    pitavastatin magnesium 4 mg, Daily                              [1]   Past Medical History:  Diagnosis Date    Hypertension 5/21    Obesity 1/25    Other specified health status     No pertinent past medical history    Personal history of other infectious and parasitic diseases     History of genital warts    PMR (polymyalgia rheumatica) (Multi) 08/14/2023   [2]   Past Surgical History:  Procedure Laterality Date    OTHER SURGICAL HISTORY  05/19/2021    Brain surgery    OTHER SURGICAL HISTORY  05/19/2021    Colonoscopy    VASECTOMY  6/03   [3]   Social History  Tobacco Use    Smoking status: Never     Passive exposure: Never    Smokeless tobacco: Never   Vaping Use    Vaping status: Never Used   Substance Use Topics    Alcohol use: Yes     Alcohol/week: 3.0 standard drinks of alcohol     Types: 3 Cans of beer per week    Drug use: Never   [4]   Family History  Problem Relation Name Age of Onset    Lung cancer Mother Shantel Hong     Cancer Mother Shantel Pitts     Breast cancer Mother Shantel Pitts     Lung cancer Father Craig Mixon     Cancer Father Craig Mixon    [5] No Known Allergies

## 2025-07-30 DIAGNOSIS — D48.0 HEMANGIOBLASTOMA OF SPINE: Primary | ICD-10-CM

## 2025-07-31 ENCOUNTER — LAB (OUTPATIENT)
Dept: LAB | Facility: HOSPITAL | Age: 65
End: 2025-07-31
Payer: COMMERCIAL

## 2025-07-31 DIAGNOSIS — D48.0 NEOPLASM OF UNCERTAIN BEHAVIOR OF BONE AND ARTICULAR CARTILAGE: Primary | ICD-10-CM

## 2025-07-31 LAB
INR PPP: 1.1 (ref 0.9–1.1)
PROTHROMBIN TIME: 12.1 SECONDS (ref 9.8–12.4)

## 2025-07-31 PROCEDURE — 36415 COLL VENOUS BLD VENIPUNCTURE: CPT

## 2025-07-31 PROCEDURE — 85610 PROTHROMBIN TIME: CPT

## 2025-08-01 ENCOUNTER — TELEPHONE (OUTPATIENT)
Dept: HEMATOLOGY/ONCOLOGY | Facility: CLINIC | Age: 65
End: 2025-08-01
Payer: COMMERCIAL

## 2025-08-01 ENCOUNTER — APPOINTMENT (OUTPATIENT)
Dept: HEMATOLOGY/ONCOLOGY | Facility: CLINIC | Age: 65
End: 2025-08-01
Payer: COMMERCIAL

## 2025-08-01 DIAGNOSIS — D48.0 HEMANGIOBLASTOMA OF SPINE: Primary | ICD-10-CM

## 2025-08-01 DIAGNOSIS — Q85.83 VON HIPPEL-LINDAU SYNDROME (MULTI): ICD-10-CM

## 2025-08-01 PROCEDURE — 99212 OFFICE O/P EST SF 10 MIN: CPT | Performed by: PSYCHIATRY & NEUROLOGY

## 2025-08-01 NOTE — TELEPHONE ENCOUNTER
Message left with pt to return call to [facility] [place], phone number provided regarding scheduling December appointment with Dr. Jones in person instead of virtual.    Olga Morris RN

## 2025-08-04 DIAGNOSIS — D48.0 HEMANGIOBLASTOMA OF SPINE: ICD-10-CM

## 2025-08-04 DIAGNOSIS — D43.2 HEMANGIOBLASTOMA OF BRAIN (MULTI): ICD-10-CM

## 2025-08-04 DIAGNOSIS — Q85.83 VON HIPPEL-LINDAU SYNDROME (MULTI): ICD-10-CM

## 2025-08-06 ENCOUNTER — HOSPITAL ENCOUNTER (OUTPATIENT)
Dept: RADIOLOGY | Facility: HOSPITAL | Age: 65
Discharge: HOME | End: 2025-08-06
Payer: COMMERCIAL

## 2025-08-06 ENCOUNTER — MULTIDISCIPLINARY MEETING (OUTPATIENT)
Dept: NEUROSURGERY | Facility: HOSPITAL | Age: 65
End: 2025-08-06
Payer: COMMERCIAL

## 2025-08-06 VITALS
TEMPERATURE: 98.2 F | DIASTOLIC BLOOD PRESSURE: 71 MMHG | SYSTOLIC BLOOD PRESSURE: 122 MMHG | OXYGEN SATURATION: 98 % | HEART RATE: 70 BPM | RESPIRATION RATE: 16 BRPM

## 2025-08-06 DIAGNOSIS — D48.0 HEMANGIOBLASTOMA OF SPINE: ICD-10-CM

## 2025-08-06 PROCEDURE — 2500000004 HC RX 250 GENERAL PHARMACY W/ HCPCS (ALT 636 FOR OP/ED): Performed by: RADIOLOGY

## 2025-08-06 PROCEDURE — 2550000001 HC RX 255 CONTRASTS: Performed by: RADIOLOGY

## 2025-08-06 PROCEDURE — 7100000009 HC PHASE TWO TIME - INITIAL BASE CHARGE

## 2025-08-06 PROCEDURE — 2780000003 HC OR 278 NO HCPCS

## 2025-08-06 PROCEDURE — 96373 THER/PROPH/DIAG INJ IA: CPT | Performed by: RADIOLOGY

## 2025-08-06 PROCEDURE — 36225 PLACE CATH SUBCLAVIAN ART: CPT | Performed by: RADIOLOGY

## 2025-08-06 PROCEDURE — 99152 MOD SED SAME PHYS/QHP 5/>YRS: CPT

## 2025-08-06 PROCEDURE — C1769 GUIDE WIRE: HCPCS

## 2025-08-06 PROCEDURE — C1760 CLOSURE DEV, VASC: HCPCS

## 2025-08-06 PROCEDURE — 2720000007 HC OR 272 NO HCPCS

## 2025-08-06 PROCEDURE — 36222 PLACE CATH CAROTID/INOM ART: CPT | Mod: 50 | Performed by: RADIOLOGY

## 2025-08-06 PROCEDURE — 99153 MOD SED SAME PHYS/QHP EA: CPT

## 2025-08-06 RX ORDER — FENTANYL CITRATE 50 UG/ML
INJECTION, SOLUTION INTRAMUSCULAR; INTRAVENOUS
Status: COMPLETED | OUTPATIENT
Start: 2025-08-06 | End: 2025-08-06

## 2025-08-06 RX ORDER — MIDAZOLAM HYDROCHLORIDE 2 MG/2ML
INJECTION, SOLUTION INTRAMUSCULAR; INTRAVENOUS
Status: COMPLETED | OUTPATIENT
Start: 2025-08-06 | End: 2025-08-06

## 2025-08-06 RX ADMIN — MIDAZOLAM HYDROCHLORIDE 1 MG: 2 INJECTION, SOLUTION INTRAMUSCULAR; INTRAVENOUS at 09:13

## 2025-08-06 RX ADMIN — MIDAZOLAM HYDROCHLORIDE 1 MG: 2 INJECTION, SOLUTION INTRAMUSCULAR; INTRAVENOUS at 08:41

## 2025-08-06 RX ADMIN — MIDAZOLAM HYDROCHLORIDE 1 MG: 2 INJECTION, SOLUTION INTRAMUSCULAR; INTRAVENOUS at 09:43

## 2025-08-06 RX ADMIN — IOHEXOL 100 ML: 350 INJECTION, SOLUTION INTRAVENOUS at 09:49

## 2025-08-06 RX ADMIN — FENTANYL CITRATE 25 MCG: 50 INJECTION, SOLUTION INTRAMUSCULAR; INTRAVENOUS at 09:43

## 2025-08-06 RX ADMIN — FENTANYL CITRATE 50 MCG: 50 INJECTION, SOLUTION INTRAMUSCULAR; INTRAVENOUS at 08:49

## 2025-08-06 RX ADMIN — MIDAZOLAM HYDROCHLORIDE 1 MG: 2 INJECTION, SOLUTION INTRAMUSCULAR; INTRAVENOUS at 08:49

## 2025-08-06 RX ADMIN — FENTANYL CITRATE 50 MCG: 50 INJECTION, SOLUTION INTRAMUSCULAR; INTRAVENOUS at 08:41

## 2025-08-06 RX ADMIN — FENTANYL CITRATE 50 MCG: 50 INJECTION, SOLUTION INTRAMUSCULAR; INTRAVENOUS at 09:13

## 2025-08-06 ASSESSMENT — PAIN - FUNCTIONAL ASSESSMENT

## 2025-08-06 ASSESSMENT — PAIN SCALES - GENERAL

## 2025-08-06 NOTE — DISCHARGE INSTRUCTIONS
You received moderate sedation:  - Do not drive a car, or operate any machinery or power tools of any kind.  - Do not drink any alcoholic drinks.  - Do not take any over the counter medications that may cause drowsiness.  - Do not make any important decisions or sign any legal documents.  - You need to have a responsible adult accompany you home.  - You may resume your normal diet.  - We strongly suggest that a responsible adult be with you for the rest of the day and also during the night. This is for your protection and safety.     For questions related to your procedure:  Please call [phone] between the hours of 7:00am-5:00pm Monday through Friday.  Please call [phone] after 5:00pm and on weekends and holidays.     In the event of an emergency call 911 or go to your nearest emergency room.

## 2025-08-06 NOTE — PROCEDURES
Pre-Procedure Verification and Time Out:  Procedure Location: procedure area  HUDDLE - Pre-procedure Verification:  completed  TIME OUT - Final Verification:  completed immediately prior to procedure start  DEBRIEF: completed    Complications:  None; patient tolerated the procedure well.     Disposition: rPCU  Condition: stable  Specimens Collected: No specimens collected    General Information:   Anesthesia/ sedation: Non-Anesthesia  Indication(s)/Pre - Procedure Diagnoses: hemangioblastoma  Post-Procedure Diagnosis: same  Procedure Name: Diagnostic Cerebral Angiogram  Procedure performed by: Dr. Andres Osborne  Assistant(s): eleni Ly  Estimated Blood Loss (mL): 7  Specimen: no  Informed Consent: consent obtained and in chart    Access: 5 Fr Sheath in R CFA  Closure: Mynx  Vessels Injected: R subclavian, L subclavian, RCCA, LCCA  Moderate Sedation Time: 60 minutes  Findings: No tumor blush in the cervical spine. Full report to follow in PACS dictation  Patient was prepped and draped in sterile fashion  Sterile prep: Chlorhexidine   Positioning: Supine  Medications given during procedure: 8ml topical lidocaine, 4mg versed, 175mcg fentanyl

## 2025-08-06 NOTE — Clinical Note
Spinal angiogram performed. Right fem access used. 4 mg versed and 175 mcg fentanyl given. Mynx closure device deployed at 0945. Bedrest for 3 hours. Pt transported to [facility].

## 2025-08-06 NOTE — PROGRESS NOTES
Cerebrovascular Conference 08/06/2025    Case Review: [facility] [place] cerebellar hemangioblastoma s/p resection (2003), HTN, HLD, and obesity, 12/2024 p/w L cerebellar mass, C3-4 intramedullary lesion. 1/16/25 redo L retrosig hemangioablastoma resection (VHL workup negative).    P/w worsening edema sorrounding cervical lesion, 8/5 s/p spinal angio w/o tumor feeder        Recommendations:  Surgical resection of tumor       Cerebrovascular conference is a multidisciplinary conferences attended by neurosurgery, neuro-radiology, APPs, and Rns.

## 2025-08-06 NOTE — PRE-PROCEDURE NOTE
Pre-Procedure H&P     Provider Assessment:  Diagnosis/Reason for Procedure: spinal cord lesion  Procedure: Diagnostic Cerebral Angiogram  Medications Reviewed:   yes   Prophylatic Antibiotics Needed:   no    Neuro status: A&Ox3, moving all extremities full strength   Mouth Opening OK: yes   Neck Flexibility OK: yes   Sedation Plan: moderate sedation   COVID-19 Risk Consent:  Surgeon has reviewed key risks related to the risk of domenico COVID-19 and if they contract COVID-19 what the risks are.

## 2025-08-09 ENCOUNTER — PATIENT MESSAGE (OUTPATIENT)
Dept: NEUROSURGERY | Facility: HOSPITAL | Age: 65
End: 2025-08-09
Payer: COMMERCIAL

## 2025-08-11 LAB — SCAN RESULT: NORMAL

## 2025-08-12 ENCOUNTER — PREP FOR PROCEDURE (OUTPATIENT)
Dept: NEUROSURGERY | Facility: HOSPITAL | Age: 65
End: 2025-08-12
Payer: COMMERCIAL

## 2025-08-12 DIAGNOSIS — D48.0 HEMANGIOBLASTOMA OF SPINE: ICD-10-CM

## 2025-08-12 DIAGNOSIS — D18.09 HEMANGIOMA OF SPINE: Primary | ICD-10-CM

## 2025-08-12 DIAGNOSIS — D43.2 HEMANGIOBLASTOMA OF BRAIN (MULTI): Primary | ICD-10-CM

## 2025-08-12 DIAGNOSIS — D49.2 CERVICAL SPINE TUMOR: ICD-10-CM

## 2025-08-12 DIAGNOSIS — Z01.818 PREOPERATIVE EXAMINATION: ICD-10-CM

## 2025-08-12 RX ORDER — DEXAMETHASONE 4 MG/1
4 TABLET ORAL 3 TIMES DAILY
Qty: 21 TABLET | Refills: 0 | Status: SHIPPED | OUTPATIENT
Start: 2025-08-12 | End: 2025-08-22

## 2025-08-12 RX ORDER — PANTOPRAZOLE SODIUM 40 MG/1
40 TABLET, DELAYED RELEASE ORAL
Qty: 30 TABLET | Refills: 11 | Status: SHIPPED | OUTPATIENT
Start: 2025-08-12 | End: 2026-08-12

## 2025-08-12 RX ORDER — CHLORHEXIDINE GLUCONATE 40 MG/ML
SOLUTION TOPICAL
Qty: 473 ML | Refills: 0 | Status: SHIPPED | OUTPATIENT
Start: 2025-08-12

## 2025-08-12 RX ORDER — CHLORHEXIDINE GLUCONATE ORAL RINSE 1.2 MG/ML
SOLUTION DENTAL
Qty: 118 ML | Refills: 0 | Status: SHIPPED | OUTPATIENT
Start: 2025-08-12

## 2025-08-13 LAB
ALBUMIN SERPL-MCNC: 4.5 G/DL (ref 3.6–5.1)
ALP SERPL-CCNC: 52 U/L (ref 35–144)
ALT SERPL-CCNC: 19 U/L (ref 9–46)
ANION GAP SERPL CALCULATED.4IONS-SCNC: 9 MMOL/L (CALC) (ref 7–17)
APPEARANCE UR: CLEAR
APTT PPP: 32 SEC (ref 23–32)
AST SERPL-CCNC: 17 U/L (ref 10–35)
BACTERIA #/AREA URNS HPF: NORMAL /HPF
BACTERIA UR CULT: NORMAL
BASOPHILS # BLD AUTO: 41 CELLS/UL (ref 0–200)
BASOPHILS NFR BLD AUTO: 0.5 %
BILIRUB SERPL-MCNC: 0.5 MG/DL (ref 0.2–1.2)
BILIRUB UR QL STRIP: NEGATIVE
BUN SERPL-MCNC: 22 MG/DL (ref 7–25)
CALCIUM SERPL-MCNC: 9.5 MG/DL (ref 8.6–10.3)
CHLORIDE SERPL-SCNC: 103 MMOL/L (ref 98–110)
CO2 SERPL-SCNC: 28 MMOL/L (ref 20–32)
COLOR UR: YELLOW
CREAT SERPL-MCNC: 1.1 MG/DL (ref 0.7–1.35)
EGFRCR SERPLBLD CKD-EPI 2021: 75 ML/MIN/1.73M2
EOSINOPHIL # BLD AUTO: 81 CELLS/UL (ref 15–500)
EOSINOPHIL NFR BLD AUTO: 1 %
ERYTHROCYTE [DISTWIDTH] IN BLOOD BY AUTOMATED COUNT: 13.1 % (ref 11–15)
GLUCOSE SERPL-MCNC: 92 MG/DL (ref 65–99)
GLUCOSE UR QL STRIP: NEGATIVE
HCT VFR BLD AUTO: 46.3 % (ref 38.5–50)
HGB BLD-MCNC: 15.1 G/DL (ref 13.2–17.1)
HGB UR QL STRIP: NEGATIVE
HYALINE CASTS #/AREA URNS LPF: NORMAL /LPF
INR PPP: 1.1
KETONES UR QL STRIP: NEGATIVE
LEUKOCYTE ESTERASE UR QL STRIP: NEGATIVE
LYMPHOCYTES # BLD AUTO: 2292 CELLS/UL (ref 850–3900)
LYMPHOCYTES NFR BLD AUTO: 28.3 %
MCH RBC QN AUTO: 28.9 PG (ref 27–33)
MCHC RBC AUTO-ENTMCNC: 32.6 G/DL (ref 32–36)
MCV RBC AUTO: 88.5 FL (ref 80–100)
MONOCYTES # BLD AUTO: 381 CELLS/UL (ref 200–950)
MONOCYTES NFR BLD AUTO: 4.7 %
NEUTROPHILS # BLD AUTO: 5306 CELLS/UL (ref 1500–7800)
NEUTROPHILS NFR BLD AUTO: 65.5 %
NITRITE UR QL STRIP: NEGATIVE
PH UR STRIP: 6.5 [PH] (ref 5–8)
PLATELET # BLD AUTO: 240 THOUSAND/UL (ref 140–400)
PMV BLD REES-ECKER: 9.5 FL (ref 7.5–12.5)
POTASSIUM SERPL-SCNC: 4.4 MMOL/L (ref 3.5–5.3)
PROT SERPL-MCNC: 6.9 G/DL (ref 6.1–8.1)
PROT UR QL STRIP: NEGATIVE
PROTHROMBIN TIME: 11.3 SEC (ref 9–11.5)
RBC # BLD AUTO: 5.23 MILLION/UL (ref 4.2–5.8)
RBC #/AREA URNS HPF: NORMAL /HPF
SERVICE CMNT-IMP: NORMAL
SODIUM SERPL-SCNC: 140 MMOL/L (ref 135–146)
SP GR UR STRIP: 1.02 (ref 1–1.03)
SQUAMOUS #/AREA URNS HPF: NORMAL /HPF
WBC # BLD AUTO: 8.1 THOUSAND/UL (ref 3.8–10.8)
WBC #/AREA URNS HPF: NORMAL /HPF

## 2025-08-18 ENCOUNTER — APPOINTMENT (OUTPATIENT)
Dept: RADIOLOGY | Facility: HOSPITAL | Age: 65
DRG: 030 | End: 2025-08-18
Payer: COMMERCIAL

## 2025-08-18 ENCOUNTER — APPOINTMENT (OUTPATIENT)
Dept: NEUROLOGY | Facility: HOSPITAL | Age: 65
DRG: 030 | End: 2025-08-18
Payer: COMMERCIAL

## 2025-08-18 ENCOUNTER — HOSPITAL ENCOUNTER (INPATIENT)
Facility: HOSPITAL | Age: 65
LOS: 2 days | Discharge: HOME | DRG: 030 | End: 2025-08-20
Attending: NEUROLOGICAL SURGERY | Admitting: NEUROLOGICAL SURGERY
Payer: COMMERCIAL

## 2025-08-18 ENCOUNTER — ANESTHESIA EVENT (OUTPATIENT)
Dept: OPERATING ROOM | Facility: HOSPITAL | Age: 65
DRG: 030 | End: 2025-08-18
Payer: COMMERCIAL

## 2025-08-18 ENCOUNTER — TELEPHONE (OUTPATIENT)
Dept: PRIMARY CARE | Facility: CLINIC | Age: 65
End: 2025-08-18

## 2025-08-18 ENCOUNTER — ANESTHESIA (OUTPATIENT)
Dept: OPERATING ROOM | Facility: HOSPITAL | Age: 65
DRG: 030 | End: 2025-08-18
Payer: COMMERCIAL

## 2025-08-18 DIAGNOSIS — G89.18 POSTOPERATIVE PAIN: ICD-10-CM

## 2025-08-18 DIAGNOSIS — D48.0 HEMANGIOBLASTOMA OF SPINE: ICD-10-CM

## 2025-08-18 DIAGNOSIS — D49.2 CERVICAL SPINE TUMOR: ICD-10-CM

## 2025-08-18 DIAGNOSIS — K59.03 CONSTIPATION DUE TO PAIN MEDICATION: ICD-10-CM

## 2025-08-18 DIAGNOSIS — D18.09 HEMANGIOMA OF SPINE: Primary | ICD-10-CM

## 2025-08-18 LAB
ABO GROUP (TYPE) IN BLOOD: NORMAL
ALBUMIN SERPL BCP-MCNC: 4 G/DL (ref 3.4–5)
ANION GAP BLDA CALCULATED.4IONS-SCNC: 11 MMO/L (ref 10–25)
ANION GAP BLDA CALCULATED.4IONS-SCNC: 12 MMO/L (ref 10–25)
ANION GAP BLDA CALCULATED.4IONS-SCNC: 12 MMO/L (ref 10–25)
ANION GAP BLDA CALCULATED.4IONS-SCNC: 13 MMO/L (ref 10–25)
ANION GAP SERPL CALC-SCNC: 17 MMOL/L (ref 10–20)
ANTIBODY SCREEN: NORMAL
BASE EXCESS BLDA CALC-SCNC: -1.5 MMOL/L (ref -2–3)
BASE EXCESS BLDA CALC-SCNC: -2 MMOL/L (ref -2–3)
BASE EXCESS BLDA CALC-SCNC: -4.3 MMOL/L (ref -2–3)
BASE EXCESS BLDA CALC-SCNC: -5.5 MMOL/L (ref -2–3)
BASOPHILS # BLD AUTO: 0.01 X10*3/UL (ref 0–0.1)
BASOPHILS NFR BLD AUTO: 0.1 %
BODY TEMPERATURE: 37 DEGREES CELSIUS
BUN SERPL-MCNC: 31 MG/DL (ref 6–23)
CA-I BLD-SCNC: 1.09 MMOL/L (ref 1.1–1.33)
CA-I BLDA-SCNC: 1.01 MMOL/L (ref 1.1–1.33)
CA-I BLDA-SCNC: 1.04 MMOL/L (ref 1.1–1.33)
CA-I BLDA-SCNC: 1.11 MMOL/L (ref 1.1–1.33)
CA-I BLDA-SCNC: 1.14 MMOL/L (ref 1.1–1.33)
CALCIUM SERPL-MCNC: 8.7 MG/DL (ref 8.6–10.6)
CHLORIDE BLDA-SCNC: 103 MMOL/L (ref 98–107)
CHLORIDE BLDA-SCNC: 104 MMOL/L (ref 98–107)
CHLORIDE BLDA-SCNC: 107 MMOL/L (ref 98–107)
CHLORIDE BLDA-SCNC: 108 MMOL/L (ref 98–107)
CHLORIDE SERPL-SCNC: 103 MMOL/L (ref 98–107)
CO2 SERPL-SCNC: 23 MMOL/L (ref 21–32)
CREAT SERPL-MCNC: 0.96 MG/DL (ref 0.5–1.3)
EGFRCR SERPLBLD CKD-EPI 2021: 89 ML/MIN/1.73M*2
EOSINOPHIL # BLD AUTO: 0 X10*3/UL (ref 0–0.7)
EOSINOPHIL NFR BLD AUTO: 0 %
ERYTHROCYTE [DISTWIDTH] IN BLOOD BY AUTOMATED COUNT: 13.5 % (ref 11.5–14.5)
GLUCOSE BLD MANUAL STRIP-MCNC: 136 MG/DL (ref 74–99)
GLUCOSE BLDA-MCNC: 127 MG/DL (ref 74–99)
GLUCOSE BLDA-MCNC: 128 MG/DL (ref 74–99)
GLUCOSE BLDA-MCNC: 146 MG/DL (ref 74–99)
GLUCOSE BLDA-MCNC: 157 MG/DL (ref 74–99)
GLUCOSE SERPL-MCNC: 158 MG/DL (ref 74–99)
HCO3 BLDA-SCNC: 20.7 MMOL/L (ref 22–26)
HCO3 BLDA-SCNC: 21.6 MMOL/L (ref 22–26)
HCO3 BLDA-SCNC: 24.3 MMOL/L (ref 22–26)
HCO3 BLDA-SCNC: 24.3 MMOL/L (ref 22–26)
HCT VFR BLD AUTO: 41.6 % (ref 41–52)
HCT VFR BLD EST: 40 % (ref 41–52)
HCT VFR BLD EST: 41 % (ref 41–52)
HCT VFR BLD EST: 42 % (ref 41–52)
HCT VFR BLD EST: 47 % (ref 41–52)
HGB BLD-MCNC: 14.2 G/DL (ref 13.5–17.5)
HGB BLDA-MCNC: 13.2 G/DL (ref 13.5–17.5)
HGB BLDA-MCNC: 13.6 G/DL (ref 13.5–17.5)
HGB BLDA-MCNC: 14.1 G/DL (ref 13.5–17.5)
HGB BLDA-MCNC: 15.5 G/DL (ref 13.5–17.5)
IMM GRANULOCYTES # BLD AUTO: 0.1 X10*3/UL (ref 0–0.7)
IMM GRANULOCYTES NFR BLD AUTO: 0.7 % (ref 0–0.9)
INHALED O2 CONCENTRATION: 55 %
INHALED O2 CONCENTRATION: 60 %
LACTATE BLDA-SCNC: 2 MMOL/L (ref 0.4–2)
LACTATE BLDA-SCNC: 2.1 MMOL/L (ref 0.4–2)
LACTATE BLDA-SCNC: 2.1 MMOL/L (ref 0.4–2)
LACTATE BLDA-SCNC: 2.4 MMOL/L (ref 0.4–2)
LYMPHOCYTES # BLD AUTO: 0.95 X10*3/UL (ref 1.2–4.8)
LYMPHOCYTES NFR BLD AUTO: 6.6 %
MAGNESIUM SERPL-MCNC: 2.04 MG/DL (ref 1.6–2.4)
MCH RBC QN AUTO: 29.6 PG (ref 26–34)
MCHC RBC AUTO-ENTMCNC: 34.1 G/DL (ref 32–36)
MCV RBC AUTO: 87 FL (ref 80–100)
MONOCYTES # BLD AUTO: 0.19 X10*3/UL (ref 0.1–1)
MONOCYTES NFR BLD AUTO: 1.3 %
NEUTROPHILS # BLD AUTO: 13.25 X10*3/UL (ref 1.2–7.7)
NEUTROPHILS NFR BLD AUTO: 91.3 %
NRBC BLD-RTO: 0 /100 WBCS (ref 0–0)
OXYHGB MFR BLDA: 96.3 % (ref 94–98)
OXYHGB MFR BLDA: 97.9 % (ref 94–98)
OXYHGB MFR BLDA: 98.1 % (ref 94–98)
OXYHGB MFR BLDA: 98.2 % (ref 94–98)
PCO2 BLDA: 42 MM HG (ref 38–42)
PCO2 BLDA: 42 MM HG (ref 38–42)
PCO2 BLDA: 44 MM HG (ref 38–42)
PCO2 BLDA: 46 MM HG (ref 38–42)
PH BLDA: 7.3 PH (ref 7.38–7.42)
PH BLDA: 7.32 PH (ref 7.38–7.42)
PH BLDA: 7.33 PH (ref 7.38–7.42)
PH BLDA: 7.35 PH (ref 7.38–7.42)
PHOSPHATE SERPL-MCNC: 4.7 MG/DL (ref 2.5–4.9)
PLATELET # BLD AUTO: 262 X10*3/UL (ref 150–450)
PO2 BLDA: 159 MM HG (ref 85–95)
PO2 BLDA: 162 MM HG (ref 85–95)
PO2 BLDA: 177 MM HG (ref 85–95)
PO2 BLDA: 178 MM HG (ref 85–95)
POTASSIUM BLDA-SCNC: 3.5 MMOL/L (ref 3.5–5.3)
POTASSIUM BLDA-SCNC: 3.6 MMOL/L (ref 3.5–5.3)
POTASSIUM BLDA-SCNC: 4 MMOL/L (ref 3.5–5.3)
POTASSIUM BLDA-SCNC: 4.3 MMOL/L (ref 3.5–5.3)
POTASSIUM SERPL-SCNC: 4.3 MMOL/L (ref 3.5–5.3)
RBC # BLD AUTO: 4.79 X10*6/UL (ref 4.5–5.9)
RH FACTOR (ANTIGEN D): NORMAL
SAO2 % BLDA: 100 % (ref 94–100)
SAO2 % BLDA: 99 % (ref 94–100)
SODIUM BLDA-SCNC: 135 MMOL/L (ref 136–145)
SODIUM BLDA-SCNC: 136 MMOL/L (ref 136–145)
SODIUM BLDA-SCNC: 137 MMOL/L (ref 136–145)
SODIUM BLDA-SCNC: 137 MMOL/L (ref 136–145)
SODIUM SERPL-SCNC: 139 MMOL/L (ref 136–145)
WBC # BLD AUTO: 14.5 X10*3/UL (ref 4.4–11.3)

## 2025-08-18 PROCEDURE — 83735 ASSAY OF MAGNESIUM: CPT | Performed by: STUDENT IN AN ORGANIZED HEALTH CARE EDUCATION/TRAINING PROGRAM

## 2025-08-18 PROCEDURE — 3600000009 HC OR TIME - EACH INCREMENTAL 1 MINUTE - PROCEDURE LEVEL FOUR: Performed by: NEUROLOGICAL SURGERY

## 2025-08-18 PROCEDURE — 2500000001 HC RX 250 WO HCPCS SELF ADMINISTERED DRUGS (ALT 637 FOR MEDICARE OP): Performed by: STUDENT IN AN ORGANIZED HEALTH CARE EDUCATION/TRAINING PROGRAM

## 2025-08-18 PROCEDURE — 36415 COLL VENOUS BLD VENIPUNCTURE: CPT | Performed by: NEUROLOGICAL SURGERY

## 2025-08-18 PROCEDURE — 63286 BX/EXC IDRL IMED LESN THRC: CPT | Performed by: NEUROLOGICAL SURGERY

## 2025-08-18 PROCEDURE — 2500000005 HC RX 250 GENERAL PHARMACY W/O HCPCS: Performed by: NEUROLOGICAL SURGERY

## 2025-08-18 PROCEDURE — 84132 ASSAY OF SERUM POTASSIUM: CPT | Performed by: STUDENT IN AN ORGANIZED HEALTH CARE EDUCATION/TRAINING PROGRAM

## 2025-08-18 PROCEDURE — 84132 ASSAY OF SERUM POTASSIUM: CPT

## 2025-08-18 PROCEDURE — 72020 X-RAY EXAM OF SPINE 1 VIEW: CPT

## 2025-08-18 PROCEDURE — 00BW0ZZ EXCISION OF CERVICAL SPINAL CORD, OPEN APPROACH: ICD-10-PCS | Performed by: NEUROLOGICAL SURGERY

## 2025-08-18 PROCEDURE — 2500000004 HC RX 250 GENERAL PHARMACY W/ HCPCS (ALT 636 FOR OP/ED): Performed by: STUDENT IN AN ORGANIZED HEALTH CARE EDUCATION/TRAINING PROGRAM

## 2025-08-18 PROCEDURE — 82947 ASSAY GLUCOSE BLOOD QUANT: CPT

## 2025-08-18 PROCEDURE — 2780000003 HC OR 278 NO HCPCS: Performed by: NEUROLOGICAL SURGERY

## 2025-08-18 PROCEDURE — 69990 MICROSURGERY ADD-ON: CPT | Performed by: NEUROLOGICAL SURGERY

## 2025-08-18 PROCEDURE — 71045 X-RAY EXAM CHEST 1 VIEW: CPT

## 2025-08-18 PROCEDURE — 86901 BLOOD TYPING SEROLOGIC RH(D): CPT | Performed by: NEUROLOGICAL SURGERY

## 2025-08-18 PROCEDURE — 82330 ASSAY OF CALCIUM: CPT | Performed by: STUDENT IN AN ORGANIZED HEALTH CARE EDUCATION/TRAINING PROGRAM

## 2025-08-18 PROCEDURE — 2500000004 HC RX 250 GENERAL PHARMACY W/ HCPCS (ALT 636 FOR OP/ED): Performed by: NEUROLOGICAL SURGERY

## 2025-08-18 PROCEDURE — 71045 X-RAY EXAM CHEST 1 VIEW: CPT | Performed by: RADIOLOGY

## 2025-08-18 PROCEDURE — 85025 COMPLETE CBC W/AUTO DIFF WBC: CPT | Performed by: STUDENT IN AN ORGANIZED HEALTH CARE EDUCATION/TRAINING PROGRAM

## 2025-08-18 PROCEDURE — 86923 COMPATIBILITY TEST ELECTRIC: CPT

## 2025-08-18 PROCEDURE — 3600000004 HC OR TIME - INITIAL BASE CHARGE - PROCEDURE LEVEL FOUR: Performed by: NEUROLOGICAL SURGERY

## 2025-08-18 PROCEDURE — A63280 PR BX/EXCIS SPIN TUM,INDUR,XMED,CERV

## 2025-08-18 PROCEDURE — 36620 INSERTION CATHETER ARTERY: CPT | Performed by: STUDENT IN AN ORGANIZED HEALTH CARE EDUCATION/TRAINING PROGRAM

## 2025-08-18 PROCEDURE — A63280 PR BX/EXCIS SPIN TUM,INDUR,XMED,CERV: Performed by: STUDENT IN AN ORGANIZED HEALTH CARE EDUCATION/TRAINING PROGRAM

## 2025-08-18 PROCEDURE — 2500000004 HC RX 250 GENERAL PHARMACY W/ HCPCS (ALT 636 FOR OP/ED)

## 2025-08-18 PROCEDURE — 2720000007 HC OR 272 NO HCPCS: Performed by: NEUROLOGICAL SURGERY

## 2025-08-18 PROCEDURE — 2020000001 HC ICU ROOM DAILY

## 2025-08-18 PROCEDURE — 3700000002 HC GENERAL ANESTHESIA TIME - EACH INCREMENTAL 1 MINUTE: Performed by: NEUROLOGICAL SURGERY

## 2025-08-18 PROCEDURE — 3700000001 HC GENERAL ANESTHESIA TIME - INITIAL BASE CHARGE: Performed by: NEUROLOGICAL SURGERY

## 2025-08-18 PROCEDURE — 95939 C MOTOR EVOKED UPR&LWR LIMBS: CPT

## 2025-08-18 PROCEDURE — 99291 CRITICAL CARE FIRST HOUR: CPT

## 2025-08-18 PROCEDURE — 37799 UNLISTED PX VASCULAR SURGERY: CPT | Performed by: STUDENT IN AN ORGANIZED HEALTH CARE EDUCATION/TRAINING PROGRAM

## 2025-08-18 PROCEDURE — 88307 TISSUE EXAM BY PATHOLOGIST: CPT | Mod: TC,SUR | Performed by: NURSE PRACTITIONER

## 2025-08-18 RX ORDER — ONDANSETRON 4 MG/1
4 TABLET, FILM COATED ORAL EVERY 8 HOURS PRN
Status: DISCONTINUED | OUTPATIENT
Start: 2025-08-18 | End: 2025-08-20 | Stop reason: HOSPADM

## 2025-08-18 RX ORDER — PHENYLEPHRINE 10 MG/250 ML(40 MCG/ML)IN 0.9 % SOD.CHLORIDE INTRAVENOUS
CONTINUOUS PRN
Status: DISCONTINUED | OUTPATIENT
Start: 2025-08-18 | End: 2025-08-18

## 2025-08-18 RX ORDER — PROMETHAZINE HYDROCHLORIDE 25 MG/1
25 SUPPOSITORY RECTAL EVERY 12 HOURS PRN
Status: DISCONTINUED | OUTPATIENT
Start: 2025-08-18 | End: 2025-08-20 | Stop reason: HOSPADM

## 2025-08-18 RX ORDER — METHYLPREDNISOLONE SODIUM SUCCINATE 125 MG/2ML
3000 INJECTION INTRAMUSCULAR; INTRAVENOUS ONCE
Status: DISCONTINUED | OUTPATIENT
Start: 2025-08-18 | End: 2025-08-18

## 2025-08-18 RX ORDER — HEPARIN SODIUM 5000 [USP'U]/ML
5000 INJECTION, SOLUTION INTRAVENOUS; SUBCUTANEOUS EVERY 12 HOURS
Status: DISCONTINUED | OUTPATIENT
Start: 2025-08-19 | End: 2025-08-20

## 2025-08-18 RX ORDER — HYDRALAZINE HYDROCHLORIDE 20 MG/ML
10 INJECTION INTRAMUSCULAR; INTRAVENOUS EVERY 30 MIN PRN
Status: DISCONTINUED | OUTPATIENT
Start: 2025-08-18 | End: 2025-08-20 | Stop reason: HOSPADM

## 2025-08-18 RX ORDER — METOPROLOL SUCCINATE 50 MG/1
100 TABLET, EXTENDED RELEASE ORAL DAILY
Status: DISCONTINUED | OUTPATIENT
Start: 2025-08-18 | End: 2025-08-20 | Stop reason: HOSPADM

## 2025-08-18 RX ORDER — AMOXICILLIN 250 MG
2 CAPSULE ORAL 2 TIMES DAILY
Status: DISCONTINUED | OUTPATIENT
Start: 2025-08-18 | End: 2025-08-20 | Stop reason: HOSPADM

## 2025-08-18 RX ORDER — SODIUM CHLORIDE 9 MG/ML
100 INJECTION, SOLUTION INTRAVENOUS CONTINUOUS
Status: ACTIVE | OUTPATIENT
Start: 2025-08-18 | End: 2025-08-19

## 2025-08-18 RX ORDER — LABETALOL HYDROCHLORIDE 5 MG/ML
10 INJECTION, SOLUTION INTRAVENOUS EVERY 10 MIN PRN
Status: DISCONTINUED | OUTPATIENT
Start: 2025-08-18 | End: 2025-08-20 | Stop reason: HOSPADM

## 2025-08-18 RX ORDER — LIDOCAINE HYDROCHLORIDE AND EPINEPHRINE 5; 5 MG/ML; UG/ML
INJECTION, SOLUTION INFILTRATION; PERINEURAL AS NEEDED
Status: DISCONTINUED | OUTPATIENT
Start: 2025-08-18 | End: 2025-08-18 | Stop reason: HOSPADM

## 2025-08-18 RX ORDER — ACETAMINOPHEN 325 MG/1
650 TABLET ORAL EVERY 4 HOURS PRN
Status: DISCONTINUED | OUTPATIENT
Start: 2025-08-18 | End: 2025-08-20 | Stop reason: HOSPADM

## 2025-08-18 RX ORDER — PHENYLEPHRINE HYDROCHLORIDE 10 MG/ML
INJECTION INTRAVENOUS AS NEEDED
Status: DISCONTINUED | OUTPATIENT
Start: 2025-08-18 | End: 2025-08-18

## 2025-08-18 RX ORDER — MAGNESIUM SULFATE HEPTAHYDRATE 40 MG/ML
4 INJECTION, SOLUTION INTRAVENOUS EVERY 6 HOURS PRN
Status: DISCONTINUED | OUTPATIENT
Start: 2025-08-18 | End: 2025-08-20 | Stop reason: HOSPADM

## 2025-08-18 RX ORDER — OXYCODONE HYDROCHLORIDE 5 MG/1
10 TABLET ORAL EVERY 4 HOURS PRN
Status: DISCONTINUED | OUTPATIENT
Start: 2025-08-18 | End: 2025-08-20 | Stop reason: HOSPADM

## 2025-08-18 RX ORDER — ICOSAPENT ETHYL 1 G/1
2 CAPSULE ORAL
Status: DISCONTINUED | OUTPATIENT
Start: 2025-08-18 | End: 2025-08-20 | Stop reason: HOSPADM

## 2025-08-18 RX ORDER — POTASSIUM CHLORIDE 20 MEQ/1
40 TABLET, EXTENDED RELEASE ORAL EVERY 6 HOURS PRN
Status: DISCONTINUED | OUTPATIENT
Start: 2025-08-18 | End: 2025-08-20 | Stop reason: HOSPADM

## 2025-08-18 RX ORDER — ONDANSETRON HYDROCHLORIDE 2 MG/ML
INJECTION, SOLUTION INTRAVENOUS AS NEEDED
Status: DISCONTINUED | OUTPATIENT
Start: 2025-08-18 | End: 2025-08-18

## 2025-08-18 RX ORDER — LIDOCAINE HCL/PF 100 MG/5ML
SYRINGE (ML) INTRAVENOUS AS NEEDED
Status: DISCONTINUED | OUTPATIENT
Start: 2025-08-18 | End: 2025-08-18

## 2025-08-18 RX ORDER — HYDROMORPHONE HYDROCHLORIDE 1 MG/ML
0.2 INJECTION, SOLUTION INTRAMUSCULAR; INTRAVENOUS; SUBCUTANEOUS
Status: DISCONTINUED | OUTPATIENT
Start: 2025-08-18 | End: 2025-08-20 | Stop reason: HOSPADM

## 2025-08-18 RX ORDER — ROCURONIUM BROMIDE 10 MG/ML
INJECTION, SOLUTION INTRAVENOUS AS NEEDED
Status: DISCONTINUED | OUTPATIENT
Start: 2025-08-18 | End: 2025-08-18

## 2025-08-18 RX ORDER — PROMETHAZINE HYDROCHLORIDE 25 MG/1
25 TABLET ORAL EVERY 6 HOURS PRN
Status: DISCONTINUED | OUTPATIENT
Start: 2025-08-18 | End: 2025-08-20 | Stop reason: HOSPADM

## 2025-08-18 RX ORDER — SODIUM CHLORIDE 0.9 G/100ML
INJECTION, SOLUTION IRRIGATION AS NEEDED
Status: DISCONTINUED | OUTPATIENT
Start: 2025-08-18 | End: 2025-08-18 | Stop reason: HOSPADM

## 2025-08-18 RX ORDER — FENTANYL CITRATE 50 UG/ML
INJECTION, SOLUTION INTRAMUSCULAR; INTRAVENOUS AS NEEDED
Status: DISCONTINUED | OUTPATIENT
Start: 2025-08-18 | End: 2025-08-18

## 2025-08-18 RX ORDER — METHYLPREDNISOLONE SODIUM SUCCINATE 125 MG/2ML
25 INJECTION INTRAMUSCULAR; INTRAVENOUS ONCE
Status: DISCONTINUED | OUTPATIENT
Start: 2025-08-18 | End: 2025-08-18

## 2025-08-18 RX ORDER — ONDANSETRON HYDROCHLORIDE 2 MG/ML
4 INJECTION, SOLUTION INTRAVENOUS EVERY 8 HOURS PRN
Status: DISCONTINUED | OUTPATIENT
Start: 2025-08-18 | End: 2025-08-20 | Stop reason: HOSPADM

## 2025-08-18 RX ORDER — POTASSIUM CHLORIDE 20 MEQ/1
20 TABLET, EXTENDED RELEASE ORAL EVERY 6 HOURS PRN
Status: DISCONTINUED | OUTPATIENT
Start: 2025-08-18 | End: 2025-08-20 | Stop reason: HOSPADM

## 2025-08-18 RX ORDER — POTASSIUM CHLORIDE 1.5 G/1.58G
20 POWDER, FOR SOLUTION ORAL EVERY 6 HOURS PRN
Status: DISCONTINUED | OUTPATIENT
Start: 2025-08-18 | End: 2025-08-20 | Stop reason: HOSPADM

## 2025-08-18 RX ORDER — CALCIUM GLUCONATE 20 MG/ML
1 INJECTION, SOLUTION INTRAVENOUS EVERY 6 HOURS PRN
Status: DISCONTINUED | OUTPATIENT
Start: 2025-08-18 | End: 2025-08-20 | Stop reason: HOSPADM

## 2025-08-18 RX ORDER — CEFAZOLIN SODIUM 2 G/100ML
2 INJECTION, SOLUTION INTRAVENOUS EVERY 8 HOURS
Status: COMPLETED | OUTPATIENT
Start: 2025-08-18 | End: 2025-08-19

## 2025-08-18 RX ORDER — PANTOPRAZOLE SODIUM 40 MG/1
40 TABLET, DELAYED RELEASE ORAL
Status: DISCONTINUED | OUTPATIENT
Start: 2025-08-19 | End: 2025-08-20 | Stop reason: HOSPADM

## 2025-08-18 RX ORDER — CALCIUM GLUCONATE 20 MG/ML
2 INJECTION, SOLUTION INTRAVENOUS EVERY 6 HOURS PRN
Status: DISCONTINUED | OUTPATIENT
Start: 2025-08-18 | End: 2025-08-20 | Stop reason: HOSPADM

## 2025-08-18 RX ORDER — PAPAVERINE HYDROCHLORIDE 30 MG/ML
INJECTION INTRAMUSCULAR; INTRAVENOUS AS NEEDED
Status: DISCONTINUED | OUTPATIENT
Start: 2025-08-18 | End: 2025-08-18 | Stop reason: HOSPADM

## 2025-08-18 RX ORDER — PROPOFOL 10 MG/ML
INJECTION, EMULSION INTRAVENOUS CONTINUOUS PRN
Status: DISCONTINUED | OUTPATIENT
Start: 2025-08-18 | End: 2025-08-18

## 2025-08-18 RX ORDER — ESMOLOL HYDROCHLORIDE 10 MG/ML
INJECTION INTRAVENOUS AS NEEDED
Status: DISCONTINUED | OUTPATIENT
Start: 2025-08-18 | End: 2025-08-18

## 2025-08-18 RX ORDER — POLYETHYLENE GLYCOL 3350 17 G/17G
17 POWDER, FOR SOLUTION ORAL DAILY
Status: DISCONTINUED | OUTPATIENT
Start: 2025-08-18 | End: 2025-08-20 | Stop reason: HOSPADM

## 2025-08-18 RX ORDER — MAGNESIUM SULFATE HEPTAHYDRATE 40 MG/ML
2 INJECTION, SOLUTION INTRAVENOUS EVERY 6 HOURS PRN
Status: DISCONTINUED | OUTPATIENT
Start: 2025-08-18 | End: 2025-08-20 | Stop reason: HOSPADM

## 2025-08-18 RX ORDER — OXYCODONE HYDROCHLORIDE 5 MG/1
5 TABLET ORAL EVERY 4 HOURS PRN
Status: DISCONTINUED | OUTPATIENT
Start: 2025-08-18 | End: 2025-08-20 | Stop reason: HOSPADM

## 2025-08-18 RX ORDER — CEFTRIAXONE 2 G/1
INJECTION, POWDER, FOR SOLUTION INTRAMUSCULAR; INTRAVENOUS AS NEEDED
Status: DISCONTINUED | OUTPATIENT
Start: 2025-08-18 | End: 2025-08-18

## 2025-08-18 RX ORDER — BACITRACIN 500 [USP'U]/G
OINTMENT TOPICAL AS NEEDED
Status: DISCONTINUED | OUTPATIENT
Start: 2025-08-18 | End: 2025-08-18 | Stop reason: HOSPADM

## 2025-08-18 RX ORDER — MIDAZOLAM HYDROCHLORIDE 2 MG/2ML
INJECTION, SOLUTION INTRAMUSCULAR; INTRAVENOUS AS NEEDED
Status: DISCONTINUED | OUTPATIENT
Start: 2025-08-18 | End: 2025-08-18

## 2025-08-18 RX ORDER — POTASSIUM CHLORIDE 1.5 G/1.58G
40 POWDER, FOR SOLUTION ORAL EVERY 6 HOURS PRN
Status: DISCONTINUED | OUTPATIENT
Start: 2025-08-18 | End: 2025-08-20 | Stop reason: HOSPADM

## 2025-08-18 RX ORDER — METHADONE IN SOD CHLOR,ISO-OSM 10 MG/ML
SYRINGE (ML) INTRAVENOUS AS NEEDED
Status: DISCONTINUED | OUTPATIENT
Start: 2025-08-18 | End: 2025-08-18

## 2025-08-18 RX ORDER — DEXMEDETOMIDINE HYDROCHLORIDE 4 UG/ML
INJECTION, SOLUTION INTRAVENOUS CONTINUOUS PRN
Status: DISCONTINUED | OUTPATIENT
Start: 2025-08-18 | End: 2025-08-18

## 2025-08-18 RX ORDER — VANCOMYCIN HYDROCHLORIDE 1 G/20ML
INJECTION, POWDER, LYOPHILIZED, FOR SOLUTION INTRAVENOUS AS NEEDED
Status: DISCONTINUED | OUTPATIENT
Start: 2025-08-18 | End: 2025-08-18

## 2025-08-18 RX ADMIN — Medication 10 MG: at 07:43

## 2025-08-18 RX ADMIN — PROPOFOL 40 MG: 10 INJECTION, EMULSION INTRAVENOUS at 08:50

## 2025-08-18 RX ADMIN — PROPOFOL 200 MG: 10 INJECTION, EMULSION INTRAVENOUS at 07:42

## 2025-08-18 RX ADMIN — PHENYLEPHRINE HYDROCHLORIDE 20 MCG: 10 INJECTION INTRAVENOUS at 14:44

## 2025-08-18 RX ADMIN — HEPARIN SODIUM 5000 UNITS: 5000 INJECTION, SOLUTION INTRAVENOUS; SUBCUTANEOUS at 23:45

## 2025-08-18 RX ADMIN — PHENYLEPHRINE HYDROCHLORIDE 80 MCG: 10 INJECTION INTRAVENOUS at 08:29

## 2025-08-18 RX ADMIN — PHENYLEPHRINE HYDROCHLORIDE 40 MCG: 10 INJECTION INTRAVENOUS at 14:37

## 2025-08-18 RX ADMIN — PHENYLEPHRINE HYDROCHLORIDE 120 MCG: 10 INJECTION INTRAVENOUS at 08:38

## 2025-08-18 RX ADMIN — CEFTRIAXONE SODIUM 2 G: 2 INJECTION, POWDER, FOR SOLUTION INTRAMUSCULAR; INTRAVENOUS at 08:46

## 2025-08-18 RX ADMIN — SODIUM CHLORIDE, SODIUM LACTATE, POTASSIUM CHLORIDE, AND CALCIUM CHLORIDE: 600; 310; 30; 20 INJECTION, SOLUTION INTRAVENOUS at 07:30

## 2025-08-18 RX ADMIN — ONDANSETRON 4 MG: 2 INJECTION, SOLUTION INTRAMUSCULAR; INTRAVENOUS at 15:05

## 2025-08-18 RX ADMIN — VANCOMYCIN HYDROCHLORIDE 1.5 G: 1025 INJECTION, POWDER, FOR SOLUTION INTRAVENOUS; ORAL at 08:22

## 2025-08-18 RX ADMIN — PHENYLEPHRINE HYDROCHLORIDE 120 MCG: 10 INJECTION INTRAVENOUS at 08:44

## 2025-08-18 RX ADMIN — PROPOFOL 20 MG: 10 INJECTION, EMULSION INTRAVENOUS at 08:52

## 2025-08-18 RX ADMIN — ACETAMINOPHEN 650 MG: 325 TABLET, FILM COATED ORAL at 18:18

## 2025-08-18 RX ADMIN — METHYLPREDNISOLONE SODIUM SUCCINATE 2964 MG: 40 INJECTION, POWDER, FOR SOLUTION INTRAMUSCULAR; INTRAVENOUS at 10:04

## 2025-08-18 RX ADMIN — PROPOFOL 50 MCG/KG/MIN: 10 INJECTION, EMULSION INTRAVENOUS at 08:25

## 2025-08-18 RX ADMIN — CEFAZOLIN SODIUM 2 G: 2 INJECTION, SOLUTION INTRAVENOUS at 23:45

## 2025-08-18 RX ADMIN — CEFAZOLIN SODIUM 2 G: 2 INJECTION, SOLUTION INTRAVENOUS at 18:18

## 2025-08-18 RX ADMIN — PHENYLEPHRINE HYDROCHLORIDE 40 MCG: 10 INJECTION INTRAVENOUS at 14:51

## 2025-08-18 RX ADMIN — REMIFENTANIL HYDROCHLORIDE 0.05 MCG/KG/MIN: 1 INJECTION, POWDER, LYOPHILIZED, FOR SOLUTION INTRAVENOUS at 08:25

## 2025-08-18 RX ADMIN — ACETAMINOPHEN 650 MG: 325 TABLET, FILM COATED ORAL at 22:04

## 2025-08-18 RX ADMIN — REMIFENTANIL HYDROCHLORIDE 20 MCG: 1 INJECTION, POWDER, LYOPHILIZED, FOR SOLUTION INTRAVENOUS at 12:15

## 2025-08-18 RX ADMIN — FENTANYL CITRATE 50 MCG: 50 INJECTION, SOLUTION INTRAMUSCULAR; INTRAVENOUS at 07:42

## 2025-08-18 RX ADMIN — DEXMEDETOMIDINE HYDROCHLORIDE 0.3 MCG/KG/HR: 4 INJECTION, SOLUTION INTRAVENOUS at 08:25

## 2025-08-18 RX ADMIN — PHENYLEPHRINE HYDROCHLORIDE 100 MCG: 10 INJECTION INTRAVENOUS at 09:05

## 2025-08-18 RX ADMIN — ROCURONIUM BROMIDE 10 MG: 10 INJECTION INTRAVENOUS at 09:05

## 2025-08-18 RX ADMIN — ESMOLOL HYDROCHLORIDE 40 MG: 10 INJECTION, SOLUTION INTRAVENOUS at 08:50

## 2025-08-18 RX ADMIN — PHENYLEPHRINE-NACL IV SOLUTION 10 MG/250ML-0.9% 0.2 MCG/KG/MIN: 10-0.9/25 SOLUTION at 12:12

## 2025-08-18 RX ADMIN — SODIUM CHLORIDE: 9 INJECTION, SOLUTION INTRAVENOUS at 09:31

## 2025-08-18 RX ADMIN — ICOSAPENT ETHYL 2 G: 1 CAPSULE ORAL at 19:49

## 2025-08-18 RX ADMIN — MIDAZOLAM HYDROCHLORIDE 2 MG: 2 INJECTION, SOLUTION INTRAMUSCULAR; INTRAVENOUS at 07:30

## 2025-08-18 RX ADMIN — METHYLPREDNISOLONE SODIUM SUCCINATE 5.4 MG/KG/HR: 1 INJECTION INTRAMUSCULAR; INTRAVENOUS at 11:04

## 2025-08-18 RX ADMIN — SODIUM CHLORIDE 100 ML/HR: 0.9 INJECTION, SOLUTION INTRAVENOUS at 17:05

## 2025-08-18 RX ADMIN — ROCURONIUM BROMIDE 50 MG: 10 INJECTION INTRAVENOUS at 07:43

## 2025-08-18 RX ADMIN — SUGAMMADEX 200 MG: 100 INJECTION, SOLUTION INTRAVENOUS at 15:05

## 2025-08-18 RX ADMIN — LIDOCAINE HYDROCHLORIDE 80 MG: 20 INJECTION INTRAVENOUS at 07:42

## 2025-08-18 RX ADMIN — PROPOFOL 20 MG: 10 INJECTION, EMULSION INTRAVENOUS at 09:09

## 2025-08-18 RX ADMIN — HYDRALAZINE HYDROCHLORIDE 10 MG: 20 INJECTION INTRAMUSCULAR; INTRAVENOUS at 19:48

## 2025-08-18 RX ADMIN — SENNOSIDES, DOCUSATE SODIUM 2 TABLET: 50; 8.6 TABLET, FILM COATED ORAL at 20:00

## 2025-08-18 RX ADMIN — HYDROMORPHONE HYDROCHLORIDE 0.2 MG: 1 INJECTION, SOLUTION INTRAMUSCULAR; INTRAVENOUS; SUBCUTANEOUS at 17:04

## 2025-08-18 RX ADMIN — PHENYLEPHRINE HYDROCHLORIDE 80 MCG: 10 INJECTION INTRAVENOUS at 12:12

## 2025-08-18 SDOH — HEALTH STABILITY: MENTAL HEALTH: CURRENT SMOKER: 0

## 2025-08-18 ASSESSMENT — PAIN DESCRIPTION - LOCATION: LOCATION: HEAD

## 2025-08-18 ASSESSMENT — PAIN SCALES - GENERAL
PAINLEVEL_OUTOF10: 3
PAINLEVEL_OUTOF10: 0 - NO PAIN
PAINLEVEL_OUTOF10: 1
PAINLEVEL_OUTOF10: 0 - NO PAIN

## 2025-08-18 ASSESSMENT — PAIN - FUNCTIONAL ASSESSMENT
PAIN_FUNCTIONAL_ASSESSMENT: 0-10

## 2025-08-19 ENCOUNTER — APPOINTMENT (OUTPATIENT)
Dept: RADIOLOGY | Facility: HOSPITAL | Age: 65
DRG: 030 | End: 2025-08-19
Payer: COMMERCIAL

## 2025-08-19 DIAGNOSIS — E78.2 MIXED HYPERLIPIDEMIA: ICD-10-CM

## 2025-08-19 LAB
ALBUMIN SERPL BCP-MCNC: 4 G/DL (ref 3.4–5)
ANION GAP SERPL CALC-SCNC: 15 MMOL/L (ref 10–20)
APTT PPP: 29 SECONDS (ref 26–36)
BASOPHILS # BLD AUTO: 0.01 X10*3/UL (ref 0–0.1)
BASOPHILS NFR BLD AUTO: 0.1 %
BUN SERPL-MCNC: 27 MG/DL (ref 6–23)
CA-I BLD-SCNC: 1.11 MMOL/L (ref 1.1–1.33)
CALCIUM SERPL-MCNC: 8.7 MG/DL (ref 8.6–10.6)
CHLORIDE SERPL-SCNC: 104 MMOL/L (ref 98–107)
CO2 SERPL-SCNC: 26 MMOL/L (ref 21–32)
CREAT SERPL-MCNC: 1.13 MG/DL (ref 0.5–1.3)
EGFRCR SERPLBLD CKD-EPI 2021: 73 ML/MIN/1.73M*2
EOSINOPHIL # BLD AUTO: 0 X10*3/UL (ref 0–0.7)
EOSINOPHIL NFR BLD AUTO: 0 %
ERYTHROCYTE [DISTWIDTH] IN BLOOD BY AUTOMATED COUNT: 13.8 % (ref 11.5–14.5)
GLUCOSE BLD MANUAL STRIP-MCNC: 140 MG/DL (ref 74–99)
GLUCOSE BLD MANUAL STRIP-MCNC: 189 MG/DL (ref 74–99)
GLUCOSE BLD MANUAL STRIP-MCNC: 190 MG/DL (ref 74–99)
GLUCOSE BLD MANUAL STRIP-MCNC: 201 MG/DL (ref 74–99)
GLUCOSE SERPL-MCNC: 145 MG/DL (ref 74–99)
HCT VFR BLD AUTO: 39.6 % (ref 41–52)
HGB BLD-MCNC: 13.2 G/DL (ref 13.5–17.5)
IMM GRANULOCYTES # BLD AUTO: 0.06 X10*3/UL (ref 0–0.7)
IMM GRANULOCYTES NFR BLD AUTO: 0.4 % (ref 0–0.9)
INR PPP: 1.2 (ref 0.9–1.1)
LYMPHOCYTES # BLD AUTO: 1.13 X10*3/UL (ref 1.2–4.8)
LYMPHOCYTES NFR BLD AUTO: 7 %
MAGNESIUM SERPL-MCNC: 2.17 MG/DL (ref 1.6–2.4)
MCH RBC QN AUTO: 28.6 PG (ref 26–34)
MCHC RBC AUTO-ENTMCNC: 33.3 G/DL (ref 32–36)
MCV RBC AUTO: 86 FL (ref 80–100)
MONOCYTES # BLD AUTO: 0.45 X10*3/UL (ref 0.1–1)
MONOCYTES NFR BLD AUTO: 2.8 %
NEUTROPHILS # BLD AUTO: 14.59 X10*3/UL (ref 1.2–7.7)
NEUTROPHILS NFR BLD AUTO: 89.7 %
NRBC BLD-RTO: 0 /100 WBCS (ref 0–0)
PHOSPHATE SERPL-MCNC: 3.2 MG/DL (ref 2.5–4.9)
PLATELET # BLD AUTO: 240 X10*3/UL (ref 150–450)
POTASSIUM SERPL-SCNC: 3.8 MMOL/L (ref 3.5–5.3)
PROTHROMBIN TIME: 13.8 SECONDS (ref 9.8–12.4)
RBC # BLD AUTO: 4.61 X10*6/UL (ref 4.5–5.9)
SODIUM SERPL-SCNC: 141 MMOL/L (ref 136–145)
WBC # BLD AUTO: 16.2 X10*3/UL (ref 4.4–11.3)

## 2025-08-19 PROCEDURE — 2500000004 HC RX 250 GENERAL PHARMACY W/ HCPCS (ALT 636 FOR OP/ED)

## 2025-08-19 PROCEDURE — 2060000001 HC INTERMEDIATE ICU ROOM DAILY

## 2025-08-19 PROCEDURE — 72156 MRI NECK SPINE W/O & W/DYE: CPT

## 2025-08-19 PROCEDURE — 2500000002 HC RX 250 W HCPCS SELF ADMINISTERED DRUGS (ALT 637 FOR MEDICARE OP, ALT 636 FOR OP/ED)

## 2025-08-19 PROCEDURE — 97165 OT EVAL LOW COMPLEX 30 MIN: CPT | Mod: GO

## 2025-08-19 PROCEDURE — 2500000004 HC RX 250 GENERAL PHARMACY W/ HCPCS (ALT 636 FOR OP/ED): Performed by: STUDENT IN AN ORGANIZED HEALTH CARE EDUCATION/TRAINING PROGRAM

## 2025-08-19 PROCEDURE — 82947 ASSAY GLUCOSE BLOOD QUANT: CPT

## 2025-08-19 PROCEDURE — 97161 PT EVAL LOW COMPLEX 20 MIN: CPT | Mod: GP

## 2025-08-19 PROCEDURE — 85025 COMPLETE CBC W/AUTO DIFF WBC: CPT | Performed by: STUDENT IN AN ORGANIZED HEALTH CARE EDUCATION/TRAINING PROGRAM

## 2025-08-19 PROCEDURE — 2500000001 HC RX 250 WO HCPCS SELF ADMINISTERED DRUGS (ALT 637 FOR MEDICARE OP): Performed by: STUDENT IN AN ORGANIZED HEALTH CARE EDUCATION/TRAINING PROGRAM

## 2025-08-19 PROCEDURE — 2500000005 HC RX 250 GENERAL PHARMACY W/O HCPCS

## 2025-08-19 PROCEDURE — 85610 PROTHROMBIN TIME: CPT

## 2025-08-19 PROCEDURE — 72156 MRI NECK SPINE W/O & W/DYE: CPT | Performed by: RADIOLOGY

## 2025-08-19 PROCEDURE — 2500000001 HC RX 250 WO HCPCS SELF ADMINISTERED DRUGS (ALT 637 FOR MEDICARE OP)

## 2025-08-19 PROCEDURE — 83735 ASSAY OF MAGNESIUM: CPT | Performed by: STUDENT IN AN ORGANIZED HEALTH CARE EDUCATION/TRAINING PROGRAM

## 2025-08-19 PROCEDURE — A9575 INJ GADOTERATE MEGLUMI 0.1ML: HCPCS | Performed by: NEUROLOGICAL SURGERY

## 2025-08-19 PROCEDURE — 2550000001 HC RX 255 CONTRASTS: Performed by: NEUROLOGICAL SURGERY

## 2025-08-19 PROCEDURE — 82330 ASSAY OF CALCIUM: CPT | Performed by: STUDENT IN AN ORGANIZED HEALTH CARE EDUCATION/TRAINING PROGRAM

## 2025-08-19 PROCEDURE — 97535 SELF CARE MNGMENT TRAINING: CPT | Mod: GO

## 2025-08-19 PROCEDURE — 84100 ASSAY OF PHOSPHORUS: CPT | Performed by: STUDENT IN AN ORGANIZED HEALTH CARE EDUCATION/TRAINING PROGRAM

## 2025-08-19 PROCEDURE — 37799 UNLISTED PX VASCULAR SURGERY: CPT | Performed by: STUDENT IN AN ORGANIZED HEALTH CARE EDUCATION/TRAINING PROGRAM

## 2025-08-19 RX ORDER — DEXAMETHASONE 4 MG/1
4 TABLET ORAL EVERY 8 HOURS SCHEDULED
Status: DISCONTINUED | OUTPATIENT
Start: 2025-08-19 | End: 2025-08-20 | Stop reason: HOSPADM

## 2025-08-19 RX ORDER — HYDROGEN PEROXIDE 3 %
SOLUTION, NON-ORAL MISCELLANEOUS DAILY PRN
Status: DISCONTINUED | OUTPATIENT
Start: 2025-08-19 | End: 2025-08-20 | Stop reason: HOSPADM

## 2025-08-19 RX ORDER — DEXTROSE 50 % IN WATER (D50W) INTRAVENOUS SYRINGE
12.5
Status: DISCONTINUED | OUTPATIENT
Start: 2025-08-19 | End: 2025-08-20 | Stop reason: HOSPADM

## 2025-08-19 RX ORDER — INSULIN LISPRO 100 [IU]/ML
0-10 INJECTION, SOLUTION INTRAVENOUS; SUBCUTANEOUS
Status: DISCONTINUED | OUTPATIENT
Start: 2025-08-19 | End: 2025-08-20 | Stop reason: HOSPADM

## 2025-08-19 RX ORDER — GADOTERATE MEGLUMINE 376.9 MG/ML
20 INJECTION INTRAVENOUS
Status: COMPLETED | OUTPATIENT
Start: 2025-08-19 | End: 2025-08-19

## 2025-08-19 RX ORDER — DEXTROSE 50 % IN WATER (D50W) INTRAVENOUS SYRINGE
25
Status: DISCONTINUED | OUTPATIENT
Start: 2025-08-19 | End: 2025-08-20 | Stop reason: HOSPADM

## 2025-08-19 RX ORDER — CYCLOBENZAPRINE HCL 10 MG
10 TABLET ORAL 3 TIMES DAILY
Status: DISCONTINUED | OUTPATIENT
Start: 2025-08-19 | End: 2025-08-20 | Stop reason: HOSPADM

## 2025-08-19 RX ORDER — LORAZEPAM 1 MG/1
1 TABLET ORAL ONCE
Status: COMPLETED | OUTPATIENT
Start: 2025-08-19 | End: 2025-08-19

## 2025-08-19 RX ADMIN — LORAZEPAM 1 MG: 1 TABLET ORAL at 20:58

## 2025-08-19 RX ADMIN — CARBOXYMETHYLCELLULOSE SODIUM 1 DROP: 5 SOLUTION/ DROPS OPHTHALMIC at 22:34

## 2025-08-19 RX ADMIN — ACETAMINOPHEN 650 MG: 325 TABLET, FILM COATED ORAL at 15:46

## 2025-08-19 RX ADMIN — LABETALOL HYDROCHLORIDE 10 MG: 5 INJECTION, SOLUTION INTRAVENOUS at 22:41

## 2025-08-19 RX ADMIN — ICOSAPENT ETHYL 2 G: 1 CAPSULE ORAL at 20:07

## 2025-08-19 RX ADMIN — METOPROLOL SUCCINATE 100 MG: 50 TABLET, EXTENDED RELEASE ORAL at 12:14

## 2025-08-19 RX ADMIN — LABETALOL HYDROCHLORIDE 10 MG: 5 INJECTION, SOLUTION INTRAVENOUS at 15:46

## 2025-08-19 RX ADMIN — HYDRALAZINE HYDROCHLORIDE 10 MG: 20 INJECTION INTRAMUSCULAR; INTRAVENOUS at 10:31

## 2025-08-19 RX ADMIN — ICOSAPENT ETHYL 2 G: 1 CAPSULE ORAL at 08:33

## 2025-08-19 RX ADMIN — LABETALOL HYDROCHLORIDE 10 MG: 5 INJECTION, SOLUTION INTRAVENOUS at 17:59

## 2025-08-19 RX ADMIN — HYDROGEN PEROXIDE: 30 SOLUTION TOPICAL at 22:34

## 2025-08-19 RX ADMIN — OXYCODONE HYDROCHLORIDE 5 MG: 5 TABLET ORAL at 22:34

## 2025-08-19 RX ADMIN — CYCLOBENZAPRINE 10 MG: 10 TABLET, FILM COATED ORAL at 20:07

## 2025-08-19 RX ADMIN — CEFAZOLIN SODIUM 2 G: 2 INJECTION, SOLUTION INTRAVENOUS at 08:32

## 2025-08-19 RX ADMIN — POTASSIUM CHLORIDE 20 MEQ: 1500 TABLET, EXTENDED RELEASE ORAL at 06:39

## 2025-08-19 RX ADMIN — DEXAMETHASONE 4 MG: 4 TABLET ORAL at 22:34

## 2025-08-19 RX ADMIN — CYCLOBENZAPRINE 10 MG: 10 TABLET, FILM COATED ORAL at 14:15

## 2025-08-19 RX ADMIN — GADOTERATE MEGLUMINE 20 ML: 376.9 INJECTION INTRAVENOUS at 21:40

## 2025-08-19 RX ADMIN — ACETAMINOPHEN 650 MG: 325 TABLET, FILM COATED ORAL at 09:30

## 2025-08-19 RX ADMIN — HEPARIN SODIUM 5000 UNITS: 5000 INJECTION, SOLUTION INTRAVENOUS; SUBCUTANEOUS at 12:13

## 2025-08-19 RX ADMIN — HYDROCHLOROTHIAZIDE: 25 TABLET ORAL at 08:34

## 2025-08-19 RX ADMIN — DEXAMETHASONE 4 MG: 4 TABLET ORAL at 12:14

## 2025-08-19 RX ADMIN — HEPARIN SODIUM 5000 UNITS: 5000 INJECTION, SOLUTION INTRAVENOUS; SUBCUTANEOUS at 23:57

## 2025-08-19 RX ADMIN — LABETALOL HYDROCHLORIDE 10 MG: 5 INJECTION, SOLUTION INTRAVENOUS at 09:34

## 2025-08-19 RX ADMIN — PANTOPRAZOLE SODIUM 40 MG: 40 TABLET, DELAYED RELEASE ORAL at 06:40

## 2025-08-19 RX ADMIN — HYDRALAZINE HYDROCHLORIDE 10 MG: 20 INJECTION INTRAMUSCULAR; INTRAVENOUS at 17:13

## 2025-08-19 SDOH — ECONOMIC STABILITY: FOOD INSECURITY: WITHIN THE PAST 12 MONTHS, YOU WORRIED THAT YOUR FOOD WOULD RUN OUT BEFORE YOU GOT THE MONEY TO BUY MORE.: NEVER TRUE

## 2025-08-19 SDOH — HEALTH STABILITY: MENTAL HEALTH: HOW MANY DRINKS CONTAINING ALCOHOL DO YOU HAVE ON A TYPICAL DAY WHEN YOU ARE DRINKING?: PATIENT DOES NOT DRINK

## 2025-08-19 SDOH — SOCIAL STABILITY: SOCIAL NETWORK: HOW OFTEN DO YOU ATTEND CHURCH OR RELIGIOUS SERVICES?: NEVER

## 2025-08-19 SDOH — HEALTH STABILITY: MENTAL HEALTH: HOW OFTEN DO YOU HAVE A DRINK CONTAINING ALCOHOL?: NEVER

## 2025-08-19 SDOH — SOCIAL STABILITY: SOCIAL NETWORK: HOW OFTEN DO YOU ATTEND MEETINGS OF THE CLUBS OR ORGANIZATIONS YOU BELONG TO?: NEVER

## 2025-08-19 SDOH — SOCIAL STABILITY: SOCIAL INSECURITY: WITHIN THE LAST YEAR, HAVE YOU BEEN HUMILIATED OR EMOTIONALLY ABUSED IN OTHER WAYS BY YOUR PARTNER OR EX-PARTNER?: NO

## 2025-08-19 SDOH — HEALTH STABILITY: PHYSICAL HEALTH: ON AVERAGE, HOW MANY MINUTES DO YOU ENGAGE IN EXERCISE AT THIS LEVEL?: 30 MIN

## 2025-08-19 SDOH — ECONOMIC STABILITY: FOOD INSECURITY: WITHIN THE PAST 12 MONTHS, THE FOOD YOU BOUGHT JUST DIDN'T LAST AND YOU DIDN'T HAVE MONEY TO GET MORE.: NEVER TRUE

## 2025-08-19 SDOH — SOCIAL STABILITY: SOCIAL INSECURITY: ARE YOU MARRIED, WIDOWED, DIVORCED, SEPARATED, NEVER MARRIED, OR LIVING WITH A PARTNER?: MARRIED

## 2025-08-19 SDOH — SOCIAL STABILITY: SOCIAL INSECURITY: WITHIN THE LAST YEAR, HAVE YOU BEEN AFRAID OF YOUR PARTNER OR EX-PARTNER?: NO

## 2025-08-19 SDOH — HEALTH STABILITY: MENTAL HEALTH: HOW OFTEN DO YOU HAVE SIX OR MORE DRINKS ON ONE OCCASION?: NEVER

## 2025-08-19 SDOH — SOCIAL STABILITY: SOCIAL INSECURITY
WITHIN THE LAST YEAR, HAVE YOU BEEN KICKED, HIT, SLAPPED, OR OTHERWISE PHYSICALLY HURT BY YOUR PARTNER OR EX-PARTNER?: NO

## 2025-08-19 SDOH — SOCIAL STABILITY: SOCIAL NETWORK: HOW OFTEN DO YOU GET TOGETHER WITH FRIENDS OR RELATIVES?: THREE TIMES A WEEK

## 2025-08-19 SDOH — SOCIAL STABILITY: SOCIAL INSECURITY
WITHIN THE LAST YEAR, HAVE YOU BEEN RAPED OR FORCED TO HAVE ANY KIND OF SEXUAL ACTIVITY BY YOUR PARTNER OR EX-PARTNER?: NO

## 2025-08-19 SDOH — SOCIAL STABILITY: SOCIAL NETWORK
DO YOU BELONG TO ANY CLUBS OR ORGANIZATIONS SUCH AS CHURCH GROUPS, UNIONS, FRATERNAL OR ATHLETIC GROUPS, OR SCHOOL GROUPS?: NO

## 2025-08-19 SDOH — ECONOMIC STABILITY: INCOME INSECURITY: IN THE PAST 12 MONTHS HAS THE ELECTRIC, GAS, OIL, OR WATER COMPANY THREATENED TO SHUT OFF SERVICES IN YOUR HOME?: NO

## 2025-08-19 SDOH — HEALTH STABILITY: PHYSICAL HEALTH: ON AVERAGE, HOW MANY DAYS PER WEEK DO YOU ENGAGE IN MODERATE TO STRENUOUS EXERCISE (LIKE A BRISK WALK)?: 3 DAYS

## 2025-08-19 SDOH — HEALTH STABILITY: MENTAL HEALTH
DO YOU FEEL STRESS - TENSE, RESTLESS, NERVOUS, OR ANXIOUS, OR UNABLE TO SLEEP AT NIGHT BECAUSE YOUR MIND IS TROUBLED ALL THE TIME - THESE DAYS?: NOT AT ALL

## 2025-08-19 SDOH — HEALTH STABILITY: PHYSICAL HEALTH
HOW OFTEN DO YOU NEED TO HAVE SOMEONE HELP YOU WHEN YOU READ INSTRUCTIONS, PAMPHLETS, OR OTHER WRITTEN MATERIAL FROM YOUR DOCTOR OR PHARMACY?: RARELY

## 2025-08-19 SDOH — SOCIAL STABILITY: SOCIAL NETWORK: IN A TYPICAL WEEK, HOW MANY TIMES DO YOU TALK ON THE PHONE WITH FAMILY, FRIENDS, OR NEIGHBORS?: THREE TIMES A WEEK

## 2025-08-19 ASSESSMENT — COGNITIVE AND FUNCTIONAL STATUS - GENERAL
WALKING IN HOSPITAL ROOM: A LITTLE
MOVING TO AND FROM BED TO CHAIR: A LITTLE
HELP NEEDED FOR BATHING: A LITTLE
STANDING UP FROM CHAIR USING ARMS: A LITTLE
TOILETING: A LITTLE
MOVING FROM LYING ON BACK TO SITTING ON SIDE OF FLAT BED WITH BEDRAILS: A LITTLE
HELP NEEDED FOR BATHING: A LITTLE
DAILY ACTIVITIY SCORE: 20
TOILETING: A LITTLE
MOVING TO AND FROM BED TO CHAIR: A LITTLE
PERSONAL GROOMING: A LITTLE
MOBILITY SCORE: 18
STANDING UP FROM CHAIR USING ARMS: A LOT
DAILY ACTIVITIY SCORE: 20
CLIMB 3 TO 5 STEPS WITH RAILING: A LITTLE
WALKING IN HOSPITAL ROOM: TOTAL
TURNING FROM BACK TO SIDE WHILE IN FLAT BAD: A LITTLE
DRESSING REGULAR LOWER BODY CLOTHING: A LOT
DRESSING REGULAR LOWER BODY CLOTHING: A LITTLE
CLIMB 3 TO 5 STEPS WITH RAILING: TOTAL
MOBILITY SCORE: 15

## 2025-08-19 ASSESSMENT — ACTIVITIES OF DAILY LIVING (ADL)
HOME_MANAGEMENT_TIME_ENTRY: 15
HOME_MANAGEMENT_TIME_ENTRY: 15
ADLS_ADDRESSED: NO
LACK_OF_TRANSPORTATION: NO
ADL_ASSISTANCE: INDEPENDENT
ADL_ASSISTANCE: INDEPENDENT

## 2025-08-19 ASSESSMENT — PAIN - FUNCTIONAL ASSESSMENT
PAIN_FUNCTIONAL_ASSESSMENT: 0-10

## 2025-08-19 ASSESSMENT — PAIN SCALES - GENERAL
PAINLEVEL_OUTOF10: 0 - NO PAIN

## 2025-08-19 ASSESSMENT — LIFESTYLE VARIABLES
SKIP TO QUESTIONS 9-10: 1
AUDIT-C TOTAL SCORE: 0

## 2025-08-20 ENCOUNTER — DOCUMENTATION (OUTPATIENT)
Dept: HOME HEALTH SERVICES | Facility: HOME HEALTH | Age: 65
End: 2025-08-20
Payer: COMMERCIAL

## 2025-08-20 ENCOUNTER — TELEPHONE (OUTPATIENT)
Dept: PRIMARY CARE | Facility: CLINIC | Age: 65
End: 2025-08-20
Payer: COMMERCIAL

## 2025-08-20 ENCOUNTER — HOME HEALTH ADMISSION (OUTPATIENT)
Dept: HOME HEALTH SERVICES | Facility: HOME HEALTH | Age: 65
End: 2025-08-20
Payer: COMMERCIAL

## 2025-08-20 VITALS
BODY MASS INDEX: 33.8 KG/M2 | RESPIRATION RATE: 20 BRPM | TEMPERATURE: 98.2 F | DIASTOLIC BLOOD PRESSURE: 73 MMHG | HEIGHT: 69 IN | HEART RATE: 83 BPM | WEIGHT: 228.18 LBS | SYSTOLIC BLOOD PRESSURE: 134 MMHG | OXYGEN SATURATION: 98 %

## 2025-08-20 DIAGNOSIS — E78.2 MIXED HYPERLIPIDEMIA: ICD-10-CM

## 2025-08-20 DIAGNOSIS — D43.2 HEMANGIOBLASTOMA OF BRAIN (MULTI): Primary | ICD-10-CM

## 2025-08-20 LAB
ALBUMIN SERPL BCP-MCNC: 3.6 G/DL (ref 3.4–5)
ANION GAP SERPL CALC-SCNC: 13 MMOL/L (ref 10–20)
APTT PPP: 28 SECONDS (ref 26–36)
BASOPHILS # BLD AUTO: 0.01 X10*3/UL (ref 0–0.1)
BASOPHILS NFR BLD AUTO: 0.1 %
BUN SERPL-MCNC: 32 MG/DL (ref 6–23)
CA-I BLD-SCNC: 1.14 MMOL/L (ref 1.1–1.33)
CALCIUM SERPL-MCNC: 8.7 MG/DL (ref 8.6–10.6)
CHLORIDE SERPL-SCNC: 105 MMOL/L (ref 98–107)
CO2 SERPL-SCNC: 27 MMOL/L (ref 21–32)
CREAT SERPL-MCNC: 1.05 MG/DL (ref 0.5–1.3)
EGFRCR SERPLBLD CKD-EPI 2021: 80 ML/MIN/1.73M*2
EOSINOPHIL # BLD AUTO: 0 X10*3/UL (ref 0–0.7)
EOSINOPHIL NFR BLD AUTO: 0 %
ERYTHROCYTE [DISTWIDTH] IN BLOOD BY AUTOMATED COUNT: 14.3 % (ref 11.5–14.5)
GLUCOSE BLD MANUAL STRIP-MCNC: 147 MG/DL (ref 74–99)
GLUCOSE BLD MANUAL STRIP-MCNC: 178 MG/DL (ref 74–99)
GLUCOSE SERPL-MCNC: 151 MG/DL (ref 74–99)
HCT VFR BLD AUTO: 36.8 % (ref 41–52)
HGB BLD-MCNC: 12.7 G/DL (ref 13.5–17.5)
IMM GRANULOCYTES # BLD AUTO: 0.15 X10*3/UL (ref 0–0.7)
IMM GRANULOCYTES NFR BLD AUTO: 1.1 % (ref 0–0.9)
INR PPP: 1.2 (ref 0.9–1.1)
LYMPHOCYTES # BLD AUTO: 0.87 X10*3/UL (ref 1.2–4.8)
LYMPHOCYTES NFR BLD AUTO: 6.1 %
MAGNESIUM SERPL-MCNC: 2.43 MG/DL (ref 1.6–2.4)
MCH RBC QN AUTO: 29.5 PG (ref 26–34)
MCHC RBC AUTO-ENTMCNC: 34.5 G/DL (ref 32–36)
MCV RBC AUTO: 86 FL (ref 80–100)
MONOCYTES # BLD AUTO: 0.77 X10*3/UL (ref 0.1–1)
MONOCYTES NFR BLD AUTO: 5.4 %
NEUTROPHILS # BLD AUTO: 12.37 X10*3/UL (ref 1.2–7.7)
NEUTROPHILS NFR BLD AUTO: 87.3 %
NRBC BLD-RTO: 0 /100 WBCS (ref 0–0)
PHOSPHATE SERPL-MCNC: 1.9 MG/DL (ref 2.5–4.9)
PLATELET # BLD AUTO: 209 X10*3/UL (ref 150–450)
POTASSIUM SERPL-SCNC: 3.9 MMOL/L (ref 3.5–5.3)
PROTHROMBIN TIME: 13.4 SECONDS (ref 9.8–12.4)
RBC # BLD AUTO: 4.3 X10*6/UL (ref 4.5–5.9)
SODIUM SERPL-SCNC: 141 MMOL/L (ref 136–145)
WBC # BLD AUTO: 14.2 X10*3/UL (ref 4.4–11.3)

## 2025-08-20 PROCEDURE — 36415 COLL VENOUS BLD VENIPUNCTURE: CPT | Performed by: STUDENT IN AN ORGANIZED HEALTH CARE EDUCATION/TRAINING PROGRAM

## 2025-08-20 PROCEDURE — 99024 POSTOP FOLLOW-UP VISIT: CPT | Performed by: NURSE PRACTITIONER

## 2025-08-20 PROCEDURE — 2500000001 HC RX 250 WO HCPCS SELF ADMINISTERED DRUGS (ALT 637 FOR MEDICARE OP): Performed by: STUDENT IN AN ORGANIZED HEALTH CARE EDUCATION/TRAINING PROGRAM

## 2025-08-20 PROCEDURE — 2500000002 HC RX 250 W HCPCS SELF ADMINISTERED DRUGS (ALT 637 FOR MEDICARE OP, ALT 636 FOR OP/ED)

## 2025-08-20 PROCEDURE — 2500000005 HC RX 250 GENERAL PHARMACY W/O HCPCS

## 2025-08-20 PROCEDURE — 80069 RENAL FUNCTION PANEL: CPT | Performed by: STUDENT IN AN ORGANIZED HEALTH CARE EDUCATION/TRAINING PROGRAM

## 2025-08-20 PROCEDURE — 2500000004 HC RX 250 GENERAL PHARMACY W/ HCPCS (ALT 636 FOR OP/ED)

## 2025-08-20 PROCEDURE — 82330 ASSAY OF CALCIUM: CPT | Performed by: STUDENT IN AN ORGANIZED HEALTH CARE EDUCATION/TRAINING PROGRAM

## 2025-08-20 PROCEDURE — 2500000001 HC RX 250 WO HCPCS SELF ADMINISTERED DRUGS (ALT 637 FOR MEDICARE OP)

## 2025-08-20 PROCEDURE — 82947 ASSAY GLUCOSE BLOOD QUANT: CPT

## 2025-08-20 PROCEDURE — 85610 PROTHROMBIN TIME: CPT

## 2025-08-20 PROCEDURE — 85025 COMPLETE CBC W/AUTO DIFF WBC: CPT | Performed by: STUDENT IN AN ORGANIZED HEALTH CARE EDUCATION/TRAINING PROGRAM

## 2025-08-20 PROCEDURE — 83735 ASSAY OF MAGNESIUM: CPT | Performed by: STUDENT IN AN ORGANIZED HEALTH CARE EDUCATION/TRAINING PROGRAM

## 2025-08-20 RX ORDER — CYCLOBENZAPRINE HCL 10 MG
10 TABLET ORAL 3 TIMES DAILY PRN
Qty: 21 TABLET | Refills: 0 | Status: SHIPPED | OUTPATIENT
Start: 2025-08-20 | End: 2025-08-27

## 2025-08-20 RX ORDER — DEXAMETHASONE 1 MG/1
TABLET ORAL
Qty: 74 TABLET | Refills: 0 | Status: SHIPPED | OUTPATIENT
Start: 2025-08-20 | End: 2025-09-04

## 2025-08-20 RX ORDER — AMOXICILLIN 250 MG
2 CAPSULE ORAL 2 TIMES DAILY
Qty: 28 TABLET | Refills: 0 | Status: SHIPPED | OUTPATIENT
Start: 2025-08-20 | End: 2025-08-27

## 2025-08-20 RX ORDER — OXYCODONE HYDROCHLORIDE 5 MG/1
5 TABLET ORAL EVERY 6 HOURS PRN
Qty: 28 TABLET | Refills: 0 | Status: SHIPPED | OUTPATIENT
Start: 2025-08-20 | End: 2025-08-27

## 2025-08-20 RX ORDER — ACETAMINOPHEN 325 MG/1
650 TABLET ORAL EVERY 4 HOURS PRN
Start: 2025-08-20

## 2025-08-20 RX ORDER — HEPARIN SODIUM 5000 [USP'U]/ML
5000 INJECTION, SOLUTION INTRAVENOUS; SUBCUTANEOUS EVERY 8 HOURS
Status: DISCONTINUED | OUTPATIENT
Start: 2025-08-20 | End: 2025-08-20 | Stop reason: HOSPADM

## 2025-08-20 RX ADMIN — METOPROLOL SUCCINATE 100 MG: 50 TABLET, EXTENDED RELEASE ORAL at 08:26

## 2025-08-20 RX ADMIN — HYDROCHLOROTHIAZIDE: 25 TABLET ORAL at 08:26

## 2025-08-20 RX ADMIN — ACETAMINOPHEN 650 MG: 325 TABLET, FILM COATED ORAL at 12:24

## 2025-08-20 RX ADMIN — HEPARIN SODIUM 5000 UNITS: 5000 INJECTION, SOLUTION INTRAVENOUS; SUBCUTANEOUS at 08:27

## 2025-08-20 RX ADMIN — CARBOXYMETHYLCELLULOSE SODIUM 1 DROP: 5 SOLUTION/ DROPS OPHTHALMIC at 10:48

## 2025-08-20 RX ADMIN — CYCLOBENZAPRINE 10 MG: 10 TABLET, FILM COATED ORAL at 08:26

## 2025-08-20 RX ADMIN — CARBOXYMETHYLCELLULOSE SODIUM 1 DROP: 5 SOLUTION/ DROPS OPHTHALMIC at 08:06

## 2025-08-20 RX ADMIN — ICOSAPENT ETHYL 2 G: 1 CAPSULE ORAL at 08:27

## 2025-08-20 RX ADMIN — PANTOPRAZOLE SODIUM 40 MG: 40 TABLET, DELAYED RELEASE ORAL at 06:01

## 2025-08-20 RX ADMIN — POTASSIUM CHLORIDE 20 MEQ: 1500 TABLET, EXTENDED RELEASE ORAL at 06:01

## 2025-08-20 RX ADMIN — DEXAMETHASONE 4 MG: 4 TABLET ORAL at 06:01

## 2025-08-20 SDOH — SOCIAL STABILITY: SOCIAL INSECURITY: DO YOU FEEL UNSAFE GOING BACK TO THE PLACE WHERE YOU ARE LIVING?: NO

## 2025-08-20 SDOH — SOCIAL STABILITY: SOCIAL INSECURITY: ARE YOU OR HAVE YOU BEEN THREATENED OR ABUSED PHYSICALLY, EMOTIONALLY, OR SEXUALLY BY ANYONE?: NO

## 2025-08-20 SDOH — SOCIAL STABILITY: SOCIAL INSECURITY: HAS ANYONE EVER THREATENED TO HURT YOUR FAMILY OR YOUR PETS?: NO

## 2025-08-20 SDOH — SOCIAL STABILITY: SOCIAL INSECURITY: WERE YOU ABLE TO COMPLETE ALL THE BEHAVIORAL HEALTH SCREENINGS?: YES

## 2025-08-20 SDOH — SOCIAL STABILITY: SOCIAL INSECURITY: ARE THERE ANY APPARENT SIGNS OF INJURIES/BEHAVIORS THAT COULD BE RELATED TO ABUSE/NEGLECT?: NO

## 2025-08-20 SDOH — SOCIAL STABILITY: SOCIAL INSECURITY: DO YOU FEEL ANYONE HAS EXPLOITED OR TAKEN ADVANTAGE OF YOU FINANCIALLY OR OF YOUR PERSONAL PROPERTY?: NO

## 2025-08-20 SDOH — SOCIAL STABILITY: SOCIAL INSECURITY: DOES ANYONE TRY TO KEEP YOU FROM HAVING/CONTACTING OTHER FRIENDS OR DOING THINGS OUTSIDE YOUR HOME?: NO

## 2025-08-20 SDOH — SOCIAL STABILITY: SOCIAL INSECURITY
ASK PARENT OR GUARDIAN: ARE THERE TIMES WHEN YOU, YOUR CHILD(REN), OR ANY MEMBER OF YOUR HOUSEHOLD FEEL UNSAFE, HARMED, OR THREATENED AROUND PERSONS WITH WHOM YOU KNOW OR LIVE?: NO

## 2025-08-20 SDOH — SOCIAL STABILITY: SOCIAL INSECURITY: HAVE YOU HAD THOUGHTS OF HARMING ANYONE ELSE?: NO

## 2025-08-20 SDOH — SOCIAL STABILITY: SOCIAL INSECURITY: ABUSE: ADULT

## 2025-08-20 ASSESSMENT — COGNITIVE AND FUNCTIONAL STATUS - GENERAL
DRESSING REGULAR LOWER BODY CLOTHING: A LOT
DAILY ACTIVITIY SCORE: 20
MOBILITY SCORE: 15
DRESSING REGULAR LOWER BODY CLOTHING: A LOT
HELP NEEDED FOR BATHING: A LITTLE
STANDING UP FROM CHAIR USING ARMS: A LOT
WALKING IN HOSPITAL ROOM: TOTAL
HELP NEEDED FOR BATHING: A LITTLE
MOBILITY SCORE: 15
DRESSING REGULAR LOWER BODY CLOTHING: A LOT
MOVING TO AND FROM BED TO CHAIR: A LITTLE
STANDING UP FROM CHAIR USING ARMS: A LOT
DAILY ACTIVITIY SCORE: 20
CLIMB 3 TO 5 STEPS WITH RAILING: TOTAL
MOVING TO AND FROM BED TO CHAIR: A LITTLE
MOBILITY SCORE: 15
STANDING UP FROM CHAIR USING ARMS: A LOT
TOILETING: A LITTLE
DAILY ACTIVITIY SCORE: 20
CLIMB 3 TO 5 STEPS WITH RAILING: TOTAL
WALKING IN HOSPITAL ROOM: TOTAL
PATIENT BASELINE BEDBOUND: NO
HELP NEEDED FOR BATHING: A LITTLE
WALKING IN HOSPITAL ROOM: TOTAL
MOVING TO AND FROM BED TO CHAIR: A LITTLE
CLIMB 3 TO 5 STEPS WITH RAILING: TOTAL
TOILETING: A LITTLE
TOILETING: A LITTLE

## 2025-08-20 ASSESSMENT — ACTIVITIES OF DAILY LIVING (ADL)
ADEQUATE_TO_COMPLETE_ADL: YES
GROOMING: INDEPENDENT
HEARING - LEFT EAR: FUNCTIONAL
FEEDING YOURSELF: INDEPENDENT
PATIENT'S MEMORY ADEQUATE TO SAFELY COMPLETE DAILY ACTIVITIES?: YES
WALKS IN HOME: INDEPENDENT
HEARING - RIGHT EAR: FUNCTIONAL
BATHING: INDEPENDENT
DRESSING YOURSELF: INDEPENDENT
JUDGMENT_ADEQUATE_SAFELY_COMPLETE_DAILY_ACTIVITIES: YES
TOILETING: INDEPENDENT

## 2025-08-20 ASSESSMENT — LIFESTYLE VARIABLES
SKIP TO QUESTIONS 9-10: 1
AUDIT-C TOTAL SCORE: 0
HOW OFTEN DO YOU HAVE A DRINK CONTAINING ALCOHOL: NEVER
HOW OFTEN DO YOU HAVE 6 OR MORE DRINKS ON ONE OCCASION: NEVER
HOW MANY STANDARD DRINKS CONTAINING ALCOHOL DO YOU HAVE ON A TYPICAL DAY: PATIENT DOES NOT DRINK
AUDIT-C TOTAL SCORE: 0

## 2025-08-20 ASSESSMENT — PAIN - FUNCTIONAL ASSESSMENT
PAIN_FUNCTIONAL_ASSESSMENT: 0-10

## 2025-08-20 ASSESSMENT — PAIN SCALES - GENERAL
PAINLEVEL_OUTOF10: 0 - NO PAIN
PAINLEVEL_OUTOF10: 0 - NO PAIN
PAINLEVEL_OUTOF10: 3
PAINLEVEL_OUTOF10: 0 - NO PAIN

## 2025-08-21 LAB
BLOOD EXPIRATION DATE: NORMAL
BLOOD EXPIRATION DATE: NORMAL
DISPENSE STATUS: NORMAL
DISPENSE STATUS: NORMAL
PRODUCT BLOOD TYPE: 6200
PRODUCT BLOOD TYPE: 6200
PRODUCT CODE: NORMAL
PRODUCT CODE: NORMAL
UNIT ABO: NORMAL
UNIT ABO: NORMAL
UNIT NUMBER: NORMAL
UNIT NUMBER: NORMAL
UNIT RH: NORMAL
UNIT RH: NORMAL
UNIT VOLUME: 350
UNIT VOLUME: 350
XM INTEP: NORMAL
XM INTEP: NORMAL

## 2025-08-22 ENCOUNTER — TELEPHONE (OUTPATIENT)
Dept: PRIMARY CARE | Facility: CLINIC | Age: 65
End: 2025-08-22
Payer: COMMERCIAL

## 2025-08-22 ENCOUNTER — HOME CARE VISIT (OUTPATIENT)
Dept: HOME HEALTH SERVICES | Facility: HOME HEALTH | Age: 65
End: 2025-08-22

## 2025-08-23 ENCOUNTER — HOME CARE VISIT (OUTPATIENT)
Dept: HOME HEALTH SERVICES | Facility: HOME HEALTH | Age: 65
End: 2025-08-23

## 2025-08-25 DIAGNOSIS — E78.2 MIXED HYPERLIPIDEMIA: Primary | ICD-10-CM

## 2025-08-25 RX ORDER — ATORVASTATIN CALCIUM 20 MG/1
TABLET, FILM COATED ORAL
Refills: 0 | OUTPATIENT
Start: 2025-08-25

## 2025-08-25 RX ORDER — ATORVASTATIN CALCIUM 20 MG/1
20 TABLET, FILM COATED ORAL DAILY
Qty: 90 TABLET | Refills: 3 | Status: SHIPPED | OUTPATIENT
Start: 2025-08-25 | End: 2026-08-25

## 2025-08-27 ENCOUNTER — APPOINTMENT (OUTPATIENT)
Dept: HEMATOLOGY/ONCOLOGY | Facility: HOSPITAL | Age: 65
End: 2025-08-27
Payer: COMMERCIAL

## 2025-08-27 LAB
LABORATORY COMMENT REPORT: NORMAL
PATH REPORT.FINAL DX SPEC: NORMAL
PATH REPORT.GROSS SPEC: NORMAL
PATH REPORT.RELEVANT HX SPEC: NORMAL
PATH REPORT.TOTAL CANCER: NORMAL

## 2025-09-04 ENCOUNTER — OFFICE VISIT (OUTPATIENT)
Dept: NEUROSURGERY | Facility: CLINIC | Age: 65
End: 2025-09-04
Payer: COMMERCIAL

## 2025-09-04 VITALS
DIASTOLIC BLOOD PRESSURE: 83 MMHG | WEIGHT: 216 LBS | HEIGHT: 70 IN | RESPIRATION RATE: 18 BRPM | BODY MASS INDEX: 30.92 KG/M2 | TEMPERATURE: 97.3 F | HEART RATE: 80 BPM | SYSTOLIC BLOOD PRESSURE: 118 MMHG

## 2025-09-04 DIAGNOSIS — G89.18 POSTOPERATIVE PAIN: ICD-10-CM

## 2025-09-04 DIAGNOSIS — D43.2 HEMANGIOBLASTOMA OF BRAIN (MULTI): Primary | ICD-10-CM

## 2025-09-04 RX ORDER — CYCLOBENZAPRINE HCL 10 MG
10 TABLET ORAL 3 TIMES DAILY PRN
Qty: 21 TABLET | Refills: 0 | Status: SHIPPED | OUTPATIENT
Start: 2025-09-04 | End: 2025-09-11

## 2025-09-04 RX ORDER — CEPHALEXIN 500 MG/1
500 CAPSULE ORAL 3 TIMES DAILY
Qty: 15 CAPSULE | Refills: 0 | Status: SHIPPED | OUTPATIENT
Start: 2025-09-04 | End: 2025-09-09

## 2025-09-04 RX ORDER — OXYCODONE HYDROCHLORIDE 5 MG/1
5 TABLET ORAL EVERY 6 HOURS PRN
Qty: 20 TABLET | Refills: 0 | Status: SHIPPED | OUTPATIENT
Start: 2025-09-04 | End: 2025-09-09

## 2025-09-04 ASSESSMENT — PAIN SCALES - GENERAL: PAINLEVEL_OUTOF10: 0-NO PAIN

## 2025-10-28 ENCOUNTER — APPOINTMENT (OUTPATIENT)
Dept: ENDOCRINOLOGY | Facility: CLINIC | Age: 65
End: 2025-10-28
Payer: COMMERCIAL

## 2026-01-26 ENCOUNTER — APPOINTMENT (OUTPATIENT)
Dept: PRIMARY CARE | Facility: CLINIC | Age: 66
End: 2026-01-26
Payer: COMMERCIAL

## 2026-06-25 ENCOUNTER — APPOINTMENT (OUTPATIENT)
Dept: OPHTHALMOLOGY | Facility: CLINIC | Age: 66
End: 2026-06-25
Payer: COMMERCIAL

## 2026-07-28 ENCOUNTER — APPOINTMENT (OUTPATIENT)
Dept: OPHTHALMOLOGY | Facility: CLINIC | Age: 66
End: 2026-07-28
Payer: COMMERCIAL

## (undated) DEVICE — BACKGROUND MATERIAL, MERCIAN, YELLOW, 1MM GRID, LF

## (undated) DEVICE — MARKER, SKIN, RULER AND LABEL PACK, CUSTOM

## (undated) DEVICE — PREP, SKIN, DURAPREP, 6 CC

## (undated) DEVICE — PAD, GROUNDING, ELECTROSURGICAL, W/9 FT CABLE, POLYHESIVE II, ADULT, LF

## (undated) DEVICE — SUTURE, MONOCRYL, 4-0, 18 IN, PS2, UNDYED

## (undated) DEVICE — KIT, PATIENT CARE, JACKSON TABLE W/PRONE-SAFE HEADREST

## (undated) DEVICE — TUBING, SMOKE EVAC, 3/8 X 10 FT

## (undated) DEVICE — STOCKINETTE, DOUBLE PLY, 6 X 48 IN, STERILE

## (undated) DEVICE — Device

## (undated) DEVICE — BUR, 3MM X 3.8MM, PRECISION, NEURO DRILL

## (undated) DEVICE — PROTECTOR, NERVE, ULNAR, PINK

## (undated) DEVICE — SUTURE, VICRYL, 0, 18 IN, CT-1, UNDYED

## (undated) DEVICE — TUBING, SUCTION, CONNECTING, STERILE 0.25 X 120 IN., LF

## (undated) DEVICE — PERFORATOR, CRANIAL 4OD 11ID 3S WHITE

## (undated) DEVICE — BUR, ROUND 3MM DIAMOND, ELITE

## (undated) DEVICE — SYRINGE, 60 CC, IRRIGATION, BULB, CONTRO-BULB, PAPER POUCH

## (undated) DEVICE — MANIFOLD, 4 PORT NEPTUNE STANDARD

## (undated) DEVICE — RETRACTOR, HOOK, FISH, NEURO

## (undated) DEVICE — ELECTRODE, GROUND PLATE

## (undated) DEVICE — ADHESIVE, SKIN, MASTISOL, 2/3 CC VIAL

## (undated) DEVICE — DRAPE, TIBURON, SPLIT SHEET, REINF ADH STRIP, 77X122

## (undated) DEVICE — DRESSING, MEPILEX, BORDER, HEEL, 8.7 X 9.1 IN

## (undated) DEVICE — CAUTERY, PENCIL, PUSH BUTTON, SMOKE EVAC, 70MM

## (undated) DEVICE — DRESSING, TRANSPARENT, TEGADERM, 4 X 4-3/4 IN

## (undated) DEVICE — STOCKINETTE, IMPERVIOUS, 12 X 48 IN, STERILE

## (undated) DEVICE — CONTAINER, SPECIMEN, 120 ML, STERILE

## (undated) DEVICE — DRAPE, SHEET, 17 X 23 IN

## (undated) DEVICE — DRAPE, INSTRUMENT, W/POUCH, STERI DRAPE, 7 X 11 IN, DISPOSABLE, STERILE

## (undated) DEVICE — BUR, ROUTER BIT, FA2, TAPERED, 2.3MM

## (undated) DEVICE — COTTON BALL, DOUBLE STRING, LARGE

## (undated) DEVICE — GOWN, SURGICAL, SMARTGOWN, XLARGE, STERILE

## (undated) DEVICE — DRESSING, NON-ADHERENT, OIL EMULSION, CURITY, 3 X 8 IN, STERILE

## (undated) DEVICE — REST, HEAD, BAGEL, 7 IN

## (undated) DEVICE — DRAPE, SMARTDRAPE, FOR TIVATO MICROSCOPE

## (undated) DEVICE — SPONGE, HEMOSTATIC, CELLULOSE, SURGICEL, 2 X 14 IN

## (undated) DEVICE — BAG, PLASTIC, 10 X 17 IN

## (undated) DEVICE — SUTURE, VICRYL, 2-0, 27 IN, BR/SH 27, VIOLET

## (undated) DEVICE — CATHETER TRAY, SURESTEP, 16FR, URINE METER W/STATLOCK

## (undated) DEVICE — PACKING, NASAL, STANDARD, W/STRING, MEROCEL, 8 CM

## (undated) DEVICE — COVER PROBE, SOFT FLEX W/ GEL, 5 X 48 IN (13X122CM)

## (undated) DEVICE — SYRINGE, 2 OZ, IRRIGATION, BULB, EAR/ULCER, BLUE, LF

## (undated) DEVICE — SYRINGE, 50 CC, LUER LOCK

## (undated) DEVICE — ELECTRODE, CORKSCREW NEEDLE 1.5M LENGTH

## (undated) DEVICE — BALL, COTTON, STANDARD, STERILE

## (undated) DEVICE — DRAPE, SHEET, FAN FOLDED, HALF, 44 X 58 IN, DISPOSABLE, LF, STERILE

## (undated) DEVICE — RUBBER BANDS,LG,HVY,6MM,3H/PKG

## (undated) DEVICE — COVER, TABLE, UHC

## (undated) DEVICE — REST, HEAD, BAGEL, 9 IN

## (undated) DEVICE — COVER, CART, 45 X 27 X 48 IN, CLEAR

## (undated) DEVICE — CATHETER, IV, ANGIOCATH, 14 G X 1.16 IN, FEP POLYMER

## (undated) DEVICE — DRESSING, GAUZE, PETROLATUM, PATCH, XEROFORM, 1 X 8 IN, STERILE

## (undated) DEVICE — COTTON BALL, DOUBLE STRING, SMALL

## (undated) DEVICE — ELECTRODE, ELECTROSURGICAL, BLADE EXT 4 INCH, INSULATED

## (undated) DEVICE — BAND, RUBBER, 3 IN, STERILE

## (undated) DEVICE — DRESSING, MEPILEX, BORDER, SACRUM, 8.7 X 9.8 IN

## (undated) DEVICE — COVER, PLASTIC, MAYO STAND, 29.5IN X 55.5IN

## (undated) DEVICE — NEEDLE, ELECTRODE, SUBDERMAL,SINGLE, 200CM LEAD, DISP

## (undated) DEVICE — DRAPE, FLUID WARMER

## (undated) DEVICE — DRESSING, GAUZE, 16 PLY, 4 X 4 IN, STERILE

## (undated) DEVICE — SPONGE, HEMOSTAT, SURGICEL FIBRILLAR, ABS, 4 X 4, LF

## (undated) DEVICE — BUR, STRAIGHT ROUTER

## (undated) DEVICE — SEALANT, HEMOSTATIC, FLOSEAL, 10 ML

## (undated) DEVICE — NEEDLE, ELECTRODE, SUBDERMAL, PAIRED, 2.0 LEAD, DISP

## (undated) DEVICE — BUR, PERFORATOR, CRANIAL, ACRA-CUT 14/11MM, CDM

## (undated) DEVICE — COTTON BALL, DBL STRUNG, XRAY DETECT, MEDIUM, 0.75IN, ST(5PK)

## (undated) DEVICE — MARKER, SKIN, DUAL TIP INK W/9 LABEL AND REMOVABLE TIME OUT SLEEVE

## (undated) DEVICE — SOLUTION, HYDROGEN PEROXIDE 3%, 8 OZ

## (undated) DEVICE — TOWEL, SURGICAL, NEURO, O/R, 16 X 26, BLUE, STERILE

## (undated) DEVICE — STOCKINETTE, IMPERVIOUS, 12 X 48 IN, LF, STERILE

## (undated) DEVICE — BLADE, OPHTHALMIC, MINI, STRAIGHT, DOUBLE BEVEL, SHARP ALL AROUND

## (undated) DEVICE — CLAMP, DRAPE/TUBE, DISPOSABLE, NS

## (undated) DEVICE — SUTURE, VICYL 2-0, TAPER POINT, SH VIOLET 8-18 INCH

## (undated) DEVICE — PLEDGET, PTFE, FIRM, 3/8 X 3/16 X 1/6 IN, MULTIPACK

## (undated) DEVICE — DRAPE PACK, CRANIOTOMY, CUSTOM, UHC

## (undated) DEVICE — DRESSING, ADAPTIC, NON-ADHERENT, 3 X 8 IN

## (undated) DEVICE — SPONGE, HEMOSTATIC, GELATIN, SURGIFOAM, 8 X 12.5 CM X 10 MM

## (undated) DEVICE — SPONGE, HEMOSTATIC, CELLULOSE, SURGICEL, FIBRILLAR, 2 X 4 IN

## (undated) DEVICE — DRAPE, IRRIGATION, W/POUCH, ADHESIVE STRIP, STERI DRAPE, 19 X 23 IN, DISPOSABLE, STERILE

## (undated) DEVICE — BALL, COTTON, MEDIUM, STERILE

## (undated) DEVICE — SUTURE, SURGIPRO, 6-0, 30 IN, CVF11, DA, BLUE

## (undated) DEVICE — BUR, ACORN 9MM PRECISION

## (undated) DEVICE — SUTURE, NUROLON, 4-0, 18 IN, TF, CONTROL RELEASE, MULTIPACK, BLACK

## (undated) DEVICE — DRESSING, TRANSPARENT, TEGADERM, 2-3/8 X 2-3/4 IN

## (undated) DEVICE — CORD, CAUTERY, BIPOLAR, STERILE

## (undated) DEVICE — COVER, TABLE, 44 X 75 IN, DISPOSABLE, LF, STERILE

## (undated) DEVICE — DRAPE, TOWEL, STERI DRAPE, 17 X 11 IN, PLASTIC, STERILE